# Patient Record
Sex: MALE | Race: WHITE | NOT HISPANIC OR LATINO | Employment: OTHER | URBAN - METROPOLITAN AREA
[De-identification: names, ages, dates, MRNs, and addresses within clinical notes are randomized per-mention and may not be internally consistent; named-entity substitution may affect disease eponyms.]

---

## 2017-02-28 RX ORDER — GEMFIBROZIL 600 MG/1
TABLET, FILM COATED ORAL
Qty: 180 TABLET | Refills: 2 | Status: SHIPPED | OUTPATIENT
Start: 2017-02-28 | End: 2017-11-17

## 2017-05-04 RX ORDER — ATORVASTATIN CALCIUM 80 MG/1
TABLET, FILM COATED ORAL
Qty: 90 TABLET | Refills: 2 | Status: SHIPPED | OUTPATIENT
Start: 2017-05-04 | End: 2018-01-23 | Stop reason: SDUPTHER

## 2017-06-16 ENCOUNTER — TELEPHONE (OUTPATIENT)
Dept: CARDIOLOGY | Facility: CLINIC | Age: 57
End: 2017-06-16

## 2017-06-16 NOTE — TELEPHONE ENCOUNTER
Called to speak with pt about connection on [facility].   Pt wasn't home and left message with his dad.  Gave my name and phone number for pt to call me back.

## 2017-08-16 ENCOUNTER — OFFICE VISIT (OUTPATIENT)
Dept: CARDIOLOGY | Facility: CLINIC | Age: 57
End: 2017-08-16

## 2017-08-16 VITALS
DIASTOLIC BLOOD PRESSURE: 70 MMHG | BODY MASS INDEX: 34.84 KG/M2 | WEIGHT: 216.8 LBS | SYSTOLIC BLOOD PRESSURE: 104 MMHG | HEART RATE: 68 BPM | HEIGHT: 66 IN

## 2017-08-16 DIAGNOSIS — I25.10 CORONARY ARTERY DISEASE INVOLVING NATIVE CORONARY ARTERY OF NATIVE HEART WITHOUT ANGINA PECTORIS: ICD-10-CM

## 2017-08-16 DIAGNOSIS — I10 ESSENTIAL HYPERTENSION: ICD-10-CM

## 2017-08-16 DIAGNOSIS — I25.5 ISCHEMIC CARDIOMYOPATHY: Primary | ICD-10-CM

## 2017-08-16 DIAGNOSIS — I48.0 PAROXYSMAL ATRIAL FIBRILLATION (HCC): ICD-10-CM

## 2017-08-16 DIAGNOSIS — E78.00 PURE HYPERCHOLESTEROLEMIA: ICD-10-CM

## 2017-08-16 PROBLEM — I47.20 V-TACH (HCC): Status: ACTIVE | Noted: 2017-08-16

## 2017-08-16 PROCEDURE — 93284 PRGRMG EVAL IMPLANTABLE DFB: CPT | Performed by: INTERNAL MEDICINE

## 2017-08-16 PROCEDURE — 99213 OFFICE O/P EST LOW 20 MIN: CPT | Performed by: INTERNAL MEDICINE

## 2017-08-16 NOTE — PROGRESS NOTES
Subjective:     Encounter Date:08/16/2017      Patient ID: Howard Owusu is a 57 y.o. male.    Chief Complaint: Cardiomyopathy and Coronary Artery Disease      PROBLEM LIST:  1. Coronary artery disease.  a. History of acute anterior myocardial infarction, 2004. Cardiac catheterization revealed an ejection fraction of 30%. There was 100% proximal LAD which was stented with a 3.0 x 12 and a 2.7 x 16 Taxus stent. Right coronary artery and circumflex were within normal limits.  b. November 2004, chest pain with cardiac catheterization. LAD patent stent noted. There was an ostial first diagonal stenosis of 99% which was unable to be crossed. Circumflex and right coronary artery were still within normal limits. Ejection fraction was 35%.  c. June 2008, cardiac catheterization revealed an ejection fraction 15%, 50% distal RCA stenosis, and left coronary anatomy was unchanged.  d. June 2011: Progressive angina, catheterization shows ejection fraction 20%, 90% distal right coronary artery (3.0 x 12 and 3.0 x 8 mm Cypher), and 90% right posterolateral (2.5 x 8 Cypher), and 85% mid LAD (3.0 x 13 mm Cypher).  2. 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  a. November 2004, [employer] ICD with DFT testing done.  b. July 2008, due to ischemic cardiomyopathy, patient was upgraded to a [employer] Bi-V ICD by Dr. Mtz.  c. June 2010, appropriate ICD shocks.  d. Generator change, [employer] Bi-V ICD (Dr. Atkins), 12/11/2013.  3. Ischemic cardiomyopathy.  4. Atrial fibrillation, single episode. Initial with VF during a PCI, cardioverted to atrial fibrillation. After 24 hours, converted to sinus rhythm.  5. Hypertension.  6. Hyperlipidemia.  7. Hypothyroidism.  8. Gastroesophageal reflux disease.  9. Obstructive sleep apnea.  History of Present Illness  Patient returns today for follow up with a history of Ischemic cardio myopathy.  Since her last visit is doing very well.  He  "continues to exercise regularly walks from 2-6 miles daily.  He has lost an additional 14 pounds in the last 6 months.  Denies any chest pain shortness of breath orthopnea PND palpitations presyncope syncope or claudication..     Allergies   Allergen Reactions   • Morphine And Related Hives   • Penicillins    • Sulfa Antibiotics          Current Outpatient Prescriptions:   •  aspirin 325 MG tablet, Take 325 mg by mouth daily., Disp: , Rfl:   •  atorvastatin (LIPITOR) 80 MG tablet, TAKE 1 TABLET EVERY NIGHT, Disp: 90 tablet, Rfl: 2  •  bisoprolol (ZEBETA) 5 MG tablet, TAKE 1 TABLET ONE TIME DAILY, Disp: 90 tablet, Rfl: 3  •  clopidogrel (PLAVIX) 75 MG tablet, Take 1 tablet by mouth Daily., Disp: 90 tablet, Rfl: 3  •  esomeprazole (NexIUM) 40 MG capsule, Take 40 mg by mouth every morning before breakfast., Disp: , Rfl:   •  gemfibrozil (LOPID) 600 MG tablet, TAKE 1 TABLET TWICE DAILY, Disp: 180 tablet, Rfl: 2  •  levothyroxine (SYNTHROID, LEVOTHROID) 125 MCG tablet, Take 125 mcg by mouth Daily., Disp: , Rfl:   •  lisinopril (PRINIVIL,ZESTRIL) 20 MG tablet, TAKE 1 TABLET EVERY DAY, Disp: 90 tablet, Rfl: 3  •  sertraline (ZOLOFT) 100 MG tablet, Take 1 tablet by mouth daily., Disp: , Rfl:     The following portions of the patient's history were reviewed and updated as appropriate: allergies, current medications, past family history, past medical history, past social history, past surgical history and problem list.    Review of Systems   Constitution: Negative.   Cardiovascular: Negative.    Respiratory: Negative.    Hematologic/Lymphatic: Negative for bleeding problem. Does not bruise/bleed easily.   Skin: Negative for rash.   Musculoskeletal: Negative for muscle weakness and myalgias.   Gastrointestinal: Negative for heartburn, nausea and vomiting.   Neurological: Negative.           Objective:   Blood pressure 104/70, pulse 68, height 66\" (167.6 cm), weight 216 lb 12.8 oz (98.3 kg).      Physical Exam   Constitutional: " He is oriented to person, place, and time. He appears well-developed and well-nourished.   HENT:   Mouth/Throat: Oropharynx is clear and moist.   Neck: No JVD present. Carotid bruit is not present. No thyromegaly present.   Cardiovascular: Regular rhythm, S1 normal, S2 normal, normal heart sounds and intact distal pulses.  Exam reveals no gallop, no S3 and no S4.    No murmur heard.  Pulses:       Carotid pulses are 2+ on the right side, and 2+ on the left side.       Radial pulses are 2+ on the right side, and 2+ on the left side.   Pulmonary/Chest: Breath sounds normal.   Abdominal: Soft. Bowel sounds are normal. He exhibits no mass. There is no tenderness.   Musculoskeletal: He exhibits no edema.   Neurological: He is alert and oriented to person, place, and time.   Skin: Skin is warm and dry. No rash noted.       Lab Review:    Procedures  ICD check.  No therapies delivered.  Normal function.  99% biventricular paced.  77% right atrially paced.  Estimated 2.7 years of battery life      Assessment:   Howard was seen today for cardiomyopathy and coronary artery disease.    Diagnoses and all orders for this visit:    Ischemic cardiomyopathy    Coronary artery disease involving native coronary artery of native heart without angina pectoris    Pure hypercholesterolemia    Essential hypertension    Paroxysmal atrial fibrillation      Impression  1. Coronary artery disease, stable no angina  2. Ischemic cardiomyopathy, functional class I.    3. Normal functioning biVICD  4. Hypertension well controlled  5. Dyslipidemia well-controlled    Plan:  1. No changes medical therapy  2. Check fasting lipids, triglycerides normal consider discontinuing the gemfibrozil   3. Revisit in 6 MO, or sooner as needed.    Jose Jones MD

## 2017-08-31 RX ORDER — LISINOPRIL 20 MG/1
TABLET ORAL
Qty: 90 TABLET | Refills: 3 | Status: SHIPPED | OUTPATIENT
Start: 2017-08-31 | End: 2018-08-04 | Stop reason: SDUPTHER

## 2017-08-31 RX ORDER — BISOPROLOL FUMARATE 5 MG/1
TABLET, FILM COATED ORAL
Qty: 90 TABLET | Refills: 3 | Status: SHIPPED | OUTPATIENT
Start: 2017-08-31 | End: 2018-08-04 | Stop reason: SDUPTHER

## 2017-11-17 ENCOUNTER — TELEPHONE (OUTPATIENT)
Dept: CARDIOLOGY | Facility: CLINIC | Age: 57
End: 2017-11-17

## 2017-11-17 RX ORDER — GEMFIBROZIL 600 MG/1
TABLET, FILM COATED ORAL
Qty: 180 TABLET | Refills: 2 | OUTPATIENT
Start: 2017-11-17

## 2017-11-17 RX ORDER — CLOPIDOGREL BISULFATE 75 MG/1
TABLET ORAL
Qty: 90 TABLET | Refills: 3 | Status: SHIPPED | OUTPATIENT
Start: 2017-11-17 | End: 2018-08-22 | Stop reason: SDUPTHER

## 2017-11-17 NOTE — TELEPHONE ENCOUNTER
----- Message from Jose Jones MD sent at 11/17/2017  2:21 PM EST -----  Regarding: RE: possibly d/c'ing gemfibrozil  Stop gemfibrozil  ----- Message -----     From: Ruby Pérez RN     Sent: 11/17/2017   8:52 AM       To: FRANKIE Sung, Jose Jones MD  Subject: possibly d/c'ing gemfibrozil                     Received a RF request for plavix and gemfibrozil.  According to 8/16/17 office note, if triglycerides were normal would consider discontinuing gemfibrozil.  Called patient and had copy of labs faxed from 10/3/17 with following results:  Total cholesterol: 120  Triglycerides  100  HDL 43  VLDL  20  LDL  57.  Please advise before completing refill.

## 2017-11-17 NOTE — TELEPHONE ENCOUNTER
Called patient at [phone] per his dad, advised patient to stop taking Gemfibrozil.  He verbalized understanding.

## 2017-11-30 ENCOUNTER — CLINICAL SUPPORT NO REQUIREMENTS (OUTPATIENT)
Dept: CARDIOLOGY | Facility: CLINIC | Age: 57
End: 2017-11-30

## 2017-11-30 DIAGNOSIS — I25.5 ISCHEMIC CARDIOMYOPATHY: Primary | ICD-10-CM

## 2017-11-30 PROCEDURE — 93295 DEV INTERROG REMOTE 1/2/MLT: CPT | Performed by: INTERNAL MEDICINE

## 2017-11-30 PROCEDURE — 93296 REM INTERROG EVL PM/IDS: CPT | Performed by: INTERNAL MEDICINE

## 2018-01-24 RX ORDER — ATORVASTATIN CALCIUM 80 MG/1
TABLET, FILM COATED ORAL
Qty: 90 TABLET | Refills: 1 | Status: SHIPPED | OUTPATIENT
Start: 2018-01-24 | End: 2018-08-04 | Stop reason: SDUPTHER

## 2018-02-21 ENCOUNTER — OFFICE VISIT (OUTPATIENT)
Dept: CARDIOLOGY | Facility: CLINIC | Age: 58
End: 2018-02-21

## 2018-02-21 VITALS
HEART RATE: 83 BPM | HEIGHT: 66 IN | WEIGHT: 220 LBS | SYSTOLIC BLOOD PRESSURE: 118 MMHG | DIASTOLIC BLOOD PRESSURE: 80 MMHG | BODY MASS INDEX: 35.36 KG/M2

## 2018-02-21 DIAGNOSIS — I25.10 CORONARY ARTERY DISEASE INVOLVING NATIVE CORONARY ARTERY OF NATIVE HEART WITHOUT ANGINA PECTORIS: Primary | ICD-10-CM

## 2018-02-21 DIAGNOSIS — I25.5 ISCHEMIC CARDIOMYOPATHY: ICD-10-CM

## 2018-02-21 DIAGNOSIS — I10 ESSENTIAL HYPERTENSION: ICD-10-CM

## 2018-02-21 DIAGNOSIS — E78.5 DYSLIPIDEMIA: ICD-10-CM

## 2018-02-21 DIAGNOSIS — Z86.79 HISTORY OF VENTRICULAR TACHYCARDIA: ICD-10-CM

## 2018-02-21 PROCEDURE — 93284 PRGRMG EVAL IMPLANTABLE DFB: CPT | Performed by: NURSE PRACTITIONER

## 2018-02-21 PROCEDURE — 99214 OFFICE O/P EST MOD 30 MIN: CPT | Performed by: NURSE PRACTITIONER

## 2018-02-21 RX ORDER — NITROGLYCERIN 0.4 MG/1
0.4 TABLET SUBLINGUAL
Qty: 25 TABLET | Refills: 6 | Status: SHIPPED | OUTPATIENT
Start: 2018-02-21 | End: 2018-06-20 | Stop reason: SDUPTHER

## 2018-02-21 RX ORDER — NITROGLYCERIN 0.4 MG/1
0.4 TABLET SUBLINGUAL
COMMUNITY
End: 2018-02-21 | Stop reason: SDUPTHER

## 2018-02-21 NOTE — PROGRESS NOTES
Subjective:     Encounter Date:02/21/2018    Primary Care Physician: Aly Wallace III, MD      Patient ID: Howard Owusu is a 58 y.o. male.    Chief Complaint:Cardiomyopathy and Coronary Artery Disease    PROBLEM LIST:  1. Coronary artery disease.  a. History of acute anterior myocardial infarction, 2004. Cardiac catheterization revealed an ejection fraction of 30%. There was 100% proximal LAD which was stented with a 3.0 x 12 and a 2.7 x 16 Taxus stent. Right coronary artery and circumflex were within normal limits.  b. November 2004, chest pain with cardiac catheterization. LAD patent stent noted. There was an ostial first diagonal stenosis of 99% which was unable to be crossed. Circumflex and right coronary artery were still within normal limits. Ejection fraction was 35%.  c. June 2008, cardiac catheterization revealed an ejection fraction 15%, 50% distal RCA stenosis, and left coronary anatomy was unchanged.  d. June 2011: Progressive angina, catheterization shows ejection fraction 20%, 90% distal right coronary artery (3.0 x 12 and 3.0 x 8 mm Cypher), and 90% right posterolateral (2.5 x 8 Cypher), and 85% mid LAD (3.0 x 13 mm Cypher).  2. 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  a. November 2004, [employer] ICD with DFT testing done.  b. July 2008, due to ischemic cardiomyopathy, patient was upgraded to a [employer] Bi-V ICD by Dr. Mtz.  c. June 2010, appropriate ICD shocks.  d. Generator change, [employer] Bi-V ICD (Dr. Atkins), 12/11/2013.  3. Ischemic cardiomyopathy.  4. Atrial fibrillation, single episode. Initial with VF during a PCI, cardioverted to atrial fibrillation. After 24 hours, converted to sinus rhythm.  5. Hypertension.  6. Hyperlipidemia.  7. Hypothyroidism.  8. Gastroesophageal reflux disease.  9. Obstructive sleep apnea.      Allergies   Allergen Reactions   • Morphine And Related Hives   • Penicillins    • Sulfa Antibiotics           Current Outpatient Prescriptions:   •  aspirin 325 MG tablet, Take 325 mg by mouth daily., Disp: , Rfl:   •  atorvastatin (LIPITOR) 80 MG tablet, TAKE 1 TABLET EVERY NIGHT, Disp: 90 tablet, Rfl: 1  •  bisoprolol (ZEBeta) 5 MG tablet, TAKE 1 TABLET ONE TIME DAILY, Disp: 90 tablet, Rfl: 3  •  clopidogrel (PLAVIX) 75 MG tablet, TAKE 1 TABLET BY MOUTH DAILY., Disp: 90 tablet, Rfl: 3  •  esomeprazole (NexIUM) 40 MG capsule, Take 40 mg by mouth every morning before breakfast., Disp: , Rfl:   •  levothyroxine (SYNTHROID, LEVOTHROID) 125 MCG tablet, Take 125 mcg by mouth Daily., Disp: , Rfl:   •  lisinopril (PRINIVIL,ZESTRIL) 20 MG tablet, TAKE 1 TABLET EVERY DAY, Disp: 90 tablet, Rfl: 3  •  Multiple Vitamins-Minerals (ONE DAILY MENS PO), Take  by mouth., Disp: , Rfl:   •  nitroglycerin (NITROSTAT) 0.4 MG SL tablet, Place 0.4 mg under the tongue Every 5 (Five) Minutes As Needed for Chest Pain. Take no more than 3 doses in 15 minutes., Disp: , Rfl:   •  sertraline (ZOLOFT) 100 MG tablet, Take 1 tablet by mouth daily., Disp: , Rfl:         History of Present Illness    Patient returns today for 6 month follow-up of coronary disease, ischemic cardiomyopathy, and history of ventricular tachycardia.  Since last being seen patient notes overall be doing well.  He denies any chest pain, pressure, tightness.  Denies any increasing shortness of breath.  No syncope, near-syncope, or edema.  States that he is compliant with his medications.  Since being seen his gemfibrozil was discontinued as his triglycerides were controlled.  Most recent LDL was 57 in October of last year.  He follows with his primary care for routine blood work.    The following portions of the patient's history were reviewed and updated as appropriate: allergies, current medications, past family history, past medical history, past social history, past surgical history and problem list.      Social History   Substance Use Topics   • Smoking status: Never  "Smoker   • Smokeless tobacco: Never Used   • Alcohol use No         Review of Systems   Constitution: Negative.   Cardiovascular: Negative.    Respiratory: Negative.    Hematologic/Lymphatic: Negative for bleeding problem. Does not bruise/bleed easily.   Skin: Negative for rash.   Musculoskeletal: Positive for arthritis. Negative for muscle weakness and myalgias.   Gastrointestinal: Negative for heartburn, nausea and vomiting.   Neurological: Negative.           Objective:    height is 167.6 cm (66\") and weight is 99.8 kg (220 lb). His blood pressure is 118/80 and his pulse is 83.         Physical Exam   Constitutional: He is oriented to person, place, and time. He appears well-developed and well-nourished. No distress.   Neck: No JVD present. No tracheal deviation present.   Cardiovascular: Normal rate, regular rhythm and normal heart sounds.  Exam reveals no friction rub.    No murmur heard.  Pulmonary/Chest: Effort normal and breath sounds normal. No respiratory distress.   Abdominal: Soft. Bowel sounds are normal. There is no tenderness.   Musculoskeletal: He exhibits no edema or deformity.   Neurological: He is alert and oriented to person, place, and time.   Skin: Skin is warm and dry.       Procedures  Device check:  Normal function.  Right atrially paced 80%.  By ventricularly paced greater than 99%.  Battery voltage of 2.2 years.  Charge time 10 seconds.  Noted atrial lead noise.  No mode switching.  Cor alicia was turned on.        Assessment:   Assessment/Plan      Howard was seen today for cardiomyopathy and coronary artery disease.    Diagnoses and all orders for this visit:    Coronary artery disease involving native coronary artery of native heart without angina pectoris    Ischemic cardiomyopathy, Stable.  Euvolemic.  No signs of heart failure.    Essential hypertension, controlled.    Dyslipidemia, controlled on current statin therapy.    History of ventricular tachycardia, no noted recurrence on device " check today.      Plan:    At this time we'll make no changes to the patient's cardiac medication regimen.  We'll see him back in 6 months time or sooner if needed.       Zeina DAVID     Dictated utilizing Dragon dictation

## 2018-05-29 ENCOUNTER — CLINICAL SUPPORT NO REQUIREMENTS (OUTPATIENT)
Dept: CARDIOLOGY | Facility: CLINIC | Age: 58
End: 2018-05-29

## 2018-05-29 DIAGNOSIS — I25.5 ISCHEMIC CARDIOMYOPATHY: ICD-10-CM

## 2018-05-29 PROCEDURE — 93296 REM INTERROG EVL PM/IDS: CPT | Performed by: INTERNAL MEDICINE

## 2018-05-29 PROCEDURE — 93295 DEV INTERROG REMOTE 1/2/MLT: CPT | Performed by: INTERNAL MEDICINE

## 2018-06-20 RX ORDER — NITROGLYCERIN 0.4 MG/1
0.4 TABLET SUBLINGUAL
Qty: 25 TABLET | Refills: 3 | Status: SHIPPED | OUTPATIENT
Start: 2018-06-20 | End: 2020-04-08 | Stop reason: SDUPTHER

## 2018-08-06 RX ORDER — BISOPROLOL FUMARATE 5 MG/1
TABLET, FILM COATED ORAL
Qty: 90 TABLET | Refills: 3 | Status: SHIPPED | OUTPATIENT
Start: 2018-08-06 | End: 2019-07-22 | Stop reason: SDUPTHER

## 2018-08-06 RX ORDER — ATORVASTATIN CALCIUM 80 MG/1
TABLET, FILM COATED ORAL
Qty: 90 TABLET | Refills: 1 | Status: SHIPPED | OUTPATIENT
Start: 2018-08-06 | End: 2018-08-22 | Stop reason: SDUPTHER

## 2018-08-06 RX ORDER — LISINOPRIL 20 MG/1
TABLET ORAL
Qty: 90 TABLET | Refills: 3 | Status: SHIPPED | OUTPATIENT
Start: 2018-08-06 | End: 2019-07-22 | Stop reason: SDUPTHER

## 2018-08-22 ENCOUNTER — OFFICE VISIT (OUTPATIENT)
Dept: CARDIOLOGY | Facility: CLINIC | Age: 58
End: 2018-08-22

## 2018-08-22 VITALS
WEIGHT: 223 LBS | DIASTOLIC BLOOD PRESSURE: 82 MMHG | SYSTOLIC BLOOD PRESSURE: 118 MMHG | HEART RATE: 74 BPM | HEIGHT: 66 IN | BODY MASS INDEX: 35.84 KG/M2

## 2018-08-22 DIAGNOSIS — I25.5 ISCHEMIC CARDIOMYOPATHY: Primary | ICD-10-CM

## 2018-08-22 DIAGNOSIS — I25.10 CORONARY ARTERY DISEASE INVOLVING NATIVE CORONARY ARTERY OF NATIVE HEART WITHOUT ANGINA PECTORIS: ICD-10-CM

## 2018-08-22 DIAGNOSIS — E78.00 PURE HYPERCHOLESTEROLEMIA: ICD-10-CM

## 2018-08-22 DIAGNOSIS — I10 ESSENTIAL HYPERTENSION: ICD-10-CM

## 2018-08-22 PROCEDURE — 99213 OFFICE O/P EST LOW 20 MIN: CPT | Performed by: INTERNAL MEDICINE

## 2018-08-22 PROCEDURE — 93284 PRGRMG EVAL IMPLANTABLE DFB: CPT | Performed by: INTERNAL MEDICINE

## 2018-08-22 RX ORDER — CLOPIDOGREL BISULFATE 75 MG/1
75 TABLET ORAL DAILY
Qty: 90 TABLET | Refills: 3 | Status: SHIPPED | OUTPATIENT
Start: 2018-08-22 | End: 2019-10-17 | Stop reason: SDUPTHER

## 2018-08-22 RX ORDER — OMEGA-3/DHA/EPA/FISH OIL 300-1000MG
800 CAPSULE,DELAYED RELEASE (ENTERIC COATED) ORAL DAILY
COMMUNITY

## 2018-08-22 RX ORDER — ATORVASTATIN CALCIUM 80 MG/1
80 TABLET, FILM COATED ORAL NIGHTLY
Qty: 90 TABLET | Refills: 3 | Status: SHIPPED | OUTPATIENT
Start: 2018-08-22 | End: 2019-10-17 | Stop reason: SDUPTHER

## 2018-08-22 NOTE — PROGRESS NOTES
Subjective:     Encounter Date:02/21/2018    Primary Care Physician: Aly Wallace III, MD      Patient ID: Howard Owusu is a 58 y.o. male.    Chief Complaint:No chief complaint on file.    PROBLEM LIST:  1. Coronary artery disease.  a. History of acute anterior myocardial infarction, 2004. Cardiac catheterization revealed an ejection fraction of 30%. There was 100% proximal LAD which was stented with a 3.0 x 12 and a 2.7 x 16 Taxus stent. Right coronary artery and circumflex were within normal limits.  b. November 2004, chest pain with cardiac catheterization. LAD patent stent noted. There was an ostial first diagonal stenosis of 99% which was unable to be crossed. Circumflex and right coronary artery were still within normal limits. Ejection fraction was 35%.  c. June 2008, cardiac catheterization revealed an ejection fraction 15%, 50% distal RCA stenosis, and left coronary anatomy was unchanged.  d. June 2011: Progressive angina, catheterization shows ejection fraction 20%, 90% distal right coronary artery (3.0 x 12 and 3.0 x 8 mm Cypher), and 90% right posterolateral (2.5 x 8 Cypher), and 85% mid LAD (3.0 x 13 mm Cypher).  2. 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  a. November 2004, [employer] ICD with DFT testing done.  b. July 2008, due to ischemic cardiomyopathy, patient was upgraded to a [employer] Bi-V ICD by Dr. Mtz.  c. June 2010, appropriate ICD shocks.  d. Generator change, [employer] Bi-V ICD (Dr. Atkins), 12/11/2013.  3. Ischemic cardiomyopathy.  4. Atrial fibrillation, single episode. Initial with VF during a PCI, cardioverted to atrial fibrillation. After 24 hours, converted to sinus rhythm.  5. Hypertension.  6. Hyperlipidemia.  7. Hypothyroidism.  8. Gastroesophageal reflux disease.  9. Obstructive sleep apnea.      Allergies   Allergen Reactions   • Morphine And Related Hives   • Penicillins    • Sulfa Antibiotics           Current Outpatient Prescriptions:   •  aspirin 325 MG tablet, Take 325 mg by mouth daily., Disp: , Rfl:   •  atorvastatin (LIPITOR) 80 MG tablet, TAKE 1 TABLET EVERY NIGHT, Disp: 90 tablet, Rfl: 1  •  bisoprolol (ZEBeta) 5 MG tablet, TAKE 1 TABLET EVERY DAY, Disp: 90 tablet, Rfl: 3  •  clopidogrel (PLAVIX) 75 MG tablet, TAKE 1 TABLET BY MOUTH DAILY., Disp: 90 tablet, Rfl: 3  •  esomeprazole (NexIUM) 40 MG capsule, Take 40 mg by mouth every morning before breakfast., Disp: , Rfl:   •  levothyroxine (SYNTHROID, LEVOTHROID) 125 MCG tablet, Take 125 mcg by mouth Daily., Disp: , Rfl:   •  lisinopril (PRINIVIL,ZESTRIL) 20 MG tablet, TAKE 1 TABLET EVERY DAY, Disp: 90 tablet, Rfl: 3  •  Multiple Vitamins-Minerals (ONE DAILY MENS PO), Take  by mouth., Disp: , Rfl:   •  nitroglycerin (NITROSTAT) 0.4 MG SL tablet, Place 1 tablet under the tongue Every 5 (Five) Minutes As Needed for Chest Pain. Take no more than 3 doses in 15 minutes., Disp: 25 tablet, Rfl: 3  •  Omega-3 Fatty Acids (OMEGA-3 FISH OIL EX ST) 880 MG capsule, Take 800 mg by mouth Daily., Disp: , Rfl:   •  sertraline (ZOLOFT) 100 MG tablet, Take 1 tablet by mouth daily., Disp: , Rfl:         History of Present Illness    Patient returns today for 6 month follow-up of coronary disease, ischemic cardiomyopathy, and history of ventricular tachycardia.  Since last being seen patient notes overall be doing well.  He denies any chest pain, pressure, tightness.  Denies any increasing shortness of breath.  No syncope, near-syncope, or edema.  States that he is compliant with his medications.  Since being seen his gemfibrozil was discontinued as his triglycerides were controlled.  Most recent LDL was 57 in October of last year.  He follows with his primary care for routine blood work.    The following portions of the patient's history were reviewed and updated as appropriate: allergies, current medications, past family history, past medical history, past social  "history, past surgical history and problem list.      Social History   Substance Use Topics   • Smoking status: Never Smoker   • Smokeless tobacco: Never Used   • Alcohol use No         Review of Systems   Constitution: Negative.   Cardiovascular: Negative.    Respiratory: Negative.    Hematologic/Lymphatic: Negative for bleeding problem. Does not bruise/bleed easily.   Skin: Negative for rash.   Musculoskeletal: Positive for arthritis. Negative for muscle weakness and myalgias.   Gastrointestinal: Negative for heartburn, nausea and vomiting.   Neurological: Negative.           Objective:    height is 167.6 cm (66\") and weight is 101 kg (223 lb). His blood pressure is 118/82 and his pulse is 74.         Physical Exam   Constitutional: He is oriented to person, place, and time. He appears well-developed and well-nourished. No distress.   Neck: No JVD present. No tracheal deviation present.   Cardiovascular: Normal rate, regular rhythm and normal heart sounds.  Exam reveals no friction rub.    No murmur heard.  Pulmonary/Chest: Effort normal and breath sounds normal. No respiratory distress.   Abdominal: Soft. Bowel sounds are normal. There is no tenderness.   Musculoskeletal: He exhibits no edema or deformity.   Neurological: He is alert and oriented to person, place, and time.   Skin: Skin is warm and dry.       Procedures  Device check:  Normal biventricular ICD function.  No ventricular arrhythmias.  Greater than 99% right ventricular paced.  70% right atrial paced.  Some mode switching due to noise reversion.  In the atrial lead only.  32% battery voltage left.        Assessment:   Assessment/Plan      Howard was seen today for cardiomyopathy and coronary artery disease.    Diagnoses and all orders for this visit:    Coronary artery disease involving native coronary artery of native heart without angina pectoris    Ischemic cardiomyopathy, Stable.  Euvolemic.  No signs of heart failure.    Essential hypertension, " controlled.    Dyslipidemia, controlled on current statin therapy.    History of ventricular tachycardia, no noted recurrence on device check today.      Plan:    At this time we'll make no changes to the patient's cardiac medication regimen.  We'll see him back in 6 months time or sooner if needed.       Jose Jones MD      Dictated utilizing Dragon dictation

## 2018-09-04 ENCOUNTER — CLINICAL SUPPORT NO REQUIREMENTS (OUTPATIENT)
Dept: CARDIOLOGY | Facility: CLINIC | Age: 58
End: 2018-09-04

## 2018-09-04 DIAGNOSIS — I25.5 ISCHEMIC CARDIOMYOPATHY: Primary | ICD-10-CM

## 2018-10-17 ENCOUNTER — CLINICAL SUPPORT NO REQUIREMENTS (OUTPATIENT)
Dept: CARDIOLOGY | Facility: CLINIC | Age: 58
End: 2018-10-17

## 2018-10-17 DIAGNOSIS — I25.5 ISCHEMIC CARDIOMYOPATHY: ICD-10-CM

## 2018-10-17 PROCEDURE — 93295 DEV INTERROG REMOTE 1/2/MLT: CPT | Performed by: INTERNAL MEDICINE

## 2018-10-17 PROCEDURE — 93296 REM INTERROG EVL PM/IDS: CPT | Performed by: INTERNAL MEDICINE

## 2019-01-16 ENCOUNTER — CLINICAL SUPPORT NO REQUIREMENTS (OUTPATIENT)
Dept: CARDIOLOGY | Facility: CLINIC | Age: 59
End: 2019-01-16

## 2019-01-16 DIAGNOSIS — I25.5 ISCHEMIC CARDIOMYOPATHY: ICD-10-CM

## 2019-01-16 PROCEDURE — 93295 DEV INTERROG REMOTE 1/2/MLT: CPT | Performed by: PHYSICIAN ASSISTANT

## 2019-01-16 PROCEDURE — 93296 REM INTERROG EVL PM/IDS: CPT | Performed by: PHYSICIAN ASSISTANT

## 2019-02-26 NOTE — PROGRESS NOTES
Subjective:     Encounter Date:02/27/2019    Primary Care Physician: Aly Wallace III, MD      Patient ID: Howard Owusu is a 59 y.o. male.    Chief Complaint:Cardiomyopathy and Coronary Artery Disease    PROBLEM LIST:  1. Coronary artery disease.  a. History of acute anterior myocardial infarction, 2004. Cardiac catheterization revealed an ejection fraction of 30%. There was 100% proximal LAD which was stented with a 3.0 x 12 and a 2.7 x 16 Taxus stent. Right coronary artery and circumflex were within normal limits.  b. November 2004, chest pain with cardiac catheterization. LAD patent stent noted. There was an ostial first diagonal stenosis of 99% which was unable to be crossed. Circumflex and right coronary artery were still within normal limits. Ejection fraction was 35%.  c. June 2008, cardiac catheterization revealed an ejection fraction 15%, 50% distal RCA stenosis, and left coronary anatomy was unchanged.  d. June 2011: Progressive angina, catheterization shows ejection fraction 20%, 90% distal right coronary artery (3.0 x 12 and 3.0 x 8 mm Cypher), and 90% right posterolateral (2.5 x 8 Cypher), and 85% mid LAD (3.0 x 13 mm Cypher).  2. 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  a. November 2004, [employer] ICD with DFT testing done.  b. July 2008, due to ischemic cardiomyopathy, patient was upgraded to a [employer] Bi-V ICD by Dr. Mtz.  c. June 2010, appropriate ICD shocks.  d. Generator change, [employer] Bi-V ICD (Dr. Atkins), 12/11/2013.  3. Ischemic cardiomyopathy.  4. Atrial fibrillation, single episode. Initial with VF during a PCI, cardioverted to atrial fibrillation. After 24 hours, converted to sinus rhythm.  5. Hypertension.  6. Hyperlipidemia.  7. Hypothyroidism.  8. Gastroesophageal reflux disease.  9. Obstructive sleep apnea.      Allergies   Allergen Reactions   • Morphine And Related Hives   • Penicillins    • Sulfa Antibiotics   "        Current Outpatient Medications:   •  aspirin 325 MG tablet, Take 325 mg by mouth daily., Disp: , Rfl:   •  atorvastatin (LIPITOR) 80 MG tablet, Take 1 tablet by mouth Every Night., Disp: 90 tablet, Rfl: 3  •  bisoprolol (ZEBeta) 5 MG tablet, TAKE 1 TABLET EVERY DAY, Disp: 90 tablet, Rfl: 3  •  clopidogrel (PLAVIX) 75 MG tablet, Take 1 tablet by mouth Daily., Disp: 90 tablet, Rfl: 3  •  esomeprazole (NexIUM) 40 MG capsule, Take 20 mg by mouth Every Morning Before Breakfast., Disp: , Rfl:   •  Garlic 1000 MG capsule, Take  by mouth Daily., Disp: , Rfl:   •  levothyroxine (SYNTHROID, LEVOTHROID) 150 MCG tablet, Take 150 mcg by mouth Daily., Disp: , Rfl:   •  lisinopril (PRINIVIL,ZESTRIL) 20 MG tablet, TAKE 1 TABLET EVERY DAY, Disp: 90 tablet, Rfl: 3  •  Multiple Vitamins-Minerals (ONE DAILY MENS PO), Take  by mouth., Disp: , Rfl:   •  nitroglycerin (NITROSTAT) 0.4 MG SL tablet, Place 1 tablet under the tongue Every 5 (Five) Minutes As Needed for Chest Pain. Take no more than 3 doses in 15 minutes., Disp: 25 tablet, Rfl: 3  •  Omega-3 Fatty Acids (OMEGA-3 FISH OIL EX ST) 880 MG capsule, Take 800 mg by mouth Daily., Disp: , Rfl:   •  sertraline (ZOLOFT) 100 MG tablet, Take 1 tablet by mouth daily., Disp: , Rfl:         History of Present Illness    Patient is a 59-year-old  male who we are seeing today for follow-up of coronary artery disease, cardiomyopathy and chronic systolic heart failure.  Since last being seen he notes to overall be doing well.  Has had 1 or 2 episodes of chest discomfort since last being seen.  Overall does not think they are \"anything\".  Denies any increasing shortness of breath.  Denies any syncope, near syncope, or edema.  Labs performed with primary care in January showed controlled LDL of 67.  His thyroid medication was recently adjusted secondary to elevated TSH.    The following portions of the patient's history were reviewed and updated as appropriate: allergies, current " "medications, past family history, past medical history, past social history, past surgical history and problem list.      Social History     Tobacco Use   • Smoking status: Never Smoker   • Smokeless tobacco: Never Used   Substance Use Topics   • Alcohol use: No   • Drug use: No         Review of Systems   Constitution: Negative.   Cardiovascular: Negative.    Respiratory: Positive for shortness of breath.    Hematologic/Lymphatic: Negative for bleeding problem. Does not bruise/bleed easily.   Skin: Negative for rash.   Musculoskeletal: Positive for arthritis. Negative for muscle weakness and myalgias.   Gastrointestinal: Negative for heartburn, nausea and vomiting.   Neurological: Negative.           Objective:    height is 167.6 cm (66\") and weight is 103 kg (226 lb). His blood pressure is 126/80 and his pulse is 79.         Physical Exam   Constitutional: He is oriented to person, place, and time. He appears well-developed and well-nourished.   Obese   Neck: No JVD present. No tracheal deviation present.   Cardiovascular: Normal rate, regular rhythm and normal heart sounds. Exam reveals no friction rub.   No murmur heard.  Pulmonary/Chest: Effort normal and breath sounds normal. No respiratory distress.   Abdominal: Soft. Bowel sounds are normal. There is no tenderness.   Musculoskeletal: He exhibits no edema or deformity.   Neurological: He is alert and oriented to person, place, and time.   Skin: Skin is warm and dry.       Procedures  Device check:   Normal functioning.  Greater than 99% by ventricularly paced.  66% right atrially paced.  Atrial events noted but noted to be noise.  No reprogramming.  Battery voltage 1.4 years.        Assessment:   Assessment/Plan      Howard was seen today for cardiomyopathy and coronary artery disease.    Diagnoses and all orders for this visit:    Coronary artery disease involving native coronary artery of native heart without angina pectoris    Ischemic cardiomyopathy " stable.    Essential hypertension, controlled    Dyslipidemia, controlled on statin therapy.      Plan:  1. Continue current medications  2. Follow-up in 6 months time with a device check or sooner if needed.    FRANKIE Sung        Dictated utilizing Dragon dictation

## 2019-02-27 ENCOUNTER — OFFICE VISIT (OUTPATIENT)
Dept: CARDIOLOGY | Facility: CLINIC | Age: 59
End: 2019-02-27

## 2019-02-27 VITALS
DIASTOLIC BLOOD PRESSURE: 80 MMHG | WEIGHT: 226 LBS | HEIGHT: 66 IN | HEART RATE: 79 BPM | SYSTOLIC BLOOD PRESSURE: 126 MMHG | BODY MASS INDEX: 36.32 KG/M2

## 2019-02-27 DIAGNOSIS — I10 ESSENTIAL HYPERTENSION: ICD-10-CM

## 2019-02-27 DIAGNOSIS — I25.5 ISCHEMIC CARDIOMYOPATHY: ICD-10-CM

## 2019-02-27 DIAGNOSIS — I25.10 CORONARY ARTERY DISEASE INVOLVING NATIVE CORONARY ARTERY OF NATIVE HEART WITHOUT ANGINA PECTORIS: Primary | ICD-10-CM

## 2019-02-27 DIAGNOSIS — E78.5 DYSLIPIDEMIA: ICD-10-CM

## 2019-02-27 PROCEDURE — 99213 OFFICE O/P EST LOW 20 MIN: CPT | Performed by: NURSE PRACTITIONER

## 2019-02-27 PROCEDURE — 93284 PRGRMG EVAL IMPLANTABLE DFB: CPT | Performed by: NURSE PRACTITIONER

## 2019-02-27 RX ORDER — LEVOTHYROXINE SODIUM 0.15 MG/1
150 TABLET ORAL DAILY
COMMUNITY

## 2019-04-18 ENCOUNTER — CLINICAL SUPPORT NO REQUIREMENTS (OUTPATIENT)
Dept: CARDIOLOGY | Facility: CLINIC | Age: 59
End: 2019-04-18

## 2019-04-18 DIAGNOSIS — I25.5 ISCHEMIC CARDIOMYOPATHY: Primary | ICD-10-CM

## 2019-04-18 PROCEDURE — 93296 REM INTERROG EVL PM/IDS: CPT | Performed by: INTERNAL MEDICINE

## 2019-04-18 PROCEDURE — 93295 DEV INTERROG REMOTE 1/2/MLT: CPT | Performed by: INTERNAL MEDICINE

## 2019-07-19 ENCOUNTER — CLINICAL SUPPORT NO REQUIREMENTS (OUTPATIENT)
Dept: CARDIOLOGY | Facility: CLINIC | Age: 59
End: 2019-07-19

## 2019-07-19 DIAGNOSIS — I25.5 ISCHEMIC CARDIOMYOPATHY: ICD-10-CM

## 2019-07-19 PROCEDURE — 93295 DEV INTERROG REMOTE 1/2/MLT: CPT | Performed by: INTERNAL MEDICINE

## 2019-07-19 PROCEDURE — 93296 REM INTERROG EVL PM/IDS: CPT | Performed by: INTERNAL MEDICINE

## 2019-07-23 RX ORDER — BISOPROLOL FUMARATE 5 MG/1
TABLET, FILM COATED ORAL
Qty: 90 TABLET | Refills: 3 | Status: SHIPPED | OUTPATIENT
Start: 2019-07-23 | End: 2020-04-08 | Stop reason: SDUPTHER

## 2019-07-23 RX ORDER — LISINOPRIL 20 MG/1
TABLET ORAL
Qty: 90 TABLET | Refills: 3 | Status: SHIPPED | OUTPATIENT
Start: 2019-07-23 | End: 2020-04-08 | Stop reason: SDUPTHER

## 2019-09-25 ENCOUNTER — OFFICE VISIT (OUTPATIENT)
Dept: CARDIOLOGY | Facility: CLINIC | Age: 59
End: 2019-09-25

## 2019-09-25 VITALS
OXYGEN SATURATION: 97 % | HEIGHT: 66 IN | DIASTOLIC BLOOD PRESSURE: 82 MMHG | HEART RATE: 72 BPM | WEIGHT: 222 LBS | BODY MASS INDEX: 35.68 KG/M2 | SYSTOLIC BLOOD PRESSURE: 142 MMHG

## 2019-09-25 DIAGNOSIS — E78.00 PURE HYPERCHOLESTEROLEMIA: ICD-10-CM

## 2019-09-25 DIAGNOSIS — I10 ESSENTIAL HYPERTENSION: ICD-10-CM

## 2019-09-25 DIAGNOSIS — I25.5 ISCHEMIC CARDIOMYOPATHY: Primary | ICD-10-CM

## 2019-09-25 DIAGNOSIS — I25.10 CORONARY ARTERY DISEASE INVOLVING NATIVE CORONARY ARTERY OF NATIVE HEART WITHOUT ANGINA PECTORIS: ICD-10-CM

## 2019-09-25 PROCEDURE — 93284 PRGRMG EVAL IMPLANTABLE DFB: CPT | Performed by: INTERNAL MEDICINE

## 2019-09-25 PROCEDURE — 99214 OFFICE O/P EST MOD 30 MIN: CPT | Performed by: INTERNAL MEDICINE

## 2019-09-25 NOTE — PROGRESS NOTES
Subjective:     Encounter Date:09/25/2019    Primary Care Physician: Aly Wallace III, MD      Patient ID: Howard Owusu is a 59 y.o. male.    Chief Complaint:Follow-up    PROBLEM LIST:  1. Coronary artery disease.  a. History of acute anterior myocardial infarction, 2004. Cardiac catheterization revealed an ejection fraction of 30%. There was 100% proximal LAD which was stented with a 3.0 x 12 and a 2.7 x 16 Taxus stent. Right coronary artery and circumflex were within normal limits.  b. November 2004, chest pain with cardiac catheterization. LAD patent stent noted. There was an ostial first diagonal stenosis of 99% which was unable to be crossed. Circumflex and right coronary artery were still within normal limits. Ejection fraction was 35%.  c. June 2008, cardiac catheterization revealed an ejection fraction 15%, 50% distal RCA stenosis, and left coronary anatomy was unchanged.  d. June 2011: Progressive angina, catheterization shows ejection fraction 20%, 90% distal right coronary artery (3.0 x 12 and 3.0 x 8 mm Cypher), and 90% right posterolateral (2.5 x 8 Cypher), and 85% mid LAD (3.0 x 13 mm Cypher).  2. 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  a. November 2004, [employer] ICD with DFT testing done.  b. July 2008, due to ischemic cardiomyopathy, patient was upgraded to a [employer] Bi-V ICD by Dr. Mtz.  c. June 2010, appropriate ICD shocks.  d. Generator change, [employer] Bi-V ICD (Dr. Atkins), 12/11/2013.  3. Ischemic cardiomyopathy.  4. Atrial fibrillation, single episode. Initial with VF during a PCI, cardioverted to atrial fibrillation. After 24 hours, converted to sinus rhythm.  5. Hypertension.  6. Hyperlipidemia.  7. Hypothyroidism.  8. Gastroesophageal reflux disease.  9. Obstructive sleep apnea.        Allergies   Allergen Reactions   • Morphine And Related Hives   • Penicillins    • Sulfa Antibiotics          Current Outpatient  Medications:   •  aspirin 325 MG tablet, Take 325 mg by mouth daily., Disp: , Rfl:   •  atorvastatin (LIPITOR) 80 MG tablet, Take 1 tablet by mouth Every Night., Disp: 90 tablet, Rfl: 3  •  bisoprolol (ZEBeta) 5 MG tablet, TAKE 1 TABLET EVERY DAY, Disp: 90 tablet, Rfl: 3  •  clopidogrel (PLAVIX) 75 MG tablet, Take 1 tablet by mouth Daily., Disp: 90 tablet, Rfl: 3  •  esomeprazole (NexIUM) 40 MG capsule, Take 20 mg by mouth Every Morning Before Breakfast., Disp: , Rfl:   •  Garlic 1000 MG capsule, Take  by mouth Daily., Disp: , Rfl:   •  levothyroxine (SYNTHROID, LEVOTHROID) 150 MCG tablet, Take 150 mcg by mouth Daily., Disp: , Rfl:   •  lisinopril (PRINIVIL,ZESTRIL) 20 MG tablet, TAKE 1 TABLET EVERY DAY, Disp: 90 tablet, Rfl: 3  •  Multiple Vitamins-Minerals (ONE DAILY MENS PO), Take  by mouth., Disp: , Rfl:   •  nitroglycerin (NITROSTAT) 0.4 MG SL tablet, Place 1 tablet under the tongue Every 5 (Five) Minutes As Needed for Chest Pain. Take no more than 3 doses in 15 minutes., Disp: 25 tablet, Rfl: 3  •  Omega-3 Fatty Acids (OMEGA-3 FISH OIL EX ST) 880 MG capsule, Take 800 mg by mouth Daily., Disp: , Rfl:   •  sertraline (ZOLOFT) 100 MG tablet, Take 1 tablet by mouth daily., Disp: , Rfl:         History of Present Illness    Patient returns today for routine follow-up for biventricular ICD, coronary disease and ischemic cardia myopathy.  Overall since her last visit patient is doing very well.  He denies any cardiovascular complaints.  Is active.  Denies denies presyncope chest pain shortness of breath orthopnea PND claudication or any cardiovascular symptoms.    The following portions of the patient's history were reviewed and updated as appropriate: allergies, current medications, past family history, past medical history, past social history, past surgical history and problem list.      Social History     Tobacco Use   • Smoking status: Never Smoker   • Smokeless tobacco: Never Used   Substance Use Topics   •  "Alcohol use: No   • Drug use: No         Review of Systems   Constitution: Negative.   Cardiovascular: Negative.    Respiratory: Negative.    Hematologic/Lymphatic: Negative for bleeding problem. Does not bruise/bleed easily.   Skin: Negative for rash.   Musculoskeletal: Negative for muscle weakness and myalgias.   Gastrointestinal: Negative for heartburn, nausea and vomiting.   Neurological: Negative.           Objective:    height is 167.6 cm (66\") and weight is 101 kg (222 lb). His blood pressure is 142/82 and his pulse is 72. His oxygen saturation is 97%.         Physical Exam   Constitutional: He is oriented to person, place, and time. He appears well-developed and well-nourished.   HENT:   Mouth/Throat: Oropharynx is clear and moist.   Neck: No JVD present. Carotid bruit is not present. No thyromegaly present.   Cardiovascular: Regular rhythm, S1 normal, S2 normal, normal heart sounds and intact distal pulses. Exam reveals no gallop, no S3 and no S4.   No murmur heard.  Pulses:       Carotid pulses are 2+ on the right side, and 2+ on the left side.       Radial pulses are 2+ on the right side, and 2+ on the left side.   Pulmonary/Chest: Breath sounds normal.   Abdominal: Soft. Bowel sounds are normal. He exhibits no mass. There is no tenderness.   Musculoskeletal: He exhibits no edema.   Neurological: He is alert and oriented to person, place, and time.   Skin: Skin is warm and dry. No rash noted.       Procedures  Device check:  61% atrially paced, 99% biventricular paced.  Normal thresholds and impedances.  Estimated battery life 1.2 years.  Charge time 10.5 seconds.  No atrial or ventricular episodes noted.  No therapy is required.        Assessment:   Assessment/Plan      Howard was seen today for follow-up.    Diagnoses and all orders for this visit:    Ischemic cardiomyopathy    Coronary artery disease involving native coronary artery of native heart without angina pectoris    Pure " hypercholesterolemia    Essential hypertension      1.  Ischemic cardiomyopathy status post by V ICD.  Currently functional class I.  2.  Coronary artery disease, stable without angina.  3.  Hypertension well-controlled  4.  Dyslipidemia well-controlled on high-dose statin  5.  Normal functioning by V ICD without recent atrial or ventricular arrhythmias.    Recommendations continue current medical therapy.  Revisit 6 months with the device check    Jose Jones MD           Dictated utilizing Dragon dictation

## 2019-10-17 ENCOUNTER — CLINICAL SUPPORT NO REQUIREMENTS (OUTPATIENT)
Dept: CARDIOLOGY | Facility: CLINIC | Age: 59
End: 2019-10-17

## 2019-10-17 DIAGNOSIS — I25.5 ISCHEMIC CARDIOMYOPATHY: ICD-10-CM

## 2019-10-17 RX ORDER — ATORVASTATIN CALCIUM 80 MG/1
TABLET, FILM COATED ORAL
Qty: 90 TABLET | Refills: 3 | Status: SHIPPED | OUTPATIENT
Start: 2019-10-17 | End: 2020-04-08 | Stop reason: SDUPTHER

## 2019-10-17 RX ORDER — CLOPIDOGREL BISULFATE 75 MG/1
TABLET ORAL
Qty: 90 TABLET | Refills: 3 | Status: SHIPPED | OUTPATIENT
Start: 2019-10-17 | End: 2020-04-08 | Stop reason: SDUPTHER

## 2020-04-06 ENCOUNTER — CLINICAL SUPPORT NO REQUIREMENTS (OUTPATIENT)
Dept: CARDIOLOGY | Facility: CLINIC | Age: 60
End: 2020-04-06

## 2020-04-06 ENCOUNTER — TELEPHONE (OUTPATIENT)
Dept: CARDIOLOGY | Facility: CLINIC | Age: 60
End: 2020-04-06

## 2020-04-06 DIAGNOSIS — I25.5 ISCHEMIC CARDIOMYOPATHY: ICD-10-CM

## 2020-04-06 PROCEDURE — 93296 REM INTERROG EVL PM/IDS: CPT | Performed by: INTERNAL MEDICINE

## 2020-04-06 PROCEDURE — 93295 DEV INTERROG REMOTE 1/2/MLT: CPT | Performed by: INTERNAL MEDICINE

## 2020-04-06 NOTE — TELEPHONE ENCOUNTER
Called pt to get a Merlin home monitor reading for his telephone visit coming up.  Left message with instructions and left my name and number to call for assistance.

## 2020-04-06 NOTE — PROGRESS NOTES
[facility] Cardiology  Subjective:     Encounter Date:04/07/2020      Patient ID: Howard Owusu is a 60 y.o. male.    Chief Complaint: Atrial Fibrillation and Cardiomyopathy      PROBLEM LIST:  1. Coronary artery disease/ischemic cardiomyopathy:  a. [facility] for acute anterior MI, 2004: EF 30%. 100% proximal LAD now s/p 3.0 x 12 and a 2.7 x 16 Taxus stent. RCA and LCx were within normal limits.  b. [facility], 11/2004: LAD patent stent noted. Ostial D1 stenosis of 99% which was unable to be crossed. LCx and RCA were still within normal limits. EF 35%.  c. [facility], 06/2008: EF 15%. 50% distal RCA stenosis, and left coronary anatomy was unchanged.  d. [facility], 06/2011: EF 20%, 90% distal RCA (3.0 x 12 and 3.0 x 8 mm Cypher), and 90% right posterolateral (2.5 x 8 Cypher), and 85% mid LAD (3.0 x 13 mm Cypher).  2. Ventricular tachycardia:  a. 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  b. November 2004, [employer] ICD with DFT testing done.  c. July 2008, due to ischemic cardiomyopathy, patient was upgraded to a [employer] Bi-V ICD by Dr. Mtz.  d. June 2010, appropriate ICD shocks.  e. Generator change, [employer] Bi-V ICD (Dr. Atkins), 12/11/2013.  3. Ischemic cardiomyopathy.  4. Atrial fibrillation, single episode. Initial with VF during a PCI, cardioverted to atrial fibrillation. After 24 hours, converted to sinus rhythm.  5. Hypertension.  6. Hyperlipidemia.  7. Hypothyroidism.  8. GERD.  9. GILL.    History of Present Illness  Howard Owusu has telephone visit today for 6 month follow up with a history of coronary artery disease, ischemic cardiomyopathy, and cardiac risk factors. Since last visit Mr. Owusu continues to feel well.  He is very active in the yard, outside has no chest pain dyspnea orthopnea PND tachypalpitations claudication presyncope or syncope.  No interval medical problems.  Overall feels he is at his baseline.  Taking  appropriate coronavirus precautions.    Allergies   Allergen Reactions   • Morphine And Related Hives   • Penicillins    • Sulfa Antibiotics          Current Outpatient Medications:   •  aspirin 325 MG tablet, Take 325 mg by mouth daily., Disp: , Rfl:   •  atorvastatin (LIPITOR) 80 MG tablet, TAKE 1 TABLET EVERY NIGHT, Disp: 90 tablet, Rfl: 3  •  bisoprolol (ZEBeta) 5 MG tablet, TAKE 1 TABLET EVERY DAY, Disp: 90 tablet, Rfl: 3  •  clopidogrel (PLAVIX) 75 MG tablet, TAKE 1 TABLET EVERY DAY, Disp: 90 tablet, Rfl: 3  •  esomeprazole (NexIUM) 40 MG capsule, Take 20 mg by mouth Every Morning Before Breakfast., Disp: , Rfl:   •  Garlic 1000 MG capsule, Take 1 tablet by mouth Daily., Disp: , Rfl:   •  levothyroxine (SYNTHROID, LEVOTHROID) 150 MCG tablet, Take 150 mcg by mouth Daily., Disp: , Rfl:   •  lisinopril (PRINIVIL,ZESTRIL) 20 MG tablet, TAKE 1 TABLET EVERY DAY, Disp: 90 tablet, Rfl: 3  •  Multiple Vitamins-Minerals (ONE DAILY MENS PO), Take 1 tablet by mouth Daily., Disp: , Rfl:   •  nitroglycerin (NITROSTAT) 0.4 MG SL tablet, Place 1 tablet under the tongue Every 5 (Five) Minutes As Needed for Chest Pain. Take no more than 3 doses in 15 minutes., Disp: 25 tablet, Rfl: 3  •  Omega-3 Fatty Acids (OMEGA-3 FISH OIL EX ST) 880 MG capsule, Take 800 mg by mouth Daily., Disp: , Rfl:   •  sertraline (ZOLOFT) 100 MG tablet, Take 1 tablet by mouth daily., Disp: , Rfl:     The following portions of the patient's history were reviewed and updated as appropriate: allergies, current medications, past family history, past medical history, past social history, past surgical history and problem list.    Review of Systems   Constitution: Negative.   Cardiovascular: Negative.    Respiratory: Negative.    Hematologic/Lymphatic: Negative for bleeding problem. Does not bruise/bleed easily.   Skin: Negative for rash.   Musculoskeletal: Negative for muscle weakness and myalgias.   Gastrointestinal: Negative for heartburn, nausea and  "vomiting.   Neurological: Negative.           Objective:     Vitals:    04/07/20 1434   Weight: 101 kg (222 lb)   Height: 167.6 cm (66\")         Physical Exam  No physical exam performed secondary to telephone visit.       Lab Review:    Lab date: 03/05/2020  • FLP: , , HDL 47, LDL 75  • CMP: Glu 102, BUN 16, Creat 0.94, eGFR 87, Na 141, K 4.7, Cl 106, CO2 21, Ca 8.8, Alk Phos 81, AST 31, ALT 30  • CBC: WBC 5.7, RBC 4.32, HGB 13.5, HCT 41.5, MCV 96, MCH 31.3,       Procedures  [employer] ICD check: Download was reviewed with the patient.  Normal atrial right ventricular and left ventricular thresholds and impedances.  He has 99% biventricular paced, 64% atrially paced.  No ventricular tachycardia and no atrial arrhythmias detected.  Battery voltage was 2.68 V given an estimated battery longevity of 7 months.      Assessment:   Howard was seen today for atrial fibrillation and cardiomyopathy.    Diagnoses and all orders for this visit:    Ischemic cardiomyopathy    Coronary artery disease involving native coronary artery of native heart without angina pectoris    Pure hypercholesterolemia    V-tach (CMS/[facility])         Plan:  1. Ischemic cardiomyopathy, currently functional class I-2.  No evidence of volume overload.  2. Coronary artery disease active without angina we will continue current medical therapy  3. Normal functioning biventricular ICD.  Nearing EVAN we will continue to follow.  4. Continue current medications.  5. Revisit in 6 MO, or sooner as needed.      This patient has consented to a telehealth visit via telephone. The visit was scheduled as a telephone visit to comply with patient safety concerns in accordance with CDC recommendations.  All vitals recorded within this visit are reported by the patient.  I spent 13 minutes in total including but not limited to the 6 minutes spent in direct conversation with this patient.     Jose Jones MD      Please note that portions of this note " may have been completed with a voice recognition program. Efforts were made to edit the dictations, but occasionally words are mistranscribed.

## 2020-04-07 ENCOUNTER — OFFICE VISIT (OUTPATIENT)
Dept: CARDIOLOGY | Facility: CLINIC | Age: 60
End: 2020-04-07

## 2020-04-07 VITALS — WEIGHT: 222 LBS | BODY MASS INDEX: 35.68 KG/M2 | HEIGHT: 66 IN

## 2020-04-07 DIAGNOSIS — I25.5 ISCHEMIC CARDIOMYOPATHY: Primary | ICD-10-CM

## 2020-04-07 DIAGNOSIS — I47.20 V-TACH (HCC): ICD-10-CM

## 2020-04-07 DIAGNOSIS — E78.00 PURE HYPERCHOLESTEROLEMIA: ICD-10-CM

## 2020-04-07 DIAGNOSIS — I25.10 CORONARY ARTERY DISEASE INVOLVING NATIVE CORONARY ARTERY OF NATIVE HEART WITHOUT ANGINA PECTORIS: ICD-10-CM

## 2020-04-07 PROCEDURE — 99213 OFFICE O/P EST LOW 20 MIN: CPT | Performed by: INTERNAL MEDICINE

## 2020-04-08 RX ORDER — ATORVASTATIN CALCIUM 80 MG/1
80 TABLET, FILM COATED ORAL NIGHTLY
Qty: 90 TABLET | Refills: 3 | Status: SHIPPED | OUTPATIENT
Start: 2020-04-08 | End: 2021-04-15

## 2020-04-08 RX ORDER — CLOPIDOGREL BISULFATE 75 MG/1
75 TABLET ORAL DAILY
Qty: 90 TABLET | Refills: 3 | Status: SHIPPED | OUTPATIENT
Start: 2020-04-08 | End: 2021-04-29

## 2020-04-08 RX ORDER — LISINOPRIL 20 MG/1
20 TABLET ORAL DAILY
Qty: 90 TABLET | Refills: 3 | Status: SHIPPED | OUTPATIENT
Start: 2020-04-08 | End: 2021-04-29

## 2020-04-08 RX ORDER — NITROGLYCERIN 0.4 MG/1
0.4 TABLET SUBLINGUAL
Qty: 25 TABLET | Refills: 3 | Status: SHIPPED | OUTPATIENT
Start: 2020-04-08

## 2020-04-08 RX ORDER — BISOPROLOL FUMARATE 5 MG/1
5 TABLET, FILM COATED ORAL DAILY
Qty: 90 TABLET | Refills: 3 | Status: SHIPPED | OUTPATIENT
Start: 2020-04-08 | End: 2021-04-29

## 2020-10-06 ENCOUNTER — TELEPHONE (OUTPATIENT)
Dept: CARDIOLOGY | Facility: CLINIC | Age: 60
End: 2020-10-06

## 2020-10-06 NOTE — TELEPHONE ENCOUNTER
Mr Umer called with concerns of his device vibrating in his chest.  Had him send in a remote reading and he is EVAN.  He has f/u appt with Dr Jones tomorrow.  He will get checked tomorrow in office and will be set up for gen change.

## 2020-10-07 ENCOUNTER — OFFICE VISIT (OUTPATIENT)
Dept: CARDIOLOGY | Facility: CLINIC | Age: 60
End: 2020-10-07

## 2020-10-07 VITALS
WEIGHT: 227 LBS | OXYGEN SATURATION: 99 % | SYSTOLIC BLOOD PRESSURE: 134 MMHG | BODY MASS INDEX: 36.48 KG/M2 | HEART RATE: 64 BPM | HEIGHT: 66 IN | DIASTOLIC BLOOD PRESSURE: 74 MMHG

## 2020-10-07 DIAGNOSIS — I10 ESSENTIAL HYPERTENSION: ICD-10-CM

## 2020-10-07 DIAGNOSIS — E78.00 PURE HYPERCHOLESTEROLEMIA: ICD-10-CM

## 2020-10-07 DIAGNOSIS — I25.10 CORONARY ARTERY DISEASE INVOLVING NATIVE CORONARY ARTERY OF NATIVE HEART WITHOUT ANGINA PECTORIS: ICD-10-CM

## 2020-10-07 DIAGNOSIS — I25.5 ISCHEMIC CARDIOMYOPATHY: Primary | ICD-10-CM

## 2020-10-07 DIAGNOSIS — I47.20 V-TACH (HCC): ICD-10-CM

## 2020-10-07 PROCEDURE — 93284 PRGRMG EVAL IMPLANTABLE DFB: CPT | Performed by: INTERNAL MEDICINE

## 2020-10-07 PROCEDURE — 99214 OFFICE O/P EST MOD 30 MIN: CPT | Performed by: INTERNAL MEDICINE

## 2020-10-07 NOTE — PROGRESS NOTES
Subjective:     Encounter Date:10/07/2020    Primary Care Physician: Aly Wallace III, MD      Patient ID: Howard Owusu is a 60 y.o. male.    Chief Complaint:Follow-up    PROBLEM LIST:  1. Coronary artery disease/ischemic cardiomyopathy:  a. [facility] for acute anterior MI, 2004: EF 30%. 100% proximal LAD now s/p 3.0 x 12 and a 2.7 x 16 Taxus stent. RCA and LCx were within normal limits.  b. [facility], 11/2004: LAD patent stent noted. Ostial D1 stenosis of 99% which was unable to be crossed. LCx and RCA were still within normal limits. EF 35%.  c. [facility], 06/2008: EF 15%. 50% distal RCA stenosis, and left coronary anatomy was unchanged.  d. [facility], 06/2011: EF 20%, 90% distal RCA (3.0 x 12 and 3.0 x 8 mm Cypher), and 90% right posterolateral (2.5 x 8 Cypher), and 85% mid LAD (3.0 x 13 mm Cypher).  2. Ventricular tachycardia:  a. 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  b. November 2004, [employer] ICD with DFT testing done.  c. July 2008, due to ischemic cardiomyopathy, patient was upgraded to a [employer] Bi-V ICD by Dr. Mtz.  d. June 2010, appropriate ICD shocks.  e. Generator change, [employer] Bi-V ICD (Dr. Atkins), 12/11/2013.  3. Ischemic cardiomyopathy.  4. Atrial fibrillation, single episode. Initial with VF during a PCI, cardioverted to atrial fibrillation. After 24 hours, converted to sinus rhythm.  5. Hypertension.  6. Hyperlipidemia.  7. Hypothyroidism.  8. GERD.  9. GILL.        Allergies   Allergen Reactions   • Morphine And Related Hives   • Penicillins    • Sulfa Antibiotics          Current Outpatient Medications:   •  aspirin 325 MG tablet, Take 325 mg by mouth daily., Disp: , Rfl:   •  atorvastatin (LIPITOR) 80 MG tablet, Take 1 tablet by mouth Every Night., Disp: 90 tablet, Rfl: 3  •  bisoprolol (ZEBeta) 5 MG tablet, Take 1 tablet by mouth Daily., Disp: 90 tablet, Rfl: 3  •  clopidogrel (PLAVIX) 75 MG tablet, Take 1 tablet by mouth Daily., Disp: 90  tablet, Rfl: 3  •  esomeprazole (NexIUM) 40 MG capsule, Take 20 mg by mouth Every Morning Before Breakfast., Disp: , Rfl:   •  Garlic 1000 MG capsule, Take 1 tablet by mouth Daily., Disp: , Rfl:   •  levothyroxine (SYNTHROID, LEVOTHROID) 150 MCG tablet, Take 150 mcg by mouth Daily., Disp: , Rfl:   •  lisinopril (PRINIVIL,ZESTRIL) 20 MG tablet, Take 1 tablet by mouth Daily., Disp: 90 tablet, Rfl: 3  •  Multiple Vitamins-Minerals (ONE DAILY MENS PO), Take 1 tablet by mouth Daily., Disp: , Rfl:   •  nitroglycerin (NITROSTAT) 0.4 MG SL tablet, Place 1 tablet under the tongue Every 5 (Five) Minutes As Needed for Chest Pain. Take no more than 3 doses in 15 minutes., Disp: 25 tablet, Rfl: 3  •  Omega-3 Fatty Acids (OMEGA-3 FISH OIL EX ST) 880 MG capsule, Take 800 mg by mouth Daily., Disp: , Rfl:   •  sertraline (ZOLOFT) 100 MG tablet, Take 1 tablet by mouth daily., Disp: , Rfl:         History of Present Illness    Patient turns today for routine follow-up of defibrillator coronary disease ventricular tachycardia.  Since her last visit he is doing very well.  He is active, doing work outside in the yard without chest pain shortness of breath orthopnea PND tachypalpitations claudication presyncope or syncope.  One episode of chest pain in July, not related to activity and no recurrence    The following portions of the patient's history were reviewed and updated as appropriate: allergies, current medications, past family history, past medical history, past social history, past surgical history and problem list.      Social History     Tobacco Use   • Smoking status: Never Smoker   • Smokeless tobacco: Never Used   Substance Use Topics   • Alcohol use: No   • Drug use: No         Review of Systems   Constitution: Negative.   Cardiovascular: Negative for chest pain, dyspnea on exertion, leg swelling, palpitations and syncope.   Respiratory: Negative.  Negative for shortness of breath.    Hematologic/Lymphatic: Negative for  "bleeding problem. Does not bruise/bleed easily.   Skin: Negative for rash.   Musculoskeletal: Positive for joint pain and joint swelling. Negative for muscle weakness and myalgias.   Gastrointestinal: Negative for heartburn, nausea and vomiting.   Neurological: Negative for dizziness, light-headedness, loss of balance and numbness.          Objective:   /74 (BP Location: Left arm)   Pulse 64   Ht 167.6 cm (66\")   Wt 103 kg (227 lb)   SpO2 99%   BMI 36.64 kg/m²         Vitals signs reviewed.   Constitutional:       Appearance: Well-developed and not in distress.   Neck:      Thyroid: No thyromegaly.      Vascular: No carotid bruit or JVD.   Pulmonary:      Breath sounds: Normal breath sounds.   Cardiovascular:      Regular rhythm.      No gallop. No S3 and S4 gallop.   Abdominal:      General: Bowel sounds are normal.      Palpations: Abdomen is soft. There is no abdominal mass.      Tenderness: There is no abdominal tenderness.   Musculoskeletal:         General: No deformity.      Extremities: No clubbing present.  Skin:     General: Skin is warm and dry.      Findings: No rash.   Neurological:      Mental Status: Alert and oriented to person, place, and time.         Procedures  Device check:  63% atrially paced greater than 99% biventricular pacing.  No therapies delivered.  Atrial lead noise seen, but no mode switching due to this.  No ventricular arrhythmias.  Normal thresholds impedance of all lead.  Patient device at [facility].        Assessment:   Assessment/Plan      Howard was seen today for follow-up.    Diagnoses and all orders for this visit:    Ischemic cardiomyopathy    Coronary artery disease involving native coronary artery of native heart without angina pectoris    Essential hypertension    Pure hypercholesterolemia    V-tach (CMS/HCC)      1.  Ischemic cardiomyopathy, currently euvolemic without heart failure.  2.  Biventricular pacer, now at elective replacement time.  3.  No further " ventricular tachycardia the last 6 months  4.  Hypertension well-controlled  5.  Dyslipidemia on high-dose statin    Recommendations 1.  Patient is stable no change in medical therapy  2.  Generator change at patient's earliest convenience.  Given the atrial lead noise, will reevaluate at that time to see if atrial lead revision is also necessary.  3.  Revisit after the above.       Jose Jones MD      Dictated utilizing Dragon dictation

## 2020-10-22 ENCOUNTER — PREP FOR SURGERY (OUTPATIENT)
Dept: OTHER | Facility: HOSPITAL | Age: 60
End: 2020-10-22

## 2020-10-22 DIAGNOSIS — Z45.02 IMPLANTABLE CARDIOVERTER-DEFIBRILLATOR (ICD) AT END OF BATTERY LIFE: Primary | ICD-10-CM

## 2020-10-22 DIAGNOSIS — I25.5 ISCHEMIC CARDIOMYOPATHY: ICD-10-CM

## 2020-10-22 RX ORDER — SODIUM CHLORIDE 0.9 % (FLUSH) 0.9 %
3 SYRINGE (ML) INJECTION EVERY 12 HOURS SCHEDULED
Status: CANCELLED | OUTPATIENT
Start: 2020-10-22

## 2020-10-22 RX ORDER — SODIUM CHLORIDE 0.9 % (FLUSH) 0.9 %
10 SYRINGE (ML) INJECTION AS NEEDED
Status: CANCELLED | OUTPATIENT
Start: 2020-10-22

## 2020-10-22 RX ORDER — ACETAMINOPHEN 325 MG/1
650 TABLET ORAL EVERY 4 HOURS PRN
Status: CANCELLED | OUTPATIENT
Start: 2020-10-22

## 2020-10-22 RX ORDER — CLINDAMYCIN PHOSPHATE 900 MG/50ML
900 INJECTION INTRAVENOUS
Status: CANCELLED | OUTPATIENT
Start: 2020-10-22

## 2020-10-22 RX ORDER — ONDANSETRON 2 MG/ML
4 INJECTION INTRAMUSCULAR; INTRAVENOUS EVERY 6 HOURS PRN
Status: CANCELLED | OUTPATIENT
Start: 2020-10-22

## 2020-10-22 RX ORDER — NITROGLYCERIN 0.4 MG/1
0.4 TABLET SUBLINGUAL
Status: CANCELLED | OUTPATIENT
Start: 2020-10-22

## 2020-10-28 ENCOUNTER — APPOINTMENT (OUTPATIENT)
Dept: PREADMISSION TESTING | Facility: HOSPITAL | Age: 60
End: 2020-10-28

## 2020-10-28 LAB — SARS-COV-2 RNA RESP QL NAA+PROBE: NOT DETECTED

## 2020-10-28 PROCEDURE — U0004 COV-19 TEST NON-CDC HGH THRU: HCPCS

## 2020-10-28 PROCEDURE — C9803 HOPD COVID-19 SPEC COLLECT: HCPCS

## 2020-11-15 ENCOUNTER — APPOINTMENT (OUTPATIENT)
Dept: PREADMISSION TESTING | Facility: HOSPITAL | Age: 60
End: 2020-11-15

## 2020-11-15 LAB — SARS-COV-2 RNA RESP QL NAA+PROBE: NOT DETECTED

## 2020-11-15 PROCEDURE — C9803 HOPD COVID-19 SPEC COLLECT: HCPCS

## 2020-11-15 PROCEDURE — U0004 COV-19 TEST NON-CDC HGH THRU: HCPCS

## 2020-11-17 ENCOUNTER — HOSPITAL ENCOUNTER (OUTPATIENT)
Facility: HOSPITAL | Age: 60
Discharge: HOME | End: 2020-11-18
Attending: INTERNAL MEDICINE | Admitting: INTERNAL MEDICINE

## 2020-11-17 DIAGNOSIS — I25.5 ISCHEMIC CARDIOMYOPATHY: ICD-10-CM

## 2020-11-17 DIAGNOSIS — Z45.02 IMPLANTABLE CARDIOVERTER-DEFIBRILLATOR (ICD) AT END OF BATTERY LIFE: ICD-10-CM

## 2020-11-17 LAB
ANION GAP SERPL CALCULATED.3IONS-SCNC: 9 MMOL/L (ref 5–15)
BUN SERPL-MCNC: 12 MG/DL (ref 8–23)
BUN/CREAT SERPL: 13.6 (ref 7–25)
CALCIUM SPEC-SCNC: 9.6 MG/DL (ref 8.6–10.5)
CHLORIDE SERPL-SCNC: 107 MMOL/L (ref 98–107)
CO2 SERPL-SCNC: 24 MMOL/L (ref 22–29)
CREAT SERPL-MCNC: 0.88 MG/DL (ref 0.76–1.27)
DEPRECATED RDW RBC AUTO: 46.8 FL (ref 37–54)
ERYTHROCYTE [DISTWIDTH] IN BLOOD BY AUTOMATED COUNT: 13.2 % (ref 12.3–15.4)
GFR SERPL CREATININE-BSD FRML MDRD: 88 ML/MIN/1.73
GLUCOSE SERPL-MCNC: 98 MG/DL (ref 65–99)
HCT VFR BLD AUTO: 41.4 % (ref 37.5–51)
HGB BLD-MCNC: 13.6 G/DL (ref 13–17.7)
MCH RBC QN AUTO: 31.3 PG (ref 26.6–33)
MCHC RBC AUTO-ENTMCNC: 32.9 G/DL (ref 31.5–35.7)
MCV RBC AUTO: 95.4 FL (ref 79–97)
PLATELET # BLD AUTO: 133 10*3/MM3 (ref 140–450)
PMV BLD AUTO: 11.2 FL (ref 6–12)
POTASSIUM SERPL-SCNC: 4.5 MMOL/L (ref 3.5–5.2)
RBC # BLD AUTO: 4.34 10*6/MM3 (ref 4.14–5.8)
SODIUM SERPL-SCNC: 140 MMOL/L (ref 136–145)
WBC # BLD AUTO: 5.52 10*3/MM3 (ref 3.4–10.8)

## 2020-11-17 PROCEDURE — 33249 INSJ/RPLCMT DEFIB W/LEAD(S): CPT | Performed by: INTERNAL MEDICINE

## 2020-11-17 PROCEDURE — 0 IOPAMIDOL PER 1 ML: Performed by: INTERNAL MEDICINE

## 2020-11-17 PROCEDURE — 99153 MOD SED SAME PHYS/QHP EA: CPT | Performed by: INTERNAL MEDICINE

## 2020-11-17 PROCEDURE — C1892 INTRO/SHEATH,FIXED,PEEL-AWAY: HCPCS | Performed by: INTERNAL MEDICINE

## 2020-11-17 PROCEDURE — 63710000001 ATORVASTATIN 40 MG TABLET: Performed by: INTERNAL MEDICINE

## 2020-11-17 PROCEDURE — 33241 REMOVE PULSE GENERATOR: CPT | Performed by: INTERNAL MEDICINE

## 2020-11-17 PROCEDURE — 63710000001 ACETAMINOPHEN 325 MG TABLET: Performed by: INTERNAL MEDICINE

## 2020-11-17 PROCEDURE — 25010000002 ONDANSETRON PER 1 MG: Performed by: INTERNAL MEDICINE

## 2020-11-17 PROCEDURE — 80048 BASIC METABOLIC PNL TOTAL CA: CPT | Performed by: NURSE PRACTITIONER

## 2020-11-17 PROCEDURE — 93461 R&L HRT ART/VENTRICLE ANGIO: CPT | Performed by: INTERNAL MEDICINE

## 2020-11-17 PROCEDURE — 93641 EP EVL 1/2CHMB PAC CVDFB TST: CPT | Performed by: INTERNAL MEDICINE

## 2020-11-17 PROCEDURE — 85027 COMPLETE CBC AUTOMATED: CPT | Performed by: NURSE PRACTITIONER

## 2020-11-17 PROCEDURE — 99152 MOD SED SAME PHYS/QHP 5/>YRS: CPT | Performed by: INTERNAL MEDICINE

## 2020-11-17 PROCEDURE — 75820 VEIN X-RAY ARM/LEG: CPT | Performed by: INTERNAL MEDICINE

## 2020-11-17 PROCEDURE — C1882 AICD, OTHER THAN SING/DUAL: HCPCS | Performed by: INTERNAL MEDICINE

## 2020-11-17 PROCEDURE — 25010000002 FENTANYL CITRATE (PF) 100 MCG/2ML SOLUTION: Performed by: INTERNAL MEDICINE

## 2020-11-17 PROCEDURE — A9270 NON-COVERED ITEM OR SERVICE: HCPCS | Performed by: INTERNAL MEDICINE

## 2020-11-17 PROCEDURE — 25010000003 LIDOCAINE 1 % SOLUTION: Performed by: INTERNAL MEDICINE

## 2020-11-17 PROCEDURE — 25010000002 MIDAZOLAM PER 1 MG: Performed by: INTERNAL MEDICINE

## 2020-11-17 PROCEDURE — C1898 LEAD, PMKR, OTHER THAN TRANS: HCPCS | Performed by: INTERNAL MEDICINE

## 2020-11-17 PROCEDURE — S0260 H&P FOR SURGERY: HCPCS | Performed by: NURSE PRACTITIONER

## 2020-11-17 DEVICE — ICD UNIFY ASSURA CRTD CD335740C: Type: IMPLANTABLE DEVICE | Status: FUNCTIONAL

## 2020-11-17 DEVICE — ENV PM AIGISRX ANTIBAC RESORB 2.9X3.3IN LG: Type: IMPLANTABLE DEVICE | Status: FUNCTIONAL

## 2020-11-17 DEVICE — LD PM TENDRIL STS 6F52CM 2088TC52: Type: IMPLANTABLE DEVICE | Status: FUNCTIONAL

## 2020-11-17 RX ORDER — ACETAMINOPHEN 325 MG/1
650 TABLET ORAL EVERY 4 HOURS PRN
Status: DISCONTINUED | OUTPATIENT
Start: 2020-11-17 | End: 2020-11-17 | Stop reason: HOSPADM

## 2020-11-17 RX ORDER — FENTANYL CITRATE 50 UG/ML
INJECTION, SOLUTION INTRAMUSCULAR; INTRAVENOUS AS NEEDED
Status: DISCONTINUED | OUTPATIENT
Start: 2020-11-17 | End: 2020-11-17 | Stop reason: HOSPADM

## 2020-11-17 RX ORDER — ACETAMINOPHEN 325 MG/1
650 TABLET ORAL EVERY 6 HOURS
Status: DISCONTINUED | OUTPATIENT
Start: 2020-11-17 | End: 2020-11-18 | Stop reason: HOSPADM

## 2020-11-17 RX ORDER — BISOPROLOL FUMARATE 5 MG/1
5 TABLET, FILM COATED ORAL DAILY
Status: DISCONTINUED | OUTPATIENT
Start: 2020-11-18 | End: 2020-11-18 | Stop reason: HOSPADM

## 2020-11-17 RX ORDER — SODIUM CHLORIDE 0.9 % (FLUSH) 0.9 %
3 SYRINGE (ML) INJECTION EVERY 12 HOURS SCHEDULED
Status: DISCONTINUED | OUTPATIENT
Start: 2020-11-17 | End: 2020-11-17 | Stop reason: HOSPADM

## 2020-11-17 RX ORDER — SERTRALINE HYDROCHLORIDE 100 MG/1
100 TABLET, FILM COATED ORAL DAILY
Status: DISCONTINUED | OUTPATIENT
Start: 2020-11-18 | End: 2020-11-18 | Stop reason: HOSPADM

## 2020-11-17 RX ORDER — MULTIPLE VITAMINS W/ MINERALS TAB 9MG-400MCG
1 TAB ORAL DAILY
Status: DISCONTINUED | OUTPATIENT
Start: 2020-11-18 | End: 2020-11-18 | Stop reason: HOSPADM

## 2020-11-17 RX ORDER — SODIUM CHLORIDE 0.9 % (FLUSH) 0.9 %
3 SYRINGE (ML) INJECTION EVERY 12 HOURS SCHEDULED
Status: DISCONTINUED | OUTPATIENT
Start: 2020-11-17 | End: 2020-11-18 | Stop reason: HOSPADM

## 2020-11-17 RX ORDER — LIDOCAINE HYDROCHLORIDE 10 MG/ML
INJECTION, SOLUTION INFILTRATION; PERINEURAL AS NEEDED
Status: DISCONTINUED | OUTPATIENT
Start: 2020-11-17 | End: 2020-11-17 | Stop reason: HOSPADM

## 2020-11-17 RX ORDER — SODIUM CHLORIDE 0.9 % (FLUSH) 0.9 %
10 SYRINGE (ML) INJECTION AS NEEDED
Status: DISCONTINUED | OUTPATIENT
Start: 2020-11-17 | End: 2020-11-17 | Stop reason: HOSPADM

## 2020-11-17 RX ORDER — SODIUM CHLORIDE 9 MG/ML
INJECTION, SOLUTION INTRAVENOUS CONTINUOUS PRN
Status: COMPLETED | OUTPATIENT
Start: 2020-11-17 | End: 2020-11-17

## 2020-11-17 RX ORDER — CLINDAMYCIN PHOSPHATE 900 MG/50ML
900 INJECTION INTRAVENOUS
Status: COMPLETED | OUTPATIENT
Start: 2020-11-17 | End: 2020-11-17

## 2020-11-17 RX ORDER — LISINOPRIL 20 MG/1
20 TABLET ORAL DAILY
Status: DISCONTINUED | OUTPATIENT
Start: 2020-11-18 | End: 2020-11-18 | Stop reason: HOSPADM

## 2020-11-17 RX ORDER — MIDAZOLAM HYDROCHLORIDE 1 MG/ML
INJECTION INTRAMUSCULAR; INTRAVENOUS AS NEEDED
Status: DISCONTINUED | OUTPATIENT
Start: 2020-11-17 | End: 2020-11-17 | Stop reason: HOSPADM

## 2020-11-17 RX ORDER — SODIUM CHLORIDE 0.9 % (FLUSH) 0.9 %
10 SYRINGE (ML) INJECTION AS NEEDED
Status: DISCONTINUED | OUTPATIENT
Start: 2020-11-17 | End: 2020-11-18 | Stop reason: HOSPADM

## 2020-11-17 RX ORDER — ASPIRIN 325 MG
325 TABLET ORAL DAILY
Status: DISCONTINUED | OUTPATIENT
Start: 2020-11-18 | End: 2020-11-18 | Stop reason: HOSPADM

## 2020-11-17 RX ORDER — NITROGLYCERIN 0.4 MG/1
0.4 TABLET SUBLINGUAL
Status: DISCONTINUED | OUTPATIENT
Start: 2020-11-17 | End: 2020-11-17 | Stop reason: HOSPADM

## 2020-11-17 RX ORDER — CLOPIDOGREL BISULFATE 75 MG/1
75 TABLET ORAL DAILY
Status: DISCONTINUED | OUTPATIENT
Start: 2020-11-18 | End: 2020-11-18 | Stop reason: HOSPADM

## 2020-11-17 RX ORDER — ONDANSETRON 2 MG/ML
4 INJECTION INTRAMUSCULAR; INTRAVENOUS EVERY 6 HOURS PRN
Status: DISCONTINUED | OUTPATIENT
Start: 2020-11-17 | End: 2020-11-17 | Stop reason: HOSPADM

## 2020-11-17 RX ORDER — NITROGLYCERIN 0.4 MG/1
0.4 TABLET SUBLINGUAL
Status: DISCONTINUED | OUTPATIENT
Start: 2020-11-17 | End: 2020-11-18 | Stop reason: HOSPADM

## 2020-11-17 RX ORDER — LEVOTHYROXINE SODIUM 0.15 MG/1
150 TABLET ORAL
Status: DISCONTINUED | OUTPATIENT
Start: 2020-11-18 | End: 2020-11-18 | Stop reason: HOSPADM

## 2020-11-17 RX ORDER — IBUPROFEN 600 MG/1
600 TABLET ORAL EVERY 6 HOURS SCHEDULED
Status: DISCONTINUED | OUTPATIENT
Start: 2020-11-18 | End: 2020-11-18 | Stop reason: HOSPADM

## 2020-11-17 RX ORDER — BUPIVACAINE HYDROCHLORIDE 5 MG/ML
INJECTION, SOLUTION EPIDURAL; INTRACAUDAL AS NEEDED
Status: DISCONTINUED | OUTPATIENT
Start: 2020-11-17 | End: 2020-11-17 | Stop reason: HOSPADM

## 2020-11-17 RX ORDER — ATORVASTATIN CALCIUM 40 MG/1
80 TABLET, FILM COATED ORAL NIGHTLY
Status: DISCONTINUED | OUTPATIENT
Start: 2020-11-17 | End: 2020-11-18 | Stop reason: HOSPADM

## 2020-11-17 RX ORDER — ONDANSETRON 2 MG/ML
INJECTION INTRAMUSCULAR; INTRAVENOUS AS NEEDED
Status: DISCONTINUED | OUTPATIENT
Start: 2020-11-17 | End: 2020-11-17 | Stop reason: HOSPADM

## 2020-11-17 RX ADMIN — CLINDAMYCIN PHOSPHATE 900 MG: 18 INJECTION, SOLUTION INTRAVENOUS at 16:44

## 2020-11-17 RX ADMIN — ACETAMINOPHEN 650 MG: 325 TABLET, FILM COATED ORAL at 21:46

## 2020-11-17 RX ADMIN — ATORVASTATIN CALCIUM 80 MG: 40 TABLET, FILM COATED ORAL at 21:46

## 2020-11-17 NOTE — OP NOTE
PROCEDURE:   CRT -D GENERATOR REPLACEMENT and right atrial lead revision    OPERATION PERFORMED:     1. Explantation of [employer] resynchronization ICD model 3357-40C pulse generator with serial number [id number] implanted on December 11, 2013.    2. Testing of retained leads:          B. Right ventricular lead Riata model 1581, dual-chamber ICD 8 French serial number [id number].  Lead was implanted on November 2, 2004        C. Left ventricular lead [employer] model 11 5060, 86 cm, BR [id number] implanted on 7/29/2008.    3. Implantation of [employer] pulse generator with serial number [id number].    4.  Implantation of  New RA lead:     C) Atrial lead [employer] model 2088 TC, 52, cm, CAU [id number].    5.  Abandonment of old RA lead:     D) Atrial lead [employer] model 1688 TC, 52, cm, [id number], this lead was implanted November 2, 2004.    6.  Contrast venogram and venoplasty due to subclavian stenosis    7 . Defibrillation threshold testing.    ATTENDING SURGEON: Maurizio Cruz DO    MODERATE SEDATION FOR PROCEDURE:  Moderate sedation was given during this procedure.    I supervised and directed as needed to administer this sedation.  This staff member also monitored the patient's hemodynamic and respiratory status and response to these medications.  Please see the full detailed procedure report generated by the electrophysiology laboratory staff.  The patient tolerated moderate sedation well.  There were no complications regarding sedation.  The total dose of fentanyl was 25 mcg and the total dose of midazolam was 6 mg.  The total dose of Brevital was 25 mg.  First sedation was administered at 1625 and continued through 1800.    ESTIMATED BLOOD LOSS: less than 20 cc    COMPLICATIONS: None.    TIME OUT: Time out was completed with verification of the correct patient identity, procedure to be performed, procedure site and implanted equipment.  INDICATION: Briefly, Howard Owusu  is a 60 y.o. male with a history of ischemic  cardiomyopathy with a QRS duration of 90 milliseconds, severe systolic dysfunction with an LVEF of 25% and NYHA functional class 3 heart failure, significant symptomatic AV karen disease including periods of complete heart block chronotropic incompetence due to 2-1 AV block and Mobitz 1 AV conduction requiring ventricular pacing more than 40% of the time and subsequent upgrade to resynchronization ICD system in 2008 with significant improvement in symptoms at that time.  He is also had a history of ventricular tachycardia but no ICD shocks.  He was noted to have noise intermittently on his right atrial lead and had reached the elective replacement indicator.  We plan to proceed with elective pulse generator replacement and revision of his right atrial lead.  I then abandoned the old right atrial lead by cutting the iceman connector pulling insulation over the conducting elements and suturing it securely to the prepectoral fascia with the insulation covering the conducting elements completely.    The patient was able to give written informed consent after revisiting the key portions of the risk versus benefit profile of the procedure.  This discussion was framed by our lengthy conversations  (please see our detailed notes).  Patient verbalized strong understanding of this discussion and a strong desire to proceed with the procedure.  Please note that this detailed informed consent process utilized mutual and shared decision making process between all parties involved, principally the physician and patient, but also potentially with input from the patient's selected family and friends.    Please especially note that the patient has been on maximally tolerated doses of guideline directed medical therapy, including but not limited to: HF indicated beta-blocker (carvedilol, metoprolol succinate), ACE Inhibitor or Angiotensin receptor blocker.  Please note that for some of these medications the maximally tolerated dose  may be zero.  The patient has not had a myocardial infarction within 40 days and has not had coronary revascularization within 90 days.    This patient who is a candidate for an ICD has a life expectancy greater than 12 months.    The patient was recently seen in our ICD clinic at which time the patient's device was discovered to be at the elective replacement indicator.   Please see our clinic note for further details.    PROCEDURE AND FINDINGS:  The patient was brought to the electrophysiology suite in a post-absorbtive state.  Informed consent was obtained prior to the procedure and confirmed.  Intravenous prophylactic antibiotics were administered and confirmed to be completely infused prior to start of the procedure.  After the site of implantation was prepped and draped in the usual sterile fashion and after adequate anesthesia was given, the skin was infiltrated with 1% lidocaine and 0.5% bupivicaine 50/50 mixture.  The skin was incised with a #10 scalpel.  Blunt and electrosurgical dissection was carried out to the pulse generator pocket.  The leads were carefully isolated with blunt and electrosurgical dissection.  Careful attention was paid not to damage the leads.     Contrast venography was performed of the left upper extremity this demonstrated a complex stenosis of the left subclavian vein.  There was a minimal amount of passage through this vein and extensive collateralization.  We felt that we could cannulate it with a wire but would be best served by venoplasty to accommodate the new lead.  Seldinger access was obtained.  A J-tip wire was then passed to the inferior vena cava under fluoroscopic guidance excluding arterial puncture.  A 9 mm x 40 mm noncompliant balloon was placed in an over-the-wire fashion past the stenosis to the superior vena cava.  Serial dilations were performed at a maximum of 18 jessica pressure from the SVC in serial fashion all the way back to the right atrial pocket.  This  ameliorated the stenosis satisfactorily.  A short sheath was placed over this wire.  The right atrial lead was then advanced under fluoroscopic guidance to the right atrial appendage.  It was affixed actively.  Active quit pacing sensing impedances were obtained.  Lead was sutured to the prepectoral fascia with nonabsorbable suture.    Once adequate hemostasis was confirmed within the pocket, the leads were detached from the pulse generator.  The leads were tested for adequate sensing, impedances and pacing thresholds.  The lead tips for all leads were cleaned and dried thoroughly.  The leads were attached to the appropriate ports on the new pulse generator.  Tug testing was performed on all connections.  The device and leads were placed within the pocket such that the coiled redundant leads were posterior to the pulse generator.  Once again, the device was tested for adequate sensing, impedances and pacing thresholds.    DEFIBRILLATION THRESHOLD TESTING:  Once adequate level of anesthesia was obtained, defibrillation testing was performed using Shock on T induction of ventricular fibrillation.  The patient was successfully internal defibrillated with 16 Joules with the least sensitivity.  There were no significant dropouts.  The defibrillation tilt was programmed optimally according to [employer] proprietary techniques.  Shock impedance was 58 ohms.  Defibrillation threshold was less than or equal to 16 J.  Patient tolerated testing of defibrillation threshold well.  There were no hemodynamic issues.  He was stable throughout.  The pulse generator was then sutured to the fascia in a medial location within the pocket using non absorbable suture.  The pocket was closed with two layers of suture using 2-0 then 3-0 Vicryl, followed by a layer of surgical adhesive and finally a sterile occlusive dressing.  A [employer] Tyrex antibiotic impregnated bio-absorbable pouch was placed within the pocket that including all of  the redundant leads and pulse generator for the prophylaxis of surgical device infection.      UNDERLYING RHYTHM: Sinus rhythm intermittent 2-1 AV block                   MEASURED DEVICE DATA:  Atrial lead                   sensin.4 mV                   impedance:              440 ohms                   Threshold:               1 volts at 0.5 ms  RV lead                   sensin.8 mV                   pacing impedance:    400 ohms                   Threshold:            0.75 volts at 0.5 ms                     RV Coil impedance:    58 ohms                   SVC Coil impedance: 58 ohms    LV lead                   sensing:                na                   impedance:          610 ohms                   Threshold:           2 volts at 1.5 ms                                                    PROGRAMMED PARAMETERS:    Mode:                                DDDR  Lower Rate:                      60 pulses per minute  Upper Sensor Rate:         130 pulses per minute  Upper Tracking Rate:      130 pulses per minute  Mode Switch Rate:           170 bpm    Tachy Therapy Programming.                  VT 1 zone MONITOR ONLY               Detection:  171 beats per minute                Therapy:    This is a monitor zone    VF zone 30/40 INTERVAL DETECTION seconds detection interval               Detection:  200 beats per minute               Therapy:    ATP during charge followed by maximal output defibrillation.      CONCLUSION:    1) Successful ICD generator change with right atrial lead revision.  Old right atrial lead was abandoned.    2) Successful DFT testing with adequate DFT safety margin.  Please note that the patient's Riata lead was demonstrated to be functionally stable.  The patient has a recalled lead and is aware of it.  That said there is no reason to abandon or revise this lead at this point.      Plan:    Patient is to follow up with our clinic in approximately one week  and then myself in 3 months.    No lovenox or heparin at any dose is to be given.    FOR THE PATIENT    Please do not lift more than 10 pounds or abduct the shoulder joint / or raise the arm above the shoulder for 6 weeks after device was implanted (this does not apply to subcutaneous ICDs).    Avoid activities that involve heavy lifting or rough contact that could result in blows to your implant site and to allow your incision time to heal.    No shower or getting device incision wet for 2 days post-operatively.    Keep wound exposed to air unless otherwise instructed, the surgical glue is the bandage.    No creams, lotions, or powders to incision.    Please avoid allowing bra strap or suspenders to lay over incision until completely healed.    Avoid hot tubs or pools until incision completely healed.    No driving for 24 hours post-operatively after device implant.    Call your doctor if you have any swelling, redness or discharge around your incision, notice anything unusual or unexpected, or you develop a fever that does not go away in two or three days.    Call your doctor if you hear any beeping sounds / vibratory alerts from your device as this indicates your device needs to be checked immediately.    Carry your Medical Device ID Card with you at all times.  Please call our office () with any questions about the device or the wounds.

## 2020-11-17 NOTE — H&P
Cardiac Electrophysiology History and Physical          [facility] at [facility]    History & Physical      PATIENT NAME  Howard Owusu    :  1960 AGE: 60 y.o.    ADMISSION DATE: 2020     Subjective      CHIEF COMPLAINT: BIV ICD generator at [facility] with RA lead malfunction    PROBLEM LIST:    1. Ischemic cardiomyopathy / Chronic Systolic Heart Failure / Ventricular Tachycardia   a. Acute Anterior MI,  with LHC revealing EF 30%. 100% proximal LAD with 3.0 x 12 and a 2.7 x 16 Taxus stent. RCA and LCx were within normal limits.  b. Repeat C 2004 for VT: LAD patent stent noted. Ostial D1 stenosis of 99% which was unable to be crossed. LCx and RCA were still within normal limits. EF 35%.  c. Electrophysiology study 2004 positive for inducible monomorphic ventricular tachycardia with [employer] Eliezer DDD ICD implant   d. [facility], 2008: EF 15%. 50% distal RCA stenosis, and left coronary anatomy was unchanged.  e. Upgrade to a [employer]. Eliezer Bi-V ICD by Dr. Mtz 2008, due to ischemic cardiomyopathy,  f. [facility], 2011: EF 20%, 90% distal RCA (3.0 x 12 and 3.0 x 8 mm Cypher), and 90% right posterolateral (2.5 x 8 Cypher), and 85% mid LAD (3.0 x 13 mm Cypher).  g. .Generator change, St. Eliezer Bi-V ICD (Dr. Atkins), 2013.  2. Atrial Fibrillation  a. Single episode of Atrial Fibrillation post cardioversion for VF during PCI  converted to NSR after 24 hours  b. Subclinical AFib documented on device interrogation 2016 w/o treatment.   3. Essential Hypertension  4. Hyperlipidemia  5. Hypothyroidism  6. GERD  7. GILL    HISTORY OF PRESENT ILLNESS:  Mr. Howard Owusu is a 60 year old white male who presents as a consult and treat today at the request of Dr. Jones for a BIV ICD generator change with recent routine device interrogation revealing his device was at [facility] necessitating replacement.  Patient also noted with RA lead noise that will be evaluated today plus/minus  replacement.  Upon presentation he reports stable NYHA Class II symptomology with dyspnea on moderate exertion.  He admits to compliance to GDMT without noted side effects.  He denies CP, palpitations, dizziness, syncpe, orthopnea, PND, or BLE.  He denies recent infections or signs of fever, chills, or cough.  He denies recent sick contacts.  PT noted with history of GILL with reported CPAP compliance.      PAST MEDICAL HISTORY  Past Medical History:   Diagnosis Date   • Arrhythmia    • Atrial fibrillation (CMS/HCC)     single episode during PCI with conversion to NSR witin 24 hrs   • Coronary artery disease 2004    S/P Taxus RICHARD prox LAD, Cath June 2011 EF 20%, S/P distal RCA 3.0x12 and 3.0x8 Cypher, PLB 2.5x8 Cypher, and mid ALD 3.0x 13 Cypher   • GERD (gastroesophageal reflux disease)    • GERD (gastroesophageal reflux disease)    • Hyperlipidemia    • Hypertension    • Hypothyroidism    • Ischemic cardiomyopathy     Hx of Inducible VT per EP study Nov 2004, S/P St Eliezer ICD implant, upgrade to Bi-V iCD July 2008 Dr. Mtz June 2010 appropriate ICD shocks, Gen change 12/11/13 MGR   • Ischemic cardiomyopathy    • Obstructive sleep apnea    • Old MI (myocardial infarction)    • GILL (obstructive sleep apnea)    • VT (ventricular tachycardia) (CMS/HCC)        SURGICAL HISTORY   has a past surgical history that includes internal cardiac defibrillator insertion; Pacemaker Replacement; and Cardiac catheterization.     SOCIAL HISTORY  Social History     Socioeconomic History   • Marital status:      Spouse name: Not on file   • Number of children: Not on file   • Years of education: Not on file   • Highest education level: Not on file   Tobacco Use   • Smoking status: Never Smoker   • Smokeless tobacco: Never Used   Substance and Sexual Activity   • Alcohol use: No   • Drug use: No   • Sexual activity: Defer       FAMILY HISTORY  family history includes Alzheimer's disease in his mother; Heart attack in his father;  No Known Problems in his brother, brother, and sister; Pulmonary fibrosis in his mother.     MEDICATIONS  Prior to Admission medications    Medication Sig Start Date End Date Taking? Authorizing Provider   aspirin 325 MG tablet Take 325 mg by mouth daily.    Ceferino Barriga MD   atorvastatin (LIPITOR) 80 MG tablet Take 1 tablet by mouth Every Night. 4/8/20   Jose Jones MD   bisoprolol (ZEBeta) 5 MG tablet Take 1 tablet by mouth Daily. 4/8/20   Jose Jones MD   clopidogrel (PLAVIX) 75 MG tablet Take 1 tablet by mouth Daily. 4/8/20   Jose Jones MD   esomeprazole (NexIUM) 40 MG capsule Take 20 mg by mouth Every Morning Before Breakfast.    Ceferino Barriga MD   Garlic 1000 MG capsule Take 1 tablet by mouth Daily.    Ceferino Barriga MD   levothyroxine (SYNTHROID, LEVOTHROID) 150 MCG tablet Take 150 mcg by mouth Daily.    Ceferino Barriga MD   lisinopril (PRINIVIL,ZESTRIL) 20 MG tablet Take 1 tablet by mouth Daily. 4/8/20   Jose Jones MD   Multiple Vitamins-Minerals (ONE DAILY MENS PO) Take 1 tablet by mouth Daily.    Ceferino Barriga MD   nitroglycerin (NITROSTAT) 0.4 MG SL tablet Place 1 tablet under the tongue Every 5 (Five) Minutes As Needed for Chest Pain. Take no more than 3 doses in 15 minutes. 4/8/20   Jose Jones MD   Omega-3 Fatty Acids (OMEGA-3 FISH OIL EX ST) 880 MG capsule Take 800 mg by mouth Daily.    Ceferino Barriga MD   sertraline (ZOLOFT) 100 MG tablet Take 1 tablet by mouth daily. 10/3/14   Ceferino Barriga MD       ALLERGIES  Allergies   Allergen Reactions   • Morphine And Related Hives   • Penicillins    • Sulfa Antibiotics        REVIEW OF SYSTEMS  Constitutional: No fevers or chills, no recent weight gain or weight loss or fatigue  Eyes: No visual loss, blurred vision, double vision, yellow sclerae.  ENT: No headaches, hearing loss, vertigo, congestion or sore throat.   Cardiovascular: Per HPI  Respiratory: No cough or wheezing, no  "sputum production, no hematemesis   Gastrointestinal: No abdominal pain, no nausea, vomiting, constipation, diarrhea, melena.   Genitourinary: No dysuria, hematuria or increased frequency.  Musculoskeletal:  No gait disturbance, weakness or joint pain or stiffness  Integumentary: No rashes, urticaria, ulcers or sores.   Neurological: No headache, dizziness, syncope, paralysis, ataxia, no prior CVA/TIA  Psychiatric: No anxiety, or depression  Endocrine: No diaphoresis, cold or heat intolerance. No polyuria or polydipsia.   Hematologic/Lymphatic: No anemia, abnormal bruising or bleeding. No history of DVT/PE.      Objective      PHYSICAL EXAM    Vital Signs: /78 (BP Location: Right arm, Patient Position: Lying) Comment: 133/77 L ARM  Pulse 60   Temp 96.7 °F (35.9 °C) (Temporal)   Resp 18   Ht 167.6 cm (66\")   Wt 101 kg (222 lb 10.6 oz)   SpO2 95%   BMI 35.94 kg/m²     General appearance: Awake, alert, cooperative  Head: Normocephalic, without obvious abnormality, atraumatic  Eyes: Conjunctivae/corneas clear, EOM's intact  Neck: no adenopathy, no carotid bruit, no JVD and thyroid: not enlarged  Lungs: clear to auscultation bilaterally and no rhonchi or crackles\", ' symmetric  Heart: regular rate and rhythm, S1, S2 normal, no murmur, click, rub or gallop  Abdomen: Soft, non-tender. Bowel sounds normal,  no organomegaly  Extremities: extremities normal, atraumatic, no cyanosis or edema  Skin: Skin color, turgor normal, no rashes or lesions  Neurologic: Grossly normal       CBC:   WBC   Date Value Ref Range Status   11/17/2020 5.52 3.40 - 10.80 10*3/mm3 Final     RBC   Date Value Ref Range Status   11/17/2020 4.34 4.14 - 5.80 10*6/mm3 Final     Hemoglobin   Date Value Ref Range Status   11/17/2020 13.6 13.0 - 17.7 g/dL Final     Hematocrit   Date Value Ref Range Status   11/17/2020 41.4 37.5 - 51.0 % Final     MCV   Date Value Ref Range Status   11/17/2020 95.4 79.0 - 97.0 fL Final     RDW   Date Value Ref " Range Status   11/17/2020 13.2 12.3 - 15.4 % Final     Platelets   Date Value Ref Range Status   11/17/2020 133 (L) 140 - 450 10*3/mm3 Final     Lab Results   Component Value Date    GLUCOSE 98 11/17/2020    CALCIUM 9.6 11/17/2020     11/17/2020    K 4.5 11/17/2020    CO2 24.0 11/17/2020     11/17/2020    BUN 12 11/17/2020    CREATININE 0.88 11/17/2020    EGFRIFNONA 88 11/17/2020    BCR 13.6 11/17/2020    ANIONGAP 9.0 11/17/2020      TELEMETRY: Paced 60 bpm    Assessment & Plan     Mr. Howard Owusu is a 60 year old white male who presents as a consult and treat today at the request of Dr. Jones for a BIV ICD generator change with recent routine device interrogation revealing his device was at [facility] necessitating replacement.  Patient also noted with RA lead noise that will be evaluated today plus/minus replacement.   All risk, benefits, and alternatives to the procedure were outlined and reviewed with patient at bedside today.  He verbalizes complete understanding of the procedure and is willing to proceed as scheduled.  All pre procedure lab evaluation reviewed and acceptable.  Patient has a negative COVID screening documented within the last 72 hours.        Electronically signed by FRANKIE Byrne, 11/17/20, 12:19 PM EST.      This note was completed using a voice transcription system. Every effort was made to ensure accuracy. However, inadvertent computerized transcription errors may be present.

## 2020-11-18 ENCOUNTER — APPOINTMENT (OUTPATIENT)
Dept: GENERAL RADIOLOGY | Facility: HOSPITAL | Age: 60
End: 2020-11-18

## 2020-11-18 VITALS
OXYGEN SATURATION: 93 % | HEART RATE: 60 BPM | RESPIRATION RATE: 18 BRPM | BODY MASS INDEX: 35.79 KG/M2 | SYSTOLIC BLOOD PRESSURE: 110 MMHG | TEMPERATURE: 96 F | DIASTOLIC BLOOD PRESSURE: 67 MMHG | WEIGHT: 222.66 LBS | HEIGHT: 66 IN

## 2020-11-18 PROCEDURE — 63710000001 ASPIRIN 325 MG TABLET: Performed by: INTERNAL MEDICINE

## 2020-11-18 PROCEDURE — 63710000001 BISOPROLOL 5 MG TABLET: Performed by: INTERNAL MEDICINE

## 2020-11-18 PROCEDURE — A9270 NON-COVERED ITEM OR SERVICE: HCPCS | Performed by: INTERNAL MEDICINE

## 2020-11-18 PROCEDURE — 63710000001 CLOPIDOGREL 75 MG TABLET: Performed by: INTERNAL MEDICINE

## 2020-11-18 PROCEDURE — 63710000001 SERTRALINE 100 MG TABLET: Performed by: INTERNAL MEDICINE

## 2020-11-18 PROCEDURE — 63710000001 IBUPROFEN 600 MG TABLET: Performed by: INTERNAL MEDICINE

## 2020-11-18 PROCEDURE — 71046 X-RAY EXAM CHEST 2 VIEWS: CPT

## 2020-11-18 PROCEDURE — 63710000001 LEVOTHYROXINE 150 MCG TABLET: Performed by: INTERNAL MEDICINE

## 2020-11-18 PROCEDURE — 63710000001 ACETAMINOPHEN 325 MG TABLET: Performed by: INTERNAL MEDICINE

## 2020-11-18 PROCEDURE — 99024 POSTOP FOLLOW-UP VISIT: CPT | Performed by: NURSE PRACTITIONER

## 2020-11-18 PROCEDURE — 63710000001 LISINOPRIL 20 MG TABLET: Performed by: INTERNAL MEDICINE

## 2020-11-18 PROCEDURE — 63710000001 MULTIVITAMIN WITH MINERALS TABLET: Performed by: INTERNAL MEDICINE

## 2020-11-18 RX ORDER — ACETAMINOPHEN 325 MG/1
650 TABLET ORAL EVERY 6 HOURS
Qty: 36 TABLET | Refills: 0 | Status: SHIPPED | OUTPATIENT
Start: 2020-11-18 | End: 2020-11-23

## 2020-11-18 RX ORDER — IBUPROFEN 600 MG/1
600 TABLET ORAL EVERY 6 HOURS SCHEDULED
Qty: 18 TABLET | Refills: 0 | Status: SHIPPED | OUTPATIENT
Start: 2020-11-18 | End: 2020-11-23

## 2020-11-18 RX ADMIN — ACETAMINOPHEN 650 MG: 325 TABLET, FILM COATED ORAL at 02:30

## 2020-11-18 RX ADMIN — MULTIPLE VITAMINS W/ MINERALS TAB 1 TABLET: TAB at 08:41

## 2020-11-18 RX ADMIN — LISINOPRIL 20 MG: 20 TABLET ORAL at 08:41

## 2020-11-18 RX ADMIN — LEVOTHYROXINE SODIUM 150 MCG: 150 TABLET ORAL at 05:46

## 2020-11-18 RX ADMIN — ASPIRIN 325 MG ORAL TABLET 325 MG: 325 PILL ORAL at 08:41

## 2020-11-18 RX ADMIN — ACETAMINOPHEN 650 MG: 325 TABLET, FILM COATED ORAL at 08:40

## 2020-11-18 RX ADMIN — IBUPROFEN 600 MG: 600 TABLET, FILM COATED ORAL at 00:38

## 2020-11-18 RX ADMIN — BISOPROLOL FUMARATE 5 MG: 5 TABLET, FILM COATED ORAL at 08:40

## 2020-11-18 RX ADMIN — CLOPIDOGREL BISULFATE 75 MG: 75 TABLET ORAL at 08:41

## 2020-11-18 RX ADMIN — SODIUM CHLORIDE, PRESERVATIVE FREE 3 ML: 5 INJECTION INTRAVENOUS at 08:41

## 2020-11-18 RX ADMIN — SERTRALINE HYDROCHLORIDE 100 MG: 100 TABLET ORAL at 08:41

## 2020-11-18 RX ADMIN — IBUPROFEN 600 MG: 600 TABLET, FILM COATED ORAL at 05:46

## 2020-11-18 NOTE — PLAN OF CARE
Goal Outcome Evaluation:  Plan of Care Reviewed With: patient     Outcome Summary: alert, roomair, no c/o this morning, being discharged home, educated on discharge teaching

## 2020-11-18 NOTE — PLAN OF CARE
Goal Outcome Evaluation:  Plan of Care Reviewed With: patient     Outcome Summary: VSS on RA. No complaints of pain. 100% AV paced. Voiding and ambulating. Dressing CDI and are sling in place. Will continue to monitor.

## 2020-11-18 NOTE — PROGRESS NOTES
Discharge Planning Assessment  [facility]     Patient Name: Howard Owusu  MRN: [id number]  Today's Date: 11/18/2020    Admit Date: 11/17/2020    Discharge Needs Assessment     Row Name 11/18/20 1041       Living Environment    Lives With  parent(s) pt resides in [facility]    Name(s) of Who Lives With Patient  father- Robin and brother Nirmala    Current Living Arrangements  home/apartment/condo    Primary Care Provided by  self    Provides Primary Care For  parent(s)    Family Caregiver if Needed  sibling(s)    Family Caregiver Names  brothnubia Silvestre    Able to Return to Prior Arrangements  yes       Resource/Environmental Concerns    Resource/Environmental Concerns  none       Transition Planning    Patient/Family Anticipates Transition to  home with family    Patient/Family Anticipated Services at Transition  none    Transportation Anticipated  family or friend will provide       Discharge Needs Assessment    Readmission Within the Last 30 Days  no previous admission in last 30 days    Equipment Currently Used at Home  none    Concerns to be Addressed  denies needs/concerns at this time;discharge planning    Equipment Needed After Discharge  none    Provided Post Acute Provider List?  N/A    Provided Post Acute Provider Quality & Resource List?  N/A        Discharge Plan     Row Name 11/18/20 1042       Plan    Plan  home    Patient/Family in Agreement with Plan  yes    Plan Comments  CM spoke with pt at bedside. Pt resides in [place] with his brother and father. Pt assists with care for his father as he has ALS. Pt reports he is independent of adls and denies use of DME. Pt is not current with home health or outpatient medical services. Pt plans to return home and denies needs at this time. Pt will have private transportation home.    Final Discharge Disposition Code  01 - home or self-care        Continued Care and Services - Admitted Since 11/17/2020    Coordination has not been started for this  encounter.       Expected Discharge Date and Time     Expected Discharge Date Expected Discharge Time    Nov 18, 2020         Demographic Summary     Row Name 11/18/20 1038       General Information    Referral Source  admission list    Reason for Consult  discharge planning    Preferred Language  English     Used During This Interaction  no    General Information Comments  PCP- Aly Wallace       Contact Information    Permission Granted to Share Info With          Functional Status     Row Name 11/18/20 1038       Functional Status    Usual Activity Tolerance  good    Current Activity Tolerance  moderate       Functional Status, IADL    Medications  independent    Meal Preparation  independent    Housekeeping  independent    Laundry  independent    Shopping  independent       Employment/    Employment/ Comments  pt confirms he has [employer] Medicare, denies concerns or disruption in coverage. Pt has prescription drug coverage and denies issues obtaining or affording current medications.        Psychosocial    No documentation.       Abuse/Neglect    No documentation.       Legal    No documentation.       Substance Abuse    No documentation.       Patient Forms    No documentation.           Janene Sykes RN

## 2020-11-18 NOTE — DISCHARGE INSTR - ACTIVITY
Please do not lift more than 10 pounds or abduct the shoulder joint / or raise the arm above the shoulder for 6 weeks after device was implanted (this does not apply to subcutaneous ICDs).     Avoid activities that involve heavy lifting or rough contact that could result in blows to your implant site and to allow your incision time to heal.     No shower or getting device incision wet for 2 days post-operatively.     Keep wound exposed to air unless otherwise instructed, the surgical glue is the bandage.     No creams, lotions, or powders to incision.     Please avoid allowing bra strap or suspenders to lay over incision until completely healed.     Avoid hot tubs or pools until incision completely healed.     No driving for 24 hours post-operatively after device implant.     Call your doctor if you have any swelling, redness or discharge around your incision, notice anything unusual or unexpected, or you develop a fever that does not go away in two or three days.     Call your doctor if you hear any beeping sounds / vibratory alerts from your device as this indicates your device needs to be checked immediately.     Carry your Medical Device ID Card with you at all times.  Please call our office () with any questions about the device or the wounds.

## 2020-11-18 NOTE — PROGRESS NOTES
"      Cardiac Electrophysiology Inpatient Follow Up Note         [facility] at [facility]    Progress Note      CC: Defibrillator generator at [facility], RA lead malfunction    Subjective       Mr. Howard Owusu is a 60-year-old white male seen in EP follow-up today status post biventricular ICD generator change with new RA lead placement and abandoned old RA lead. Upon evaluation patient is resting comfortably in bed with minimal complaints of device incision tenderness. Patient's dressing removed with incision clean, dry, and approximated with mild ecchymosis and occlusive Dermabond covering without hematoma or drainage noted. Patient has been up walking in room and voiding without difficulty. Patient states he is ready for discharge home today as scheduled.      REVIEW OF SYSTEMS  ROS no change from admission H&P except for: above    Objective   VITAL SIGNS  /64 (BP Location: Right arm, Patient Position: Lying)   Pulse 60   Temp 98.6 °F (37 °C) (Oral)   Resp 18   Ht 167.6 cm (66\")   Wt 101 kg (222 lb 10.6 oz)   SpO2 93%   BMI 35.94 kg/m²     Pulse Ox: SpO2  Av.7 %  Min: 90 %  Max: 97 %  Supplemental O2:       Admit Weight  Weight: 101 kg (222 lb 10.6 oz)  Last 3 Weights    Body mass index is 35.94 kg/m².    INTAKE/OUTPUT  No intake/output data recorded.  No intake or output data in the 24 hours ending 20 0816    PHYSICAL EXAM  General appearance: awake, alert, oriented, moves all extremities  Lungs: no rhonchi, no wheezes, no rales  Heart: S1-S2, RRR  Abdomen: positive bowel sounds, no bruits, no masses  Extremities: warm and dry, no cyanosis, no clubbing    LABS  Results from last 7 days   Lab Units 20  1307   WBC 10*3/mm3 5.52   HEMOGLOBIN g/dL 13.6   HEMATOCRIT % 41.4   MCV fL 95.4   PLATELETS 10*3/mm3 133*         Results from last 7 days   Lab Units 20  1307   POTASSIUM mmol/L 4.5   CHLORIDE mmol/L 107   CO2 mmol/L 24.0   BUN mg/dL 12   CREATININE " mg/dL 0.88   GLUCOSE mg/dL 98   CALCIUM mg/dL 9.6       CURRENT MEDICATIONS  acetaminophen, 650 mg, Oral, Q6H  aspirin, 325 mg, Oral, Daily  atorvastatin, 80 mg, Oral, Nightly  bisoprolol, 5 mg, Oral, Daily  clopidogrel, 75 mg, Oral, Daily  ibuprofen, 600 mg, Oral, Q6H  levothyroxine, 150 mcg, Oral, Q AM  lisinopril, 20 mg, Oral, Daily  multivitamin with minerals, 1 tablet, Oral, Daily  sertraline, 100 mg, Oral, Daily  sodium chloride, 3 mL, Intravenous, Q12H      CXR: Device and leads in acceptable position without pneumothorax noted    TELEMETRY: AV paced 60 bpm    Assessment & Plan     Mr. Howard Owusu is a 60-year-old white male seen in EP follow-up today status post biventricular ICD generator change with new RA lead placement and abandoned old RA lead. Device check per representative today with noted normal function. Patient is stable and ready for discharge home today with follow-up in 3 months with Dr. Cruz in office with [employer] device check. Patient is instructed on all activity restrictions and wound care instructions. All medications are reviewed and outlined on discharge paperwork. Patient verbalizes complete understanding of above instruction and education and is ready for discharge home today.    Electronically signed by FRANKIE Byrne, 11/18/20, 8:16 AM EST.

## 2020-11-30 ENCOUNTER — OFFICE VISIT (OUTPATIENT)
Dept: CARDIOLOGY | Facility: CLINIC | Age: 60
End: 2020-11-30

## 2020-11-30 DIAGNOSIS — I25.5 ISCHEMIC CARDIOMYOPATHY: Primary | ICD-10-CM

## 2020-11-30 PROCEDURE — 99024 POSTOP FOLLOW-UP VISIT: CPT | Performed by: INTERNAL MEDICINE

## 2020-11-30 NOTE — PROGRESS NOTES
11/30/2020    Name: Howard Owusu  YOB: 1960    WOUND CHECK    Patient has fever: [] YES   [x] NO     Temperature if indicated:     Wound Location:  Left Shoulder     Surgical Glue: intact     Wound Appearance: clear/intact     Plan: normal wound check      Did patient receive home monitoring box? [x] YES   [] NO  (If no, contact device clinic.)    Does patient have questions about remote monitoring? [] YES   [x] NO (If yes notify device clinic)      Notes:       Appointment for follow-up scheduled for 3 months post procedure []    Future Appointments   Date Time Provider Department Center   11/30/2020 10:00 AM WOUND CHECK [facility] None   2/22[facility]2021 10:30 [facility]   4/21/2021 11:45 AM Jose Jones MD [facility] None          Tabitha Quire, MA, 11/30/20

## 2021-02-22 ENCOUNTER — OFFICE VISIT (OUTPATIENT)
Dept: CARDIOLOGY | Facility: CLINIC | Age: 61
End: 2021-02-22

## 2021-02-22 VITALS
HEART RATE: 60 BPM | HEIGHT: 66 IN | OXYGEN SATURATION: 98 % | SYSTOLIC BLOOD PRESSURE: 110 MMHG | WEIGHT: 230 LBS | DIASTOLIC BLOOD PRESSURE: 64 MMHG | BODY MASS INDEX: 36.96 KG/M2

## 2021-02-22 DIAGNOSIS — I47.20 V-TACH (HCC): ICD-10-CM

## 2021-02-22 DIAGNOSIS — I10 ESSENTIAL HYPERTENSION: ICD-10-CM

## 2021-02-22 DIAGNOSIS — I25.10 CORONARY ARTERY DISEASE INVOLVING NATIVE CORONARY ARTERY OF NATIVE HEART WITHOUT ANGINA PECTORIS: ICD-10-CM

## 2021-02-22 DIAGNOSIS — I48.0 PAROXYSMAL ATRIAL FIBRILLATION (HCC): ICD-10-CM

## 2021-02-22 DIAGNOSIS — I25.5 ISCHEMIC CARDIOMYOPATHY: Primary | ICD-10-CM

## 2021-02-22 DIAGNOSIS — G47.33 OBSTRUCTIVE SLEEP APNEA: ICD-10-CM

## 2021-02-22 DIAGNOSIS — Z95.810 ICD (IMPLANTABLE CARDIOVERTER-DEFIBRILLATOR), BIVENTRICULAR, IN SITU: ICD-10-CM

## 2021-02-22 PROCEDURE — 99214 OFFICE O/P EST MOD 30 MIN: CPT | Performed by: INTERNAL MEDICINE

## 2021-02-22 PROCEDURE — 93284 PRGRMG EVAL IMPLANTABLE DFB: CPT | Performed by: INTERNAL MEDICINE

## 2021-02-22 NOTE — PROGRESS NOTES
Cardiac Electrophysiology Outpatient Follow Up Note            [facility] at [facility]    Follow Up Office Visit      Howard Owusu  [id number]  02/22/2021  [unfilled]  [unfilled]    Primary Care Physician: Aly Wallace III, MD    Referred By: No ref. provider found    Subjective     Chief Complaint:   Diagnoses and all orders for this visit:    1. Ischemic cardiomyopathy (Primary)    2. Coronary artery disease involving native coronary artery of native heart without angina pectoris    3. Essential hypertension    4. Paroxysmal atrial fibrillation (CMS/HCC)    5. V-tach (CMS/HCC)    6. Obstructive sleep apnea    7. ICD (implantable cardioverter-defibrillator), biventricular, in situ      Chief Complaint   Patient presents with   • Cardiomyopathy       History of Present Illness:   Hoawrd Owusu is a 61 y.o. male who presents to my electrophysiology clinic for follow up of above complaints.  He is doing quite well.  He is healed nicely since his resynchronization ICD pulse generator replacement recently.  No discomfort here.    He was walking taking garbage out on the ice he slipped he landed on his chest.  It was sore for several days.    His heart failure symptoms are stable.  He does not have shortness of breath lying flat does not wake up at night short of breath.  Of course if eats a lot of salt and this all changes and he becomes markedly worse.  That said he is quite effectively is staying away from salt..      Review of Systems:   Constitutional: No fevers or chills, no recent weight gain or weight loss or fatigue  Eyes: No visual loss, blurred vision, double vision, yellow sclerae.  ENT: No headaches, hearing loss, vertigo, congestion or sore throat.   Cardiovascular: Per HPI  Respiratory: No cough or wheezing, no sputum production, no hematemesis   Gastrointestinal: No abdominal pain, no nausea, vomiting, constipation, diarrhea, melena.    Genitourinary: No dysuria, hematuria or increased frequency.  Musculoskeletal:  No gait disturbance, weakness or joint pain or stiffness  Integumentary: No rashes, urticaria, ulcers or sores.   Neurological: No headache, dizziness, syncope, paralysis, ataxia, no prior CVA/TIA  Psychiatric: No anxiety, or depression  Endocrine: No diaphoresis, cold or heat intolerance. No polyuria or polydipsia.   Hematologic/Lymphatic: No anemia, abnormal bruising or bleeding. No history of DVT/PE.      Past Medical History:   Past Medical History:   Diagnosis Date   • Arrhythmia    • Atrial fibrillation (CMS/HCC)     single episode during PCI with conversion to NSR witin 24 hrs   • Coronary artery disease 2004    S/P Taxus RICHARD prox LAD, Cath June 2011 EF 20%, S/P distal RCA 3.0x12 and 3.0x8 Cypher, PLB 2.5x8 Cypher, and mid ALD 3.0x 13 Cypher   • GERD (gastroesophageal reflux disease)    • GERD (gastroesophageal reflux disease)    • Hyperlipidemia    • Hypertension    • Hypothyroidism    • Ischemic cardiomyopathy     Hx of Inducible VT per EP study Nov 2004, S/P [employer] ICD implant, upgrade to Bi-V iCD July 2008 Dr. Mtz June 2010 appropriate ICD shocks, Gen change 12/11/13 MGR   • Ischemic cardiomyopathy    • Obstructive sleep apnea    • Old MI (myocardial infarction)    • GILL (obstructive sleep apnea)    • VT (ventricular tachycardia) (CMS/HCC)        Past Surgical History:   Past Surgical History:   Procedure Laterality Date   • CARDIAC CATHETERIZATION     • CARDIAC ELECTROPHYSIOLOGY PROCEDURE N/A 11/17/2020    Procedure: ICD DC generator change Generator change at patient's earliest convenience.  Given the atrial lead noise, will reevaluate at that time to see if atrial lead revision is also necessary. ;  Surgeon: Maurizio Cruz DO;  Location: [facility] EP INVASIVE LOCATION;  Service: Cardiology;  Laterality: N/A;   • INTERNAL CARDIAC DEFIBRILLATOR INSERTION     • PACEMAKER REPLACEMENT      Generator change, [employer] Bi-V ICD  (Dr. Atkins), 12/11/2013.       Family History:   Family History   Problem Relation Age of Onset   • Pulmonary fibrosis Mother    • Alzheimer's disease Mother    • Heart attack Father    • No Known Problems Sister    • No Known Problems Brother    • No Known Problems Brother        Social History:   Social History     Socioeconomic History   • Marital status:      Spouse name: Not on file   • Number of children: Not on file   • Years of education: Not on file   • Highest education level: Not on file   Tobacco Use   • Smoking status: Never Smoker   • Smokeless tobacco: Never Used   Substance and Sexual Activity   • Alcohol use: No   • Drug use: No   • Sexual activity: Defer       Medications:     Current Outpatient Medications:   •  aspirin 325 MG tablet, Take 325 mg by mouth daily., Disp: , Rfl:   •  atorvastatin (LIPITOR) 80 MG tablet, Take 1 tablet by mouth Every Night., Disp: 90 tablet, Rfl: 3  •  bisoprolol (ZEBeta) 5 MG tablet, Take 1 tablet by mouth Daily., Disp: 90 tablet, Rfl: 3  •  clopidogrel (PLAVIX) 75 MG tablet, Take 1 tablet by mouth Daily., Disp: 90 tablet, Rfl: 3  •  esomeprazole (NexIUM) 40 MG capsule, Take 20 mg by mouth Every Morning Before Breakfast., Disp: , Rfl:   •  Garlic 1000 MG capsule, Take 1 tablet by mouth Daily., Disp: , Rfl:   •  levothyroxine (SYNTHROID, LEVOTHROID) 150 MCG tablet, Take 150 mcg by mouth Daily., Disp: , Rfl:   •  lisinopril (PRINIVIL,ZESTRIL) 20 MG tablet, Take 1 tablet by mouth Daily., Disp: 90 tablet, Rfl: 3  •  Multiple Vitamins-Minerals (ONE DAILY MENS PO), Take 1 tablet by mouth Daily., Disp: , Rfl:   •  nitroglycerin (NITROSTAT) 0.4 MG SL tablet, Place 1 tablet under the tongue Every 5 (Five) Minutes As Needed for Chest Pain. Take no more than 3 doses in 15 minutes., Disp: 25 tablet, Rfl: 3  •  Omega-3 Fatty Acids (OMEGA-3 FISH OIL EX ST) 880 MG capsule, Take 800 mg by mouth Daily., Disp: , Rfl:   •  sertraline (ZOLOFT) 100 MG tablet, Take 1 tablet by  "mouth daily., Disp: , Rfl:     Allergies:   Allergies   Allergen Reactions   • Morphine And Related Hives   • Penicillins    • Sulfa Antibiotics        Objective   Vital Signs:   Vitals:    02/22/21 1033   BP: 110/64   BP Location: Right arm   Patient Position: Sitting   Pulse: 60   SpO2: 98%   Weight: 104 kg (230 lb)   Height: 167.6 cm (66\")       PHYSICAL EXAM  General appearance: Awake, alert, cooperative  Head: Normocephalic, without obvious abnormality, atraumatic  Eyes: Conjunctivae/corneas clear, EOMs intact  Neck: no adenopathy, no carotid bruit, no JVD and thyroid: not enlarged  Lungs: clear to auscultation bilaterally and no rhonchi or crackles\", ' symmetric  Heart: regular rate and rhythm, S1, S2 normal, no murmur, click, rub or gallop  Abdomen: Soft, non-tender, bowel sounds normal,  no organomegaly  Extremities: extremities normal, atraumatic, no cyanosis or edema  Skin: Skin color, turgor normal, no rashes or lesions  Neurologic: Grossly normal     Lab Results   Component Value Date    GLUCOSE 98 11/17/2020    CALCIUM 9.6 11/17/2020     11/17/2020    K 4.5 11/17/2020    CO2 24.0 11/17/2020     11/17/2020    BUN 12 11/17/2020    CREATININE 0.88 11/17/2020    EGFRIFNONA 88 11/17/2020    BCR 13.6 11/17/2020    ANIONGAP 9.0 11/17/2020     Lab Results   Component Value Date    WBC 5.52 11/17/2020    HGB 13.6 11/17/2020    HCT 41.4 11/17/2020    MCV 95.4 11/17/2020     (L) 11/17/2020     No results found for: INR, PROTIME  No results found for: TSH, N2GWVCD, F2WSDTQ, THYROIDAB    Cardiac Testing:     I personally viewed and interpreted the patient's EKG/Telemetry/lab data    Procedures    Tobacco Cessation: N/A  Obstructive Sleep Apnea Screening: Completed    Assessment & Plan    Diagnoses and all orders for this visit:    1. Ischemic cardiomyopathy (Primary)    2. Coronary artery disease involving native coronary artery of native heart without angina pectoris    3. Essential " hypertension    4. Paroxysmal atrial fibrillation (CMS/HCC)    5. V-tach (CMS/[facility])    6. Obstructive sleep apnea    7. ICD (implantable cardioverter-defibrillator), biventricular, in situ         Diagnosis Plan   1. Ischemic cardiomyopathy   table.  Heart failure symptoms are absent.  New York Heart Association functional class II at this point today.  No lower extremity edema.  No exacerbation.  On good medical therapy including heart failure beta-blocker as well as ACE inhibitor.   2. Coronary artery disease involving native coronary artery of native heart without angina pectoris   aspirin.  Statin.  Beta-blocker.    No symptoms of angina.  Doing nicely.   3. Essential hypertension   blood pressure well controlled on bisoprolol.  Managed well.   4. Paroxysmal atrial fibrillation (CMS/HCC)   nothing to suggest atrial fibrillation on his device interrogation.  Brief episodes of atrial tachycardia less than 10 seconds duration.  No indication for anticoagulation at this point.  Continue to monitor A. fib burden on his device.  At this point I would not diagnose him as having atrial fibrillation.   5. V-tach (CMS/[facility])   no further episodes.  ICD monitor zone programmed accordingly.  We will continue to monitor this.   6. Obstructive sleep apnea   CPAP therapy.  Well treated.  Controlled.   7. ICD (implantable cardioverter-defibrillator), biventricular, in situ   [employer] ICD system interrogated.  New right atrial lead functioning well today.  VT monitor zone detection interval extended to 60 intervals by device reprogramming today.      This patient's Cardiac Implanted Electronic Device was manually interrogated and reprogrammed during the patient encounter today.  Iterative programming changes were manually made to determine the sensing threshold, pacing threshold, lead impedance as well as underlying cardiac rhythm.  These programming changes were not limited to but included some or all of the following when  appropriate: pacing mode, programmed AV delays, blanking periods, and refractory periods.  Data obtained as a result of these manual programing changes informed the patient's CIED permanent programming.         Body mass index is 37.12 kg/m².  Weight loss encouraged.    I spent 26 minutes in consultation with this patient which included more than 65% of this time in direct face-to-face counseling, physical examination and discussion of my assessment and findings and shared decision making with the patient.  The remainder of the time not spent face to face was performing one, some or all of the following actions:  preparing to see this patient ( eg. Review of tests),  ordering medications, tests or procedures ), care coordination, discussion of the plan with other healthcare providers, documenting clinical information in Epic well as independently interpreting results and communicating results to patient, family and or caregiver.  All time noted occurred on the date of service.    Follow Up: 1 year      Thank you for allowing me to participate in the care of your patient. Please to not hesitate to contact me with additional questions or concerns.      Maurizio Cruz DO, FACC, RS  Cardiac Electrophysiologist  [facility] / [facility]

## 2021-04-08 ENCOUNTER — HOSPITAL ENCOUNTER (OUTPATIENT)
Facility: HOSPITAL | Age: 61
Discharge: HOME | End: 2021-04-10
Attending: EMERGENCY MEDICINE | Admitting: INTERNAL MEDICINE

## 2021-04-08 ENCOUNTER — APPOINTMENT (OUTPATIENT)
Dept: GENERAL RADIOLOGY | Facility: HOSPITAL | Age: 61
End: 2021-04-08

## 2021-04-08 ENCOUNTER — TELEPHONE (OUTPATIENT)
Dept: CARDIOLOGY | Facility: CLINIC | Age: 61
End: 2021-04-08

## 2021-04-08 DIAGNOSIS — T82.111A MALFUNCTION OF CARDIAC PACEMAKER, INITIAL ENCOUNTER: Primary | ICD-10-CM

## 2021-04-08 LAB
ALBUMIN SERPL-MCNC: 4.2 G/DL (ref 3.5–5.2)
ALBUMIN/GLOB SERPL: 1.6 G/DL
ALP SERPL-CCNC: 82 U/L (ref 39–117)
ALT SERPL W P-5'-P-CCNC: 26 U/L (ref 1–41)
ANION GAP SERPL CALCULATED.3IONS-SCNC: 7 MMOL/L (ref 5–15)
AST SERPL-CCNC: 32 U/L (ref 1–40)
BASOPHILS # BLD AUTO: 0.03 10*3/MM3 (ref 0–0.2)
BASOPHILS NFR BLD AUTO: 0.5 % (ref 0–1.5)
BILIRUB SERPL-MCNC: 0.4 MG/DL (ref 0–1.2)
BUN SERPL-MCNC: 13 MG/DL (ref 8–23)
BUN/CREAT SERPL: 14.9 (ref 7–25)
CALCIUM SPEC-SCNC: 9 MG/DL (ref 8.6–10.5)
CHLORIDE SERPL-SCNC: 107 MMOL/L (ref 98–107)
CO2 SERPL-SCNC: 25 MMOL/L (ref 22–29)
CREAT SERPL-MCNC: 0.87 MG/DL (ref 0.76–1.27)
DEPRECATED RDW RBC AUTO: 47 FL (ref 37–54)
EOSINOPHIL # BLD AUTO: 0.38 10*3/MM3 (ref 0–0.4)
EOSINOPHIL NFR BLD AUTO: 6.7 % (ref 0.3–6.2)
ERYTHROCYTE [DISTWIDTH] IN BLOOD BY AUTOMATED COUNT: 13.3 % (ref 12.3–15.4)
GFR SERPL CREATININE-BSD FRML MDRD: 89 ML/MIN/1.73
GLOBULIN UR ELPH-MCNC: 2.7 GM/DL
GLUCOSE SERPL-MCNC: 99 MG/DL (ref 65–99)
HCT VFR BLD AUTO: 38.2 % (ref 37.5–51)
HGB BLD-MCNC: 12.4 G/DL (ref 13–17.7)
HOLD SPECIMEN: NORMAL
IMM GRANULOCYTES # BLD AUTO: 0.01 10*3/MM3 (ref 0–0.05)
IMM GRANULOCYTES NFR BLD AUTO: 0.2 % (ref 0–0.5)
LYMPHOCYTES # BLD AUTO: 1.78 10*3/MM3 (ref 0.7–3.1)
LYMPHOCYTES NFR BLD AUTO: 31.3 % (ref 19.6–45.3)
MCH RBC QN AUTO: 31 PG (ref 26.6–33)
MCHC RBC AUTO-ENTMCNC: 32.5 G/DL (ref 31.5–35.7)
MCV RBC AUTO: 95.5 FL (ref 79–97)
MONOCYTES # BLD AUTO: 0.5 10*3/MM3 (ref 0.1–0.9)
MONOCYTES NFR BLD AUTO: 8.8 % (ref 5–12)
NEUTROPHILS NFR BLD AUTO: 2.98 10*3/MM3 (ref 1.7–7)
NEUTROPHILS NFR BLD AUTO: 52.5 % (ref 42.7–76)
NRBC BLD AUTO-RTO: 0 /100 WBC (ref 0–0.2)
NT-PROBNP SERPL-MCNC: 1201 PG/ML (ref 0–900)
PLATELET # BLD AUTO: 143 10*3/MM3 (ref 140–450)
PMV BLD AUTO: 11.5 FL (ref 6–12)
POTASSIUM SERPL-SCNC: 4.2 MMOL/L (ref 3.5–5.2)
PROT SERPL-MCNC: 6.9 G/DL (ref 6–8.5)
RBC # BLD AUTO: 4 10*6/MM3 (ref 4.14–5.8)
SODIUM SERPL-SCNC: 139 MMOL/L (ref 136–145)
TROPONIN T SERPL-MCNC: <0.01 NG/ML (ref 0–0.03)
WBC # BLD AUTO: 5.68 10*3/MM3 (ref 3.4–10.8)
WHOLE BLOOD HOLD SPECIMEN: NORMAL
WHOLE BLOOD HOLD SPECIMEN: NORMAL

## 2021-04-08 PROCEDURE — G0378 HOSPITAL OBSERVATION PER HR: HCPCS

## 2021-04-08 PROCEDURE — 80053 COMPREHEN METABOLIC PANEL: CPT | Performed by: EMERGENCY MEDICINE

## 2021-04-08 PROCEDURE — 71046 X-RAY EXAM CHEST 2 VIEWS: CPT

## 2021-04-08 PROCEDURE — C9803 HOPD COVID-19 SPEC COLLECT: HCPCS

## 2021-04-08 PROCEDURE — 93005 ELECTROCARDIOGRAM TRACING: CPT | Performed by: EMERGENCY MEDICINE

## 2021-04-08 PROCEDURE — 84484 ASSAY OF TROPONIN QUANT: CPT | Performed by: EMERGENCY MEDICINE

## 2021-04-08 PROCEDURE — U0004 COV-19 TEST NON-CDC HGH THRU: HCPCS | Performed by: INTERNAL MEDICINE

## 2021-04-08 PROCEDURE — 83880 ASSAY OF NATRIURETIC PEPTIDE: CPT | Performed by: EMERGENCY MEDICINE

## 2021-04-08 PROCEDURE — 93295 DEV INTERROG REMOTE 1/2/MLT: CPT | Performed by: INTERNAL MEDICINE

## 2021-04-08 PROCEDURE — 99284 EMERGENCY DEPT VISIT MOD MDM: CPT

## 2021-04-08 PROCEDURE — 99220 PR INITIAL OBSERVATION CARE/DAY 70 MINUTES: CPT | Performed by: INTERNAL MEDICINE

## 2021-04-08 PROCEDURE — 93296 REM INTERROG EVL PM/IDS: CPT | Performed by: INTERNAL MEDICINE

## 2021-04-08 PROCEDURE — 85025 COMPLETE CBC W/AUTO DIFF WBC: CPT | Performed by: EMERGENCY MEDICINE

## 2021-04-08 RX ORDER — SODIUM CHLORIDE 0.9 % (FLUSH) 0.9 %
10 SYRINGE (ML) INJECTION EVERY 12 HOURS SCHEDULED
Status: DISCONTINUED | OUTPATIENT
Start: 2021-04-08 | End: 2021-04-10 | Stop reason: HOSPADM

## 2021-04-08 RX ORDER — ASPIRIN 325 MG
325 TABLET ORAL DAILY
Status: DISCONTINUED | OUTPATIENT
Start: 2021-04-08 | End: 2021-04-10 | Stop reason: HOSPADM

## 2021-04-08 RX ORDER — SODIUM CHLORIDE 0.9 % (FLUSH) 0.9 %
10 SYRINGE (ML) INJECTION AS NEEDED
Status: DISCONTINUED | OUTPATIENT
Start: 2021-04-08 | End: 2021-04-10 | Stop reason: HOSPADM

## 2021-04-08 RX ORDER — ACETAMINOPHEN 650 MG/1
650 SUPPOSITORY RECTAL EVERY 4 HOURS PRN
Status: DISCONTINUED | OUTPATIENT
Start: 2021-04-08 | End: 2021-04-10 | Stop reason: HOSPADM

## 2021-04-08 RX ORDER — PANTOPRAZOLE SODIUM 40 MG/1
40 TABLET, DELAYED RELEASE ORAL EVERY MORNING
Status: DISCONTINUED | OUTPATIENT
Start: 2021-04-09 | End: 2021-04-10 | Stop reason: HOSPADM

## 2021-04-08 RX ORDER — BISOPROLOL FUMARATE 5 MG/1
5 TABLET, FILM COATED ORAL DAILY
Status: DISCONTINUED | OUTPATIENT
Start: 2021-04-09 | End: 2021-04-10 | Stop reason: HOSPADM

## 2021-04-08 RX ORDER — SERTRALINE HYDROCHLORIDE 100 MG/1
100 TABLET, FILM COATED ORAL DAILY
Status: DISCONTINUED | OUTPATIENT
Start: 2021-04-09 | End: 2021-04-10 | Stop reason: HOSPADM

## 2021-04-08 RX ORDER — LISINOPRIL 20 MG/1
20 TABLET ORAL DAILY
Status: DISCONTINUED | OUTPATIENT
Start: 2021-04-09 | End: 2021-04-10 | Stop reason: HOSPADM

## 2021-04-08 RX ORDER — ACETAMINOPHEN 325 MG/1
650 TABLET ORAL EVERY 4 HOURS PRN
Status: DISCONTINUED | OUTPATIENT
Start: 2021-04-08 | End: 2021-04-10 | Stop reason: HOSPADM

## 2021-04-08 RX ORDER — ATORVASTATIN CALCIUM 40 MG/1
80 TABLET, FILM COATED ORAL NIGHTLY
Status: DISCONTINUED | OUTPATIENT
Start: 2021-04-08 | End: 2021-04-10 | Stop reason: HOSPADM

## 2021-04-08 RX ORDER — ACETAMINOPHEN 160 MG/5ML
650 SOLUTION ORAL EVERY 4 HOURS PRN
Status: DISCONTINUED | OUTPATIENT
Start: 2021-04-08 | End: 2021-04-10 | Stop reason: HOSPADM

## 2021-04-08 RX ORDER — LEVOTHYROXINE SODIUM 0.15 MG/1
150 TABLET ORAL DAILY
Status: DISCONTINUED | OUTPATIENT
Start: 2021-04-09 | End: 2021-04-10 | Stop reason: HOSPADM

## 2021-04-08 RX ADMIN — SODIUM CHLORIDE, PRESERVATIVE FREE 10 ML: 5 INJECTION INTRAVENOUS at 23:38

## 2021-04-08 RX ADMIN — ATORVASTATIN CALCIUM 80 MG: 40 TABLET, FILM COATED ORAL at 23:38

## 2021-04-08 NOTE — ED PROVIDER NOTES
EMERGENCY DEPARTMENT ENCOUNTER    Pt Name: Howard Owusu  MRN: [id number]  Pt :   1960  Room Number:    Date of encounter:  2021  PCP: Aly Wallace III, MD  ED Provider: Flo Grullon MD    Historian: Patient and patient's cardiologist       HPI:  Chief Complaint: Tiredness, pacemaker malfunction        Context: Howard Owusu is a 61 y.o. male who presents to the ED c/o generalized tiredness ongoing for the last month.  He denies any unilateral weakness.  He has had no recent change in medications.  The patient performed a pacemaker check today and was told that the pacemaker was malfunctioning as far as the wires are concerned.  He was told to come to the hospital and he would require admission and surgical procedure to repair this problem.        PAST MEDICAL HISTORY  Active Ambulatory Problems     Diagnosis Date Noted   • Ischemic cardiomyopathy    • Coronary artery disease 2004   • Hyperlipidemia    • Hypertension    • Atrial fibrillation (CMS/[facility]) 2016   • GERD (gastroesophageal reflux disease)    • Hypothyroidism    • Obstructive sleep apnea    • V-tach (CMS/[facility]) 2017   • ICD (implantable cardioverter-defibrillator), biventricular, in situ 2021     Resolved Ambulatory Problems     Diagnosis Date Noted   • No Resolved Ambulatory Problems     Past Medical History:   Diagnosis Date   • Arrhythmia    • Old MI (myocardial infarction)    • GILL (obstructive sleep apnea)    • VT (ventricular tachycardia) (CMS/[facility])          PAST SURGICAL HISTORY  Past Surgical History:   Procedure Laterality Date   • CARDIAC CATHETERIZATION     • CARDIAC ELECTROPHYSIOLOGY PROCEDURE N/A 2020    Procedure: ICD DC generator change Generator change at patient's earliest convenience.  Given the atrial lead noise, will reevaluate at that time to see if atrial lead revision is also necessary. ;  Surgeon: Maurizio Cruz DO;  Location: [facility] INVASIVE LOCATION;  Service:  Cardiology;  Laterality: N/A;   • INTERNAL CARDIAC DEFIBRILLATOR INSERTION     • PACEMAKER REPLACEMENT      Generator change, [employer] Bi-V ICD (Dr. Atkins), 12/11/2013.         FAMILY HISTORY  Family History   Problem Relation Age of Onset   • Pulmonary fibrosis Mother    • Alzheimer's disease Mother    • Heart attack Father    • No Known Problems Sister    • No Known Problems Brother    • No Known Problems Brother          SOCIAL HISTORY  Social History     Socioeconomic History   • Marital status:      Spouse name: Not on file   • Number of children: Not on file   • Years of education: Not on file   • Highest education level: Not on file   Tobacco Use   • Smoking status: Never Smoker   • Smokeless tobacco: Never Used   Substance and Sexual Activity   • Alcohol use: No   • Drug use: No   • Sexual activity: Defer         ALLERGIES  Morphine and related, Penicillins, and Sulfa antibiotics        REVIEW OF SYSTEMS  Review of Systems     All systems reviewed and negative except for those discussed in HPI.       PHYSICAL EXAM    I have reviewed the triage vital signs and nursing notes.    ED Triage Vitals [04/08/21 1718]   Temp Heart Rate Resp BP SpO2   98.1 °F (36.7 °C) 61 22 167/88 94 %      Temp src Heart Rate Source Patient Position BP Location FiO2 (%)   Oral Monitor Sitting Left arm --       Physical Exam  GENERAL:   Appears pleasant and in no acute distress.  HENT: Nares patent.  EYES: No scleral icterus.  CV: Regular rhythm, regular rate.  No murmurs gallops rubs clear to auscultation  RESPIRATORY: Normal effort.  No audible wheezes, rales or rhonchi.  ABDOMEN: Soft, nontender.  Protuberant with adipose  MUSCULOSKELETAL: No deformities.   NEURO: Alert, moves all extremities, follows commands.  SKIN: Warm, dry, no rash visualized.        LAB RESULTS  No results found for this or any previous visit (from the past 24 hour(s)).    If labs were ordered, I independently reviewed the  results.        RADIOLOGY  No Radiology Exams Resulted Within Past 24 Hours    I ordered and reviewed the above noted radiographic studies.    I viewed images of chest x-ray which showed no obvious pacemaker lead wire cracks/disruption per my independent interpretation.  See radiologist's dictation for official interpretation.        PROCEDURES    Procedures    ECG 12 Lead   Final Result   Test Reason : SOA Protocol   Blood Pressure : **/** mmHG   Vent. Rate : 060 BPM     Atrial Rate : 060 BPM      P-R Int : 166 ms          QRS Dur : 180 ms       QT Int : 486 ms       P-R-T Axes : 111 -63 066 degrees      QTc Int : 486 ms      AV dual-paced rhythm   Biventricular pacemaker detected   Abnormal ECG   When compared with ECG of 03-JUN-2011 14:47,   Vent. rate has decreased BY  14 BPM   Confirmed by ZENAIDA ROSALES MD ([id number]) on 4/9/2021 1:25:07 AM      Referred By:  EDMD           Confirmed By:ZENAIDA ROSALES MD          MEDICATIONS GIVEN IN ER    Medications   sodium chloride 0.9 % flush 10 mL (has no administration in time range)         PROGRESS, DATA ANALYSIS, CONSULTS, AND MEDICAL DECISION MAKING    All labs have been independently reviewed by me.  All radiology studies have been reviewed by me and the radiologist dictating the report.   EKG's have been independently viewed and interpreted by me.            ED Course as of Apr 08 1905   Thu Apr 08, 2021   1820 I spoke with , cardiology.  He will call in [employer] pacer rep who is on their way.  He requests NPO after midnight.    I have paged the hospitalist for admission.    [MS]   1828 I spoke with Dr. Gusman who will admit.    [MS]      ED Course User Index  [MS] Zenaida Rosales MD             AS OF 17:47 EDT VITALS:    BP - 167/88  HR - 61  TEMP - 98.1 °F (36.7 °C) (Oral)  O2 SATS - 94%        DIAGNOSIS  Final diagnoses:   Malfunction of cardiac pacemaker, initial encounter         DISPOSITION  Admission           Zenaida Rosales MD  04/09/21 3564

## 2021-04-08 NOTE — TELEPHONE ENCOUNTER
Received reading from home monitor of RV lead noise with aborted shocks x 4.  Spoke with Dr Cruz and he wants him to come to ER to get worked up for a new RV lead and for detections to be turned off.  Called Mr Owusu and he is aware and is coming to [facility] ER.  Notified Mata in billing tool and on call Laureen DAVID is aware.

## 2021-04-08 NOTE — H&P
[facility] Medicine Services  HISTORY AND PHYSICAL    Patient Name: Howard Owusu  : 1960  MRN: [id number]  Primary Care Physician: Aly Wallace III, MD  Date of admission: 2021      Subjective   Subjective     Chief Complaint:  Pacemaker complication    HPI:  Howard Owusu is a 61 y.o. male with a PMH of atrial fibrillation s/p PPM, HTN, GERD, obesity, and hypothyroidism who presented to the ED after cardiology nurse called him and told him his pacemaker was not working appropriately. He denies any chest pain, palpitations, lightheadedness, or dizziness. He has been having progressively worsening dyspnea on exertion for the last month associated with PND and orthopnea, denies and lower extremity swelling.      COVID Details: [x] No Symptoms OR  Date of Symptom Onset:  __ Date of first positive COVID test:  __       Symptoms: [] Fever []  Cough [] Shortness of breath [] Change in taste or smell       Risks: [] Direct Exposure [] High risk facility [] None known       COVID VACCINE status elects not to vaccinate       Review of Systems   Constitutional: Negative for chills and fever.   HENT: Negative for sore throat and trouble swallowing.    Eyes: Negative for photophobia and visual disturbance.   Gastrointestinal: Negative for abdominal pain, constipation and diarrhea.   Genitourinary: Negative for difficulty urinating and dysuria.   Musculoskeletal: Negative for gait problem and joint swelling.   Skin: Negative for rash and wound.   Neurological: Negative for dizziness and headaches.   Hematological: Negative for adenopathy. Does not bruise/bleed easily.   Psychiatric/Behavioral: Negative for confusion and decreased concentration.     All other systems reviewed and are negative.     Personal History     Past Medical History:   Diagnosis Date   • Arrhythmia    • Atrial fibrillation (CMS/HCC)     single episode during PCI with conversion to NSR witin 24 hrs   •  Coronary artery disease 2004    S/P Taxus RICHARD prox LAD, Cath June 2011 EF 20%, S/P distal RCA 3.0x12 and 3.0x8 Cypher, PLB 2.5x8 Cypher, and mid ALD 3.0x 13 Cypher   • GERD (gastroesophageal reflux disease)    • GERD (gastroesophageal reflux disease)    • Hyperlipidemia    • Hypertension    • Hypothyroidism    • Ischemic cardiomyopathy     Hx of Inducible VT per EP study Nov 2004, S/P [employer] ICD implant, upgrade to Bi-V iCD July 2008 Dr. Mtz June 2010 appropriate ICD shocks, Gen change 12/11/13 MGR   • Ischemic cardiomyopathy    • Obstructive sleep apnea    • Old MI (myocardial infarction)    • GILL (obstructive sleep apnea)    • VT (ventricular tachycardia) (CMS/HCC)        Past Surgical History:   Procedure Laterality Date   • CARDIAC CATHETERIZATION     • CARDIAC ELECTROPHYSIOLOGY PROCEDURE N/A 11/17/2020    Procedure: ICD DC generator change Generator change at patient's earliest convenience.  Given the atrial lead noise, will reevaluate at that time to see if atrial lead revision is also necessary. ;  Surgeon: Maurizio Cruz DO;  Location: [facility] EP INVASIVE LOCATION;  Service: Cardiology;  Laterality: N/A;   • INTERNAL CARDIAC DEFIBRILLATOR INSERTION     • PACEMAKER REPLACEMENT      Generator change, [employer] Bi-V ICD (Dr. Atkins), 12/11/2013.       Family History: family history includes Alzheimer's disease in his mother; Heart attack in his father; No Known Problems in his brother, brother, and sister; Pulmonary fibrosis in his mother. Otherwise pertinent FHx was reviewed and unremarkable.     Social History:  reports that he has never smoked. He has never used smokeless tobacco. He reports that he does not drink alcohol and does not use drugs.  Social History     Social History Narrative   • Not on file       Medications:  Available home medication information reviewed.  (Not in a hospital admission)      Allergies   Allergen Reactions   • Morphine And Related Hives   • Penicillins    • Sulfa  Antibiotics        Objective   Objective     Vital Signs:   Temp:  [98.1 °F (36.7 °C)] 98.1 °F (36.7 °C)  Heart Rate:  [60-61] 60  Resp:  [22] 22  BP: (107-167)/(66-95) 122/78       Physical Exam   Constitutional: Awake, alert  Eyes: PERRLA, sclerae anicteric, no conjunctival injection  HENT: NCAT, mucous membranes moist  Neck: Supple, no thyromegaly, no lymphadenopathy, trachea midline  Respiratory: Clear to auscultation bilaterally, nonlabored respirations on room air  Cardiovascular: RRR, no murmurs, no lower extremity edema   Gastrointestinal: Positive bowel sounds, soft, nontender, nondistended  Psychiatric: Appropriate affect, cooperative  Neurologic: Oriented x 3, moving all extremities equally, Cranial Nerves grossly intact to confrontation, speech clear  Skin: No rashes on exposed skin    Result Review:  I have personally reviewed the results from the time of this admission to 04/08/21 7:31 PM EDT and agree with these findings:  [x]  Laboratory  []  Microbiology  []  Radiology  [x]  EKG/Telemetry   []  Cardiology/Vascular   []  Pathology  []  Old records  []  Other:  Most notable findings include:      LAB RESULTS:      Lab 04/08/21  1802   WBC 5.68   HEMOGLOBIN 12.4*   HEMATOCRIT 38.2   PLATELETS 143   NEUTROS ABS 2.98   IMMATURE GRANS (ABS) 0.01   LYMPHS ABS 1.78   MONOS ABS 0.50   EOS ABS 0.38   MCV 95.5         Lab 04/08/21  1802   SODIUM 139   POTASSIUM 4.2   CHLORIDE 107   CO2 25.0   ANION GAP 7.0   BUN 13   CREATININE 0.87   GLUCOSE 99   CALCIUM 9.0         Lab 04/08/21  1802   TOTAL PROTEIN 6.9   ALBUMIN 4.20   GLOBULIN 2.7   ALT (SGPT) 26   AST (SGOT) 32   BILIRUBIN 0.4   ALK PHOS 82         Lab 04/08/21  1802   PROBNP 1,201.0*   TROPONIN T <0.010                 Brief Urine Lab Results     None        Microbiology Results (last 10 days)     ** No results found for the last 240 hours. **          XR Chest 2 View    Result Date: 4/8/2021   EXAMINATION: XR CHEST 2 VIEWS - 04/08/2021  INDICATION:  Pacemaker wire problem.  COMPARISON: Chest x-ray 11/18/2020.  FINDINGS: Left chest wall pacing/ICD device with leads grossly intact. No pneumothorax. No overt edema.       Impression: Left chest wall pacing device stable from 11/18/2020 comparison in overall positioning of leads with leads intact. No overt edema or acute parenchymal findings.  DICTATED:   04/08/2021 EDITED/ls :   04/08/2021  This report was finalized on 4/8/2021 7:00 PM by Dr. Abdriahman Rankin.            Assessment/Plan   Assessment & Plan     Active Hospital Problems    Diagnosis  POA   • Complications, mechanical, pacemaker, cardiac [T82.111A]  Yes   • Atrial fibrillation (CMS/HCC) [I48.91]  Yes     single episode.  Initial with VF during a PCI, cardioverted to atrial fibrillation.  After 24 hours, converted to sinus rhythm.     • Hyperlipidemia [E78.5]  Yes   • Hypertension [I10]  Yes   • Hypothyroidism [E03.9]  Yes   • Obstructive sleep apnea [G47.33]  Yes   • Ischemic cardiomyopathy [I25.5]  Yes       Howard Owusu is a 61 y.o. male with a PMH of ICM, CAD, atrial fibrillation s/p CV in 2004, HTN, GERD, obesity, and hypothyroidism who presented to the ED after cardiology nurse called him and told him his pacemaker was not working appropriately.    Pacemaker complication  Atrial fibrillation s/p cardioversion in 2004  ICM   CAD  -patient is asymptomatic except for progressive dyspnea x1 month   -EKG showing paced rhythm, chest x-ray shows pacing device with leads intact  -Cardiology nurse called patient and said the lead was out of place  -ED doc spoke with  I suppose had to keep patient n.p.o. at midnight for procedure for tomorrow  -continue aspirin, statin, bisoprolol, holding plavix for procedure     Dyspnea   -for 1 month, associated with orthopnea and PND, proBNP 1201  -check echocardiogram   -recommend sleep study - he says he has never been tested for sleep apnea    HTN  -continue lisinopril     Hypothyroidism   -continue levothyroxine  150 mcg qd     DVT prophylaxis:  SCD      CODE STATUS:    Code Status and Medical Interventions:   Ordered at: 04/08/21 1934     Code Status:    CPR     Medical Interventions (Level of Support Prior to Arrest):    Full       Admission Status:  I believe this patient meets OBSERVATION status, however if further evaluation or treatment plans warrant, status may change.  Based upon current information, I predict patient's care encounter to be less than or equal to 2 midnights.      Rosalinda Duffy MD  04/08/21

## 2021-04-09 ENCOUNTER — APPOINTMENT (OUTPATIENT)
Dept: CARDIOLOGY | Facility: HOSPITAL | Age: 61
End: 2021-04-09

## 2021-04-09 ENCOUNTER — APPOINTMENT (OUTPATIENT)
Dept: GENERAL RADIOLOGY | Facility: HOSPITAL | Age: 61
End: 2021-04-09

## 2021-04-09 LAB
BH CV ECHO MEAS - AO MAX PG (FULL): 2.3 MMHG
BH CV ECHO MEAS - AO MAX PG: 6.2 MMHG
BH CV ECHO MEAS - AO MEAN PG (FULL): 1 MMHG
BH CV ECHO MEAS - AO MEAN PG: 3 MMHG
BH CV ECHO MEAS - AO ROOT AREA (BSA CORRECTED): 1.7
BH CV ECHO MEAS - AO ROOT AREA: 10.2 CM^2
BH CV ECHO MEAS - AO ROOT DIAM: 3.6 CM
BH CV ECHO MEAS - AO V2 MAX: 124 CM/SEC
BH CV ECHO MEAS - AO V2 MEAN: 85.3 CM/SEC
BH CV ECHO MEAS - AO V2 VTI: 33.3 CM
BH CV ECHO MEAS - ASC AORTA: 3.2 CM
BH CV ECHO MEAS - AVA(I,A): 2.6 CM^2
BH CV ECHO MEAS - AVA(I,D): 2.6 CM^2
BH CV ECHO MEAS - AVA(V,A): 3 CM^2
BH CV ECHO MEAS - AVA(V,D): 3 CM^2
BH CV ECHO MEAS - BSA(HAYCOCK): 2.2 M^2
BH CV ECHO MEAS - BSA: 2.1 M^2
BH CV ECHO MEAS - BZI_BMI: 36.6 KILOGRAMS/M^2
BH CV ECHO MEAS - BZI_METRIC_HEIGHT: 167.6 CM
BH CV ECHO MEAS - BZI_METRIC_WEIGHT: 103 KG
BH CV ECHO MEAS - EDV(CUBED): 248.9 ML
BH CV ECHO MEAS - EDV(MOD-SP2): 177 ML
BH CV ECHO MEAS - EDV(MOD-SP4): 190 ML
BH CV ECHO MEAS - EDV(TEICH): 200.5 ML
BH CV ECHO MEAS - EF(CUBED): 48.9 %
BH CV ECHO MEAS - EF(MOD-SP2): 35.6 %
BH CV ECHO MEAS - EF(MOD-SP4): 38.9 %
BH CV ECHO MEAS - EF(TEICH): 40.2 %
BH CV ECHO MEAS - ESV(CUBED): 127.3 ML
BH CV ECHO MEAS - ESV(MOD-SP2): 114 ML
BH CV ECHO MEAS - ESV(MOD-SP4): 116 ML
BH CV ECHO MEAS - ESV(TEICH): 119.9 ML
BH CV ECHO MEAS - FS: 20 %
BH CV ECHO MEAS - IVS/LVPW: 1.1
BH CV ECHO MEAS - IVSD: 1.1 CM
BH CV ECHO MEAS - LA DIMENSION: 4.7 CM
BH CV ECHO MEAS - LA/AO: 1.3
BH CV ECHO MEAS - LAD MAJOR: 6.1 CM
BH CV ECHO MEAS - LAT PEAK E' VEL: 4.9 CM/SEC
BH CV ECHO MEAS - LATERAL E/E' RATIO: 19.2
BH CV ECHO MEAS - LV DIASTOLIC VOL/BSA (35-75): 90 ML/M^2
BH CV ECHO MEAS - LV MASS(C)D: 271.9 GRAMS
BH CV ECHO MEAS - LV MASS(C)DI: 128.8 GRAMS/M^2
BH CV ECHO MEAS - LV MAX PG: 3.9 MMHG
BH CV ECHO MEAS - LV MEAN PG: 2 MMHG
BH CV ECHO MEAS - LV SYSTOLIC VOL/BSA (12-30): 55 ML/M^2
BH CV ECHO MEAS - LV V1 MAX: 98.5 CM/SEC
BH CV ECHO MEAS - LV V1 MEAN: 67.2 CM/SEC
BH CV ECHO MEAS - LV V1 VTI: 23.2 CM
BH CV ECHO MEAS - LVIDD: 6.3 CM
BH CV ECHO MEAS - LVIDS: 5 CM
BH CV ECHO MEAS - LVLD AP2: 9.7 CM
BH CV ECHO MEAS - LVLD AP4: 9.2 CM
BH CV ECHO MEAS - LVLS AP2: 8.7 CM
BH CV ECHO MEAS - LVLS AP4: 8.3 CM
BH CV ECHO MEAS - LVOT AREA (M): 3.8 CM^2
BH CV ECHO MEAS - LVOT AREA: 3.8 CM^2
BH CV ECHO MEAS - LVOT DIAM: 2.2 CM
BH CV ECHO MEAS - LVPWD: 0.96 CM
BH CV ECHO MEAS - MED PEAK E' VEL: 5.7 CM/SEC
BH CV ECHO MEAS - MEDIAL E/E' RATIO: 16.5
BH CV ECHO MEAS - MR ALIAS VEL: 38.5 CM/SEC
BH CV ECHO MEAS - MR ERO: 1.4 CM^2
BH CV ECHO MEAS - MR FLOW RATE: 740.8 CM^3/SEC
BH CV ECHO MEAS - MR MAX PG: 114 MMHG
BH CV ECHO MEAS - MR MAX VEL: 534 CM/SEC
BH CV ECHO MEAS - MR MEAN PG: 70 MMHG
BH CV ECHO MEAS - MR MEAN VEL: 389 CM/SEC
BH CV ECHO MEAS - MR PISA RADIUS: 1.8 CM
BH CV ECHO MEAS - MR PISA: 19.2 CM^2
BH CV ECHO MEAS - MR VOLUME: 299.7 ML
BH CV ECHO MEAS - MR VTI: 216 CM
BH CV ECHO MEAS - MV A MAX VEL: 79.5 CM/SEC
BH CV ECHO MEAS - MV AREA (1 DIAM): 9.1 CM^2
BH CV ECHO MEAS - MV DEC TIME: 0.25 SEC
BH CV ECHO MEAS - MV DIAM: 3.4 CM
BH CV ECHO MEAS - MV E MAX VEL: 93.8 CM/SEC
BH CV ECHO MEAS - MV E/A: 1.2
BH CV ECHO MEAS - MV FLOW AREA(1DIAM): 9.1 CM^2
BH CV ECHO MEAS - PA ACC SLOPE: 371 CM/SEC^2
BH CV ECHO MEAS - PA ACC TIME: 0.21 SEC
BH CV ECHO MEAS - PA MAX PG: 4.2 MMHG
BH CV ECHO MEAS - PA PR(ACCEL): -16.9 MMHG
BH CV ECHO MEAS - PA V2 MAX: 103 CM/SEC
BH CV ECHO MEAS - RAP SYSTOLE: 8 MMHG
BH CV ECHO MEAS - RVDD: 1.8 CM
BH CV ECHO MEAS - RVSP: 30 MMHG
BH CV ECHO MEAS - SI(AO): 160.6 ML/M^2
BH CV ECHO MEAS - SI(CUBED): 57.6 ML/M^2
BH CV ECHO MEAS - SI(LVOT): 41.8 ML/M^2
BH CV ECHO MEAS - SI(MOD-SP2): 29.8 ML/M^2
BH CV ECHO MEAS - SI(MOD-SP4): 35.1 ML/M^2
BH CV ECHO MEAS - SI(TEICH): 38.2 ML/M^2
BH CV ECHO MEAS - SV(AO): 339 ML
BH CV ECHO MEAS - SV(CUBED): 121.6 ML
BH CV ECHO MEAS - SV(LVOT): 88.2 ML
BH CV ECHO MEAS - SV(MOD-SP2): 63 ML
BH CV ECHO MEAS - SV(MOD-SP4): 74 ML
BH CV ECHO MEAS - SV(TEICH): 80.6 ML
BH CV ECHO MEAS - TAPSE (>1.6): 2.9 CM
BH CV ECHO MEAS - TR MAX PG: 22 MMHG
BH CV ECHO MEAS - TR MAX VEL: 232.5 CM/SEC
BH CV ECHO MEAS - TV MAX PG: 1.6 MMHG
BH CV ECHO MEAS - TV V2 MAX: 62.7 CM/SEC
BH CV ECHO MEASUREMENTS AVERAGE E/E' RATIO: 17.7
BH CV VAS BP RIGHT ARM: NORMAL MMHG
BH CV XLRA - RV BASE: 3.1 CM
BH CV XLRA - RV LENGTH: 7 CM
BH CV XLRA - RV MID: 2.1 CM
BH CV XLRA - TDI S': 11 CM/SEC
LEFT ATRIUM VOLUME INDEX: 31.7 ML/M^2
LEFT ATRIUM VOLUME: 67 ML
LV EF 2D ECHO EST: 25 %
MAXIMAL PREDICTED HEART RATE: 157 BPM
QT INTERVAL: 486 MS
QTC INTERVAL: 486 MS
SARS-COV-2 RNA PNL SPEC NAA+PROBE: NOT DETECTED
STRESS TARGET HR: 133 BPM

## 2021-04-09 PROCEDURE — G0378 HOSPITAL OBSERVATION PER HR: HCPCS

## 2021-04-09 PROCEDURE — C1769 GUIDE WIRE: HCPCS | Performed by: INTERNAL MEDICINE

## 2021-04-09 PROCEDURE — 93306 TTE W/DOPPLER COMPLETE: CPT | Performed by: INTERNAL MEDICINE

## 2021-04-09 PROCEDURE — 63710000001 IBUPROFEN 600 MG TABLET: Performed by: INTERNAL MEDICINE

## 2021-04-09 PROCEDURE — 71045 X-RAY EXAM CHEST 1 VIEW: CPT

## 2021-04-09 PROCEDURE — 99152 MOD SED SAME PHYS/QHP 5/>YRS: CPT | Performed by: INTERNAL MEDICINE

## 2021-04-09 PROCEDURE — 63710000001 ATORVASTATIN 40 MG TABLET: Performed by: INTERNAL MEDICINE

## 2021-04-09 PROCEDURE — 33216 INSERT 1 ELECTRODE PM-DEFIB: CPT | Performed by: INTERNAL MEDICINE

## 2021-04-09 PROCEDURE — 0 IOPAMIDOL PER 1 ML: Performed by: INTERNAL MEDICINE

## 2021-04-09 PROCEDURE — 93306 TTE W/DOPPLER COMPLETE: CPT

## 2021-04-09 PROCEDURE — 25010000002 MIDAZOLAM PER 1 MG: Performed by: INTERNAL MEDICINE

## 2021-04-09 PROCEDURE — 25010000002 SULFUR HEXAFLUORIDE MICROSPH 60.7-25 MG RECONSTITUTED SUSPENSION: Performed by: INTERNAL MEDICINE

## 2021-04-09 PROCEDURE — 99153 MOD SED SAME PHYS/QHP EA: CPT | Performed by: INTERNAL MEDICINE

## 2021-04-09 PROCEDURE — 99225 PR SBSQ OBSERVATION CARE/DAY 25 MINUTES: CPT | Performed by: INTERNAL MEDICINE

## 2021-04-09 PROCEDURE — 25010000003 CEFAZOLIN IN DEXTROSE 2-4 GM/100ML-% SOLUTION: Performed by: NURSE PRACTITIONER

## 2021-04-09 PROCEDURE — A9270 NON-COVERED ITEM OR SERVICE: HCPCS | Performed by: INTERNAL MEDICINE

## 2021-04-09 PROCEDURE — 25010000002 FENTANYL CITRATE (PF) 100 MCG/2ML SOLUTION: Performed by: INTERNAL MEDICINE

## 2021-04-09 PROCEDURE — 99214 OFFICE O/P EST MOD 30 MIN: CPT | Performed by: INTERNAL MEDICINE

## 2021-04-09 PROCEDURE — 75820 VEIN X-RAY ARM/LEG: CPT | Performed by: INTERNAL MEDICINE

## 2021-04-09 RX ORDER — IBUPROFEN 600 MG/1
600 TABLET ORAL EVERY 6 HOURS SCHEDULED
Status: DISCONTINUED | OUTPATIENT
Start: 2021-04-09 | End: 2021-04-10 | Stop reason: HOSPADM

## 2021-04-09 RX ORDER — LIDOCAINE HYDROCHLORIDE 5 MG/ML
INJECTION, SOLUTION INFILTRATION; PERINEURAL AS NEEDED
Status: DISCONTINUED | OUTPATIENT
Start: 2021-04-09 | End: 2021-04-09 | Stop reason: HOSPADM

## 2021-04-09 RX ORDER — SODIUM CHLORIDE 0.9 % (FLUSH) 0.9 %
10 SYRINGE (ML) INJECTION AS NEEDED
Status: DISCONTINUED | OUTPATIENT
Start: 2021-04-09 | End: 2021-04-10 | Stop reason: HOSPADM

## 2021-04-09 RX ORDER — ACETAMINOPHEN 325 MG/1
650 TABLET ORAL EVERY 6 HOURS
Status: DISCONTINUED | OUTPATIENT
Start: 2021-04-09 | End: 2021-04-10 | Stop reason: HOSPADM

## 2021-04-09 RX ORDER — MIDAZOLAM HYDROCHLORIDE 1 MG/ML
INJECTION INTRAMUSCULAR; INTRAVENOUS AS NEEDED
Status: DISCONTINUED | OUTPATIENT
Start: 2021-04-09 | End: 2021-04-09 | Stop reason: HOSPADM

## 2021-04-09 RX ORDER — SODIUM CHLORIDE 0.9 % (FLUSH) 0.9 %
3 SYRINGE (ML) INJECTION EVERY 12 HOURS SCHEDULED
Status: DISCONTINUED | OUTPATIENT
Start: 2021-04-09 | End: 2021-04-10 | Stop reason: HOSPADM

## 2021-04-09 RX ORDER — CEFAZOLIN SODIUM 2 G/100ML
2 INJECTION, SOLUTION INTRAVENOUS
Status: COMPLETED | OUTPATIENT
Start: 2021-04-09 | End: 2021-04-09

## 2021-04-09 RX ORDER — FENTANYL CITRATE 50 UG/ML
INJECTION, SOLUTION INTRAMUSCULAR; INTRAVENOUS AS NEEDED
Status: DISCONTINUED | OUTPATIENT
Start: 2021-04-09 | End: 2021-04-09 | Stop reason: HOSPADM

## 2021-04-09 RX ORDER — CEFAZOLIN SODIUM 2 G/100ML
2 INJECTION, SOLUTION INTRAVENOUS ONCE
Status: DISCONTINUED | OUTPATIENT
Start: 2021-04-09 | End: 2021-04-09

## 2021-04-09 RX ADMIN — SERTRALINE HYDROCHLORIDE 100 MG: 100 TABLET ORAL at 08:35

## 2021-04-09 RX ADMIN — SODIUM CHLORIDE, PRESERVATIVE FREE 10 ML: 5 INJECTION INTRAVENOUS at 08:35

## 2021-04-09 RX ADMIN — CEFAZOLIN SODIUM 2 G: 2 INJECTION, SOLUTION INTRAVENOUS at 16:50

## 2021-04-09 RX ADMIN — ATORVASTATIN CALCIUM 80 MG: 40 TABLET, FILM COATED ORAL at 20:42

## 2021-04-09 RX ADMIN — IBUPROFEN 600 MG: 600 TABLET, FILM COATED ORAL at 20:42

## 2021-04-09 RX ADMIN — LEVOTHYROXINE SODIUM 150 MCG: 150 TABLET ORAL at 05:08

## 2021-04-09 RX ADMIN — ASPIRIN 325 MG ORAL TABLET 325 MG: 325 PILL ORAL at 08:34

## 2021-04-09 RX ADMIN — BISOPROLOL FUMARATE 5 MG: 5 TABLET, FILM COATED ORAL at 08:34

## 2021-04-09 RX ADMIN — LISINOPRIL 20 MG: 20 TABLET ORAL at 08:35

## 2021-04-09 RX ADMIN — SULFUR HEXAFLUORIDE 2 ML: KIT at 11:00

## 2021-04-09 RX ADMIN — PANTOPRAZOLE SODIUM 40 MG: 40 TABLET, DELAYED RELEASE ORAL at 05:08

## 2021-04-09 RX ADMIN — SODIUM CHLORIDE, PRESERVATIVE FREE 10 ML: 5 INJECTION INTRAVENOUS at 20:42

## 2021-04-09 NOTE — PROGRESS NOTES
Discharge Planning Assessment  [facility]     Patient Name: Howard Owusu  MRN: [id number]  Today's Date: 4/9/2021    Admit Date: 4/8/2021    Discharge Needs Assessment     Row Name 04/09/21 1408       Living Environment    Lives With  child(jason), adult;parent(s)    Name(s) of Who Lives With Patient  Phoenix Kincaid, brother, and Robin Owusu, father    Current Living Arrangements  home/apartment/condo    Primary Care Provided by  self    Provides Primary Care For  no one    Family Caregiver if Needed  sibling(s);other relative(s)    Able to Return to Prior Arrangements  yes       Resource/Environmental Concerns    Resource/Environmental Concerns  none       Transition Planning    Patient/Family Anticipates Transition to  home with family    Patient/Family Anticipated Services at Transition      Transportation Anticipated  family or friend will provide       Discharge Needs Assessment    Readmission Within the Last 30 Days  no previous admission in last 30 days    Equipment Currently Used at Home  none    Concerns to be Addressed  no discharge needs identified;denies needs/concerns at this time    Anticipated Changes Related to Illness  none    Equipment Needed After Discharge  none        Discharge Plan     Row Name 04/09/21 7100       Plan    Plan  Home    Patient/Family in Agreement with Plan  yes    Plan Comments  Met & spoke with Mr Owusu at the bedside. He lives with his dad and younger brother in [facility]. At baseline, is ind with ADL's/mobility. Denies DME/HH/Rehab/Oxygen. 4/9 placement of new RV lead for ICD. O2 RA. Plan is home with dad or brother to transport. No DC needs voiced. CM is following.    Final Discharge Disposition Code  01 - home or self-care        Continued Care and Services - Admitted Since 4/8/2021    Coordination has not been started for this encounter.         Demographic Summary     Row Name 04/09/21 4138       General Information    Admission Type  observation     General Information Comments  Met & spoke with Mr Owusu at the bedside. Verified PCP is Dr Wallace. Primary insurance is [employer]. Has drug coverage.        Functional Status     Row Name 04/09/21 1407       Functional Status, IADL    Medications  independent    Meal Preparation  independent    Housekeeping  independent    Laundry  independent    Shopping  independent        Psychosocial    No documentation.       Abuse/Neglect    No documentation.       Legal    No documentation.       Substance Abuse    No documentation.       Patient Forms    No documentation.           Enid Fleming RN

## 2021-04-09 NOTE — OP NOTE
Procedure performed:    1.  Moderate sedation    2.  Left upper extremity venogram    _________    This very pleasant 61-year-old gentleman has a history of appropriate ICD therapy ischemic heart disease severe systolic dysfunction left bundle branch block [employer] [place] Heart Association functional class III heart failure ejection fraction of 30 to 35% and was admitted electively to the hospital after home telemetry demonstrated failure of his [employer] ICD lead.  His ICD was deactivated last night and the emergency room.    He was brought to the EP laboratory today for elective procedure.  The goal of the procedure was to provide him with a new RV ICD lead.    The patient was sedated moderately.    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    I felt that it would be prudent to see if we could pass a wire through this complex stenosis to the inferior vena cava without opening the device pocket and thus we prepared and draped the patient's chest in a sterile fashion but did not open the device pocket.  Lidocaine bupivacaine were administered to the skin overlying the area and a small wheal was formed in the subcutaneous tissue with this.  Seldinger access was performed at the subclavian vein under fluoroscopic guidance.  After numerous attempts I was not able to pass a J-tip wire through the stenotic area.  This was a lengthy attempt that we made.    The risk-benefit profile at this point favor discontinuing this procedure.    This patient will benefit from revision of his RV lead likely requiring laser lead extraction with or without combination snare extraction from the groin at a later date electively as an outpatient.    I did discuss this with the patient but his sedation likely will mean that he will benefit from some reinforcement tomorrow after he has recovered from the anesthesia.    Loss of 0.    Total Versed given was 4 mg.   Fentanyl dose was 150 mcg.  Brevital dose was 40 mg.    Patient tolerated this procedure well and there were no complications.  ______________    Plan:    1.  Overnight observation.  2.  Chest x-ray to rule out pneumothorax.  3.  Telemetry continuously overnight.  Patient's ICD is nonfunctional.  4.  ZOLL LifeVest to be arranged tonight.  He can be discharged home with this tomorrow.  5.  Our outpatient schedulers will contact him to arrange for elective laser lead extraction procedure in the near future.

## 2021-04-09 NOTE — CONSULTS
Cardiology Consult    Howard Owusu  1960  [phone]  There is no work phone number on file.    04/09/21    DATE OF ADMISSION: 4/8/2021  [address]    Aly Wallace III, MD  [address]  / [facility] [id number]  Referring Provider: No ref. provider found     Chief Complaint   Patient presents with   • Pacemaker Problem     CC: RV lead noise     PROBLEM LIST:     1. Ischemic cardiomyopathy / Chronic Systolic Heart Failure / Ventricular Tachycardia   a. Acute Anterior MI, 2004 with [facility] revealing EF 30%. 100% proximal LAD with 3.0 x 12 and a 2.7 x 16 Taxus stent. RCA and LCx were within normal limits.  b. Repeat [facility] 11/2004 for VT: LAD patent stent noted. Ostial D1 stenosis of 99% which was unable to be crossed. LCx and RCA were still within normal limits. EF 35%.  c. Electrophysiology study 11/2004 positive for inducible monomorphic ventricular tachycardia with [employer] DDD ICD implant   d. [facility], 06/2008: EF 15%. 50% distal RCA stenosis, and left coronary anatomy was unchanged.  e. Upgrade to a [employer] Bi-V ICD by Dr. Mtz July 2008, due to ischemic cardiomyopathy,  f. [facility], 06/2011: EF 20%, 90% distal RCA (3.0 x 12 and 3.0 x 8 mm Cypher), and 90% right posterolateral (2.5 x 8 Cypher), and 85% mid LAD (3.0 x 13 mm Cypher).  g. .Generator change, [employer] Bi-V ICD (Dr. Atkins), 12/11/2013.  2. Atrial Fibrillation  a. Single episode of Atrial Fibrillation post cardioversion for VF during PCI 2004 converted to NSR after 24 hours  b. Subclinical AFib documented on device interrogation 2016 w/o treatment.   3. Essential Hypertension  4. Hyperlipidemia  5. Hypothyroidism  6. GERD  7. GILL    History of Present Illness:     Mr. Howard Owusu is a plesant 61 y.o. male with a PMH of atrial fibrillation s/p PPM, HTN, GERD, obesity, and hypothyroidism , known patient of Dr. Cruz. Patient received a call from Cadriology nurse Merary Zelaya yesterday 4/8/21 around 1600 who alerted him that his  home monitoring for his [employer] BiV ICD was having RV lead noise with aborted shocks x 4. Dr. Cruz wanted patient to come to the [facility] ED for manual device interrogation with plan for new RV lead placement and for detections to be turned off to prevent inappropriate shocks. Patient was admitted last night as advised with plan to undergo placement of new RV lead today with Dr. Cruz. He denies any chest pain, palpitations, lightheadedness, or dizziness. He has been having progressively worsening dyspnea on exertion for the last month associated with PND and orthopnea, denies and lower extremity swelling. He does feel moderately fatigued. BP stable at home. He has remained NPO since MN.     Allergies   Allergen Reactions   • Morphine And Related Hives   • Penicillins    • Sulfa Antibiotics        Prior to Admission Medications     Prescriptions Last Dose Informant Patient Reported? Taking?    aspirin 325 MG tablet 4/8/2021 Self Yes Yes    Take 325 mg by mouth daily.    atorvastatin (LIPITOR) 80 MG tablet 4/7/2021 Self No Yes    Take 1 tablet by mouth Every Night.    bisoprolol (ZEBeta) 5 MG tablet 4/8/2021 Self No Yes    Take 1 tablet by mouth Daily.    clopidogrel (PLAVIX) 75 MG tablet 4/8/2021 Self No Yes    Take 1 tablet by mouth Daily.    esomeprazole (NexIUM) 20 MG packet 4/8/2021 Self Yes Yes    Take 20 mg by mouth Every Morning Before Breakfast.    Garlic 1000 MG capsule 4/8/2021 Self Yes Yes    Take 1 tablet by mouth Daily.    levothyroxine (SYNTHROID, LEVOTHROID) 150 MCG tablet 4/8/2021 Self Yes Yes    Take 150 mcg by mouth Daily.    lisinopril (PRINIVIL,ZESTRIL) 20 MG tablet 4/8/2021 Self No Yes    Take 1 tablet by mouth Daily.    Multiple Vitamins-Minerals (ONE DAILY MENS PO) 4/8/2021 Self Yes Yes    Take 1 tablet by mouth Daily.    Omega-3 Fatty Acids (OMEGA-3 FISH OIL EX ST) 880 MG capsule 4/8/2021 Self Yes Yes    Take 800 mg by mouth Daily.    sertraline (ZOLOFT) 100 MG tablet 4/8/2021 Self Yes Yes     Take 1 tablet by mouth daily.    nitroglycerin (NITROSTAT) 0.4 MG SL tablet More than a month Self No No    Place 1 tablet under the tongue Every 5 (Five) Minutes As Needed for Chest Pain. Take no more than 3 doses in 15 minutes.            Current Facility-Administered Medications:   •  acetaminophen (TYLENOL) tablet 650 mg, 650 mg, Oral, Q4H PRN **OR** acetaminophen (TYLENOL) 160 MG/5ML solution 650 mg, 650 mg, Oral, Q4H PRN **OR** acetaminophen (TYLENOL) suppository 650 mg, 650 mg, Rectal, Q4H PRN, Rosalinda Duffy MD  •  aspirin tablet 325 mg, 325 mg, Oral, Daily, Rosalinda Duffy MD, 325 mg at 04/09/21 0834  •  atorvastatin (LIPITOR) tablet 80 mg, 80 mg, Oral, Nightly, Rosalinda Duffy MD, 80 mg at 04/08/21 2338  •  bisoprolol (ZEBeta) tablet 5 mg, 5 mg, Oral, Daily, Rosalinda Duffy MD, 5 mg at 04/09/21 0834  •  levothyroxine (SYNTHROID, LEVOTHROID) tablet 150 mcg, 150 mcg, Oral, Daily, Rosalinda Duffy MD, 150 mcg at 04/09/21 0508  •  lisinopril (PRINIVIL,ZESTRIL) tablet 20 mg, 20 mg, Oral, Daily, Rosalinda Duffy MD, 20 mg at 04/09/21 0835  •  pantoprazole (PROTONIX) EC tablet 40 mg, 40 mg, Oral, QAM, Rosalinda Duffy MD, 40 mg at 04/09/21 0508  •  sertraline (ZOLOFT) tablet 100 mg, 100 mg, Oral, Daily, Rosalinda Duffy MD, 100 mg at 04/09/21 0835  •  sodium chloride 0.9 % flush 10 mL, 10 mL, Intravenous, PRN, Flo Grullon MD  •  sodium chloride 0.9 % flush 10 mL, 10 mL, Intravenous, Q12H, Rosalinda Duffy MD, 10 mL at 04/09/21 0835  •  sodium chloride 0.9 % flush 10 mL, 10 mL, Intravenous, PRN, Rosalinda Duffy MD    Social History     Socioeconomic History   • Marital status:      Spouse name: Not on file   • Number of children: Not on file   • Years of education: Not on file   • Highest education level: Not on file   Tobacco Use   • Smoking status: Never Smoker   • Smokeless tobacco: Never Used   Substance and Sexual Activity   • Alcohol use: No   • Drug use: No   • Sexual activity: Defer       Family  History   Problem Relation Age of Onset   • Pulmonary fibrosis Mother    • Alzheimer's disease Mother    • Heart attack Father    • No Known Problems Sister    • No Known Problems Brother    • No Known Problems Brother        REVIEW OF SYSTEMS:   CONST:  No weight loss, fever, chills, +weakness + fatigue.   HEENT:  No visual loss, blurred vision, double vision, yellow sclerae.                   No hearing loss, congestion, sore throat.   SKIN:      No rashes, urticaria, ulcers, sores.     RESP:     + shortness of breath, - hemoptysis, cough, sputum.   GI:           No anorexia, nausea, vomiting, diarrhea. No abdominal pain, melena.   :         No burning on urination, hematuria or increased frequency.  ENDO:    No diaphoresis, cold or heat intolerance. No polyuria or polydipsia.   NEURO:  No headache, dizziness, syncope, paralysis, ataxia, or parasthesias.                  No change in bowel or bladder control. No history of CVA/TIA  MUSC:    No muscle, back pain, joint pain or stiffness.   HEME:    No anemia, bleeding, bruising. No history of DVT/PE.  PSYCH:  No history of depression, anxiety    Vitals:    04/09/21 0710 04/09/21 0754 04/09/21 0834 04/09/21 0835   BP:  152/88 152/88    BP Location:  Left arm Left arm    Patient Position:  Lying Lying    Pulse: 60 60 67 69   Resp:  20 18    Temp:  97.4 °F (36.3 °C) 97.4 °F (36.3 °C)    TempSrc:  Oral Oral    SpO2: 91% 90% (!) 88% 90%   Weight:       Height:             Vital Sign Min/Max for last 24 hours  Temp  Min: 97.4 °F (36.3 °C)  Max: 98.1 °F (36.7 °C)   BP  Min: 107/66  Max: 167/88   Pulse  Min: 53  Max: 69   Resp  Min: 18  Max: 22   SpO2  Min: 88 %  Max: 96 %   Flow (L/min)  Min: 2  Max: 2      Intake/Output Summary (Last 24 hours) at 4/9/2021 1006  Last data filed at 4/9/2021 0316  Gross per 24 hour   Intake --   Output 200 ml   Net -200 ml             Physical Exam:  GEN: Well nourished, Well- developed  No acute distress  HEENT: Normocephalic,  Atraumatic, PERRLA, moist mucous membranes  NECK: supple, NO JVD, no thyromegaly, no lymphadenopathy  CARD: S1S2  RRR no murmur, gallop, rub  LUNGS: Clear to auscultataion, normal respiratory effort  ABDOMEN: Soft, nontender, normal bowel sounds  EXTREMITIES:No gross deformities,  No clubbing, cyanosis, or edema  SKIN: Warm, dry  NEURO: No focal deficits  PSYCHIATRIC: Normal affect and mood      I personally viewed and interpreted the patient's EKG/Telemetry/lab data    Data:   Results from last 7 days   Lab Units 04/08/21  1802   WBC 10*3/mm3 5.68   HEMOGLOBIN g/dL 12.4*   HEMATOCRIT % 38.2   PLATELETS 10*3/mm3 143     Results from last 7 days   Lab Units 04/08/21  1802   SODIUM mmol/L 139   POTASSIUM mmol/L 4.2   CHLORIDE mmol/L 107   CO2 mmol/L 25.0   BUN mg/dL 13   CREATININE mg/dL 0.87   GLUCOSE mg/dL 99              Results from last 7 days   Lab Units 04/08/21  1802   PROBNP pg/mL 1,201.0*         Results from last 7 days   Lab Units 04/08/21  1802   TROPONIN T ng/mL <0.010         Results from last 7 days   Lab Units 04/08/21  1802   PROBNP pg/mL 1,201.0*       Intake/Output Summary (Last 24 hours) at 4/9/2021 1006  Last data filed at 4/9/2021 0316  Gross per 24 hour   Intake --   Output 200 ml   Net -200 ml       Chest X-Ray:  Imaging Results (Last 24 Hours)     Procedure Component Value Units Date/Time    XR Chest 2 View [[id number]] Collected: 04/08/21 1758     Updated: 04/08/21 1903    Narrative:         EXAMINATION: XR CHEST 2 VIEWS - 04/08/2021     INDICATION: Pacemaker wire problem.     COMPARISON: Chest x-ray 11/18/2020.     FINDINGS: Left chest wall pacing/ICD device with leads grossly intact.  No pneumothorax. No overt edema.         Impression:      Left chest wall pacing device stable from 11/18/2020  comparison in overall positioning of leads with leads intact. No overt  edema or acute parenchymal findings.     DICTATED:   04/08/2021  EDITED/ls :   04/08/2021     This report was finalized on  2021 7:00 PM by Dr. Abdirahman Rankin.             Telemetry: AV paced 53-69 bpm  EK21 @ 1734, AV dual paced 60 bpm       Assessment and Plan:     1. Ischemic cardiomyopathy / Chronic Systolic Heart Failure / hx of VT  -NYHA class II, on GDMT, continue   -s/p [employer] BiV ICD with noted RV lead noise on remote monitoring yesterday with aborted shocks x 4. Patient has been moderately fatigued with increased SANTIZO over the last month. [employer] representative saw patient last night and performed manual device interrogation that confirmed RV lead noise, detections were turned off to prevent inappropriate shocks. Patient admitted with plan to undergo placement of new RV lead today with Dr. Cruz today. The risks, benefits, and alternatives of the procedure have been reviewed and the patient wishes to proceed. Patient has been NPO since MN.   -Covid test pending   -informed consent, EP case request, Saline lock, and pre procedural antibiotics ordered.   -Continue to hold plavix for todays procedure    2. AF   -CHADsVasc= 2, on ASA   -Single episode of Atrial Fibrillation post cardioversion for VF during PCI  converted to NSR after 24 hours  -Subclinical AFib documented on device interrogation  w/o treatment  -No reoccurrence    3. HTN   -well controlled, Continue Lisinopril       Electronically signed by FRANKIE Meadows, 21, 10:06 AM EDT.  ____________________          Cardiac Electrophysiology Procedure Note      [facility]          CARDIAC ELECTROPHYSIOLOGIST ADDENDUM    I have personally performed a face to face diagnostic evaluation on this patient.  I have reviewed the note authored by the advanced practice provider and agree with the history of present illness, past medical history, surgical history, social history, family history and review of systems.. I have reviewed current medications, and lab values.  My findings are below:  .    History of  "Present Illness:  61 y.o. male who had the pleasure of seeing again in follow-up visit today.  He was found on remote monitoring to have physiologic noise on his RV rate since lead of his recalled Riata lead.  There were several events of aborted ICD therapy due to ventricular oversensing due to noise.  He came to the ER last night.  His ICD was turned off with respect to tachycardia detection and therapy and needs with me to the hospital.    He is feeling reasonably well.  No chest pain nausea vomit fevers or chills.  He is little bit more short of breath recently in the last couple of months but nothing acute.      Review of Systems:   · Constitutional: No Fevers/Chills, No recent weight gain weight loss, No fatigue.  · Cardiovascular: Per HPI  · Respiratory: No cough or wheezing, no sputum production. No hematemesis.    · Gastrointestinal: No Nausea, Vomiting, Diarrhea, or Constipation. No bloody or dark stools.  · All others negative except what is noted above and in my UJAN's note.     Physical Exam   Vital Signs: /88 (BP Location: Left arm, Patient Position: Lying)   Pulse 60   Temp 97.4 °F (36.3 °C) (Oral)   Resp 18   Ht 167.6 cm (66\")   Wt 103 kg (227 lb 11.8 oz)   SpO2 97%   BMI 36.76 kg/m²        Admission Weight: 99.8 kg (220 lb)     General appearance: Alert oriented and cooperative.  In no acute distress  Skin:  Warm and Dry to touch  Head: Normocephalic, without obvious abnormality, atraumatic.   Eyes: Conjunctivae unremarkable, EOM's intact.Sclera non icteric.  Neck: No JVD, No carotid bruit. Neck supple, trachea midline  Lungs: Clear to auscultation bilaterally, no use of accessory muscles.    Heart:: RRR with Normal S1 and S2 . No murmurs and no gallops.  Abdomen: Soft, non-tender. Bowel sounds normal.  Extremities: No edema.  Neurologic: Oriented to time, person and place, affect appropriate.  No focal/major motor or sensory defects noted.    Psychiatric:  Appropriate mood, memory and " judgment.  Good use of semantics and logic.    PLAN:    Very pleasant gentleman 61 years of age ischemic cardiomyopathy chronic systolic heart failure left bundle branch block severe systolic dysfunction ejection fraction of 35% New [place] Heart Association functional class III heart failure.  He has had appropriate ICD therapy previously.    He has a recalled Riata lead.  He underwent elective pulse generator replacement in October 2020 performed by myself.  At that time his Riata lead was functioning within normal parameters.  His Riata lead has now failed.    I discussed with him the option of revising his ICD lead.  We will need to assess for patency of the subclavian and axillary venous system on the left side.  He already has 4 leads in situ.  Placing another lead would leave him with 5 leads traversing the venous structures the superior vena cava and lying within the heart.  Alternatively lead extraction is a potential option however in my opinion the risk-benefit profile of lead extraction is less favorable than proceeding with a lead revision at this point.    He agrees and wishes to proceed in this fashion.  We will arrange for elective inpatient lead revision today.    The patient and I made a mutually shared decision ( shared decision making model ) that the patient would be best served by proceeding with the above invasive heart procedure.  This is a high risk invasive cardiac procedure which comes with significant risk of morbidity and mortality.  This patient has significant underlying  heart disease.  Howard Owusu understands these risks and   has made an informed decision to proceed.    This patient who is a candidate for an ICD has a life expectancy greater than 12 months.    Please especially note that the patient has been on maximally tolerated doses of guideline directed medical therapy, including but not limited to: HF indicated beta-blocker (carvedilol, metoprolol succinate), ACE Inhibitor or  Angiotensin receptor blocker.  Please note that for some of these medications the maximally tolerated dose may be zero.  The patient has not had a myocardial infarction within 40 days and has not had coronary revascularization within 90 days.         Maurizio Cruz, DO

## 2021-04-09 NOTE — PROGRESS NOTES
[facility]  PROGRESS NOTE    Patient Name: Howard Owusu  : 1960  MRN: [id number]    Date of Admission: 2021  Primary Care Physician: Aly Wallace III, MD    Subjective   Subjective     CC:  F/U pacemaker problem    HPI:  He feels well today.  No chest pain or shortness of breath.    ROS:  Gen- No fevers, chills  CV- No chest pain, palpitations  Resp- No cough, dyspnea  GI- No N/V/D, abd pain     Objective   Objective     Vital Signs:   Temp:  [97.4 °F (36.3 °C)-98.1 °F (36.7 °C)] 97.4 °F (36.3 °C)  Heart Rate:  [53-67] 67  Resp:  [20-22] 20  BP: (107-167)/() 152/88        Physical Exam:  Constitutional: No acute distress, awake, alert, sitting up in bed  HENT: NCAT, mucous membranes moist  Respiratory: Clear to auscultation bilaterally, respiratory effort normal   Cardiovascular: RRR, no murmurs, rubs, or gallops  Gastrointestinal: Positive bowel sounds, soft, nontender, nondistended  Musculoskeletal: No bilateral ankle edema  Psychiatric: Appropriate affect, cooperative  Neurologic: Cranial Nerves grossly intact to confrontation, speech clear  Skin: No rashes    Results Reviewed:  Results from last 7 days   Lab Units 21  1802   WBC 10*3/mm3 5.68   HEMOGLOBIN g/dL 12.4*   HEMATOCRIT % 38.2   PLATELETS 10*3/mm3 143     Results from last 7 days   Lab Units 21  1802   SODIUM mmol/L 139   POTASSIUM mmol/L 4.2   CHLORIDE mmol/L 107   CO2 mmol/L 25.0   BUN mg/dL 13   CREATININE mg/dL 0.87   GLUCOSE mg/dL 99   CALCIUM mg/dL 9.0   ALT (SGPT) U/L 26   AST (SGOT) U/L 32   TROPONIN T ng/mL <0.010   PROBNP pg/mL 1,201.0*     Estimated Creatinine Clearance: 97.7 mL/min (by C-G formula based on SCr of 0.87 mg/dL).    Microbiology Results Abnormal     None          Imaging Results (Last 24 Hours)     Procedure Component Value Units Date/Time    XR Chest 2 View [[id number]] Collected: 21     Updated: 21 1903    Narrative:          EXAMINATION: XR CHEST 2 VIEWS - 04/08/2021     INDICATION: Pacemaker wire problem.     COMPARISON: Chest x-ray 11/18/2020.     FINDINGS: Left chest wall pacing/ICD device with leads grossly intact.  No pneumothorax. No overt edema.         Impression:      Left chest wall pacing device stable from 11/18/2020  comparison in overall positioning of leads with leads intact. No overt  edema or acute parenchymal findings.     DICTATED:   04/08/2021  EDITED/ls :   04/08/2021     This report was finalized on 4/8/2021 7:00 PM by Dr. Abdirahman Rankin.                 I have reviewed the medications:  Scheduled Meds:aspirin, 325 mg, Oral, Daily  atorvastatin, 80 mg, Oral, Nightly  bisoprolol, 5 mg, Oral, Daily  levothyroxine, 150 mcg, Oral, Daily  lisinopril, 20 mg, Oral, Daily  pantoprazole, 40 mg, Oral, QAM  sertraline, 100 mg, Oral, Daily  sodium chloride, 10 mL, Intravenous, Q12H      Continuous Infusions:   PRN Meds:.•  acetaminophen **OR** acetaminophen **OR** acetaminophen  •  sodium chloride  •  sodium chloride    Assessment/Plan   Assessment & Plan     Active Hospital Problems    Diagnosis  POA   • **Complications, mechanical, pacemaker, cardiac [T82.111A]  Yes   • Atrial fibrillation (CMS/HCC) [I48.91]  Yes   • Hyperlipidemia [E78.5]  Yes   • Hypertension [I10]  Yes   • Hypothyroidism [E03.9]  Yes   • Obstructive sleep apnea [G47.33]  Yes   • Ischemic cardiomyopathy [I25.5]  Yes      Resolved Hospital Problems   No resolved problems to display.        Brief Hospital Course to date:  Howard Owusu is a 61 y.o. male with a PMH of ICM, CAD, atrial fibrillation s/p CV in 2004, HTN, GERD, obesity, and hypothyroidism who presented to the ED after cardiology nurse called him and told him his pacemaker was not working appropriately.     Pacemaker complication  Atrial fibrillation s/p cardioversion in 2004  ICM   CAD  -Cardiology to see today for pacemaker lead out of place  -Continue aspirin, statin, bisoprolol, holding  plavix for procedure   -Echocardiogram shows LVEF 25%.  Not sure what this was previously     Dyspnea   -for 1 month, associated with orthopnea and PND, proBNP 1201  --Echo with LVEF 25%  -Needs outpatient sleep study     HTN  -continue lisinopril      Hypothyroidism   -continue levothyroxine 150 mcg qd     DVT Prophylaxis:  SCDs      Disposition: I expect the patient to be discharged 1-2 days.    CODE STATUS:   Code Status and Medical Interventions:   Ordered at: 04/08/21 1934     Code Status:    CPR     Medical Interventions (Level of Support Prior to Arrest):    Full       Jackie Liang MD  04/09/21

## 2021-04-10 VITALS
WEIGHT: 227.74 LBS | RESPIRATION RATE: 16 BRPM | SYSTOLIC BLOOD PRESSURE: 141 MMHG | OXYGEN SATURATION: 90 % | TEMPERATURE: 96.7 F | HEART RATE: 60 BPM | DIASTOLIC BLOOD PRESSURE: 89 MMHG | HEIGHT: 66 IN | BODY MASS INDEX: 36.6 KG/M2

## 2021-04-10 LAB
ANION GAP SERPL CALCULATED.3IONS-SCNC: 9 MMOL/L (ref 5–15)
BUN SERPL-MCNC: 15 MG/DL (ref 8–23)
BUN/CREAT SERPL: 17 (ref 7–25)
CALCIUM SPEC-SCNC: 8.4 MG/DL (ref 8.6–10.5)
CHLORIDE SERPL-SCNC: 106 MMOL/L (ref 98–107)
CO2 SERPL-SCNC: 25 MMOL/L (ref 22–29)
CREAT SERPL-MCNC: 0.88 MG/DL (ref 0.76–1.27)
DEPRECATED RDW RBC AUTO: 48.7 FL (ref 37–54)
ERYTHROCYTE [DISTWIDTH] IN BLOOD BY AUTOMATED COUNT: 13.3 % (ref 12.3–15.4)
GFR SERPL CREATININE-BSD FRML MDRD: 87 ML/MIN/1.73
GLUCOSE SERPL-MCNC: 103 MG/DL (ref 65–99)
HCT VFR BLD AUTO: 38.9 % (ref 37.5–51)
HGB BLD-MCNC: 12.2 G/DL (ref 13–17.7)
MCH RBC QN AUTO: 31 PG (ref 26.6–33)
MCHC RBC AUTO-ENTMCNC: 31.4 G/DL (ref 31.5–35.7)
MCV RBC AUTO: 99 FL (ref 79–97)
PLATELET # BLD AUTO: 118 10*3/MM3 (ref 140–450)
PMV BLD AUTO: 11.4 FL (ref 6–12)
POTASSIUM SERPL-SCNC: 4.1 MMOL/L (ref 3.5–5.2)
RBC # BLD AUTO: 3.93 10*6/MM3 (ref 4.14–5.8)
SODIUM SERPL-SCNC: 140 MMOL/L (ref 136–145)
WBC # BLD AUTO: 5.46 10*3/MM3 (ref 3.4–10.8)

## 2021-04-10 PROCEDURE — 63710000001 LISINOPRIL 20 MG TABLET: Performed by: INTERNAL MEDICINE

## 2021-04-10 PROCEDURE — A9270 NON-COVERED ITEM OR SERVICE: HCPCS | Performed by: INTERNAL MEDICINE

## 2021-04-10 PROCEDURE — 63710000001 SERTRALINE 100 MG TABLET: Performed by: INTERNAL MEDICINE

## 2021-04-10 PROCEDURE — 63710000001 IBUPROFEN 600 MG TABLET: Performed by: INTERNAL MEDICINE

## 2021-04-10 PROCEDURE — G0378 HOSPITAL OBSERVATION PER HR: HCPCS

## 2021-04-10 PROCEDURE — 85027 COMPLETE CBC AUTOMATED: CPT | Performed by: INTERNAL MEDICINE

## 2021-04-10 PROCEDURE — 63710000001 BISOPROLOL 5 MG TABLET: Performed by: INTERNAL MEDICINE

## 2021-04-10 PROCEDURE — 63710000001 LEVOTHYROXINE 150 MCG TABLET: Performed by: INTERNAL MEDICINE

## 2021-04-10 PROCEDURE — 63710000001 ASPIRIN 325 MG TABLET: Performed by: INTERNAL MEDICINE

## 2021-04-10 PROCEDURE — 80048 BASIC METABOLIC PNL TOTAL CA: CPT | Performed by: INTERNAL MEDICINE

## 2021-04-10 PROCEDURE — 99217 PR OBSERVATION CARE DISCHARGE MANAGEMENT: CPT | Performed by: NURSE PRACTITIONER

## 2021-04-10 PROCEDURE — 63710000001 PANTOPRAZOLE 40 MG TABLET DELAYED-RELEASE: Performed by: INTERNAL MEDICINE

## 2021-04-10 RX ADMIN — IBUPROFEN 600 MG: 600 TABLET, FILM COATED ORAL at 02:01

## 2021-04-10 RX ADMIN — LISINOPRIL 20 MG: 20 TABLET ORAL at 08:44

## 2021-04-10 RX ADMIN — BISOPROLOL FUMARATE 5 MG: 5 TABLET, FILM COATED ORAL at 08:44

## 2021-04-10 RX ADMIN — ASPIRIN 325 MG ORAL TABLET 325 MG: 325 PILL ORAL at 08:44

## 2021-04-10 RX ADMIN — LEVOTHYROXINE SODIUM 150 MCG: 150 TABLET ORAL at 05:48

## 2021-04-10 RX ADMIN — SERTRALINE HYDROCHLORIDE 100 MG: 100 TABLET ORAL at 08:44

## 2021-04-10 RX ADMIN — SODIUM CHLORIDE, PRESERVATIVE FREE 10 ML: 5 INJECTION INTRAVENOUS at 08:45

## 2021-04-10 RX ADMIN — PANTOPRAZOLE SODIUM 40 MG: 40 TABLET, DELAYED RELEASE ORAL at 05:48

## 2021-04-10 NOTE — DISCHARGE SUMMARY
[facility]  DISCHARGE SUMMARY    Patient Name: Howard Owusu  : 1960  MRN: [id number]    Date of Admission: 2021  5:40 PM  Date of Discharge: 04/10/21  Primary Care Physician: Aly Wallace III, MD    Consults     No orders found from 3/10/2021 to 2021.          Hospital Course     Presenting Problem:   Malfunction of cardiac pacemaker, initial encounter [T82.111A]    Active Hospital Problems    Diagnosis  POA   • **Complications, mechanical, pacemaker, cardiac [T82.111A]  Yes   • Atrial fibrillation (CMS/HCC) [I48.91]  Yes   • Hyperlipidemia [E78.5]  Yes   • Hypertension [I10]  Yes   • Hypothyroidism [E03.9]  Yes   • Obstructive sleep apnea [G47.33]  Yes   • Ischemic cardiomyopathy [I25.5]  Yes      Resolved Hospital Problems   No resolved problems to display.          Hospital Course:  Howard Owusu is a 61 y.o. male with a PMH of ICM, CAD, atrial fibrillation s/p CV in , HTN, GERD, obesity, and hypothyroidism who presented to the ED after cardiology nurse called him and told him his pacemaker was not working appropriately. Plan was for him to get new RV ICD lead.  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  After numerous attempts, was not able to pass a J-tip wire through the stenotic area and procedure was discontinued.  It was felt this patient will benefit from revision of his RV lead likely requiring laser lead extraction with or without combination snare extraction     Pacemaker complication  Atrial fibrillation s/p cardioversion in   ICM   CAD  -Cardiology following, plans for laser lead extraction in near future.  ICD is nonfunctional  --awaiting LifeVest to be placed prior to dc  -Continue aspirin, statin, bisoprolol, restart plavix    -Echocardiogram shows LVEF 25%.  Not sure what this was previously     Dyspnea   -for 1 month, associated with orthopnea and PND, proBNP  1201  --Echo with LVEF 25%  -Needs outpatient sleep study     HTN  -continue lisinopril      Hypothyroidism   -continue levothyroxine 150 mcg qd          Discharge Follow Up Recommendations for outpatient labs/diagnostics:  PCP 1 week  Dr. Cruz per his recs, his schedulers will call with appointment      Day of Discharge     HPI:   No issues overnight  No complaints    Review of Systems  Gen- No fevers, chills  CV- No chest pain, palpitations  Resp- No cough, dyspnea  GI- No N/V/D, abd pain      Vital Signs:   Temp:  [96.7 °F (35.9 °C)-97.6 °F (36.4 °C)] 96.7 °F (35.9 °C)  Heart Rate:  [36-83] 60  Resp:  [14-18] 16  BP: (108-168)/(79-95) 141/89     Physical Exam:  Constitutional: No acute distress, awake, alert  HENT: NCAT, mucous membranes moist  Respiratory: Clear to auscultation bilaterally, respiratory effort normal   Cardiovascular: RRR, no murmurs, rubs, or gallops  Gastrointestinal: Positive bowel sounds, soft, nontender, nondistended, obese  Musculoskeletal: No bilateral ankle edema  Psychiatric: Appropriate affect, cooperative  Neurologic: Oriented x 3, PERERA, speech clear  Skin: No rashes noted      Pertinent  and/or Most Recent Results     LAB RESULTS:      Lab 04/10/21  0430 04/08/21  1802   WBC 5.46 5.68   HEMOGLOBIN 12.2* 12.4*   HEMATOCRIT 38.9 38.2   PLATELETS 118* 143   NEUTROS ABS  --  2.98   IMMATURE GRANS (ABS)  --  0.01   LYMPHS ABS  --  1.78   MONOS ABS  --  0.50   EOS ABS  --  0.38   MCV 99.0* 95.5         Lab 04/10/21  0430 04/08/21  1802   SODIUM 140 139   POTASSIUM 4.1 4.2   CHLORIDE 106 107   CO2 25.0 25.0   ANION GAP 9.0 7.0   BUN 15 13   CREATININE 0.88 0.87   GLUCOSE 103* 99   CALCIUM 8.4* 9.0         Lab 04/08/21  1802   TOTAL PROTEIN 6.9   ALBUMIN 4.20   GLOBULIN 2.7   ALT (SGPT) 26   AST (SGOT) 32   BILIRUBIN 0.4   ALK PHOS 82         Lab 04/08/21  1802   PROBNP 1,201.0*   TROPONIN T <0.010         Brief Urine Lab Results     None        Microbiology Results (last 10 days)      Procedure Component Value - Date/Time    COVID-19, M,T,W, APTIMA PANTHER [facility] IN-HOUSE NP/OP SWAB IN UTM/VTM/SALINE TRANSPORT MEDIA 24HR TAT - Swab, Nasopharynx [[id number]]  (Normal) Collected: 04/08/21 1930    Lab Status: Final result Specimen: Swab from Nasopharynx Updated: 04/09/21 1146     COVID19 Not Detected    Narrative:      Fact sheet for providers: https://www.fda.gov/media/907160/download     Fact sheet for patients: https://www.fda.gov/media/702170/download    Test performed by RT PCR.          Adult Transthoracic Echo Complete w/ Color, Spectral and Contrast if necessary per protocol    Result Date: 4/9/2021  · Estimated left ventricular EF = 25% · Mild mitral valve regurgitation is present. · Mild tricuspid valve regurgitation is present.      XR Chest 2 View    Result Date: 4/8/2021   EXAMINATION: XR CHEST 2 VIEWS - 04/08/2021  INDICATION: Pacemaker wire problem.  COMPARISON: Chest x-ray 11/18/2020.  FINDINGS: Left chest wall pacing/ICD device with leads grossly intact. No pneumothorax. No overt edema.       Left chest wall pacing device stable from 11/18/2020 comparison in overall positioning of leads with leads intact. No overt edema or acute parenchymal findings.  DICTATED:   04/08/2021 EDITED/ls :   04/08/2021  This report was finalized on 4/8/2021 7:00 PM by Dr. Abdirahman Rankin.      XR Chest 1 View    Result Date: 4/9/2021  EXAMINATION: XR CHEST 1 VW-  INDICATION: Post ICD / Pacer Implant; T82.111A-Breakdown (mechanical) of cardiac pulse generator (battery), initial encounter  COMPARISON: One day prior  FINDINGS: Left chest wall ICD projects unchanged. No new focal airspace opacity. No effusion or pneumothorax. Unchanged heart and mediastinal contours.      Left chest wall ICD projects unchanged. No new focal airspace opacity. No effusion or pneumothorax. Unchanged heart and mediastinal contours.  This report was finalized on 4/9/2021 6:32 PM by Yaakov Hinson.      EP/CRM Study    Result Date:  4/9/2021  Procedure performed: 1.  Moderate sedation 2.  Left upper extremity venogram _________ This very pleasant 61-year-old gentleman has a history of appropriate ICD therapy ischemic heart disease severe systolic dysfunction left bundle branch block [employer] [place] Heart Association functional class III heart failure ejection fraction of 30 to 35% and was admitted electively to the hospital after home telemetry demonstrated failure of his [employer] [place] ICD lead.  His ICD was deactivated last night and the emergency room. He was brought to the EP laboratory today for elective procedure.  The goal of the procedure was to provide him with a new RV ICD lead. The patient was sedated moderately. 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 I felt that it would be prudent to see if we could pass a wire through this complex stenosis to the inferior vena cava without opening the device pocket and thus we prepared and draped the patient's chest in a sterile fashion but did not open the device pocket.  Lidocaine bupivacaine were administered to the skin overlying the area and a small wheal was formed in the subcutaneous tissue with this.  Seldinger access was performed at the subclavian vein under fluoroscopic guidance.  After numerous attempts I was not able to pass a J-tip wire through the stenotic area.  This was a lengthy attempt that we made. The risk-benefit profile at this point favor discontinuing this procedure. This patient will benefit from revision of his RV lead likely requiring laser lead extraction with or without combination snare extraction from the groin at a later date electively as an outpatient. I did discuss this with the patient but his sedation likely will mean that he will benefit from some reinforcement tomorrow after he has recovered from the anesthesia. Loss of 0. Total Versed given was 4 mg.  Fentanyl dose was 150 mcg.   Brevital dose was 40 mg. Patient tolerated this procedure well and there were no complications. ______________ Plan: 1.  Overnight observation. 2.  Chest x-ray to rule out pneumothorax. 3.  Telemetry continuously overnight.  Patient's ICD is nonfunctional. 4.  ZOLL LifeVest to be arranged tonight.  He can be discharged home with this tomorrow. 5.  Our outpatient schedulers will contact him to arrange for elective laser lead extraction procedure in the near future.       Results for orders placed during the hospital encounter of 04/08/21    Adult Transthoracic Echo Complete w/ Color, Spectral and Contrast if necessary per protocol    Interpretation Summary  · Estimated left ventricular EF = 25%  · Mild mitral valve regurgitation is present.  · Mild tricuspid valve regurgitation is present.      Discharge Details        Discharge Medications      Continue These Medications      Instructions Start Date   aspirin 325 MG tablet   325 mg, Oral, Daily      atorvastatin 80 MG tablet  Commonly known as: LIPITOR   80 mg, Oral, Nightly      bisoprolol 5 MG tablet  Commonly known as: ZEBeta   5 mg, Oral, Daily      clopidogrel 75 MG tablet  Commonly known as: PLAVIX   75 mg, Oral, Daily      esomeprazole 20 MG packet  Commonly known as: NexIUM   20 mg, Oral, Every Morning Before Breakfast      Garlic 1000 MG capsule   1 tablet, Oral, Daily      levothyroxine 150 MCG tablet  Commonly known as: SYNTHROID, LEVOTHROID   150 mcg, Oral, Daily      lisinopril 20 MG tablet  Commonly known as: PRINIVIL,ZESTRIL   20 mg, Oral, Daily      multivitamin with minerals tablet tablet   1 tablet, Oral, Daily      nitroglycerin 0.4 MG SL tablet  Commonly known as: NITROSTAT   0.4 mg, Sublingual, Every 5 Minutes PRN, Take no more than 3 doses in 15 minutes.       [place]-3 Fish Oil Ex St 880 MG capsule   800 mg, Oral, Daily      sertraline 100 MG tablet  Commonly known as: ZOLOFT   1 tablet, Oral, Daily             Allergies   Allergen Reactions   •  Morphine And Related Hives   • Penicillins Unknown - Low Severity     Patient reports tolerating amoxicillin.   • Sulfa Antibiotics          Discharge Disposition:  Home or Self Care    Diet:  Hospital:  Diet Order   Procedures   • Diet Regular       Activity:  Activity Instructions     Activity as Tolerated               CODE STATUS:    Code Status and Medical Interventions:   Ordered at: 04/08/21 1934     Code Status:    CPR     Medical Interventions (Level of Support Prior to Arrest):    Full       Future Appointments   Date Time Provider Department Center   4/21/2021 11:45 AM Jose Jones MD [facility] None   2/28/2022 10:15 AM Maurizio Cruz DO [facility] None       Additional Instructions for the Follow-ups that You Need to Schedule     Discharge Follow-up with PCP   As directed       Currently Documented PCP:    Aly Wallace III, MD    PCP Phone Number:    [phone]     Follow Up Details: 1 week, needs outpatient sleep study         Discharge Follow-up with Specified Provider: Anthony per his recs   As directed      To: Anthony per his recs    Follow Up Details: his schedulers will call pt with appointment         Discharge Follow-up with Specified Provider: Dr. Jones as scheduled   As directed      To: Dr. Jones as scheduled               Delmi Mejia, FRANKIE  04/10/21      Time Spent on Discharge:  I spent 40 minutes on this discharge activity which included: face-to-face encounter with the patient, reviewing the data in the system, coordination of the care with the nursing staff as well as consultants, documentation, and entering orders.

## 2021-04-10 NOTE — PROGRESS NOTES
[facility]       LOS: 0 days   Patient Care Team:  Aly Wallace III, MD as PCP - General        Subjective       Patient Denies:  Cp, sob, palpitations.      Vital Signs  Temp:  [97.4 °F (36.3 °C)-97.6 °F (36.4 °C)] 97.6 °F (36.4 °C)  Heart Rate:  [36-72] 60  Resp:  [14-18] 14  BP: (108-168)/(79-95) 147/89    Intake/Output Summary (Last 24 hours) at 4/10/2021 0831  Last data filed at 4/10/2021 0100  Gross per 24 hour   Intake 240 ml   Output 300 ml   Net -60 ml     No intake/output data recorded.    Physical Exam:     General Appearance:    Alert, cooperative, in no acute distress       Neck:   No adenopathy, supple, trachea midline, no thyromegaly, no JVD   Lungs:     Clear to auscultation,respirations regular, even and                unlabored    Heart:    Regular rhythm and normal rate, normal S1 and S2, no         murmur, no gallop, no rub, no click   Chest Wall:    No abnormalities observed.  ICD wound vac in place   Abdomen:     Normal bowel sounds, no masses, no organomegaly, soft     nontender, nondistended   Extremities:   Moves all extremities well, no edema, no cyanosis, no           redness   Pulses:   Pulses palpable and equal bilaterally     Results Review:     I reviewed the patient's new clinical results.      WBC WBC   Date/Time Value Ref Range Status   04/10/2021 0430 5.46 3.40 - 10.80 10*3/mm3 Final   04/08/2021 1802 5.68 3.40 - 10.80 10*3/mm3 Final            HGB Hemoglobin   Date/Time Value Ref Range Status   04/10/2021 0430 12.2 (L) 13.0 - 17.7 g/dL Final   04/08/2021 1802 12.4 (L) 13.0 - 17.7 g/dL Final           HCT Hematocrit   Date/Time Value Ref Range Status   04/10/2021 0430 38.9 37.5 - 51.0 % Final   04/08/2021 1802 38.2 37.5 - 51.0 % Final            Platelets Platelets   Date/Time Value Ref Range Status   04/10/2021 0430 118 (L) 140 - 450 10*3/mm3 Final   04/08/2021 1802 529 140 - 450 10*3/mm3 Final      Sodium  Sodium   Date/Time Value Ref Range Status   04/10/2021 0430 140 136 - 145 mmol/L Final   04/08/2021 1802 139 136 - 145 mmol/L Final     Potassium  Potassium   Date/Time Value Ref Range Status   04/10/2021 0430 4.1 3.5 - 5.2 mmol/L Final   04/08/2021 1802 4.2 3.5 - 5.2 mmol/L Final     Chloride  Chloride   Date/Time Value Ref Range Status   04/10/2021 0430 106 98 - 107 mmol/L Final   04/08/2021 1802 107 98 - 107 mmol/L Final     BicarbonateNo results found for: PLASMABICARB    BUN BUN   Date/Time Value Ref Range Status   04/10/2021 0430 15 8 - 23 mg/dL Final   04/08/2021 1802 13 8 - 23 mg/dL Final      Creatinine Creatinine   Date/Time Value Ref Range Status   04/10/2021 0430 0.88 0.76 - 1.27 mg/dL Final   04/08/2021 1802 0.87 0.76 - 1.27 mg/dL Final      Calcium Calcium   Date/Time Value Ref Range Status   04/10/2021 0430 8.4 (L) 8.6 - 10.5 mg/dL Final   04/08/2021 1802 9.0 8.6 - 10.5 mg/dL Final      Mag @RESULFAST(MG:3)@        PT/INR:     No results found for: PROTIME, INR   Troponin I:  No results found for: TROPONINI   Lab Results   Component Value Date    TROPONINT <0.010 04/08/2021       acetaminophen, 650 mg, Oral, Q6H  aspirin, 325 mg, Oral, Daily  atorvastatin, 80 mg, Oral, Nightly  bisoprolol, 5 mg, Oral, Daily  ibuprofen, 600 mg, Oral, Q6H  levothyroxine, 150 mcg, Oral, Daily  lisinopril, 20 mg, Oral, Daily  pantoprazole, 40 mg, Oral, QAM  sertraline, 100 mg, Oral, Daily  sodium chloride, 10 mL, Intravenous, Q12H  sodium chloride, 3 mL, Intravenous, Q12H           Assessment/Plan     Patient Active Problem List   Diagnosis Code   • Ischemic cardiomyopathy I25.5   • Coronary artery disease I25.10   • Hyperlipidemia E78.5   • Hypertension I10   • Atrial fibrillation (CMS/HCC) I48.91   • GERD (gastroesophageal reflux disease) K21.9   • Hypothyroidism E03.9   • Obstructive sleep apnea G47.33   • V-tach (CMS/HCC) I47.2   • ICD (implantable cardioverter-defibrillator), biventricular, in situ  Z95.810   • Complications, mechanical, pacemaker, cardiac T82.111A     CV stable  Home with Lifevest.  ICD is nonfunctional.  Follow-up with DANIEL Dodd  04/10/21  08:31 EDT

## 2021-04-13 ENCOUNTER — TELEPHONE (OUTPATIENT)
Dept: CARDIOLOGY | Facility: CLINIC | Age: 61
End: 2021-04-13

## 2021-04-13 NOTE — TELEPHONE ENCOUNTER
Pt wishes to come to the office to discuss laser lead extraction prior to having the procedure done. Called [facility] in scheduling and left a voicemail.

## 2021-04-14 DIAGNOSIS — I87.1 SUBCLAVIAN VEIN STENOSIS: ICD-10-CM

## 2021-04-14 DIAGNOSIS — T82.110A AICD LEAD MALFUNCTION: Primary | ICD-10-CM

## 2021-04-15 RX ORDER — ATORVASTATIN CALCIUM 80 MG/1
TABLET, FILM COATED ORAL
Qty: 90 TABLET | Refills: 1 | Status: SHIPPED | OUTPATIENT
Start: 2021-04-15 | End: 2021-09-02

## 2021-04-19 ENCOUNTER — OFFICE VISIT (OUTPATIENT)
Dept: CARDIOLOGY | Facility: CLINIC | Age: 61
End: 2021-04-19

## 2021-04-19 VITALS
HEART RATE: 66 BPM | SYSTOLIC BLOOD PRESSURE: 130 MMHG | WEIGHT: 231.8 LBS | TEMPERATURE: 96 F | BODY MASS INDEX: 37.25 KG/M2 | DIASTOLIC BLOOD PRESSURE: 66 MMHG | HEIGHT: 66 IN

## 2021-04-19 DIAGNOSIS — I25.5 ISCHEMIC CARDIOMYOPATHY: ICD-10-CM

## 2021-04-19 DIAGNOSIS — T82.110D ICD (IMPLANTABLE CARDIOVERTER-DEFIBRILLATOR) LEAD FAILURE, SUBSEQUENT ENCOUNTER: Primary | ICD-10-CM

## 2021-04-19 DIAGNOSIS — Z95.810 ICD (IMPLANTABLE CARDIOVERTER-DEFIBRILLATOR), BIVENTRICULAR, IN SITU: ICD-10-CM

## 2021-04-19 PROBLEM — T82.111A COMPLICATIONS, MECHANICAL, PACEMAKER, CARDIAC: Status: RESOLVED | Noted: 2021-04-08 | Resolved: 2021-04-19

## 2021-04-19 PROCEDURE — 99024 POSTOP FOLLOW-UP VISIT: CPT | Performed by: INTERNAL MEDICINE

## 2021-04-19 NOTE — PROGRESS NOTES
Cardiac Electrophysiology Outpatient Follow Up Note            [facility]    Follow Up Office Visit      Howard Owusu  [id number]  04/19/2021  [unfilled]  [unfilled]    Primary Care Physician: Bia Cam    Referred By: No ref. provider found    Subjective     Chief Complaint:   Diagnoses and all orders for this visit:    1. ICD (implantable cardioverter-defibrillator) lead failure, subsequent encounter (Primary)    2. ICD (implantable cardioverter-defibrillator), biventricular, in situ    3. Ischemic cardiomyopathy      Chief Complaint   Patient presents with   • Coronary Artery Disease       History of Present Illness:   Howard Owusu is a 61 y.o. male who presents to my electrophysiology clinic for follow up of recent EP procedure documented complex stenosis of the left subclavian.  The patient has a failed Riata lead.    Simon doing well.  He does not like wearing a LifeVest but he is doing so faithfully reliably.  He has had no syncope presyncope ICD shocks palpitations nausea vomiting fevers or chills.  He is waiting to hear from our schedulers in terms of when the lead extraction will be performed.      Review of Systems:   Constitutional: No fevers or chills, no recent weight gain or weight loss or fatigue  Eyes: No visual loss, blurred vision, double vision, yellow sclerae.  ENT: No headaches, hearing loss, vertigo, congestion or sore throat.   Cardiovascular: Per HPI  Respiratory: No cough or wheezing, no sputum production, no hematemesis   Gastrointestinal: No abdominal pain, no nausea, vomiting, constipation, diarrhea, melena.   Genitourinary: No dysuria, hematuria or increased frequency.  Musculoskeletal:  No gait disturbance, weakness or joint pain or stiffness  Integumentary: No rashes, urticaria, ulcers or sores.   Neurological: No headache, dizziness, syncope, paralysis, ataxia, no prior CVA/TIA  Psychiatric: No anxiety, or  depression  Endocrine: No diaphoresis, cold or heat intolerance. No polyuria or polydipsia.   Hematologic/Lymphatic: No anemia, abnormal bruising or bleeding. No history of DVT/PE.      Past Medical History:   Past Medical History:   Diagnosis Date   • Arrhythmia    • Atrial fibrillation (CMS/HCC)     single episode during PCI with conversion to NSR witin 24 hrs   • Coronary artery disease 2004    S/P Taxus RICHARD prox LAD, Cath June 2011 EF 20%, S/P distal RCA 3.0x12 and 3.0x8 Cypher, PLB 2.5x8 Cypher, and mid ALD 3.0x 13 Cypher   • GERD (gastroesophageal reflux disease)    • GERD (gastroesophageal reflux disease)    • Hyperlipidemia    • Hypertension    • Hypothyroidism    • Ischemic cardiomyopathy     Hx of Inducible VT per EP study Nov 2004, S/P [employer] Eliezer ICD implant, upgrade to Bi-V iCD July 2008 Dr. Mtz June 2010 appropriate ICD shocks, Gen change 12/11/13 MGR   • Ischemic cardiomyopathy    • Obstructive sleep apnea    • Old MI (myocardial infarction)    • GILL (obstructive sleep apnea)    • VT (ventricular tachycardia) (CMS/HCC)        Past Surgical History:   Past Surgical History:   Procedure Laterality Date   • CARDIAC CATHETERIZATION     • CARDIAC ELECTROPHYSIOLOGY PROCEDURE N/A 11/17/2020    Procedure: ICD DC generator change Generator change at patient's earliest convenience.  Given the atrial lead noise, will reevaluate at that time to see if atrial lead revision is also necessary. ;  Surgeon: Maurizio Cruz DO;  [facility];  Service: Cardiology;  Laterality: N/A;   • CARDIAC ELECTROPHYSIOLOGY PROCEDURE N/A 4/9/2021    Procedure: [employer] Eliezer BiV ICD RV lead, new;  Surgeon: Maurizio Cruz DO;  Location: [facility];  Service: Cardiology;  Laterality: N/A;   • INTERNAL CARDIAC DEFIBRILLATOR INSERTION     • PACEMAKER REPLACEMENT      Generator change, St. Eliezer Bi-V ICD (Dr. Atkins), 12/11/2013.       Family History:   Family History   Problem Relation Age of Onset    • Pulmonary fibrosis Mother    • Alzheimer's disease Mother    • Heart attack Father    • No Known Problems Sister    • No Known Problems Brother    • No Known Problems Brother        Social History:   Social History     Socioeconomic History   • Marital status:      Spouse name: Not on file   • Number of children: Not on file   • Years of education: Not on file   • Highest education level: Not on file   Tobacco Use   • Smoking status: Never Smoker   • Smokeless tobacco: Never Used   Substance and Sexual Activity   • Alcohol use: No   • Drug use: No   • Sexual activity: Defer       Medications:     Current Outpatient Medications:   •  aspirin 325 MG tablet, Take 325 mg by mouth daily., Disp: , Rfl:   •  atorvastatin (LIPITOR) 80 MG tablet, TAKE 1 TABLET EVERY NIGHT, Disp: 90 tablet, Rfl: 1  •  bisoprolol (ZEBeta) 5 MG tablet, Take 1 tablet by mouth Daily., Disp: 90 tablet, Rfl: 3  •  clopidogrel (PLAVIX) 75 MG tablet, Take 1 tablet by mouth Daily., Disp: 90 tablet, Rfl: 3  •  esomeprazole (NexIUM) 20 MG packet, Take 20 mg by mouth Every Morning Before Breakfast., Disp: , Rfl:   •  Garlic 1000 MG capsule, Take 1 tablet by mouth Daily., Disp: , Rfl:   •  levothyroxine (SYNTHROID, LEVOTHROID) 150 MCG tablet, Take 150 mcg by mouth Daily., Disp: , Rfl:   •  lisinopril (PRINIVIL,ZESTRIL) 20 MG tablet, Take 1 tablet by mouth Daily., Disp: 90 tablet, Rfl: 3  •  Multiple Vitamins-Minerals (ONE DAILY MENS PO), Take 1 tablet by mouth Daily., Disp: , Rfl:   •  nitroglycerin (NITROSTAT) 0.4 MG SL tablet, Place 1 tablet under the tongue Every 5 (Five) Minutes As Needed for Chest Pain. Take no more than 3 doses in 15 minutes., Disp: 25 tablet, Rfl: 3  •  Omega-3 Fatty Acids (OMEGA-3 FISH OIL EX ST) 880 MG capsule, Take 800 mg by mouth Daily., Disp: , Rfl:   •  sertraline (ZOLOFT) 100 MG tablet, Take 1 tablet by mouth daily., Disp: , Rfl:     Allergies:   Allergies   Allergen Reactions   • Morphine And Related Hives   •  "Penicillins Unknown - Low Severity     Patient reports tolerating amoxicillin.   • Sulfa Antibiotics        Objective   Vital Signs:   Vitals:    04/19/21 1025   BP: 130/66   BP Location: Right arm   Patient Position: Sitting   Pulse: 66   Temp: 96 °F (35.6 °C)   Weight: 105 kg (231 lb 12.8 oz)   Height: 167.6 cm (66\")       PHYSICAL EXAM  General appearance: Awake, alert, cooperative  Head: Normocephalic, without obvious abnormality, atraumatic  Eyes: Conjunctivae/corneas clear, EOMs intact  Neck: no adenopathy, no carotid bruit, no JVD and thyroid: not enlarged  Lungs: clear to auscultation bilaterally and no rhonchi or crackles\", ' symmetric  Heart: regular rate and rhythm, S1, S2 normal, no murmur, click, rub or gallop  Abdomen: Soft, non-tender, bowel sounds normal,  no organomegaly  Extremities: extremities normal, atraumatic, no cyanosis or edema  Skin: Skin color, turgor normal, no rashes or lesions  Neurologic: Grossly normal     Lab Results   Component Value Date    GLUCOSE 103 (H) 04/10/2021    CALCIUM 8.4 (L) 04/10/2021     04/10/2021    K 4.1 04/10/2021    CO2 25.0 04/10/2021     04/10/2021    BUN 15 04/10/2021    CREATININE 0.88 04/10/2021    EGFRIFNONA 87 04/10/2021    BCR 17.0 04/10/2021    ANIONGAP 9.0 04/10/2021     Lab Results   Component Value Date    WBC 5.46 04/10/2021    HGB 12.2 (L) 04/10/2021    HCT 38.9 04/10/2021    MCV 99.0 (H) 04/10/2021     (L) 04/10/2021     No results found for: INR, PROTIME  No results found for: TSH, I4USFJU, O6PMTTA, THYROIDAB    Cardiac Testing:     I personally viewed and interpreted the patient's EKG/Telemetry/lab data    Procedures    Tobacco Cessation: N/A  Obstructive Sleep Apnea Screening: N/A    Assessment & Plan    Diagnoses and all orders for this visit:    1. ICD (implantable cardioverter-defibrillator) lead failure, subsequent encounter (Primary)    2. ICD (implantable cardioverter-defibrillator), biventricular, in situ    3. Ischemic " cardiomyopathy         Diagnosis Plan   1. ICD (implantable cardioverter-defibrillator) lead failure, subsequent encounter   [employer] Eliezer resynchronization ICD system.  Failed Riata lead.  Left bundle branch block New York Heart Association functional class III heart failure severe systolic dysfunction.    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    Plan is for complex laser lead extraction combined snare procedure.  This will be immediately followed during the same operative occasion with reimplantation of a new RV lead.  We may need to revise his LV lead and atrial leads at the same time as well if there is collateral damage to these leads.    Scheduling is underway to arrange for Dr. Greco, Dr. Bartlett in my schedules to coincide.  Laser lead extraction will be high risk performed in the hybrid OR.   2. ICD (implantable cardioverter-defibrillator), biventricular, in situ   ICD deactivated.  ZOLL LifeVest in place.  Patient understands the purpose of this device.  He is wearing it reliably.  It is difficult for him to wear and is unpleasant but he is compliant with it.   3. Ischemic cardiomyopathy   stable.  No further heart failure symptoms.  I asked him medical therapy.  Continue bisoprolol.  Continue aspirin.  No changes here.     Body mass index is 37.41 kg/m².    I spent 18 minutes in consultation with this patient which included more than 65% of this time in direct face-to-face counseling, physical examination and discussion of my assessment and findings and shared decision making with the patient.  The remainder of the time not spent face to face was performing one, some or all of the following actions:  preparing to see this patient ( eg. Review of tests),  ordering medications, tests or procedures ), care coordination, discussion of the plan with other healthcare providers, documenting clinical information in Epic well as independently  interpreting results and communicating results to patient, family and or caregiver.  All time noted occurred on the date of service.    Follow Up:       Thank you for allowing me to participate in the care of your patient. Please to not hesitate to contact me with additional questions or concerns.      Maurizio Cruz DO, FACC, RS  Cardiac Electrophysiologist  [facility] / [facility]

## 2021-04-21 ENCOUNTER — OFFICE VISIT (OUTPATIENT)
Dept: CARDIOLOGY | Facility: CLINIC | Age: 61
End: 2021-04-21

## 2021-04-21 VITALS
HEART RATE: 67 BPM | WEIGHT: 232 LBS | RESPIRATION RATE: 18 BRPM | OXYGEN SATURATION: 94 % | HEIGHT: 66 IN | BODY MASS INDEX: 37.28 KG/M2

## 2021-04-21 DIAGNOSIS — I25.10 CORONARY ARTERY DISEASE INVOLVING NATIVE CORONARY ARTERY OF NATIVE HEART, ANGINA PRESENCE UNSPECIFIED: ICD-10-CM

## 2021-04-21 DIAGNOSIS — I10 ESSENTIAL HYPERTENSION: ICD-10-CM

## 2021-04-21 DIAGNOSIS — E78.00 PURE HYPERCHOLESTEROLEMIA: ICD-10-CM

## 2021-04-21 DIAGNOSIS — I47.20 V-TACH (HCC): ICD-10-CM

## 2021-04-21 DIAGNOSIS — I25.5 ISCHEMIC CARDIOMYOPATHY: Primary | ICD-10-CM

## 2021-04-21 DIAGNOSIS — I25.10 CORONARY ARTERY DISEASE INVOLVING NATIVE CORONARY ARTERY OF NATIVE HEART WITHOUT ANGINA PECTORIS: ICD-10-CM

## 2021-04-21 PROBLEM — I87.1 SUBCLAVIAN VEIN STENOSIS: Status: ACTIVE | Noted: 2021-04-21

## 2021-04-21 PROBLEM — T82.110A AICD LEAD MALFUNCTION: Status: ACTIVE | Noted: 2021-04-21

## 2021-04-21 PROCEDURE — 99214 OFFICE O/P EST MOD 30 MIN: CPT | Performed by: INTERNAL MEDICINE

## 2021-04-21 NOTE — PROGRESS NOTES
Subjective:     Encounter Date:04/21/2021      Patient ID: Howard Owusu is a 61 y.o. male.    Chief Complaint: Follow-up    PROBLEM LIST:  1. Coronary artery disease/ischemic cardiomyopathy:  a. [facility] for acute anterior MI, 2004: EF 30%. 100% proximal LAD now s/p 3.0 x 12 and a 2.7 x 16 Taxus stent. RCA and LCx were within normal limits.  b. [facility], 11/2004: LAD patent stent noted. Ostial D1 stenosis of 99% which was unable to be crossed. LCx and RCA were still within normal limits. EF 35%.  c. [facility], 06/2008: EF 15%. 50% distal RCA stenosis, and left coronary anatomy was unchanged.  d. [facility], 06/2011: EF 20%, 90% distal RCA (3.0 x 12 and 3.0 x 8 mm Cypher), and 90% right posterolateral (2.5 x 8 Cypher), and 85% mid LAD (3.0 x 13 mm Cypher).  2. Ventricular tachycardia:  a. 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  b. November 2004, St. Eliezer ICD with DFT testing done.  c. July 2008, due to ischemic cardiomyopathy, patient was upgraded to a [employer]. Eliezer Bi-V ICD by Dr. Mtz.  d. June 2010, appropriate ICD shocks.  e. Generator change, [employer]. Eliezer Bi-V ICD (Dr. Atkins), 12/11/2013.  f. Generator change, [employer]zer BiV ICD 11/20  g. Nonfunctioning ICD lead 3/21.  occluded left subclavian vein.  3. Ischemic cardiomyopathy.  4. Atrial fibrillation, single episode. Initial with VF during a PCI, cardioverted to atrial fibrillation. After 24 hours, converted to sinus rhythm.  5. Hypertension.  6. Hyperlipidemia.  7. Hypothyroidism.  8. GERD.  9. GILL.    History of Present Illness  Patient returns today for follow up with a history of patient returns to follow-up of ischemic cardiomyopathy ventricular arrhythmias. Since her last visit, he was noted to have a nonfunctioning ICD lead on routine transtelephonic checks. Attempts at replacing we were unsuccessful due to occluded left subclavian vein. He is separately scheduled for upcoming laser guided extraction. He is wearing a  LifeVest currently. Denies any chest pain orthopnea PND or tachypalpitations. Main complaint is of decreasing energy and easy sleeping..     Allergies   Allergen Reactions   • Morphine And Related Hives   • Penicillins Unknown - Low Severity     Patient reports tolerating amoxicillin.   • Sulfa Antibiotics          Current Outpatient Medications:   •  aspirin 325 MG tablet, Take 325 mg by mouth daily., Disp: , Rfl:   •  atorvastatin (LIPITOR) 80 MG tablet, TAKE 1 TABLET EVERY NIGHT, Disp: 90 tablet, Rfl: 1  •  bisoprolol (ZEBeta) 5 MG tablet, Take 1 tablet by mouth Daily., Disp: 90 tablet, Rfl: 3  •  clopidogrel (PLAVIX) 75 MG tablet, Take 1 tablet by mouth Daily., Disp: 90 tablet, Rfl: 3  •  esomeprazole (NexIUM) 20 MG packet, Take 20 mg by mouth Every Morning Before Breakfast., Disp: , Rfl:   •  Garlic 1000 MG capsule, Take 1 tablet by mouth Daily., Disp: , Rfl:   •  levothyroxine (SYNTHROID, LEVOTHROID) 150 MCG tablet, Take 150 mcg by mouth Daily., Disp: , Rfl:   •  lisinopril (PRINIVIL,ZESTRIL) 20 MG tablet, Take 1 tablet by mouth Daily., Disp: 90 tablet, Rfl: 3  •  Multiple Vitamins-Minerals (ONE DAILY MENS PO), Take 1 tablet by mouth Daily., Disp: , Rfl:   •  nitroglycerin (NITROSTAT) 0.4 MG SL tablet, Place 1 tablet under the tongue Every 5 (Five) Minutes As Needed for Chest Pain. Take no more than 3 doses in 15 minutes., Disp: 25 tablet, Rfl: 3  •  Omega-3 Fatty Acids (OMEGA-3 FISH OIL EX ST) 880 MG capsule, Take 800 mg by mouth Daily., Disp: , Rfl:   •  sertraline (ZOLOFT) 100 MG tablet, Take 1 tablet by mouth daily., Disp: , Rfl:     The following portions of the patient's history were reviewed and updated as appropriate: allergies, current medications, past family history, past medical history, past social history, past surgical history and problem list.    Review of Systems   Constitutional: Positive for malaise/fatigue.   Respiratory: Positive for sleep disturbances due to breathing.          "  Objective:   Pulse 67, resp. rate 18, height 167.6 cm (66\"), weight 105 kg (232 lb), SpO2 94 %.      Vitals reviewed.   Constitutional:       Appearance: Well-developed and not in distress.   Neck:      Thyroid: No thyromegaly.      Vascular: No carotid bruit or JVD.   Pulmonary:      Breath sounds: Normal breath sounds.   Cardiovascular:      Regular rhythm.      No gallop. No S3 and S4 gallop.   Edema:     Peripheral edema absent.   Abdominal:      General: Bowel sounds are normal.      Palpations: Abdomen is soft. There is no abdominal mass.      Tenderness: There is no abdominal tenderness.   Musculoskeletal:         General: No deformity.      Extremities: No clubbing present.Skin:     General: Skin is warm and dry.      Findings: No rash.   Neurological:      Mental Status: Alert and oriented to person, place, and time.         Lab Review:    Procedures        Assessment:   Diagnoses and all orders for this visit:    1. Ischemic cardiomyopathy (Primary)    2. Coronary artery disease involving native coronary artery of native heart without angina pectoris    3. Pure hypercholesterolemia    4. Essential hypertension    5. V-tach (CMS/[facility])        Impression  1. Coronary artery disease, no current angina. No ischemic evaluation in quite some time.  2. Ventricular tachycardia status post BiV ICD upgrade. Nonfunctioning ICD lead currently wearing LifeVest.  3. Hypertension well-controlled  4. Dyslipidemia last LDL less than 70  5. Ischemic cardiomyopathy currently euvolemic and no evidence of heart failure    Plan:  1. Await laser guided lead extraction and reimplant.  2. Patient is due for ischemic evaluation, will do after the above and prior to her next visit  3. Revisit in 6 MO, or sooner as needed.    Jose Jones MD    "

## 2021-04-22 ENCOUNTER — PREP FOR SURGERY (OUTPATIENT)
Dept: OTHER | Facility: HOSPITAL | Age: 61
End: 2021-04-22

## 2021-04-22 DIAGNOSIS — T82.198A MALFUNCTION OF IMPLANTABLE DEFIBRILLATOR VENTRICULAR (ICD) LEAD: Primary | ICD-10-CM

## 2021-04-22 DIAGNOSIS — I25.5 ISCHEMIC CARDIOMYOPATHY: ICD-10-CM

## 2021-04-22 RX ORDER — CEFAZOLIN SODIUM 2 G/100ML
2 INJECTION, SOLUTION INTRAVENOUS ONCE
Status: CANCELLED | OUTPATIENT
Start: 2021-04-22 | End: 2021-04-22

## 2021-04-22 RX ORDER — SODIUM CHLORIDE 0.9 % (FLUSH) 0.9 %
3 SYRINGE (ML) INJECTION EVERY 12 HOURS SCHEDULED
Status: CANCELLED | OUTPATIENT
Start: 2021-04-22

## 2021-04-22 RX ORDER — SODIUM CHLORIDE 0.9 % (FLUSH) 0.9 %
1-10 SYRINGE (ML) INJECTION AS NEEDED
Status: CANCELLED | OUTPATIENT
Start: 2021-04-22

## 2021-04-22 RX ORDER — NITROGLYCERIN 0.4 MG/1
0.4 TABLET SUBLINGUAL
Status: CANCELLED | OUTPATIENT
Start: 2021-04-22

## 2021-04-22 RX ORDER — ACETAMINOPHEN 325 MG/1
650 TABLET ORAL EVERY 4 HOURS PRN
Status: CANCELLED | OUTPATIENT
Start: 2021-04-22

## 2021-04-29 RX ORDER — CLOPIDOGREL BISULFATE 75 MG/1
TABLET ORAL
Qty: 90 TABLET | Refills: 3 | Status: SHIPPED | OUTPATIENT
Start: 2021-04-29 | End: 2022-01-28 | Stop reason: ALTCHOICE

## 2021-04-29 RX ORDER — LISINOPRIL 20 MG/1
TABLET ORAL
Qty: 90 TABLET | Refills: 3 | Status: SHIPPED | OUTPATIENT
Start: 2021-04-29 | End: 2021-06-29

## 2021-04-29 RX ORDER — BISOPROLOL FUMARATE 5 MG/1
TABLET, FILM COATED ORAL
Qty: 90 TABLET | Refills: 3 | Status: SHIPPED | OUTPATIENT
Start: 2021-04-29

## 2021-05-24 ENCOUNTER — PRE-ADMISSION TESTING (OUTPATIENT)
Dept: PREADMISSION TESTING | Facility: HOSPITAL | Age: 61
End: 2021-05-24

## 2021-05-24 ENCOUNTER — ANESTHESIA EVENT (OUTPATIENT)
Dept: CARDIOLOGY | Facility: HOSPITAL | Age: 61
End: 2021-05-24

## 2021-05-24 DIAGNOSIS — I25.5 ISCHEMIC CARDIOMYOPATHY: ICD-10-CM

## 2021-05-24 DIAGNOSIS — T82.198A MALFUNCTION OF IMPLANTABLE DEFIBRILLATOR VENTRICULAR (ICD) LEAD: ICD-10-CM

## 2021-05-24 DIAGNOSIS — Z01.89 LABORATORY TEST: Primary | ICD-10-CM

## 2021-05-24 LAB
ABO GROUP BLD: NORMAL
ANION GAP SERPL CALCULATED.3IONS-SCNC: 11 MMOL/L (ref 5–15)
BUN SERPL-MCNC: 16 MG/DL (ref 8–23)
BUN/CREAT SERPL: 17.4 (ref 7–25)
CALCIUM SPEC-SCNC: 9.4 MG/DL (ref 8.6–10.5)
CHLORIDE SERPL-SCNC: 107 MMOL/L (ref 98–107)
CO2 SERPL-SCNC: 20 MMOL/L (ref 22–29)
CREAT SERPL-MCNC: 0.92 MG/DL (ref 0.76–1.27)
DEPRECATED RDW RBC AUTO: 48.8 FL (ref 37–54)
ERYTHROCYTE [DISTWIDTH] IN BLOOD BY AUTOMATED COUNT: 13.2 % (ref 12.3–15.4)
FLUAV SUBTYP SPEC NAA+PROBE: NOT DETECTED
FLUBV RNA ISLT QL NAA+PROBE: NOT DETECTED
GFR SERPL CREATININE-BSD FRML MDRD: 83 ML/MIN/1.73
GLUCOSE SERPL-MCNC: 109 MG/DL (ref 65–99)
HCT VFR BLD AUTO: 44.5 % (ref 37.5–51)
HGB BLD-MCNC: 13.8 G/DL (ref 13–17.7)
MCH RBC QN AUTO: 31 PG (ref 26.6–33)
MCHC RBC AUTO-ENTMCNC: 31 G/DL (ref 31.5–35.7)
MCV RBC AUTO: 100 FL (ref 79–97)
PLATELET # BLD AUTO: 123 10*3/MM3 (ref 140–450)
PMV BLD AUTO: 11.2 FL (ref 6–12)
POTASSIUM SERPL-SCNC: 4 MMOL/L (ref 3.5–5.2)
RBC # BLD AUTO: 4.45 10*6/MM3 (ref 4.14–5.8)
RH BLD: POSITIVE
SARS-COV-2 RNA PNL SPEC NAA+PROBE: NOT DETECTED
SODIUM SERPL-SCNC: 138 MMOL/L (ref 136–145)
WBC # BLD AUTO: 5.93 10*3/MM3 (ref 3.4–10.8)

## 2021-05-24 PROCEDURE — 86901 BLOOD TYPING SEROLOGIC RH(D): CPT

## 2021-05-24 PROCEDURE — 86900 BLOOD TYPING SEROLOGIC ABO: CPT

## 2021-05-24 PROCEDURE — 85027 COMPLETE CBC AUTOMATED: CPT

## 2021-05-24 PROCEDURE — 36415 COLL VENOUS BLD VENIPUNCTURE: CPT

## 2021-05-24 PROCEDURE — C9803 HOPD COVID-19 SPEC COLLECT: HCPCS

## 2021-05-24 PROCEDURE — 80048 BASIC METABOLIC PNL TOTAL CA: CPT

## 2021-05-24 PROCEDURE — 87636 SARSCOV2 & INF A&B AMP PRB: CPT

## 2021-05-24 NOTE — PAT
"Dear Patient,    Drink plenty of fluids the day before to ensure you are well hydrated, unless otherwise directed by your physician.    Do NOT eat after midnight the night before your procedure.   You may have clear liquids only up to three hours before your scheduled arrival time (Water is best, but clear liquids can also include coffee without cream or milk, fruit juice without pulp, clear broth, and clear gelatin).  During the three hour pre-procedure timeframe, NOTHING BY MOUTH (NPO).    We encourage you to drink 8 ounces of water three to four hours before your scheduled arrival time.    Take your medications as instructed by your doctor.    Following your procedure, be sure to drink plenty of fluids to continue flushing the kidneys if dye was utilized during your procedure (cardiac catheterization)    Benefits of hydrating before and after your procedure include:    -improved hydration helps prevent potential harm to the kidneys    by flushing the contrast/dye used during your procedure (if    applicable)    -Lower post-procedure complications    -Improved patient comfort     Do NOT smoke after midnight the night before your procedure.    Glasses and jewelry may be worn, but dentures must be removed prior to your procedure.    Leave any items you consider valuable at home.      MORNING of your Procedure, please bring the following:     -Photo ID and insurance card(s)    -ALL medications in their ORIGINAL CONTAINERS    -Co-pay and/or deductible required by your insurance   -Copy of living will or power of  document (if not brought to    Pre-Admission Testing department)   -CPAP mask and tubing, not your machine (if applicable)    -Relaxation aids (music, books, magazines)    Family members may wait in [facility] waiting area during procedure.    Need to make arrangements for transportation prior to discharge.    A handout regarding \"Heart Healthy Eating\" was provided today to encourage healthy eating " habits.    Booklet published by [employer] was given in Pre-Admission testing.  This booklet is for informational purposes only.  If you have any questions about your procedure, please speak with your physician.    Blood bank bracelet applied to patient during Pre Admission Testing visit.  Patient instructed not to remove from arm until after procedure and they are discharged from the hospital.  Explained to patient that they may shower and get the bracelet wet, but not to immerse under water for longer periods (bathing, swimming, hand dishwashing, etc).  Patient verbalized understanding.    Covid test completed here.

## 2021-05-25 ENCOUNTER — APPOINTMENT (OUTPATIENT)
Dept: GENERAL RADIOLOGY | Facility: HOSPITAL | Age: 61
End: 2021-05-25

## 2021-05-25 ENCOUNTER — HOSPITAL ENCOUNTER (OUTPATIENT)
Facility: HOSPITAL | Age: 61
Discharge: HOME | End: 2021-05-26
Attending: INTERNAL MEDICINE | Admitting: INTERNAL MEDICINE

## 2021-05-25 ENCOUNTER — ANESTHESIA (OUTPATIENT)
Dept: CARDIOLOGY | Facility: HOSPITAL | Age: 61
End: 2021-05-25

## 2021-05-25 DIAGNOSIS — T82.198A MALFUNCTION OF IMPLANTABLE DEFIBRILLATOR VENTRICULAR (ICD) LEAD: ICD-10-CM

## 2021-05-25 DIAGNOSIS — T82.110A AICD LEAD MALFUNCTION: ICD-10-CM

## 2021-05-25 DIAGNOSIS — I25.5 ISCHEMIC CARDIOMYOPATHY: ICD-10-CM

## 2021-05-25 DIAGNOSIS — I87.1 SUBCLAVIAN VEIN STENOSIS: ICD-10-CM

## 2021-05-25 LAB
ABO GROUP BLD: NORMAL
BLD GP AB SCN SERPL QL: NEGATIVE
RH BLD: POSITIVE
T&S EXPIRATION DATE: NORMAL

## 2021-05-25 PROCEDURE — 63710000001 HYDROCODONE-ACETAMINOPHEN 5-325 MG TABLET: Performed by: NURSE PRACTITIONER

## 2021-05-25 PROCEDURE — C1773 RET DEV, INSERTABLE: HCPCS | Performed by: INTERNAL MEDICINE

## 2021-05-25 PROCEDURE — 33234 REMOVAL OF PACEMAKER SYSTEM: CPT | Performed by: INTERNAL MEDICINE

## 2021-05-25 PROCEDURE — C1892 INTRO/SHEATH,FIXED,PEEL-AWAY: HCPCS | Performed by: INTERNAL MEDICINE

## 2021-05-25 PROCEDURE — 63710000001 ACETAMINOPHEN 325 MG TABLET: Performed by: INTERNAL MEDICINE

## 2021-05-25 PROCEDURE — 25010000002 NEOSTIGMINE 10 MG/10ML SOLUTION: Performed by: NURSE ANESTHETIST, CERTIFIED REGISTERED

## 2021-05-25 PROCEDURE — C1894 INTRO/SHEATH, NON-LASER: HCPCS | Performed by: INTERNAL MEDICINE

## 2021-05-25 PROCEDURE — 25010000002 ONDANSETRON PER 1 MG: Performed by: NURSE ANESTHETIST, CERTIFIED REGISTERED

## 2021-05-25 PROCEDURE — S0260 H&P FOR SURGERY: HCPCS | Performed by: INTERNAL MEDICINE

## 2021-05-25 PROCEDURE — 25010000003 CEFAZOLIN IN DEXTROSE 2-4 GM/100ML-% SOLUTION: Performed by: NURSE ANESTHETIST, CERTIFIED REGISTERED

## 2021-05-25 PROCEDURE — 33249 INSJ/RPLCMT DEFIB W/LEAD(S): CPT | Performed by: INTERNAL MEDICINE

## 2021-05-25 PROCEDURE — 25010000002 DEXAMETHASONE SODIUM PHOSPHATE 10 MG/ML SOLUTION: Performed by: NURSE ANESTHETIST, CERTIFIED REGISTERED

## 2021-05-25 PROCEDURE — C1898 LEAD, PMKR, OTHER THAN TRANS: HCPCS | Performed by: INTERNAL MEDICINE

## 2021-05-25 PROCEDURE — 25010000002 KETOROLAC TROMETHAMINE PER 15 MG: Performed by: INTERNAL MEDICINE

## 2021-05-25 PROCEDURE — 25010000003 LIDOCAINE 1 % SOLUTION: Performed by: INTERNAL MEDICINE

## 2021-05-25 PROCEDURE — 33241 REMOVE PULSE GENERATOR: CPT

## 2021-05-25 PROCEDURE — C1769 GUIDE WIRE: HCPCS | Performed by: INTERNAL MEDICINE

## 2021-05-25 PROCEDURE — 25010000002 PROPOFOL 10 MG/ML EMULSION: Performed by: NURSE ANESTHETIST, CERTIFIED REGISTERED

## 2021-05-25 PROCEDURE — 0 IOPAMIDOL PER 1 ML: Performed by: INTERNAL MEDICINE

## 2021-05-25 PROCEDURE — 63710000001 ATORVASTATIN 40 MG TABLET: Performed by: INTERNAL MEDICINE

## 2021-05-25 PROCEDURE — C2628 CATHETER, OCCLUSION: HCPCS | Performed by: INTERNAL MEDICINE

## 2021-05-25 PROCEDURE — 93641 EP EVL 1/2CHMB PAC CVDFB TST: CPT | Performed by: INTERNAL MEDICINE

## 2021-05-25 PROCEDURE — C2629 INTRO/SHEATH, LASER: HCPCS | Performed by: INTERNAL MEDICINE

## 2021-05-25 PROCEDURE — 33241 REMOVE PULSE GENERATOR: CPT | Performed by: INTERNAL MEDICINE

## 2021-05-25 PROCEDURE — 33244 REMOVE ELCTRD TRANSVENOUSLY: CPT | Performed by: INTERNAL MEDICINE

## 2021-05-25 PROCEDURE — C1766 INTRO/SHEATH,STRBLE,NON-PEEL: HCPCS | Performed by: INTERNAL MEDICINE

## 2021-05-25 PROCEDURE — C1882 AICD, OTHER THAN SING/DUAL: HCPCS | Performed by: INTERNAL MEDICINE

## 2021-05-25 PROCEDURE — A9270 NON-COVERED ITEM OR SERVICE: HCPCS | Performed by: INTERNAL MEDICINE

## 2021-05-25 PROCEDURE — 71046 X-RAY EXAM CHEST 2 VIEWS: CPT

## 2021-05-25 PROCEDURE — 86900 BLOOD TYPING SEROLOGIC ABO: CPT | Performed by: INTERNAL MEDICINE

## 2021-05-25 PROCEDURE — A9270 NON-COVERED ITEM OR SERVICE: HCPCS | Performed by: NURSE PRACTITIONER

## 2021-05-25 PROCEDURE — C1759 CATH, INTRA ECHOCARDIOGRAPHY: HCPCS | Performed by: INTERNAL MEDICINE

## 2021-05-25 PROCEDURE — C1895 LEAD, AICD, ENDO DUAL COIL: HCPCS | Performed by: INTERNAL MEDICINE

## 2021-05-25 PROCEDURE — 86923 COMPATIBILITY TEST ELECTRIC: CPT

## 2021-05-25 PROCEDURE — 86901 BLOOD TYPING SEROLOGIC RH(D): CPT | Performed by: INTERNAL MEDICINE

## 2021-05-25 PROCEDURE — C1889 IMPLANT/INSERT DEVICE, NOC: HCPCS | Performed by: INTERNAL MEDICINE

## 2021-05-25 PROCEDURE — C1893 INTRO/SHEATH, FIXED,NON-PEEL: HCPCS | Performed by: INTERNAL MEDICINE

## 2021-05-25 PROCEDURE — 86850 RBC ANTIBODY SCREEN: CPT | Performed by: INTERNAL MEDICINE

## 2021-05-25 PROCEDURE — C1760 CLOSURE DEV, VASC: HCPCS | Performed by: INTERNAL MEDICINE

## 2021-05-25 DEVICE — LD DEFIB OPTISURE DF4 1COIL 8F 65CM: Type: IMPLANTABLE DEVICE | Status: FUNCTIONAL

## 2021-05-25 DEVICE — LD PM TENDRIL STS 6F52CM 2088TC52: Type: IMPLANTABLE DEVICE | Status: FUNCTIONAL

## 2021-05-25 DEVICE — ICD UNIFY ASSURA NXGN CRTD DF4 CD335740Q: Type: IMPLANTABLE DEVICE | Status: FUNCTIONAL

## 2021-05-25 DEVICE — ENV PM AIGISRX ANTIBAC RESORB 2.9X3.3IN LG: Type: IMPLANTABLE DEVICE | Status: FUNCTIONAL

## 2021-05-25 RX ORDER — LIDOCAINE HYDROCHLORIDE 10 MG/ML
INJECTION, SOLUTION INFILTRATION; PERINEURAL AS NEEDED
Status: DISCONTINUED | OUTPATIENT
Start: 2021-05-25 | End: 2021-05-25 | Stop reason: HOSPADM

## 2021-05-25 RX ORDER — ESMOLOL HYDROCHLORIDE 10 MG/ML
INJECTION INTRAVENOUS AS NEEDED
Status: DISCONTINUED | OUTPATIENT
Start: 2021-05-25 | End: 2021-05-25 | Stop reason: SURG

## 2021-05-25 RX ORDER — FAMOTIDINE 20 MG/1
20 TABLET, FILM COATED ORAL ONCE
Status: DISCONTINUED | OUTPATIENT
Start: 2021-05-25 | End: 2021-05-25 | Stop reason: HOSPADM

## 2021-05-25 RX ORDER — ASPIRIN 325 MG
325 TABLET ORAL DAILY
Status: DISCONTINUED | OUTPATIENT
Start: 2021-05-26 | End: 2021-05-26 | Stop reason: HOSPADM

## 2021-05-25 RX ORDER — IBUPROFEN 600 MG/1
600 TABLET ORAL EVERY 6 HOURS SCHEDULED
Status: DISCONTINUED | OUTPATIENT
Start: 2021-05-26 | End: 2021-05-26 | Stop reason: HOSPADM

## 2021-05-25 RX ORDER — ONDANSETRON 2 MG/ML
INJECTION INTRAMUSCULAR; INTRAVENOUS AS NEEDED
Status: DISCONTINUED | OUTPATIENT
Start: 2021-05-25 | End: 2021-05-25 | Stop reason: SURG

## 2021-05-25 RX ORDER — CEFAZOLIN SODIUM 2 G/100ML
2 INJECTION, SOLUTION INTRAVENOUS ONCE
Status: DISCONTINUED | OUTPATIENT
Start: 2021-05-25 | End: 2021-05-26 | Stop reason: HOSPADM

## 2021-05-25 RX ORDER — ATORVASTATIN CALCIUM 40 MG/1
80 TABLET, FILM COATED ORAL NIGHTLY
Status: DISCONTINUED | OUTPATIENT
Start: 2021-05-25 | End: 2021-05-26 | Stop reason: HOSPADM

## 2021-05-25 RX ORDER — ONDANSETRON 2 MG/ML
4 INJECTION INTRAMUSCULAR; INTRAVENOUS ONCE AS NEEDED
Status: DISCONTINUED | OUTPATIENT
Start: 2021-05-25 | End: 2021-05-25 | Stop reason: HOSPADM

## 2021-05-25 RX ORDER — LEVOTHYROXINE SODIUM 0.15 MG/1
150 TABLET ORAL
Status: DISCONTINUED | OUTPATIENT
Start: 2021-05-26 | End: 2021-05-26 | Stop reason: HOSPADM

## 2021-05-25 RX ORDER — MIDAZOLAM HYDROCHLORIDE 1 MG/ML
1 INJECTION INTRAMUSCULAR; INTRAVENOUS
Status: DISCONTINUED | OUTPATIENT
Start: 2021-05-25 | End: 2021-05-25 | Stop reason: HOSPADM

## 2021-05-25 RX ORDER — CLOPIDOGREL BISULFATE 75 MG/1
75 TABLET ORAL DAILY
Status: DISCONTINUED | OUTPATIENT
Start: 2021-05-26 | End: 2021-05-26 | Stop reason: HOSPADM

## 2021-05-25 RX ORDER — NITROGLYCERIN 0.4 MG/1
0.4 TABLET SUBLINGUAL
Status: DISCONTINUED | OUTPATIENT
Start: 2021-05-25 | End: 2021-05-26 | Stop reason: HOSPADM

## 2021-05-25 RX ORDER — SODIUM CHLORIDE 0.9 % (FLUSH) 0.9 %
10 SYRINGE (ML) INJECTION EVERY 12 HOURS SCHEDULED
Status: DISCONTINUED | OUTPATIENT
Start: 2021-05-25 | End: 2021-05-25 | Stop reason: HOSPADM

## 2021-05-25 RX ORDER — LIDOCAINE HYDROCHLORIDE 10 MG/ML
INJECTION, SOLUTION EPIDURAL; INFILTRATION; INTRACAUDAL; PERINEURAL AS NEEDED
Status: DISCONTINUED | OUTPATIENT
Start: 2021-05-25 | End: 2021-05-25 | Stop reason: SURG

## 2021-05-25 RX ORDER — HYDROCODONE BITARTRATE AND ACETAMINOPHEN 5; 325 MG/1; MG/1
1 TABLET ORAL ONCE
Status: COMPLETED | OUTPATIENT
Start: 2021-05-25 | End: 2021-05-25

## 2021-05-25 RX ORDER — FAMOTIDINE 10 MG/ML
20 INJECTION, SOLUTION INTRAVENOUS ONCE
Status: COMPLETED | OUTPATIENT
Start: 2021-05-25 | End: 2021-05-25

## 2021-05-25 RX ORDER — SODIUM CHLORIDE 0.9 % (FLUSH) 0.9 %
1-10 SYRINGE (ML) INJECTION AS NEEDED
Status: DISCONTINUED | OUTPATIENT
Start: 2021-05-25 | End: 2021-05-26 | Stop reason: HOSPADM

## 2021-05-25 RX ORDER — CEFAZOLIN SODIUM 2 G/100ML
INJECTION, SOLUTION INTRAVENOUS AS NEEDED
Status: DISCONTINUED | OUTPATIENT
Start: 2021-05-25 | End: 2021-05-25 | Stop reason: SURG

## 2021-05-25 RX ORDER — ETOMIDATE 2 MG/ML
INJECTION INTRAVENOUS AS NEEDED
Status: DISCONTINUED | OUTPATIENT
Start: 2021-05-25 | End: 2021-05-25 | Stop reason: SURG

## 2021-05-25 RX ORDER — LIDOCAINE HYDROCHLORIDE 10 MG/ML
0.5 INJECTION, SOLUTION EPIDURAL; INFILTRATION; INTRACAUDAL; PERINEURAL ONCE AS NEEDED
Status: DISCONTINUED | OUTPATIENT
Start: 2021-05-25 | End: 2021-05-25 | Stop reason: HOSPADM

## 2021-05-25 RX ORDER — SODIUM CHLORIDE 0.9 % (FLUSH) 0.9 %
3 SYRINGE (ML) INJECTION EVERY 12 HOURS SCHEDULED
Status: DISCONTINUED | OUTPATIENT
Start: 2021-05-25 | End: 2021-05-26 | Stop reason: HOSPADM

## 2021-05-25 RX ORDER — LISINOPRIL 20 MG/1
20 TABLET ORAL DAILY
Status: DISCONTINUED | OUTPATIENT
Start: 2021-05-26 | End: 2021-05-26 | Stop reason: HOSPADM

## 2021-05-25 RX ORDER — SODIUM CHLORIDE 9 MG/ML
INJECTION, SOLUTION INTRAVENOUS CONTINUOUS PRN
Status: DISCONTINUED | OUTPATIENT
Start: 2021-05-25 | End: 2021-05-25 | Stop reason: SURG

## 2021-05-25 RX ORDER — SODIUM CHLORIDE 0.9 % (FLUSH) 0.9 %
10 SYRINGE (ML) INJECTION AS NEEDED
Status: DISCONTINUED | OUTPATIENT
Start: 2021-05-25 | End: 2021-05-25 | Stop reason: HOSPADM

## 2021-05-25 RX ORDER — EPHEDRINE SULFATE 50 MG/ML
INJECTION, SOLUTION INTRAVENOUS AS NEEDED
Status: DISCONTINUED | OUTPATIENT
Start: 2021-05-25 | End: 2021-05-25 | Stop reason: SURG

## 2021-05-25 RX ORDER — NEOSTIGMINE METHYLSULFATE 1 MG/ML
INJECTION, SOLUTION INTRAVENOUS AS NEEDED
Status: DISCONTINUED | OUTPATIENT
Start: 2021-05-25 | End: 2021-05-25 | Stop reason: SURG

## 2021-05-25 RX ORDER — SODIUM CHLORIDE, SODIUM LACTATE, POTASSIUM CHLORIDE, CALCIUM CHLORIDE 600; 310; 30; 20 MG/100ML; MG/100ML; MG/100ML; MG/100ML
9 INJECTION, SOLUTION INTRAVENOUS CONTINUOUS
Status: DISCONTINUED | OUTPATIENT
Start: 2021-05-25 | End: 2021-05-26 | Stop reason: HOSPADM

## 2021-05-25 RX ORDER — PROPOFOL 10 MG/ML
VIAL (ML) INTRAVENOUS AS NEEDED
Status: DISCONTINUED | OUTPATIENT
Start: 2021-05-25 | End: 2021-05-25 | Stop reason: SURG

## 2021-05-25 RX ORDER — ACETAMINOPHEN 325 MG/1
650 TABLET ORAL EVERY 4 HOURS PRN
Status: DISCONTINUED | OUTPATIENT
Start: 2021-05-25 | End: 2021-05-26 | Stop reason: HOSPADM

## 2021-05-25 RX ORDER — DEXAMETHASONE SODIUM PHOSPHATE 10 MG/ML
INJECTION, SOLUTION INTRAMUSCULAR; INTRAVENOUS AS NEEDED
Status: DISCONTINUED | OUTPATIENT
Start: 2021-05-25 | End: 2021-05-25 | Stop reason: SURG

## 2021-05-25 RX ORDER — BISOPROLOL FUMARATE 5 MG/1
5 TABLET, FILM COATED ORAL DAILY
Status: DISCONTINUED | OUTPATIENT
Start: 2021-05-26 | End: 2021-05-26 | Stop reason: HOSPADM

## 2021-05-25 RX ORDER — KETOROLAC TROMETHAMINE 15 MG/ML
15 INJECTION, SOLUTION INTRAMUSCULAR; INTRAVENOUS EVERY 8 HOURS SCHEDULED
Status: DISCONTINUED | OUTPATIENT
Start: 2021-05-25 | End: 2021-05-26 | Stop reason: HOSPADM

## 2021-05-25 RX ORDER — SERTRALINE HYDROCHLORIDE 100 MG/1
100 TABLET, FILM COATED ORAL DAILY
Status: DISCONTINUED | OUTPATIENT
Start: 2021-05-26 | End: 2021-05-26 | Stop reason: HOSPADM

## 2021-05-25 RX ORDER — ROCURONIUM BROMIDE 10 MG/ML
INJECTION, SOLUTION INTRAVENOUS AS NEEDED
Status: DISCONTINUED | OUTPATIENT
Start: 2021-05-25 | End: 2021-05-25 | Stop reason: SURG

## 2021-05-25 RX ORDER — FENTANYL CITRATE 50 UG/ML
50 INJECTION, SOLUTION INTRAMUSCULAR; INTRAVENOUS
Status: DISCONTINUED | OUTPATIENT
Start: 2021-05-25 | End: 2021-05-25 | Stop reason: HOSPADM

## 2021-05-25 RX ORDER — GLYCOPYRROLATE 0.2 MG/ML
INJECTION INTRAMUSCULAR; INTRAVENOUS AS NEEDED
Status: DISCONTINUED | OUTPATIENT
Start: 2021-05-25 | End: 2021-05-25 | Stop reason: SURG

## 2021-05-25 RX ORDER — ACETAMINOPHEN 325 MG/1
650 TABLET ORAL EVERY 6 HOURS
Status: DISCONTINUED | OUTPATIENT
Start: 2021-05-25 | End: 2021-05-26 | Stop reason: HOSPADM

## 2021-05-25 RX ADMIN — PROPOFOL 50 MG: 10 INJECTION, EMULSION INTRAVENOUS at 08:29

## 2021-05-25 RX ADMIN — EPHEDRINE SULFATE 10 MG: 50 INJECTION, SOLUTION INTRAVENOUS at 10:20

## 2021-05-25 RX ADMIN — SODIUM CHLORIDE: 9 INJECTION, SOLUTION INTRAVENOUS at 08:29

## 2021-05-25 RX ADMIN — SODIUM CHLORIDE: 9 INJECTION, SOLUTION INTRAVENOUS at 12:04

## 2021-05-25 RX ADMIN — ROCURONIUM BROMIDE 10 MG: 10 INJECTION INTRAVENOUS at 11:23

## 2021-05-25 RX ADMIN — GLYCOPYRROLATE 0.4 MG: 0.4 INJECTION INTRAMUSCULAR; INTRAVENOUS at 12:04

## 2021-05-25 RX ADMIN — ETOMIDATE 10 MG: 2 INJECTION, SOLUTION INTRAVENOUS at 08:29

## 2021-05-25 RX ADMIN — ROCURONIUM BROMIDE 20 MG: 10 INJECTION INTRAVENOUS at 09:30

## 2021-05-25 RX ADMIN — ATORVASTATIN CALCIUM 80 MG: 40 TABLET, FILM COATED ORAL at 20:12

## 2021-05-25 RX ADMIN — KETOROLAC TROMETHAMINE 15 MG: 15 INJECTION, SOLUTION INTRAMUSCULAR; INTRAVENOUS at 15:01

## 2021-05-25 RX ADMIN — ESMOLOL HYDROCHLORIDE 20 MG: 10 INJECTION, SOLUTION INTRAVENOUS at 08:29

## 2021-05-25 RX ADMIN — ROCURONIUM BROMIDE 20 MG: 10 INJECTION INTRAVENOUS at 10:41

## 2021-05-25 RX ADMIN — EPHEDRINE SULFATE 10 MG: 50 INJECTION, SOLUTION INTRAVENOUS at 11:24

## 2021-05-25 RX ADMIN — KETOROLAC TROMETHAMINE 15 MG: 15 INJECTION, SOLUTION INTRAMUSCULAR; INTRAVENOUS at 22:27

## 2021-05-25 RX ADMIN — EPHEDRINE SULFATE 5 MG: 50 INJECTION, SOLUTION INTRAVENOUS at 11:19

## 2021-05-25 RX ADMIN — EPHEDRINE SULFATE 5 MG: 50 INJECTION, SOLUTION INTRAVENOUS at 10:53

## 2021-05-25 RX ADMIN — ROCURONIUM BROMIDE 50 MG: 10 INJECTION INTRAVENOUS at 08:31

## 2021-05-25 RX ADMIN — FAMOTIDINE 20 MG: 10 INJECTION, SOLUTION INTRAVENOUS at 07:06

## 2021-05-25 RX ADMIN — ACETAMINOPHEN 650 MG: 325 TABLET ORAL at 17:51

## 2021-05-25 RX ADMIN — ROCURONIUM BROMIDE 10 MG: 10 INJECTION INTRAVENOUS at 08:57

## 2021-05-25 RX ADMIN — EPHEDRINE SULFATE 10 MG: 50 INJECTION, SOLUTION INTRAVENOUS at 11:08

## 2021-05-25 RX ADMIN — EPHEDRINE SULFATE 5 MG: 50 INJECTION, SOLUTION INTRAVENOUS at 09:32

## 2021-05-25 RX ADMIN — ROCURONIUM BROMIDE 20 MG: 10 INJECTION INTRAVENOUS at 10:09

## 2021-05-25 RX ADMIN — NEOSTIGMINE METHYLSULFATE 4 MG: 1 INJECTION, SOLUTION INTRAVENOUS at 12:04

## 2021-05-25 RX ADMIN — HYDROCODONE BITARTRATE AND ACETAMINOPHEN 1 TABLET: 5; 325 TABLET ORAL at 20:12

## 2021-05-25 RX ADMIN — LIDOCAINE HYDROCHLORIDE 50 MG: 10 INJECTION, SOLUTION EPIDURAL; INFILTRATION; INTRACAUDAL; PERINEURAL at 08:29

## 2021-05-25 RX ADMIN — PROPOFOL 50 MG: 10 INJECTION, EMULSION INTRAVENOUS at 08:31

## 2021-05-25 RX ADMIN — CEFAZOLIN SODIUM 2 G: 2 INJECTION, SOLUTION INTRAVENOUS at 08:34

## 2021-05-25 RX ADMIN — ONDANSETRON 4 MG: 2 INJECTION INTRAMUSCULAR; INTRAVENOUS at 08:29

## 2021-05-25 RX ADMIN — EPHEDRINE SULFATE 5 MG: 50 INJECTION, SOLUTION INTRAVENOUS at 10:18

## 2021-05-25 RX ADMIN — EPHEDRINE SULFATE 5 MG: 50 INJECTION, SOLUTION INTRAVENOUS at 10:46

## 2021-05-25 RX ADMIN — SUGAMMADEX 200 MG: 100 INJECTION, SOLUTION INTRAVENOUS at 12:11

## 2021-05-25 RX ADMIN — EPHEDRINE SULFATE 10 MG: 50 INJECTION, SOLUTION INTRAVENOUS at 10:47

## 2021-05-25 RX ADMIN — DEXAMETHASONE SODIUM PHOSPHATE 4 MG: 10 INJECTION, SOLUTION INTRAMUSCULAR; INTRAVENOUS at 08:29

## 2021-05-25 RX ADMIN — ACETAMINOPHEN 650 MG: 325 TABLET ORAL at 22:27

## 2021-05-25 RX ADMIN — EPHEDRINE SULFATE 5 MG: 50 INJECTION, SOLUTION INTRAVENOUS at 11:22

## 2021-05-25 NOTE — ANESTHESIA PROCEDURE NOTES
Airway  Urgency: elective    Date/Time: 5/25/2021 8:34 AM  Airway not difficult    General Information and Staff    Patient location during procedure: OR  CRNA: Cody Rodríguez CRNA    Indications and Patient Condition  Indications for airway management: airway protection    Preoxygenated: yes  MILS not maintained throughout  Mask difficulty assessment: 2 - vent by mask + OA or adjuvant +/- NMBA    Final Airway Details  Final airway type: endotracheal airway      Successful airway: ETT  Cuffed: yes   Successful intubation technique: direct laryngoscopy  Endotracheal tube insertion site: oral  Blade: Stephan  Blade size: 3  ETT size (mm): 7.5  Cormack-Lehane Classification: grade I - full view of glottis  Placement verified by: chest auscultation and capnometry   Measured from: lips  ETT/EBT  to lips (cm): 20  Number of attempts at approach: 1  Assessment: lips, teeth, and gum same as pre-op and atraumatic intubation    Additional Comments  Negative epigastric sounds, Breath sound equal bilaterally with symmetric chest rise and fall

## 2021-05-25 NOTE — ANESTHESIA POSTPROCEDURE EVALUATION
Patient: Howard Owusu    Procedure Summary     Date: 05/25/21 Room / Location: [facility] CATH/EP LAB [facility] / [facility] EP INVASIVE LOCATION    Anesthesia Start: 0823 Anesthesia Stop: 1219    Procedure: RV ICD lead extraction (SJM) new RV lead insertion, no meds to hold, Hybrid OR, CT/OR back up (N/A ) Diagnosis:       AICD lead malfunction      Subclavian vein stenosis      (lead malfuction, vein occulusion)    Providers: Maurizio Cruz DO Provider: Caden Ventura MD    Anesthesia Type: general ASA Status: 4          Anesthesia Type: general    Vitals  Vitals Value Taken Time   /69 05/25/21 1218   Temp     Pulse 87 05/25/21 1218   Resp 20 05/25/21 1218   SpO2 96 % 05/25/21 1218           Post Anesthesia Care and Evaluation    Patient location during evaluation: PACU  Patient participation: complete - patient participated  Level of consciousness: awake and alert  Pain management: adequate  Airway patency: patent  Anesthetic complications: No anesthetic complications  PONV Status: none  Cardiovascular status: hemodynamically stable and acceptable  Respiratory status: nonlabored ventilation, acceptable and nasal cannula  Hydration status: acceptable

## 2021-05-25 NOTE — ANESTHESIA PROCEDURE NOTES
Arterial Line      Patient reassessed immediately prior to procedure    Patient location during procedure: ICU  Start time: 5/25/2021 7:40 AM  Stop Time:5/25/2021 7:50 AM       Line placed for hemodynamic monitoring.  Performed By   CRNA: Cody Rodríguez CRNA  Preanesthetic Checklist  Completed: patient identified, IV checked, site marked, risks and benefits discussed, surgical consent, monitors and equipment checked, pre-op evaluation and timeout performed  Arterial Line Prep   Sterile Tech: cap, gloves, sterile barriers and mask  Prep: ChloraPrep  Patient monitoring: blood pressure monitoring, continuous pulse oximetry and EKG  Arterial Line Procedure   Laterality:right  Location:  radial artery  Catheter size: 20 G   Guidance: palpation technique  Number of attempts: 1  Successful placement: yes  Post Assessment   Dressing Type: occlusive dressing applied, secured with tape, wrist guard applied and biopatch applied.   Complications no  Circ/Move/Sens Assessment: normal and unchanged.   Patient Tolerance: patient tolerated the procedure well with no apparent complications

## 2021-05-25 NOTE — ANESTHESIA PREPROCEDURE EVALUATION
Anesthesia Evaluation     Patient summary reviewed and Nursing notes reviewed   NPO Solid Status: > 8 hours  NPO Liquid Status: > 8 hours           Airway   Mallampati: II  TM distance: >3 FB  Neck ROM: full  No difficulty expected  Dental      Pulmonary    (+) sleep apnea,   (-) asthma, shortness of breath, recent URI, not a smoker  Cardiovascular     ECG reviewed  Patient on routine beta blocker    (+) pacemaker ICD, hypertension well controlled, past MI  >12 months, CAD, cardiac stents more than 12 months ago dysrhythmias (VT ) Tachycardia, CHF (isch CM ) , hyperlipidemia,     ROS comment: ECG AV dual-paced rhythm  Biventricular pacemaker detected    ECHO 2021 EF = 25%  Mild MVR TVR   ·     Neuro/Psych  (-) seizures, CVA  GI/Hepatic/Renal/Endo    (+)  GERD,  renal disease stones, thyroid problem hypothyroidism  (-) liver disease, diabetes    Musculoskeletal     Abdominal    Substance History      OB/GYN          Other   arthritis,      ROS/Med Hx Other: Plavix                Anesthesia Plan    ASA 4     general   (A  Line)  intravenous induction     Anesthetic plan, all risks, benefits, and alternatives have been provided, discussed and informed consent has been obtained with: patient.    Plan discussed with CRNA.

## 2021-05-26 VITALS
HEIGHT: 66 IN | OXYGEN SATURATION: 92 % | BODY MASS INDEX: 35.79 KG/M2 | HEART RATE: 57 BPM | WEIGHT: 222.66 LBS | RESPIRATION RATE: 18 BRPM | SYSTOLIC BLOOD PRESSURE: 105 MMHG | TEMPERATURE: 97.8 F | DIASTOLIC BLOOD PRESSURE: 63 MMHG

## 2021-05-26 LAB
BH BB BLOOD EXPIRATION DATE: NORMAL
BH BB BLOOD TYPE BARCODE: 5100
BH BB DISPENSE STATUS: NORMAL
BH BB PRODUCT CODE: NORMAL
BH BB UNIT NUMBER: NORMAL
CROSSMATCH INTERPRETATION: NORMAL
UNIT  ABO: NORMAL
UNIT  RH: NORMAL

## 2021-05-26 PROCEDURE — 25010000002 KETOROLAC TROMETHAMINE PER 15 MG: Performed by: INTERNAL MEDICINE

## 2021-05-26 PROCEDURE — 93010 ELECTROCARDIOGRAM REPORT: CPT | Performed by: INTERNAL MEDICINE

## 2021-05-26 PROCEDURE — 63710000001 LEVOTHYROXINE 150 MCG TABLET: Performed by: INTERNAL MEDICINE

## 2021-05-26 PROCEDURE — 63710000001 LISINOPRIL 20 MG TABLET: Performed by: INTERNAL MEDICINE

## 2021-05-26 PROCEDURE — A9270 NON-COVERED ITEM OR SERVICE: HCPCS | Performed by: INTERNAL MEDICINE

## 2021-05-26 PROCEDURE — 93005 ELECTROCARDIOGRAM TRACING: CPT | Performed by: INTERNAL MEDICINE

## 2021-05-26 PROCEDURE — 99024 POSTOP FOLLOW-UP VISIT: CPT | Performed by: NURSE PRACTITIONER

## 2021-05-26 PROCEDURE — 63710000001 SERTRALINE 100 MG TABLET: Performed by: INTERNAL MEDICINE

## 2021-05-26 PROCEDURE — 63710000001 BISOPROLOL 5 MG TABLET: Performed by: INTERNAL MEDICINE

## 2021-05-26 PROCEDURE — 63710000001 CLOPIDOGREL 75 MG TABLET: Performed by: INTERNAL MEDICINE

## 2021-05-26 PROCEDURE — 63710000001 ACETAMINOPHEN 325 MG TABLET: Performed by: INTERNAL MEDICINE

## 2021-05-26 PROCEDURE — 63710000001 ASPIRIN 325 MG TABLET: Performed by: INTERNAL MEDICINE

## 2021-05-26 RX ORDER — IBUPROFEN 600 MG/1
600 TABLET ORAL EVERY 6 HOURS SCHEDULED
Qty: 20 TABLET | Refills: 0 | Status: SHIPPED | OUTPATIENT
Start: 2021-05-26 | End: 2021-05-31

## 2021-05-26 RX ORDER — ACETAMINOPHEN 325 MG/1
650 TABLET ORAL EVERY 6 HOURS
Qty: 34 TABLET | Refills: 0 | Status: SHIPPED | OUTPATIENT
Start: 2021-05-26 | End: 2021-05-31

## 2021-05-26 RX ADMIN — KETOROLAC TROMETHAMINE 15 MG: 15 INJECTION, SOLUTION INTRAMUSCULAR; INTRAVENOUS at 05:33

## 2021-05-26 RX ADMIN — CLOPIDOGREL BISULFATE 75 MG: 75 TABLET ORAL at 08:46

## 2021-05-26 RX ADMIN — BISOPROLOL FUMARATE 5 MG: 5 TABLET, FILM COATED ORAL at 08:46

## 2021-05-26 RX ADMIN — LEVOTHYROXINE SODIUM 150 MCG: 150 TABLET ORAL at 05:33

## 2021-05-26 RX ADMIN — ACETAMINOPHEN 650 MG: 325 TABLET ORAL at 05:33

## 2021-05-26 RX ADMIN — LISINOPRIL 20 MG: 20 TABLET ORAL at 08:46

## 2021-05-26 RX ADMIN — SERTRALINE 100 MG: 100 TABLET, FILM COATED ORAL at 08:46

## 2021-05-26 RX ADMIN — SODIUM CHLORIDE, PRESERVATIVE FREE 3 ML: 5 INJECTION INTRAVENOUS at 08:46

## 2021-05-26 RX ADMIN — ASPIRIN 325 MG ORAL TABLET 325 MG: 325 PILL ORAL at 08:46

## 2021-06-03 ENCOUNTER — OFFICE VISIT (OUTPATIENT)
Dept: CARDIOLOGY | Facility: CLINIC | Age: 61
End: 2021-06-03

## 2021-06-03 DIAGNOSIS — T82.110A AICD LEAD MALFUNCTION: Primary | ICD-10-CM

## 2021-06-03 PROCEDURE — 99024 POSTOP FOLLOW-UP VISIT: CPT | Performed by: INTERNAL MEDICINE

## 2021-06-03 NOTE — PROGRESS NOTES
06/03/2021    Name: Howard Owusu  YOB: 1960    WOUND CHECK    Patient has fever: [] YES   [] NO     Temperature if indicated:     Wound Location:  Left Shoulder     Surgical Glue: intact     Wound Appearance: clear/intact     Plan: normal wound check      Did patient receive home monitoring box? [x] YES   [] NO  (If no, contact device clinic.)    Does patient have questions about remote monitoring? [] YES   [x] NO (If yes notify device clinic)      Notes:       Appointment for follow-up scheduled for 3 months post procedure []    Future Appointments   Date Time Provider Department Center   6/25/2021 12:45 PM Suly Tavarez APRN E [facility]   8/30/2021 11:30 AM Maurizio Cruz DO E [facility]   11/2/2021  2:30 PM Jose Jones MD E [facility]          Jed Hendricks MA, 06/03/21

## 2021-06-09 LAB
QT INTERVAL: 448 MS
QTC INTERVAL: 448 MS

## 2021-06-24 NOTE — PROGRESS NOTES
"Chief Complaint  Establish Care and Congestive Heart Failure    Subjective    History of Present Illness {CC  Problem List  Visit  Diagnosis   Encounters  Notes  Medications  Labs  Result Review Imaging  Media :23}     Howard Owusu presents to [facility] CARDIOLOGY for   History of Present Illness   61-year-old male with ischemic cardiomyopathy/chronic systolic heart failure, paroxysmal atrial fibrillation, hypertension, hyperlipidemia, hypothyroidism, GERD, GILL who presents today as a hospital referral for chronic systolic heart failure.  Patient was admitted 5/25 for elective BiV ICD system revision with removal of old malfunctioning RV defibrillator lead and removal of 2 RA pacing leads due to failure of old RA lead and insulation failure of recently placed RA lead.  He reports since revision that he has had increasing weakness and dyspnea.  He reports that he has to lay down multiple times throughout the day due to weakness.  He reports that weakness is generalized.  He reports that he used to build to climb stairs but now he climbs a few stairs he has to stop because he is so short of breath.  He feels his resting heart rates are faster than what they usually are.  He denies any palpitations or racing heartbeats.  He denies any weight gain or lower extremity edema.  He does note occasional orthopnea.  He says he has to take rest after just doing routine activities of daily living.  He denies any chest pain.      Objective     Vital Signs:   Vitals:    06/25/21 1226 06/25/21 1227 06/25/21 1228   BP: 133/78 129/83 128/68   BP Location: Right arm Left arm Left arm   Patient Position: Sitting Sitting Standing   Cuff Size: Adult Adult Adult   Pulse: 64 63 71   Resp:   18   Temp:   96.8 °F (36 °C)   TempSrc:   Temporal   SpO2: 93% 94% 92%   Weight:   100 kg (220 lb 8 oz)   Height:   167.6 cm (66\")     Body mass index is 35.59 kg/m².  Physical Exam  Vitals reviewed.   Constitutional:     "   Appearance: Normal appearance.   HENT:      Head: Normocephalic.   Eyes:      Extraocular Movements: Extraocular movements intact.      Pupils: Pupils are equal, round, and reactive to light.   Neck:      Vascular: No carotid bruit.   Cardiovascular:      Rate and Rhythm: Normal rate and regular rhythm.      Pulses: Normal pulses.      Heart sounds: Normal heart sounds, S1 normal and S2 normal. No murmur heard.     Pulmonary:      Effort: Pulmonary effort is normal. No respiratory distress.      Breath sounds: Normal breath sounds.   Chest:      Chest wall: No tenderness.   Abdominal:      General: Abdomen is flat. Bowel sounds are normal.      Palpations: Abdomen is soft.   Musculoskeletal:      Cervical back: Neck supple.      Right lower leg: No edema.      Left lower leg: No edema.   Skin:     General: Skin is warm and dry.      Comments: Left supraclavicular incision is healed appropriately   Neurological:      General: No focal deficit present.      Mental Status: He is alert and oriented to person, place, and time. Mental status is at baseline.   Psychiatric:         Mood and Affect: Mood normal.         Behavior: Behavior normal.         Thought Content: Thought content normal.              Result Review  Data Reviewed:{ Labs  Result Review  Imaging  Med Tab  Media :23}   EP/CRM Study (05/25/2021 12:09)  Adult Transthoracic Echo Complete w/ Color, Spectral and Contrast if necessary per protocol (04/09/2021 11:52)  SCANNED - CARDIOLOGY (05/25/2021)  ECG 12 Lead (05/26/2021 05:38)  EP/CRM Study (05/25/2021 12:09)  Prepare RBC, 6 Units (05/26/2021 07:51)  Basic metabolic panel (05/24/2021 12:02)  CBC (No diff) (05/24/2021 12:02)  Basic Metabolic Panel (04/10/2021 04:30)  BNP (04/08/2021 18:02)  XR Chest PA & Lateral (05/25/2021 20:05)    Consultant notes EP/cardiology   Device interrogation shows normal functioning device.  Atrial paced 20%, biventricular paced 13%.    Due to narrow QRS LV pacing was  turned off during RA/RV lead implantation    EKG today shows normal sinus rhythm with first-degree AV block, heart rate 63.     Assessment and Plan {CC Problem List  Visit Diagnosis  ROS  Review (Popup)  Health Maintenance  Quality  BestPractice  Medications  SmartSets  SnapShot Encounters  Media :23}   1. Shortness of breath  He does not appear fluid overloaded, he is no longer BV pacing, which may be contributing to his symptoms  Will discuss with Dr. Cruz  - ECG 12 Lead; Future    2. Weakness  Normal functioning device, but intentionally no longer bi-v pacing due to narrow QRS. Will see if Dr. Cruz feels this contributes to his symptoms    3. Ischemic cardiomyopathy  EF ~25-30% for years and has remained compensated  Continue lisinopril, bisoprolol.       4. AICD lead malfunction  Status post extraction of RA/ RV lead, implantation of new RA lead and new RV lead    5. Essential hypertension  Well controlled  Continue to monitor    6. Coronary artery disease involving native coronary artery of native heart without angina pectoris  EKG stable  Continue statin, plavix, ASA      Follow Up {Instructions Charge Capture  Follow-up Communications :23}   Return if symptoms worsen or fail to improve.    Patient was given instructions and counseling regarding his condition or for health maintenance advice. Please see specific information pulled into the AVS if appropriate.  Patient was instructed to call the [facility] with any questions, concerns, or worsening symptoms.    *Please note that portions of this note were completed with a voice recognition program. Efforts were made to edit the dictations, but occasionally words are mistranscribed.

## 2021-06-25 ENCOUNTER — HOSPITAL ENCOUNTER (OUTPATIENT)
Dept: CARDIOLOGY | Facility: HOSPITAL | Age: 61
Discharge: HOME | End: 2021-06-25

## 2021-06-25 ENCOUNTER — OFFICE VISIT (OUTPATIENT)
Dept: CARDIOLOGY | Facility: HOSPITAL | Age: 61
End: 2021-06-25

## 2021-06-25 VITALS
BODY MASS INDEX: 35.44 KG/M2 | OXYGEN SATURATION: 92 % | WEIGHT: 220.5 LBS | TEMPERATURE: 96.8 F | RESPIRATION RATE: 18 BRPM | DIASTOLIC BLOOD PRESSURE: 68 MMHG | SYSTOLIC BLOOD PRESSURE: 128 MMHG | HEIGHT: 66 IN | HEART RATE: 71 BPM

## 2021-06-25 DIAGNOSIS — I10 ESSENTIAL HYPERTENSION: ICD-10-CM

## 2021-06-25 DIAGNOSIS — I25.10 CORONARY ARTERY DISEASE INVOLVING NATIVE CORONARY ARTERY OF NATIVE HEART WITHOUT ANGINA PECTORIS: ICD-10-CM

## 2021-06-25 DIAGNOSIS — R06.02 SHORTNESS OF BREATH: Primary | ICD-10-CM

## 2021-06-25 DIAGNOSIS — I25.5 ISCHEMIC CARDIOMYOPATHY: ICD-10-CM

## 2021-06-25 DIAGNOSIS — R53.1 WEAKNESS: ICD-10-CM

## 2021-06-25 DIAGNOSIS — R06.02 SHORTNESS OF BREATH: ICD-10-CM

## 2021-06-25 DIAGNOSIS — T82.110A AICD LEAD MALFUNCTION: ICD-10-CM

## 2021-06-25 LAB
QT INTERVAL: 384 MS
QTC INTERVAL: 392 MS

## 2021-06-25 PROCEDURE — 93005 ELECTROCARDIOGRAM TRACING: CPT | Performed by: NURSE PRACTITIONER

## 2021-06-25 PROCEDURE — 99214 OFFICE O/P EST MOD 30 MIN: CPT | Performed by: NURSE PRACTITIONER

## 2021-06-25 PROCEDURE — 93010 ELECTROCARDIOGRAM REPORT: CPT | Performed by: INTERNAL MEDICINE

## 2021-06-29 ENCOUNTER — TELEPHONE (OUTPATIENT)
Dept: CARDIOLOGY | Facility: HOSPITAL | Age: 61
End: 2021-06-29

## 2021-06-29 RX ORDER — SACUBITRIL AND VALSARTAN 24; 26 MG/1; MG/1
1 TABLET, FILM COATED ORAL 2 TIMES DAILY
Qty: 60 TABLET | Refills: 3 | Status: SHIPPED | OUTPATIENT
Start: 2021-06-29 | End: 2021-10-29 | Stop reason: SDUPTHER

## 2021-06-29 NOTE — TELEPHONE ENCOUNTER
Discussed care with Dr. Cruz. Attempted to call patient to discuss recommendations. Left message to return call

## 2021-06-29 NOTE — TELEPHONE ENCOUNTER
Spoke with patient.  Discussed care with  who feels that turning of LV pacing is unlikely causing his symptoms considering his narrow QRS.  Recommended that we treat him for his systolic heart failure.  Patient tells me he is actually feeling much better.  He is not sure what is made him feel better.  We have discussed medical management.  At this time he does not appear to have symptoms of fluid overload.  We will try changing his lisinopril to Entresto to see if this helps improves his symptoms.  Discussed possible side effects.  Patient to follow-up next week for repeat labs and further evaluation.

## 2021-07-07 NOTE — PROGRESS NOTES
"Chief Complaint  Congestive Heart Failure and Follow-up    Subjective    History of Present Illness {CC  Problem List  Visit  Diagnosis   Encounters  Notes  Medications  Labs  Result Review Imaging  Media :23}     Howard Owusu presents to [facility] CARDIOLOGY for   History of Present Illness   61-year-old male with ischemic cardiomyopathy/chronic systolic heart failure, paroxysmal atrial fibrillation, hypertension, hyperlipidemia, hypothyroidism, GERD, GILL who presents today to follow up on  chronic systolic heart failure.  Patient was admitted 5/25 for elective BiV ICD system revision with removal of old malfunctioning RV defibrillator lead and removal of 2 RA pacing leads due to failure of old RA lead and insulation failure of recently placed RA lead.  He has some initial fatigue and dyspnea after procedure, which seemed to improve.  His lisinopril was changed to Entresto.  He feels back to his baseline and reports that he is actually seeing some improvement in his exertional dyspnea.  He reports his energy levels have improved.  He denies any weight gain, lower extreme edema, lightheadedness, dizziness, low blood pressures or palpitations.  He reports his systolic blood pressures are typically in the 110s to 130s.    Objective     Vital Signs:   Vitals:    07/09/21 1231   BP: 142/83   BP Location: Left arm   Patient Position: Sitting   Cuff Size: Adult   Pulse: 71   Resp: 20   Temp: 98 °F (36.7 °C)   TempSrc: Temporal   SpO2: 96%   Weight: 101 kg (222 lb 8 oz)   Height: 167.6 cm (66\")     Body mass index is 35.91 kg/m².  Physical Exam  Vitals reviewed.   Constitutional:       Appearance: Normal appearance.   HENT:      Head: Normocephalic.   Eyes:      Extraocular Movements: Extraocular movements intact.      Pupils: Pupils are equal, round, and reactive to light.   Neck:      Vascular: No carotid bruit.   Cardiovascular:      Rate and Rhythm: Normal rate and regular rhythm.      " Pulses: Normal pulses.      Heart sounds: Normal heart sounds, S1 normal and S2 normal. No murmur heard.     Pulmonary:      Effort: Pulmonary effort is normal. No respiratory distress.      Breath sounds: Normal breath sounds.   Chest:      Chest wall: No tenderness.   Abdominal:      General: Abdomen is flat. Bowel sounds are normal.      Palpations: Abdomen is soft.   Musculoskeletal:      Cervical back: Neck supple.      Right lower leg: No edema.      Left lower leg: No edema.   Skin:     General: Skin is warm and dry.      Comments: Left supraclavicular incision is healed appropriately   Neurological:      General: No focal deficit present.      Mental Status: He is alert and oriented to person, place, and time. Mental status is at baseline.   Psychiatric:         Mood and Affect: Mood normal.         Behavior: Behavior normal.         Thought Content: Thought content normal.              Result Review  Data Reviewed:{ Labs  Result Review  Imaging  Med Tab  Media :23}   EP/CRM Study (05/25/2021 12:09)  Adult Transthoracic Echo Complete w/ Color, Spectral and Contrast if necessary per protocol (04/09/2021 11:52)  Basic metabolic panel (05/24/2021 12:02)  BNP (04/08/2021 18:02)       Assessment and Plan {CC Problem List  Visit Diagnosis  ROS  Review (Popup)  Health Maintenance  Quality  BestPractice  Medications  SmartSets  SnapShot Encounters  Media :23}   1. Ischemic cardiomyopathy/chronic systolic heart failure  Appears euvolemic  Symptoms improving on Entresto.  Continue bisoprolol and Entresto.  Will check BMP today and if labs are stable consider increasing Entresto to 49/51mg BID  BMP, proBNP    2. AICD lead malfunction  Status post extraction of RA/ RV lead, implantation of new RA lead and new RV lead    3. Essential hypertension  Well controlled  Continue to monitor  Advised to monitor for symptoms of low blood pressure          Follow Up {Instructions Charge Capture  Follow-up  Communications :23}   No follow-ups on file.    Patient was given instructions and counseling regarding his condition or for health maintenance advice. Please see specific information pulled into the AVS if appropriate.  Patient was instructed to call the [facility] with any questions, concerns, or worsening symptoms.    *Please note that portions of this note were completed with a voice recognition program. Efforts were made to edit the dictations, but occasionally words are mistranscribed.

## 2021-07-08 PROCEDURE — 93296 REM INTERROG EVL PM/IDS: CPT | Performed by: INTERNAL MEDICINE

## 2021-07-08 PROCEDURE — 93295 DEV INTERROG REMOTE 1/2/MLT: CPT | Performed by: INTERNAL MEDICINE

## 2021-07-09 ENCOUNTER — OFFICE VISIT (OUTPATIENT)
Dept: CARDIOLOGY | Facility: HOSPITAL | Age: 61
End: 2021-07-09

## 2021-07-09 ENCOUNTER — LAB (OUTPATIENT)
Dept: LAB | Facility: HOSPITAL | Age: 61
End: 2021-07-09

## 2021-07-09 VITALS
HEIGHT: 66 IN | SYSTOLIC BLOOD PRESSURE: 142 MMHG | RESPIRATION RATE: 20 BRPM | WEIGHT: 222.5 LBS | TEMPERATURE: 98 F | HEART RATE: 71 BPM | OXYGEN SATURATION: 96 % | DIASTOLIC BLOOD PRESSURE: 83 MMHG | BODY MASS INDEX: 35.76 KG/M2

## 2021-07-09 DIAGNOSIS — I25.5 ISCHEMIC CARDIOMYOPATHY: ICD-10-CM

## 2021-07-09 DIAGNOSIS — I25.5 ISCHEMIC CARDIOMYOPATHY: Primary | ICD-10-CM

## 2021-07-09 DIAGNOSIS — I10 ESSENTIAL HYPERTENSION: ICD-10-CM

## 2021-07-09 DIAGNOSIS — T82.110A AICD LEAD MALFUNCTION: ICD-10-CM

## 2021-07-09 DIAGNOSIS — I50.22 CHRONIC SYSTOLIC CONGESTIVE HEART FAILURE (HCC): ICD-10-CM

## 2021-07-09 LAB
ANION GAP SERPL CALCULATED.3IONS-SCNC: 8.2 MMOL/L (ref 5–15)
BUN SERPL-MCNC: 11 MG/DL (ref 8–23)
BUN/CREAT SERPL: 13.9 (ref 7–25)
CALCIUM SPEC-SCNC: 9.1 MG/DL (ref 8.6–10.5)
CHLORIDE SERPL-SCNC: 108 MMOL/L (ref 98–107)
CO2 SERPL-SCNC: 23.8 MMOL/L (ref 22–29)
CREAT SERPL-MCNC: 0.79 MG/DL (ref 0.76–1.27)
GFR SERPL CREATININE-BSD FRML MDRD: 99 ML/MIN/1.73
GLUCOSE SERPL-MCNC: 102 MG/DL (ref 65–99)
NT-PROBNP SERPL-MCNC: 554.8 PG/ML (ref 0–900)
POTASSIUM SERPL-SCNC: 4.9 MMOL/L (ref 3.5–5.2)
SODIUM SERPL-SCNC: 140 MMOL/L (ref 136–145)

## 2021-07-09 PROCEDURE — 99214 OFFICE O/P EST MOD 30 MIN: CPT | Performed by: NURSE PRACTITIONER

## 2021-07-09 PROCEDURE — 36415 COLL VENOUS BLD VENIPUNCTURE: CPT

## 2021-07-09 PROCEDURE — 83880 ASSAY OF NATRIURETIC PEPTIDE: CPT

## 2021-07-09 PROCEDURE — 80048 BASIC METABOLIC PNL TOTAL CA: CPT

## 2021-07-12 ENCOUNTER — TELEPHONE (OUTPATIENT)
Dept: CARDIOLOGY | Facility: HOSPITAL | Age: 61
End: 2021-07-12

## 2021-07-12 NOTE — TELEPHONE ENCOUNTER
Attempted to call patient again. Left message that labs are stable and to continue current medications. Will also send letter

## 2021-07-12 NOTE — TELEPHONE ENCOUNTER
Attempted to call patient with lab results. Unable to reach. Left message to return call.    Statement Selected

## 2021-08-30 ENCOUNTER — OFFICE VISIT (OUTPATIENT)
Dept: CARDIOLOGY | Facility: CLINIC | Age: 61
End: 2021-08-30

## 2021-08-30 VITALS
WEIGHT: 219.8 LBS | HEIGHT: 66 IN | SYSTOLIC BLOOD PRESSURE: 110 MMHG | OXYGEN SATURATION: 98 % | BODY MASS INDEX: 35.32 KG/M2 | DIASTOLIC BLOOD PRESSURE: 62 MMHG | HEART RATE: 72 BPM

## 2021-08-30 DIAGNOSIS — T82.110D ICD (IMPLANTABLE CARDIOVERTER-DEFIBRILLATOR) LEAD FAILURE, SUBSEQUENT ENCOUNTER: ICD-10-CM

## 2021-08-30 DIAGNOSIS — Z95.810 ICD (IMPLANTABLE CARDIOVERTER-DEFIBRILLATOR), BIVENTRICULAR, IN SITU: ICD-10-CM

## 2021-08-30 DIAGNOSIS — I48.0 PAROXYSMAL ATRIAL FIBRILLATION (HCC): Primary | ICD-10-CM

## 2021-08-30 DIAGNOSIS — I25.5 ISCHEMIC CARDIOMYOPATHY: ICD-10-CM

## 2021-08-30 PROCEDURE — 99213 OFFICE O/P EST LOW 20 MIN: CPT | Performed by: PHYSICIAN ASSISTANT

## 2021-08-30 PROCEDURE — 93284 PRGRMG EVAL IMPLANTABLE DFB: CPT | Performed by: PHYSICIAN ASSISTANT

## 2021-08-30 NOTE — PROGRESS NOTES
Cardiac Electrophysiology Outpatient Follow Up Note            [facility]    Follow Up Office Visit      Howard Owusu  [id number]  04/19/2021  Primary Care Physician: Bia Cam    Referred By: No ref. provider found    Subjective       Chief Complaint   Patient presents with   • Atrial Fibrillation       History of Present Illness:   Howard Owusu is a 61 y.o. male who presents to my electrophysiology clinic for follow up of recent EP procedure documented complex stenosis of the left subclavian.  The patient has a failed Riata lead. He underwent a lead revision with extraction of two pacing leads and insertion of leads with a generator change. He states he had low energy but recently he has started Entresto with Daniela Daysi APRN and his symptoms are now improving. He has no chest pain, worsening sob, or CHF symptoms. No ICD shocks. Overall he feels good.         Problem List:   1. Coronary artery disease/ischemic cardiomyopathy:  a. [facility] for acute anterior MI, 2004: EF 30%. 100% proximal LAD now s/p 3.0 x 12 and a 2.7 x 16 Taxus stent. RCA and LCx were within normal limits.  b. [facility], 11/2004: LAD patent stent noted. Ostial D1 stenosis of 99% which was unable to be crossed. LCx and RCA were still within normal limits. EF 35%.  c. [facility], 06/2008: EF 15%. 50% distal RCA stenosis, and left coronary anatomy was unchanged.  d. [facility], 06/2011: EF 20%, 90% distal RCA (3.0 x 12 and 3.0 x 8 mm Cypher), and 90% right posterolateral (2.5 x 8 Cypher), and 85% mid LAD (3.0 x 13 mm Cypher).  2. Ventricular tachycardia:  a. 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  b. November 2004, [employer] Eliezer ICD with DFT testing done.  c. July 2008, due to ischemic cardiomyopathy, patient was upgraded to a [employer] Eliezer Bi-V ICD by Dr. Mtz.  d. June 2010, appropriate ICD shocks.  e. Generator change, St. Eliezer Bi-V ICD (Dr. Atkins),  12/11/2013.  f. Recent Lead extraction of failed Jena lead and revision with new RA/RV lead and generator change with Dr. Cruz 5/25/2021  3. Ischemic cardiomyopathy.  4. Atrial fibrillation, single episode. Initial with VF during a PCI, cardioverted to atrial fibrillation. After 24 hours, converted to sinus rhythm.  5. Hypertension.  6. Hyperlipidemia.  7. Hypothyroidism.  8. GERD.  9. GILL.    Review of Systems:   Constitutional: No fevers or chills, no recent weight gain or weight loss or fatigue  Eyes: No visual loss, blurred vision, double vision, yellow sclerae.  ENT: No headaches, hearing loss, vertigo, congestion or sore throat.   Cardiovascular: Per HPI  Respiratory: No cough or wheezing, no sputum production, no hematemesis   Gastrointestinal: No abdominal pain, no nausea, vomiting, constipation, diarrhea, melena.   Genitourinary: No dysuria, hematuria or increased frequency.  Musculoskeletal:  No gait disturbance, weakness or joint pain or stiffness  Integumentary: No rashes, urticaria, ulcers or sores.   Neurological: No headache, dizziness, syncope, paralysis, ataxia, no prior CVA/TIA  Psychiatric: No anxiety, or depression  Endocrine: No diaphoresis, cold or heat intolerance. No polyuria or polydipsia.   Hematologic/Lymphatic: No anemia, abnormal bruising or bleeding. No history of DVT/PE.      Past Medical History:   Past Medical History:   Diagnosis Date   • Arrhythmia    • Arthritis    • Atrial fibrillation (CMS/HCC)     single episode during PCI with conversion to NSR witin 24 hrs   • Coronary artery disease 2004    S/P Taxus RICHARD prox LAD, Cath June 2011 EF 20%, S/P distal RCA 3.0x12 and 3.0x8 Cypher, PLB 2.5x8 Cypher, and mid ALD 3.0x 13 Cypher   • GERD (gastroesophageal reflux disease)    • GERD (gastroesophageal reflux disease)    • History of kidney stones     2000   • Hyperlipidemia    • Hypertension    • Hypothyroidism    • Ischemic cardiomyopathy     Hx of Inducible VT per EP study Nov 2004,  S/P [employer] Eliezer ICD implant, upgrade to Bi-V iCD July 2008 Dr. Mtz June 2010 appropriate ICD shocks, Gen change 12/11/13 MGR   • Ischemic cardiomyopathy    • Obstructive sleep apnea    • Old MI (myocardial infarction)    • GILL (obstructive sleep apnea)    • VT (ventricular tachycardia) (CMS/HCC)        Past Surgical History:   Past Surgical History:   Procedure Laterality Date   • CARDIAC CATHETERIZATION     • CARDIAC ELECTROPHYSIOLOGY PROCEDURE N/A 11/17/2020    Procedure: ICD DC generator change Generator change at patient's earliest convenience.  Given the atrial lead noise, will reevaluate at that time to see if atrial lead revision is also necessary. ;  Surgeon: Maurizio Cruz DO;  Location:  [facility] EP INVASIVE LOCATION;  Service: Cardiology;  Laterality: N/A;   • CARDIAC ELECTROPHYSIOLOGY PROCEDURE N/A 4/9/2021    Procedure: [employer] Eliezer BiV ICD RV lead, new;  Surgeon: Maurizio Cruz DO;  Location: [facility];  Service: Cardiology;  Laterality: N/A;   • CARDIAC ELECTROPHYSIOLOGY PROCEDURE N/A 5/25/2021    Procedure: RV ICD lead extraction (SJM) new RV lead insertion, no meds to hold, Hybrid OR, CT/OR back up;  Surgeon: Maurizio Cruz DO;  Location: [facility] EP [facility]VASIVE LOCATION;  Service: Cardiovascular;  Laterality: N/A;   • CARDIAC ELECTROPHYSIOLOGY PROCEDURE N/A 5/25/2021    Procedure: EP/CRM Study;  Surgeon: Marlon Bartlett MD;  Location:  [facility] EP INVASIVE LOCATION;  Service: Cardiovascular;  Laterality: N/A;   • INSERT / REPLACE / REMOVE PACEMAKER     • INTERNAL CARDIAC DEFIBRILLATOR INSERTION     • KIDNEY STONE SURGERY     • PACEMAKER REPLACEMENT      Generator change, [employer]. Eliezer Bi-V ICD (Dr. Atkins), 12/11/2013.       Family History:   Family History   Problem Relation Age of Onset   • Pulmonary fibrosis Mother    • Alzheimer's disease Mother    • Heart attack Father    • No Known Problems Sister    • No Known Problems Brother    • No Known Problems Brother        Social History:   Social  History     Socioeconomic History   • Marital status:      Spouse name: Not on file   • Number of children: Not on file   • Years of education: Not on file   • Highest education level: Not on file   Tobacco Use   • Smoking status: Never Smoker   • Smokeless tobacco: Never Used   Vaping Use   • Vaping Use: Never used   Substance and Sexual Activity   • Alcohol use: No   • Drug use: No   • Sexual activity: Defer       Medications:     Current Outpatient Medications:   •  aspirin EC (aspirin) 325 MG tablet, Take 325 mg by mouth Daily., Disp: , Rfl:   •  atorvastatin (LIPITOR) 80 MG tablet, TAKE 1 TABLET EVERY NIGHT, Disp: 90 tablet, Rfl: 1  •  bisoprolol (ZEBeta) 5 MG tablet, TAKE 1 TABLET EVERY DAY, Disp: 90 tablet, Rfl: 3  •  clopidogrel (PLAVIX) 75 MG tablet, TAKE 1 TABLET EVERY DAY, Disp: 90 tablet, Rfl: 3  •  esomeprazole (NexIUM) 20 MG packet, Take 20 mg by mouth Every Morning Before Breakfast., Disp: , Rfl:   •  Garlic 1000 MG capsule, Take 1 tablet by mouth Daily., Disp: , Rfl:   •  levothyroxine (SYNTHROID, LEVOTHROID) 150 MCG tablet, Take 150 mcg by mouth Daily., Disp: , Rfl:   •  Multiple Vitamins-Minerals (ONE DAILY MENS PO), Take 1 tablet by mouth Daily., Disp: , Rfl:   •  nitroglycerin (NITROSTAT) 0.4 MG SL tablet, Place 1 tablet under the tongue Every 5 (Five) Minutes As Needed for Chest Pain. Take no more than 3 doses in 15 minutes., Disp: 25 tablet, Rfl: 3  •  Omega-3 Fatty Acids (OMEGA-3 FISH OIL EX ST) 880 MG capsule, Take 800 mg by mouth Daily., Disp: , Rfl:   •  sacubitril-valsartan (Entresto) 24-26 MG tablet, Take 1 tablet by mouth 2 (Two) Times a Day., Disp: 60 tablet, Rfl: 3  •  sertraline (ZOLOFT) 100 MG tablet, Take 1 tablet by mouth daily., Disp: , Rfl:     Allergies:   Allergies   Allergen Reactions   • Morphine And Related Hives   • Penicillins Unknown - Low Severity     Patient reports tolerating amoxicillin.   • Sulfa Antibiotics Unknown - Low Severity     Was told by MD that he  "is allergic but does not know the reaction       Objective   Vital Signs:   Vitals:    08/30/21 1116   BP: 110/62   BP Location: Left arm   Patient Position: Sitting   Pulse: 72   SpO2: 98%   Weight: 99.7 kg (219 lb 12.8 oz)   Height: 167.6 cm (66\")       PHYSICAL EXAM  General appearance: Awake, alert, cooperative  Head: Normocephalic, without obvious abnormality, atraumatic  Eyes: Conjunctivae/corneas clear, EOMs intact  Neck: no adenopathy, no carotid bruit, no JVD and thyroid: not enlarged  Lungs: clear to auscultation bilaterally and no rhonchi or crackles\", ' symmetric  Heart: regular rate and rhythm, S1, S2 normal, no murmur, click, rub or gallop  Abdomen: Soft, non-tender, bowel sounds normal,  no organomegaly  Extremities: extremities normal, atraumatic, no cyanosis or edema  Skin: Skin color, turgor normal, no rashes or lesions  Neurologic: Grossly normal     Lab Results   Component Value Date    GLUCOSE 102 (H) 07/09/2021    CALCIUM 9.1 07/09/2021     07/09/2021    K 4.9 07/09/2021    CO2 23.8 07/09/2021     (H) 07/09/2021    BUN 11 07/09/2021    CREATININE 0.79 07/09/2021    EGFRIFNONA 99 07/09/2021    BCR 13.9 07/09/2021    ANIONGAP 8.2 07/09/2021     Lab Results   Component Value Date    WBC 5.93 05/24/2021    HGB 13.8 05/24/2021    HCT 44.5 05/24/2021    .0 (H) 05/24/2021     (L) 05/24/2021     No results found for: INR, PROTIME  No results found for: TSH, E9CWITY, O4RUPBE, THYROIDAB    Cardiac Testing:  [employer]zer interrogation [employer]zer DDDR ICD.  A paced 40% RV paced 26%.  Acceptable P wave thresholds impedances.  Battery voltage is a  6 years remaining charge time 8.3 seconds.  Less than 1% mode switch (brief A. tach 4 minutes)        Procedures    Tobacco Cessation: N/A  Obstructive Sleep Apnea Screening: N/A    Assessment & Plan    Diagnoses and all orders for this visit:    1. Paroxysmal atrial fibrillation (CMS/HCC) (Primary)    2. ICD (implantable " cardioverter-defibrillator) lead failure, subsequent encounter    3. ICD (implantable cardioverter-defibrillator), biventricular, in situ    4. Ischemic cardiomyopathy         Diagnosis Plan   1. ICD (implantable cardioverter-defibrillator) lead failure, subsequent encounter   [employer] El[employer]zer resynchronization ICD system.  Failed Riata lead.  S/P lead revesion with extraction of failed leads, insertion of new RA and RV lead with new generator. Normal interrogation today.        2. ICD (implantable cardioverter-defibrillator), biventricular, in situ  Normal Interrogation.    3. Ischemic cardiomyopathy   stable.  Continue GDMT.      Body mass index is 35.48 kg/m².    Continue medical therapy  Follow up 6 months   Electronically signed by DANIEL Moreira, 08/30/21, 11:49 AM EDT.

## 2021-09-02 RX ORDER — ATORVASTATIN CALCIUM 80 MG/1
TABLET, FILM COATED ORAL
Qty: 90 TABLET | Refills: 1 | Status: SHIPPED | OUTPATIENT
Start: 2021-09-02 | End: 2022-07-18

## 2021-10-07 PROCEDURE — 93296 REM INTERROG EVL PM/IDS: CPT | Performed by: INTERNAL MEDICINE

## 2021-10-07 PROCEDURE — 93295 DEV INTERROG REMOTE 1/2/MLT: CPT | Performed by: INTERNAL MEDICINE

## 2021-10-08 RX ORDER — IBUPROFEN 600 MG/1
TABLET ORAL
Qty: 20 TABLET | OUTPATIENT
Start: 2021-10-08

## 2021-10-28 ENCOUNTER — TELEPHONE (OUTPATIENT)
Dept: CARDIOLOGY | Facility: HOSPITAL | Age: 61
End: 2021-10-28

## 2021-10-29 RX ORDER — SACUBITRIL AND VALSARTAN 24; 26 MG/1; MG/1
1 TABLET, FILM COATED ORAL 2 TIMES DAILY
Qty: 60 TABLET | Refills: 3 | Status: SHIPPED | OUTPATIENT
Start: 2021-10-29 | End: 2021-11-02 | Stop reason: DRUGHIGH

## 2021-10-29 NOTE — TELEPHONE ENCOUNTER
To which pharmacy? If you speak with him, let him know that I will get him another refill, but after this Dr. Jones's office can refill since I no longer see him in clinic.

## 2021-11-02 ENCOUNTER — OFFICE VISIT (OUTPATIENT)
Dept: CARDIOLOGY | Facility: CLINIC | Age: 61
End: 2021-11-02

## 2021-11-02 VITALS
DIASTOLIC BLOOD PRESSURE: 74 MMHG | HEIGHT: 66 IN | SYSTOLIC BLOOD PRESSURE: 118 MMHG | BODY MASS INDEX: 36.48 KG/M2 | HEART RATE: 60 BPM | WEIGHT: 227 LBS | OXYGEN SATURATION: 97 %

## 2021-11-02 DIAGNOSIS — I48.0 PAROXYSMAL ATRIAL FIBRILLATION (HCC): Primary | ICD-10-CM

## 2021-11-02 DIAGNOSIS — I25.10 CORONARY ARTERY DISEASE INVOLVING NATIVE CORONARY ARTERY OF NATIVE HEART WITHOUT ANGINA PECTORIS: ICD-10-CM

## 2021-11-02 DIAGNOSIS — I25.5 ISCHEMIC CARDIOMYOPATHY: ICD-10-CM

## 2021-11-02 DIAGNOSIS — I48.0 PAROXYSMAL ATRIAL FIBRILLATION (HCC): ICD-10-CM

## 2021-11-02 DIAGNOSIS — Z95.810 ICD (IMPLANTABLE CARDIOVERTER-DEFIBRILLATOR), BIVENTRICULAR, IN SITU: ICD-10-CM

## 2021-11-02 DIAGNOSIS — I25.5 ISCHEMIC CARDIOMYOPATHY: Primary | ICD-10-CM

## 2021-11-02 DIAGNOSIS — I47.20 V-TACH (HCC): ICD-10-CM

## 2021-11-02 DIAGNOSIS — T82.110A AICD LEAD MALFUNCTION: ICD-10-CM

## 2021-11-02 DIAGNOSIS — E78.00 PURE HYPERCHOLESTEROLEMIA: ICD-10-CM

## 2021-11-02 DIAGNOSIS — I25.10 CORONARY ARTERY DISEASE INVOLVING NATIVE CORONARY ARTERY OF NATIVE HEART, UNSPECIFIED WHETHER ANGINA PRESENT: ICD-10-CM

## 2021-11-02 DIAGNOSIS — I10 PRIMARY HYPERTENSION: ICD-10-CM

## 2021-11-02 PROCEDURE — 99214 OFFICE O/P EST MOD 30 MIN: CPT | Performed by: INTERNAL MEDICINE

## 2021-11-02 NOTE — PROGRESS NOTES
Subjective:     Encounter Date:11/02/2021    Primary Care Physician: Bia Cam      Patient ID: Howard Owusu is a 61 y.o. male.    Chief Complaint:Follow-up    Problem List:   1. Coronary artery disease/ischemic cardiomyopathy:  a. [facility] for acute anterior MI, 2004: EF 30%. 100% proximal LAD now s/p 3.0 x 12 and a 2.7 x 16 Taxus stent. RCA and LCx were within normal limits.  b. [facility], 11/2004: LAD patent stent noted. Ostial D1 stenosis of 99% which was unable to be crossed. LCx and RCA were still within normal limits. EF 35%.  c. [facility], 06/2008: EF 15%. 50% distal RCA stenosis, and left coronary anatomy was unchanged.  d. [facility], 06/2011: EF 20%, 90% distal RCA (3.0 x 12 and 3.0 x 8 mm Cypher), and 90% right posterolateral (2.5 x 8 Cypher), and 85% mid LAD (3.0 x 13 mm Cypher).  2. Ventricular tachycardia:  a. 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  b. November 2004, [employer]. Eliezer ICD with DFT testing done.  c. July 2008, due to ischemic cardiomyopathy, patient was upgraded to a [employer]. Eliezer Bi-V ICD by Dr. Mtz.  d. June 2010, appropriate ICD shocks.  e. Generator change, St. Eliezer Bi-V ICD (Dr. Atkins), 12/11/2013.  f. Recent Lead extraction of failed Jena lead and revision with new RA/RV lead and generator change with Dr. Cruz 5/25/2021  3. Ischemic cardiomyopathy.  4. Atrial fibrillation, single episode. Initial with VF during a PCI, cardioverted to atrial fibrillation. After 24 hours, converted to sinus rhythm.  5. Hypertension.  6. Hyperlipidemia.  7. Hypothyroidism.  8. GERD.  9. GILL.      Allergies   Allergen Reactions   • Morphine And Related Hives   • Penicillins Unknown - Low Severity     Patient reports tolerating amoxicillin.   • Sulfa Antibiotics Unknown - Low Severity     Was told by MD that he is allergic but does not know the reaction         Current Outpatient Medications:   •  aspirin EC (aspirin) 325 MG tablet, Take 325 mg by mouth Daily.,  Disp: , Rfl:   •  atorvastatin (LIPITOR) 80 MG tablet, TAKE 1 TABLET EVERY NIGHT, Disp: 90 tablet, Rfl: 1  •  bisoprolol (ZEBeta) 5 MG tablet, TAKE 1 TABLET EVERY DAY, Disp: 90 tablet, Rfl: 3  •  clopidogrel (PLAVIX) 75 MG tablet, TAKE 1 TABLET EVERY DAY, Disp: 90 tablet, Rfl: 3  •  esomeprazole (NexIUM) 20 MG packet, Take 20 mg by mouth Every Morning Before Breakfast., Disp: , Rfl:   •  Garlic 1000 MG capsule, Take 1 tablet by mouth Daily., Disp: , Rfl:   •  levothyroxine (SYNTHROID, LEVOTHROID) 150 MCG tablet, Take 150 mcg by mouth Daily., Disp: , Rfl:   •  Multiple Vitamins-Minerals (ONE DAILY MENS PO), Take 1 tablet by mouth Daily., Disp: , Rfl:   •  nitroglycerin (NITROSTAT) 0.4 MG SL tablet, Place 1 tablet under the tongue Every 5 (Five) Minutes As Needed for Chest Pain. Take no more than 3 doses in 15 minutes., Disp: 25 tablet, Rfl: 3  •  Omega-3 Fatty Acids (OMEGA-3 FISH OIL EX ST) 880 MG capsule, Take 800 mg by mouth Daily., Disp: , Rfl:   •  sacubitril-valsartan (Entresto) 24-26 MG tablet, Take 1 tablet by mouth 2 (Two) Times a Day., Disp: 60 tablet, Rfl: 3  •  sertraline (ZOLOFT) 100 MG tablet, Take 1 tablet by mouth daily., Disp: , Rfl:         History of Present Illness    Patient returns today for routine follow-up of ischemic cardiomyopathy and coronary disease.  Since her last visit, he and went BiV ICD generator replacement and lead revision due to malfunctioning lead.  He notes since that time he has not felt as well as he had previously.  He has more dyspnea on exertion and malaise fatigue.  He is more short of breath.  When switched to Entresto, he notes it improved slightly but not significantly.  Denies orthopnea PND or tachypalpitations.    The following portions of the patient's history were reviewed and updated as appropriate: allergies, current medications, past family history, past medical history, past social history, past surgical history and problem list.      Social History     Tobacco  "Use   • Smoking status: Never Smoker   • Smokeless tobacco: Never Used   Vaping Use   • Vaping Use: Never used   Substance Use Topics   • Alcohol use: No   • Drug use: No         ROS       Objective:   /74   Pulse 60   Ht 167.6 cm (66\")   Wt 103 kg (227 lb)   SpO2 97%   BMI 36.64 kg/m²         Vitals reviewed.   Constitutional:       Appearance: Well-developed and not in distress.   Neck:      Thyroid: No thyromegaly.      Vascular: No carotid bruit or JVD.   Pulmonary:      Breath sounds: Normal breath sounds.   Cardiovascular:      Regular rhythm.      No gallop. No S3 and S4 gallop.   Abdominal:      General: Bowel sounds are normal.      Palpations: Abdomen is soft. There is no abdominal mass.      Tenderness: There is no abdominal tenderness.   Musculoskeletal:         General: No deformity.      Extremities: No clubbing present.Skin:     General: Skin is warm and dry.      Findings: No rash.   Neurological:      Mental Status: Alert and oriented to person, place, and time.         Procedures          Assessment:   Assessment/Plan      Diagnoses and all orders for this visit:    1. Paroxysmal atrial fibrillation (HCC) (Primary)    2. Primary hypertension    3. Coronary artery disease involving native coronary artery of native heart without angina pectoris    4. Ischemic cardiomyopathy    5. Pure hypercholesterolemia      1.  Coronary artery disease, no current angina no recent ischemic evaluation  2.  Ischemic cardiomyopathy less LV 25%.  Status post BiV ICD upgrade.  Functional class II-3.  No evidence of volume overload  3.  Hypertension well-controlled on Entresto and beta-blocker  4.  Dyslipidemia on high intensity statin 5.  Rare brief atrial fibrillation none recent.    Recommendations:  1.  Patient with new onset dyspnea on exertion.  We will check a perfusion study to rule out ischemia  2.  Note of recent device downloads and recent interrogation showed that he is CRT pacing only 30% of the " time.  I will need to reevaluate this information and see if we can reprogram around this.  This is may be the part of why he is more symptomatic  3.  Does not appear to be volume overloaded would not add diuretics at this time  4.  Increase Entresto to 49/51 twice daily.  Samples given         Jose Jones MD      Dictated utilizing Dragon dictation

## 2021-11-16 ENCOUNTER — TELEPHONE (OUTPATIENT)
Dept: CARDIOLOGY | Facility: CLINIC | Age: 61
End: 2021-11-16

## 2021-11-16 NOTE — TELEPHONE ENCOUNTER
"Spoke with patient, advised of below.  He reports he is feeling some better and cancelled the stress test.  Doesn't think he needs it at this time.   Advised if changes his mind or symptoms return or anything new, to contact office.  Understanding and agreement verbalized.     ----- Message from Jose Jones MD sent at 11/15/2021  5:01 PM EST -----  Please notify patient, that the reason his device was reprogrammed is because of his \"narrow QRS\".  And recent studies showing this is not necessary to have the LV lead turned on.  If he is still feeling \"worse than before\".  We can always place him on a GXT, to see if he has an exercise-induced bundle branch block.  If he does, then we can turn back on his BiV portion of his fibrillator  ----- Message -----  From: Jose Jones MD  Sent: 11/2/2021   4:26 PM EST  To: Jose Jones MD    Please check with Osmo reprogramming his device given his CRT pacing only 30%        "

## 2021-12-07 ENCOUNTER — TELEPHONE (OUTPATIENT)
Dept: CARDIOLOGY | Facility: CLINIC | Age: 61
End: 2021-12-07

## 2021-12-07 NOTE — TELEPHONE ENCOUNTER
Left VM in response to patient message saying Entresto cost has increased.  Per pharmacy, 60 day supply is $153.14 which possibly means patient in coverage gap.  Left VM advising can leave samples at desk and will mail Entresto info for patient assistance.

## 2022-01-06 PROCEDURE — 93296 REM INTERROG EVL PM/IDS: CPT | Performed by: INTERNAL MEDICINE

## 2022-01-06 PROCEDURE — 93295 DEV INTERROG REMOTE 1/2/MLT: CPT | Performed by: INTERNAL MEDICINE

## 2022-01-25 ENCOUNTER — TELEPHONE (OUTPATIENT)
Dept: CARDIOLOGY | Facility: CLINIC | Age: 62
End: 2022-01-25

## 2022-01-25 NOTE — TELEPHONE ENCOUNTER
"Spoke with Mr Owusu regarding being in atrial fibrillation since 1/19/2022. He states he has no edema in feet or ankles or belly. He does have fatigue and a \"little\" sob. States he is not able to sleep flat but only uses 1 pillow. Will call us if he experiences any other symptoms.Report in murj.  "

## 2022-01-27 NOTE — TELEPHONE ENCOUNTER
Mr Owusu remote monitor reading is showing ongoing afib since 1/19/22.  He is only on Plavix and ASA.

## 2022-01-28 NOTE — TELEPHONE ENCOUNTER
Called and spoke to Mr Owusu to instruct him to start Eliquis 5mg bid and to stop Plavix.  He verbalized understanding.

## 2022-02-07 ENCOUNTER — TELEPHONE (OUTPATIENT)
Dept: CARDIOLOGY | Facility: CLINIC | Age: 62
End: 2022-02-07

## 2022-02-07 DIAGNOSIS — I48.0 PAROXYSMAL ATRIAL FIBRILLATION: Primary | ICD-10-CM

## 2022-02-07 NOTE — TELEPHONE ENCOUNTER
Per Allison/TARUN remote transmission received today: AF burden 11%, onigoing in presenting EGM & since 1/19/2022, V-rates controlled. Report in MURJ.     Attempted to call patient to assess if he is symptomatic; left a voice mail message instructing pt call the device clinic.

## 2022-02-07 NOTE — TELEPHONE ENCOUNTER
Pt called back, reports weakness, shortness of breath with activity & at rest; denies lower extremity or abd edema, denies chest pain. Pt denies recent COVID-19 illness. Pt states he's compliant with all cardiac meds including Eliquis.

## 2022-02-09 NOTE — TELEPHONE ENCOUNTER
Day 21 of Eliquis therapy will be on 2/18/2022. I will place order for ECV for 2/21/2022 or afterwards.     I spoke with patient & strongly reiterated the importance of not skipping Eliquis doses & being diligent about taking the doses as scheduled. I also advised patient that the cardioversion would be schedule for 2/21/2022 or later that week.  Pt verbalized understanding and compliance.

## 2022-02-09 NOTE — TELEPHONE ENCOUNTER
Attempted to call patient, left him a voice mail message to call back to discuss a cardioversion.

## 2022-02-10 ENCOUNTER — PREP FOR SURGERY (OUTPATIENT)
Dept: OTHER | Facility: HOSPITAL | Age: 62
End: 2022-02-10

## 2022-02-10 DIAGNOSIS — I48.19 PERSISTENT ATRIAL FIBRILLATION: Primary | ICD-10-CM

## 2022-02-10 RX ORDER — SODIUM CHLORIDE 0.9 % (FLUSH) 0.9 %
10 SYRINGE (ML) INJECTION AS NEEDED
Status: CANCELLED | OUTPATIENT
Start: 2022-02-10

## 2022-02-10 RX ORDER — ONDANSETRON 2 MG/ML
4 INJECTION INTRAMUSCULAR; INTRAVENOUS EVERY 6 HOURS PRN
Status: CANCELLED | OUTPATIENT
Start: 2022-02-10

## 2022-02-10 RX ORDER — SODIUM CHLORIDE 0.9 % (FLUSH) 0.9 %
3 SYRINGE (ML) INJECTION EVERY 12 HOURS SCHEDULED
Status: CANCELLED | OUTPATIENT
Start: 2022-02-10

## 2022-02-10 RX ORDER — NITROGLYCERIN 0.4 MG/1
0.4 TABLET SUBLINGUAL
Status: CANCELLED | OUTPATIENT
Start: 2022-02-10

## 2022-02-23 ENCOUNTER — HOSPITAL ENCOUNTER (OUTPATIENT)
Dept: CARDIOLOGY | Facility: HOSPITAL | Age: 62
Setting detail: HOSPITAL OUTPATIENT SURGERY
Discharge: HOME | End: 2022-02-23
Attending: INTERNAL MEDICINE | Admitting: INTERNAL MEDICINE

## 2022-02-23 VITALS
HEIGHT: 66 IN | HEART RATE: 70 BPM | TEMPERATURE: 96.6 F | RESPIRATION RATE: 16 BRPM | WEIGHT: 229.72 LBS | SYSTOLIC BLOOD PRESSURE: 152 MMHG | OXYGEN SATURATION: 95 % | BODY MASS INDEX: 36.92 KG/M2 | DIASTOLIC BLOOD PRESSURE: 97 MMHG

## 2022-02-23 DIAGNOSIS — I48.0 PAROXYSMAL ATRIAL FIBRILLATION: ICD-10-CM

## 2022-02-23 DIAGNOSIS — I48.19 PERSISTENT ATRIAL FIBRILLATION: ICD-10-CM

## 2022-02-23 PROCEDURE — 92960 CARDIOVERSION ELECTRIC EXT: CPT

## 2022-02-23 PROCEDURE — 92960 CARDIOVERSION ELECTRIC EXT: CPT | Performed by: INTERNAL MEDICINE

## 2022-02-23 PROCEDURE — 36415 COLL VENOUS BLD VENIPUNCTURE: CPT

## 2022-02-23 RX ORDER — SODIUM CHLORIDE 0.9 % (FLUSH) 0.9 %
10 SYRINGE (ML) INJECTION AS NEEDED
Status: DISCONTINUED | OUTPATIENT
Start: 2022-02-23 | End: 2022-02-23 | Stop reason: HOSPADM

## 2022-02-23 RX ORDER — ONDANSETRON 2 MG/ML
4 INJECTION INTRAMUSCULAR; INTRAVENOUS EVERY 6 HOURS PRN
Status: DISCONTINUED | OUTPATIENT
Start: 2022-02-23 | End: 2022-02-23 | Stop reason: HOSPADM

## 2022-02-23 RX ORDER — SODIUM CHLORIDE 0.9 % (FLUSH) 0.9 %
3 SYRINGE (ML) INJECTION EVERY 12 HOURS SCHEDULED
Status: DISCONTINUED | OUTPATIENT
Start: 2022-02-23 | End: 2022-02-23 | Stop reason: HOSPADM

## 2022-02-23 RX ORDER — NITROGLYCERIN 0.4 MG/1
0.4 TABLET SUBLINGUAL
Status: DISCONTINUED | OUTPATIENT
Start: 2022-02-23 | End: 2022-02-23 | Stop reason: HOSPADM

## 2022-02-23 NOTE — PROGRESS NOTES
Mr. Owusu showed up today in the cardiovascular outpatient unit by himself.  He has no family members or friends who can drive him home due to a hospice situation.  He is unaware of his rate and rhythm and states compliance with his blood thinners.  We will cancel his cardioversion for today.  I have asked him to call our office when his social situation has resolved and we can reschedule him for cardioversion at that time.  He understands the plan and is in agreement.    Electronically signed by DANIEL Sandra, 02/23/22, 10:33 AM EST.

## 2022-02-24 DIAGNOSIS — I48.0 PAROXYSMAL ATRIAL FIBRILLATION: Primary | ICD-10-CM

## 2022-02-28 LAB
MAXIMAL PREDICTED HEART RATE: 156 BPM
STRESS TARGET HR: 133 BPM

## 2022-03-01 ENCOUNTER — PREP FOR SURGERY (OUTPATIENT)
Dept: OTHER | Facility: HOSPITAL | Age: 62
End: 2022-03-01

## 2022-03-01 DIAGNOSIS — I48.19 PERSISTENT ATRIAL FIBRILLATION: Primary | ICD-10-CM

## 2022-03-01 RX ORDER — ONDANSETRON 2 MG/ML
4 INJECTION INTRAMUSCULAR; INTRAVENOUS EVERY 6 HOURS PRN
Status: CANCELLED | OUTPATIENT
Start: 2022-03-01

## 2022-03-01 RX ORDER — NITROGLYCERIN 0.4 MG/1
0.4 TABLET SUBLINGUAL
Status: CANCELLED | OUTPATIENT
Start: 2022-03-01

## 2022-03-01 RX ORDER — SODIUM CHLORIDE 0.9 % (FLUSH) 0.9 %
3 SYRINGE (ML) INJECTION EVERY 12 HOURS SCHEDULED
Status: CANCELLED | OUTPATIENT
Start: 2022-03-01

## 2022-03-01 RX ORDER — SODIUM CHLORIDE 0.9 % (FLUSH) 0.9 %
10 SYRINGE (ML) INJECTION AS NEEDED
Status: CANCELLED | OUTPATIENT
Start: 2022-03-01

## 2022-03-02 ENCOUNTER — HOSPITAL ENCOUNTER (OUTPATIENT)
Dept: CARDIOLOGY | Facility: HOSPITAL | Age: 62
Discharge: HOME | End: 2022-03-02
Attending: INTERNAL MEDICINE | Admitting: INTERNAL MEDICINE

## 2022-03-02 VITALS
OXYGEN SATURATION: 98 % | BODY MASS INDEX: 37.03 KG/M2 | DIASTOLIC BLOOD PRESSURE: 94 MMHG | HEART RATE: 60 BPM | RESPIRATION RATE: 16 BRPM | WEIGHT: 230.38 LBS | HEIGHT: 66 IN | SYSTOLIC BLOOD PRESSURE: 142 MMHG | TEMPERATURE: 97.4 F

## 2022-03-02 DIAGNOSIS — I48.0 PAROXYSMAL ATRIAL FIBRILLATION: ICD-10-CM

## 2022-03-02 DIAGNOSIS — I48.19 PERSISTENT ATRIAL FIBRILLATION: ICD-10-CM

## 2022-03-02 LAB
ANION GAP SERPL CALCULATED.3IONS-SCNC: 9 MMOL/L (ref 5–15)
BUN SERPL-MCNC: 17 MG/DL (ref 8–23)
BUN/CREAT SERPL: 17.9 (ref 7–25)
CALCIUM SPEC-SCNC: 9.1 MG/DL (ref 8.6–10.5)
CHLORIDE SERPL-SCNC: 106 MMOL/L (ref 98–107)
CO2 SERPL-SCNC: 23 MMOL/L (ref 22–29)
CREAT SERPL-MCNC: 0.95 MG/DL (ref 0.76–1.27)
DEPRECATED RDW RBC AUTO: 46.3 FL (ref 37–54)
EGFRCR SERPLBLD CKD-EPI 2021: 89.4 ML/MIN/1.73
ERYTHROCYTE [DISTWIDTH] IN BLOOD BY AUTOMATED COUNT: 13.9 % (ref 12.3–15.4)
GLUCOSE SERPL-MCNC: 101 MG/DL (ref 65–99)
HCT VFR BLD AUTO: 38.2 % (ref 37.5–51)
HGB BLD-MCNC: 12.5 G/DL (ref 13–17.7)
MAGNESIUM SERPL-MCNC: 2 MG/DL (ref 1.6–2.4)
MCH RBC QN AUTO: 30 PG (ref 26.6–33)
MCHC RBC AUTO-ENTMCNC: 32.7 G/DL (ref 31.5–35.7)
MCV RBC AUTO: 91.6 FL (ref 79–97)
PLATELET # BLD AUTO: 161 10*3/MM3 (ref 140–450)
PMV BLD AUTO: 11.2 FL (ref 6–12)
POTASSIUM SERPL-SCNC: 4.3 MMOL/L (ref 3.5–5.2)
RBC # BLD AUTO: 4.17 10*6/MM3 (ref 4.14–5.8)
SODIUM SERPL-SCNC: 138 MMOL/L (ref 136–145)
WBC NRBC COR # BLD: 5.51 10*3/MM3 (ref 3.4–10.8)

## 2022-03-02 PROCEDURE — 99152 MOD SED SAME PHYS/QHP 5/>YRS: CPT | Performed by: INTERNAL MEDICINE

## 2022-03-02 PROCEDURE — 92960 CARDIOVERSION ELECTRIC EXT: CPT

## 2022-03-02 PROCEDURE — 36415 COLL VENOUS BLD VENIPUNCTURE: CPT

## 2022-03-02 PROCEDURE — 80048 BASIC METABOLIC PNL TOTAL CA: CPT | Performed by: INTERNAL MEDICINE

## 2022-03-02 PROCEDURE — 93005 ELECTROCARDIOGRAM TRACING: CPT | Performed by: INTERNAL MEDICINE

## 2022-03-02 PROCEDURE — 83735 ASSAY OF MAGNESIUM: CPT | Performed by: INTERNAL MEDICINE

## 2022-03-02 PROCEDURE — 85027 COMPLETE CBC AUTOMATED: CPT | Performed by: INTERNAL MEDICINE

## 2022-03-02 PROCEDURE — S0260 H&P FOR SURGERY: HCPCS | Performed by: INTERNAL MEDICINE

## 2022-03-02 PROCEDURE — 92960 CARDIOVERSION ELECTRIC EXT: CPT | Performed by: INTERNAL MEDICINE

## 2022-03-02 RX ORDER — ONDANSETRON 2 MG/ML
4 INJECTION INTRAMUSCULAR; INTRAVENOUS EVERY 6 HOURS PRN
Status: DISCONTINUED | OUTPATIENT
Start: 2022-03-02 | End: 2022-03-02 | Stop reason: HOSPADM

## 2022-03-02 RX ORDER — SODIUM CHLORIDE 0.9 % (FLUSH) 0.9 %
3 SYRINGE (ML) INJECTION EVERY 12 HOURS SCHEDULED
Status: DISCONTINUED | OUTPATIENT
Start: 2022-03-02 | End: 2022-03-02 | Stop reason: HOSPADM

## 2022-03-02 RX ORDER — NITROGLYCERIN 0.4 MG/1
0.4 TABLET SUBLINGUAL
Status: DISCONTINUED | OUTPATIENT
Start: 2022-03-02 | End: 2022-03-02 | Stop reason: HOSPADM

## 2022-03-02 RX ORDER — SODIUM CHLORIDE 0.9 % (FLUSH) 0.9 %
10 SYRINGE (ML) INJECTION AS NEEDED
Status: DISCONTINUED | OUTPATIENT
Start: 2022-03-02 | End: 2022-03-02 | Stop reason: HOSPADM

## 2022-03-02 RX ADMIN — METHOHEXITAL SODIUM 40 MG: 500 INJECTION, POWDER, LYOPHILIZED, FOR SOLUTION INTRAMUSCULAR; INTRAVENOUS; RECTAL at 11:34

## 2022-03-02 NOTE — H&P
[facility] Cardiology History and Physical    03/02/2022       Subjective:      Howard Owusu  2503/1  1960    Primary Cardiologist: Jose Jones MD    Chief Complaint: Atrial Fibrillation      Problem List:    1. Atrial fibrillation                A. single episode. Initial with VF during a PCI,                 cardioverted to atrial  fibrillation. After 24 hours,                             converted to sinus rhythm.                      B. Recurrent atrial fibrillation noted on remote device                          check 01/19/2022.                      C.  Eliquis 5 mg po bid initiated 01/25/2022                      D. CHADS-VASc=3  2.  Coronary artery disease/ischemic cardiomyopathy:  a. [facility] for acute anterior MI, 2004: EF 30%. 100% proximal LAD now s/p 3.0 x 12 and a 2.7 x 16 Taxus stent. RCA and LCx were within normal limits.  b. [facility], 11/2004: LAD patent stent noted. Ostial D1 stenosis of 99% which was unable to be crossed. LCx and RCA were still within normal limits. EF 35%.  c. [facility], 06/2008: EF 15%. 50% distal RCA stenosis, and left coronary anatomy was unchanged.  d. [facility], 06/2011: EF 20%, 90% distal RCA (3.0 x 12 and 3.0 x 8 mm Cypher), and 90% right posterolateral (2.5 x 8 Cypher), and 85% mid LAD (3.0 x 13 mm Cypher)    3. Ventricular tachycardia:  a. 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  b. November 2004, [employer]. Eliezer ICD with DFT testing done.  c. July 2008, due to ischemic cardiomyopathy, patient was upgraded to a [employer]. Eliezer Bi-V ICD by Dr. Mtz.  d. June 2010, appropriate ICD shocks.  e. Generator change, St. Eliezer Bi-V ICD (Dr. Atkins), 12/11/2013.  f. Recent Lead extraction of failed Jena lead and revision with new RA/RV lead and generator change with Dr. Cruz 5/25/2021  4. Ischemic cardiomyopathy.  5. Hypertension.  6. Hyperlipidemia.  7. Hypothyroidism.  8. GERD.  9. GILL.      is allergic to morphine and related,  penicillins, and sulfa antibiotics.     Home Medications:   1.  Eliquis 5 mg twice daily  2.  Aspirin 325mg daily  3.  Lipitor 80 mg daily  4.  Zebeta 5 mg daily  5.  Nexium 20 mg daily  6.  Garlic 1000 mg daily  7.  Levothyroxine 150 mcg daily  8.  Entresto 49-51 mg twice daily  9.  Zoloft 100 mg daily  10.  Multivitamin daily  11.  Omega-3 fish oil 800 mg daily  12.  Nitroglycerin 0.4 mg sublingual as needed for chest pain    HPI: Mr. Owusu is a 62-year-old white male with the above-noted medical history who presents to the cardiovascular outpatient unit today to undergo external cardioversion.  Mr. wOusu has a history of a single episode of atrial fibrillation dating back to 2011 at the time that he was having a stent placed.  In January he was noted to have atrial fibrillation per his remote device check.  Dr. Cruz was consulted and recommended that Mr. Owusu initiate Eliquis 5 mg p.o. twice daily and cardioversion will be undertaken once the patient had completed 3 weeks of Eliquis therapy.  Mr. Owusu states that he is not aware of tachypalpitations but does note increased symptoms of fatigue as well as shortness of breath with exertional activities.  Mr. Owusu does have a history of sleep apnea but is adamant that he will not wear CPAP.       Cardiac risk factors: advanced age (older than 55 for men, 65 for women), dyslipidemia, family history of premature cardiovascular disease, hypertension, male gender, obesity (BMI >= 30 kg/m2) and sedentary lifestyle.     History  Family History   Problem Relation Age of Onset   • Pulmonary fibrosis Mother    • Alzheimer's disease Mother    • Heart attack Father    • No Known Problems Sister    • No Known Problems Brother    • No Known Problems Brother      Past Surgical History:   Procedure Laterality Date   • CARDIAC CATHETERIZATION     • CARDIAC ELECTROPHYSIOLOGY PROCEDURE N/A 11/17/2020    Procedure: ICD DC generator change Generator change at patient's  earliest convenience.  Given the atrial lead noise, will reevaluate at that time to see if atrial lead revision is also necessary. ;  Surgeon: Maurizio Cruz DO;  Location:  [facility];  Service: Cardiology;  Laterality: N/A;   • CARDIAC ELECTROPHYSIOLOGY PROCEDURE N/A 4/9/2021    Procedure: [employer] Eliezer BiV ICD RV lead, new;  Surgeon: Maurizio Cruz DO;  Location:  [facility];  Service: Cardiology;  Laterality: N/A;   • CARDIAC ELECTROPHYSIOLOGY PROCEDURE N/A 5/25/2021    Procedure: RV ICD lead extraction (SJM) new RV lead insertion, no meds to hold, Hybrid OR, CT/OR back up;  Surgeon: Maurizio Cruz DO;  Location:  [facility];  Service: Cardiovascular;  Laterality: N/A;   • CARDIAC ELECTROPHYSIOLOGY PROCEDURE N/A 5/25/2021    Procedure: EP/CRM Study;  Surgeon: Marlon Bartlett MD;  Location:  [facility] LOC[facility];  Service: Cardiovascular;  Laterality: N/A;   • INSERT / REPLACE / REMOVE PACEMAKER     • INTERNAL CARDIAC DEFIBRILLATOR INSERTION     • KIDNEY STONE SURGERY     • PACEMAKER REPLACEMENT      Generator change, St. Eliezer Bi-V ICD (Dr. Atkins), 12/11/2013.      Past Medical History:   Diagnosis Date   • Arrhythmia    • Arthritis    • Atrial fibrillation (HCC)     single episode during PCI with conversion to NSR witin 24 hrs   • Coronary artery disease 2004    S/P Taxus RICHARD prox LAD, Cath June 2011 EF 20%, S/P distal RCA 3.0x12 and 3.0x8 Cypher, PLB 2.5x8 Cypher, and mid ALD 3.0x 13 Cypher   • GERD (gastroesophageal reflux disease)    • GERD (gastroesophageal reflux disease)    • History of kidney stones     2000   • Hyperlipidemia    • Hypertension    • Hypothyroidism    • Ischemic cardiomyopathy     Hx of Inducible VT per EP study Nov 2004, S/P St Eliezer ICD implant, upgrade to Bi-V iCD July 2008 Dr. Mtz June 2010 appropriate ICD shocks, Gen change 12/11/13 MGR   • Ischemic cardiomyopathy    • Obstructive sleep apnea    • Old MI (myocardial infarction)     • GILL (obstructive sleep apnea)    • VT (ventricular tachycardia) ([facility])      Social History     Tobacco Use   Smoking Status Never Smoker   Smokeless Tobacco Never Used     Social History     Substance and Sexual Activity   Alcohol Use No     Past Surgical History:   Procedure Laterality Date   • CARDIAC CATHETERIZATION     • CARDIAC ELECTROPHYSIOLOGY PROCEDURE N/A 11/17/2020    Procedure: ICD DC generator change Generator change at patient's earliest convenience.  Given the atrial lead noise, will reevaluate at that time to see if atrial lead revision is also necessary. ;  Surgeon: Maurizio Cruz DO;  Location:  [facility];  Service: Cardiology;  Laterality: N/A;   • CARDIAC ELECTROPHYSIOLOGY PROCEDURE N/A 4/9/2021    Procedure: [employer] Eliezer BiV ICD RV lead, new;  Surgeon: Maurizio Cruz DO;  Location:  [facility];  Service: Cardiology;  Laterality: N/A;   • CARDIAC ELECTROPHYSIOLOGY PROCEDURE N/A 5/25/2021    Procedure: RV ICD lead extraction (SJM) new RV lead insertion, no meds to hold, Hybrid OR, CT/OR back up;  Surgeon: Maurizio Cruz DO;  Location: [facility] EP INVASIVE LOCATION;  Service: Cardiovascular;  Laterality: N/A;   • CARDIAC ELECTROPHYSIOLOGY PROCEDURE N/A 5/25/2021    Procedure: EP/CRM Study;  Surgeon: Marlon Bartlett MD;  Location: [facility] EP INVASIVE LOCATION;  Service: Cardiovascular;  Laterality: N/A;   • INSERT / REPLACE / REMOVE PACEMAKER     • INTERNAL CARDIAC DEFIBRILLATOR INSERTION     • KIDNEY STONE SURGERY     • PACEMAKER REPLACEMENT      Generator change, St. Eliezer Bi-V ICD (Dr. Atkins), 12/11/2013.       Review of Systems  Review of Systems   Constitutional: Positive for malaise/fatigue.   HENT: Negative.    Eyes: Negative.    Cardiovascular: Positive for dyspnea on exertion and irregular heartbeat.   Respiratory: Positive for shortness of breath.    Endocrine: Negative.    Hematologic/Lymphatic: Negative.    Skin: Negative.    Musculoskeletal: Negative.     Gastrointestinal: Negative.    Genitourinary: Negative.    Neurological: Negative.    Psychiatric/Behavioral: Negative.        Objective:   B/P 152/97, HR- 70, RR 16, O2 sat 97% on Room Air     Physical Exam  Constitutional:       Appearance: He is obese.   HENT:      Head: Normocephalic and atraumatic.      Nose: Nose normal.      Mouth/Throat:      Mouth: Mucous membranes are dry.   Cardiovascular:      Rate and Rhythm: Normal rate. Rhythm irregular.      Pulses: Normal pulses.   Pulmonary:      Effort: Pulmonary effort is normal.      Breath sounds: Normal breath sounds.   Abdominal:      General: Bowel sounds are normal.      Palpations: Abdomen is soft.   Musculoskeletal:         General: Normal range of motion.      Cervical back: Normal range of motion and neck supple.   Skin:     General: Skin is warm and dry.   Neurological:      General: No focal deficit present.      Mental Status: He is alert and oriented to person, place, and time.   Psychiatric:         Mood and Affect: Mood normal.         Behavior: Behavior normal.       Cardiographics  ECG: not performed- Device interrogation reveals atrial fibrillation .  Echocardiogram: not done    Imaging  Chest x-ray: not done     Lab Review   Lab Results   Component Value Date    GLUCOSE 101 (H) 03/02/2022    BUN 17 03/02/2022    CREATININE 0.95 03/02/2022    EGFRIFNONA 99 07/09/2021    BCR 17.9 03/02/2022    CO2 23.0 03/02/2022    CALCIUM 9.1 03/02/2022    ALBUMIN 4.20 04/08/2021    AST 32 04/08/2021    ALT 26 04/08/2021     Lab Results   Component Value Date    WBC 5.51 03/02/2022    HGB 12.5 (L) 03/02/2022    HCT 38.2 03/02/2022    MCV 91.6 03/02/2022     03/02/2022        Assessment:    62-year-old white male with a history of ischemic cardiomyopathy, coronary artery disease, hypertension, hyperlipidemia, obstructive sleep apnea and atrial fibrillation who was noted to have an 11% atrial fib burden on his remote device check.  Patient was  initiated on Eliquis 5 mg twice daily on 1/25/2022 and presents today to undergo external cardioversion.  The patient's device was interrogated today and he remains in atrial fibrillation.        Plan:   1.  External cardioversion today under the direction of Dr. Maurizio Cruz.  2.  Further recommendations following the above.      Scribed for Maurizio Cruz DO by Verito Benz RN. 3/2/2022  11:56 EST   Maurizio Cruz DO, [facility], [facility]  Cardiac Electrophysiologist  [facility] / [facility]

## 2022-03-02 NOTE — OP NOTE
Cardiac Electrophysiology Procedure Note          [facility] at [facility]      Date of Admission:  3/2/2022            Date of Procedure:  3/2/2022    PROCEDURE: Transthoracic Direct Current Cardioversion    DIAGNOSIS AND PRESENTING RHYTHM: Atrial fibrillation    POSTPROCEDURE DIAGNOSIS: Same    The risks, benefits, approach and potential complications of the procedure were discussed with the patient and informed consent was obtained prior to the administration of anesthesia.    The patient was able to give written informed consent after revisiting the key portions of the risk versus benefit profile of the procedure.  This discussion was framed by our lengthy conversations  (please see our detailed notes).  Patient verbalized strong understanding of this discussion and a strong desire to proceed with the procedure.  Please note that this detailed informed consent process utilized mutual and shared decision making process between all parties involved, principally the physician and patient, but also potentially with input from the patient's selected family and friends.'      The patient is presently anticoagulated with DOAC    Attending Electrophysiologist: Maurizio Cruz DO.    MODERATE SEDATION FOR PROCEDURE:    Moderate sedation was given during this procedure.    I supervised and directed RN to administer this sedation.  This staff member also monitored the patient's hemodynamic and respiratory status and response to these medications.  Please see the full detailed procedure report generated by the electrophysiology laboratory staff.  The patient tolerated moderate sedation well.  There were no complications regarding sedation.  The total dose of fentanyl was 0 mcg and the total dose of midazolam was 0 mg.  The total dose of Brevital was 40 mg.  First sedation was administered at 1100 and continued through 1124.    A time-out was completed verifying correct patient, procedure,  "positioning, and special equipment prior to beginning this procedure.    PT/INR:  No results found for: LABPROT, INR  PTT:  No results found for: APTT    CBC:   WBC   Date Value Ref Range Status   03/02/2022 5.51 3.40 - 10.80 10*3/mm3 Final     RBC   Date Value Ref Range Status   03/02/2022 4.17 4.14 - 5.80 10*6/mm3 Final     Hemoglobin   Date Value Ref Range Status   03/02/2022 12.5 (L) 13.0 - 17.7 g/dL Final     Hematocrit   Date Value Ref Range Status   03/02/2022 38.2 37.5 - 51.0 % Final     MCV   Date Value Ref Range Status   03/02/2022 91.6 79.0 - 97.0 fL Final     RDW   Date Value Ref Range Status   03/02/2022 13.9 12.3 - 15.4 % Final     Platelets   Date Value Ref Range Status   03/02/2022 161 140 - 450 10*3/mm3 Final     BMP:     Sodium   Date Value Ref Range Status   03/02/2022 138 136 - 145 mmol/L Final     Potassium   Date Value Ref Range Status   03/02/2022 4.3 3.5 - 5.2 mmol/L Final     Comment:     Slight hemolysis detected by analyzer. Results may be affected.     Chloride   Date Value Ref Range Status   03/02/2022 106 98 - 107 mmol/L Final     CO2   Date Value Ref Range Status   03/02/2022 23.0 22.0 - 29.0 mmol/L Final     BUN   Date Value Ref Range Status   03/02/2022 17 8 - 23 mg/dL Final     Creatinine   Date Value Ref Range Status   03/02/2022 0.95 0.76 - 1.27 mg/dL Final     eGFR   Date Value Ref Range Status   03/02/2022 89.4 >60.0 mL/min/1.73 Final     Comment:     National Kidney Foundation and American Society of Nephrology (ASN) Task Force recommended calculation based on the Chronic Kidney Disease Epidemiology Collaboration (CKD-EPI) equation refit without adjustment for race.       Vital Signs: /94   Pulse 60   Temp 97.4 °F (36.3 °C) (Temporal)   Resp 16   Ht 167.6 cm (66\")   Wt 104 kg (230 lb 6.1 oz)   SpO2 98%   BMI 37.18 kg/m²      Admit Weight:  104 kg (230 lb 6.1 oz)  BMI: Body mass index is 37.18 kg/m².    Procedural Narrative:  The patient was brought to the " electrophysiology procedure area in the fasting state and placed on a cardiac monitor.  External defibrillation pads were applied in the anterior-posterior configuration. Surface electrocardiography confirmed the above indicated atrial arrhythmia.  Once adequate level of anesthesia was obtained, a synchronized biphasic shock of 200 Joules energy was delivered.  The resultant rhythm was sinus.  Neurologic examination following recovery from anesthesia demonstrated no focal deficits. The patient tolerated the procedure well and there were no complications.     [employer] ICD was confirmed to be programmed tachycardia detection and therapy on prior to him leaving the area.    CONCLUSION:  Succesful DC cardioversion.    RECOMMENDATION:     1.  Follow up with referring physician or clinical service.  2.  This patient will require 4 weeks of uninterrupted anticoagulation.     Maurizio Cruz DO

## 2022-03-19 LAB
QT INTERVAL: 464 MS
QTC INTERVAL: 464 MS

## 2022-04-07 PROCEDURE — 93296 REM INTERROG EVL PM/IDS: CPT | Performed by: INTERNAL MEDICINE

## 2022-04-07 PROCEDURE — 93295 DEV INTERROG REMOTE 1/2/MLT: CPT | Performed by: INTERNAL MEDICINE

## 2022-06-01 ENCOUNTER — OFFICE VISIT (OUTPATIENT)
Dept: CARDIOLOGY | Facility: CLINIC | Age: 62
End: 2022-06-01

## 2022-06-01 VITALS
HEART RATE: 60 BPM | BODY MASS INDEX: 35.52 KG/M2 | DIASTOLIC BLOOD PRESSURE: 76 MMHG | OXYGEN SATURATION: 96 % | SYSTOLIC BLOOD PRESSURE: 114 MMHG | WEIGHT: 221 LBS | HEIGHT: 66 IN

## 2022-06-01 DIAGNOSIS — I10 PRIMARY HYPERTENSION: ICD-10-CM

## 2022-06-01 DIAGNOSIS — I48.0 PAROXYSMAL ATRIAL FIBRILLATION: ICD-10-CM

## 2022-06-01 DIAGNOSIS — I25.10 CORONARY ARTERY DISEASE INVOLVING NATIVE CORONARY ARTERY OF NATIVE HEART WITHOUT ANGINA PECTORIS: Primary | ICD-10-CM

## 2022-06-01 DIAGNOSIS — I25.5 ISCHEMIC CARDIOMYOPATHY: ICD-10-CM

## 2022-06-01 PROCEDURE — 99214 OFFICE O/P EST MOD 30 MIN: CPT | Performed by: INTERNAL MEDICINE

## 2022-06-01 NOTE — PROGRESS NOTES
Subjective:     Encounter Date:06/01/2022    Primary Care Physician: Bia Cam MD      Patient ID: Howard Owusu is a 62 y.o. male.    Chief Complaint:Follow-up      Problem List:   1. Coronary artery disease/ischemic cardiomyopathy:  a. [facility] for acute anterior MI, 2004: EF 30%. 100% proximal LAD now s/p 3.0 x 12 and a 2.7 x 16 Taxus stent. RCA and LCx were within normal limits.  b. [facility], 11/2004: LAD patent stent noted. Ostial D1 stenosis of 99% which was unable to be crossed. LCx and RCA were still within normal limits. EF 35%.  c. [facility], 06/2008: EF 15%. 50% distal RCA stenosis, and left coronary anatomy was unchanged.  d. [facility], 06/2011: EF 20%, 90% distal RCA (3.0 x 12 and 3.0 x 8 mm Cypher), and 90% right posterolateral (2.5 x 8 Cypher), and 85% mid LAD (3.0 x 13 mm Cypher).  2. Ventricular tachycardia:  a. 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  b. November 2004, [employer] ICD with DFT testing done.  c. July 2008, due to ischemic cardiomyopathy, patient was upgraded to a [employer]zer Bi-V ICD by Dr. Mtz.  d. June 2010, appropriate ICD shocks.  e. Generator change, [employer]zer Bi-V ICD (Dr. Atkins), 12/11/2013.  f. Recent Lead extraction of failed [place] lead and revision with new RA/RV lead and generator change with Dr. Cruz 5/25/2021  3. Ischemic cardiomyopathy.  4. Atrial fibrillation  1.  Initial with VF during a PCI, cardioverted to atrial fibrillation. After 24 hours, converted to sinus rhythm.  2. 3/2/2022 ECV to sinus rhythm, Dr. Cruz.  5. Hypertension.  6. Hyperlipidemia.  7. Hypothyroidism.  8. GERD.  9. GILL.  10. Surgeries:  1. Surgery for nephrolithiasis      Allergies   Allergen Reactions   • Morphine And Related Hives   • Penicillins Unknown - Low Severity     Patient reports tolerating amoxicillin.   • Sulfa Antibiotics Unknown - Low Severity     Was told by MD that he is allergic but does not know the reaction         Current Outpatient  Medications:   •  apixaban (ELIQUIS) 5 MG tablet tablet, Take 1 tablet by mouth 2 (Two) Times a Day., Disp: 60 tablet, Rfl: 6  •  aspirin  MG tablet, Take 325 mg by mouth Daily., Disp: , Rfl:   •  atorvastatin (LIPITOR) 80 MG tablet, TAKE 1 TABLET EVERY NIGHT (Patient taking differently: Take 80 mg by mouth Every Night.), Disp: 90 tablet, Rfl: 1  •  bisoprolol (ZEBeta) 5 MG tablet, TAKE 1 TABLET EVERY DAY (Patient taking differently: Take 5 mg by mouth Daily.), Disp: 90 tablet, Rfl: 3  •  esomeprazole (NexIUM) 20 MG packet, Take 20 mg by mouth Every Morning Before Breakfast., Disp: , Rfl:   •  Garlic 1000 MG capsule, Take 1 tablet by mouth Daily., Disp: , Rfl:   •  levothyroxine (SYNTHROID, LEVOTHROID) 150 MCG tablet, Take 150 mcg by mouth Daily., Disp: , Rfl:   •  Multiple Vitamins-Minerals (ONE DAILY MENS PO), Take 1 tablet by mouth Daily., Disp: , Rfl:   •  nitroglycerin (NITROSTAT) 0.4 MG SL tablet, Place 1 tablet under the tongue Every 5 (Five) Minutes As Needed for Chest Pain. Take no more than 3 doses in 15 minutes., Disp: 25 tablet, Rfl: 3  •  Omega-3 Fatty Acids (OMEGA-3 FISH OIL EX ST) 880 MG capsule, Take 800 mg by mouth Daily., Disp: , Rfl:   •  sacubitril-valsartan (ENTRESTO) 49-51 MG tablet, Take 1 tablet by mouth 2 (Two) Times a Day., Disp: 60 tablet, Rfl: 5  •  sertraline (ZOLOFT) 100 MG tablet, Take 1 tablet by mouth daily., Disp: , Rfl:         History of Present Illness    Patient is a 62-year-old  male who is being seen today for 6-month follow-up of coronary artery disease.  Since last being seen patient has undergone external cardioversion for atrial fibrillation.  Does note that he feels improved after this.  He is maintained currently on Eliquis and aspirin.  He denies any chest pain, pressure, tightness.  Notes that since his lead revision and generator change back in the fall he has had some recurrent left-sided back pain.  Fairly pinpoint nature and worse with deep  "inspiration.  Has some occasional shortness of breath but overall feels that this is improving.  Has been intentionally losing weight.  No reported syncope, near syncope.  Is due to follow-up with electrophysiology on June 13.    The following portions of the patient's history were reviewed and updated as appropriate: allergies, current medications, past family history, past medical history, past social history, past surgical history and problem list.      Social History     Tobacco Use   • Smoking status: Never Smoker   • Smokeless tobacco: Never Used   Vaping Use   • Vaping Use: Never used   Substance Use Topics   • Alcohol use: No   • Drug use: No         Review of Systems   Constitutional: Positive for malaise/fatigue.   Cardiovascular: Negative for chest pain, dyspnea on exertion, leg swelling, palpitations and syncope.   Respiratory: Negative.  Negative for shortness of breath.    Hematologic/Lymphatic: Negative for bleeding problem. Does not bruise/bleed easily.   Skin: Negative for rash.   Musculoskeletal: Positive for back pain. Negative for muscle weakness and myalgias.   Gastrointestinal: Negative for heartburn, nausea and vomiting.   Neurological: Negative for dizziness, light-headedness, loss of balance and numbness.          Objective:   /76   Pulse 60   Ht 167.6 cm (66\")   Wt 100 kg (221 lb)   SpO2 96%   BMI 35.67 kg/m²         Vitals reviewed.   Constitutional:       Appearance: Well-developed and not in distress.   Neck:      Vascular: No JVD.      Trachea: No tracheal deviation.   Pulmonary:      Effort: Pulmonary effort is normal.      Breath sounds: Normal breath sounds.   Cardiovascular:      Normal rate. Regular rhythm.   Edema:     Peripheral edema absent.   Abdominal:      General: Bowel sounds are normal.      Palpations: Abdomen is soft.      Tenderness: There is no abdominal tenderness.   Musculoskeletal:         General: No deformity. Skin:     General: Skin is warm and dry. "   Neurological:      Mental Status: Alert and oriented to person, place, and time.         Procedures          Assessment:   Assessment & Plan      Diagnoses and all orders for this visit:    1. Coronary artery disease involving native coronary artery of native heart without angina pectoris (Primary).  Stable.  No active angina.  On aspirin.    2. Ischemic cardiomyopathy, stable.  No active heart failure.  On Entresto.    3. Primary hypertension, stable.  On beta-blocker.    4. Paroxysmal atrial fibrillation (HCC), stable.  Status post recent cardioversion.  Currently on Eliquis.  Has follow-up with electrophysiology coming up.      Plan:  1. Continue current cardiac medications.  2. Keep current scheduled follow-up with electrophysiology.  3. Discussed with patient if appointment with electrophysiology is stable we could condense his appointments to here in [place] as it is easier for him to travel here for device checks and follow-up.  4. Follow-up in 6 months time with a device check or sooner if needed.         FRANKIE Sung   Dictated utilizing Dragon dictation

## 2022-06-13 ENCOUNTER — OFFICE VISIT (OUTPATIENT)
Dept: CARDIOLOGY | Facility: CLINIC | Age: 62
End: 2022-06-13

## 2022-06-13 VITALS
DIASTOLIC BLOOD PRESSURE: 66 MMHG | BODY MASS INDEX: 35.2 KG/M2 | HEART RATE: 61 BPM | HEIGHT: 66 IN | SYSTOLIC BLOOD PRESSURE: 120 MMHG | WEIGHT: 219 LBS | OXYGEN SATURATION: 94 %

## 2022-06-13 DIAGNOSIS — I47.20 V-TACH: ICD-10-CM

## 2022-06-13 DIAGNOSIS — Z95.810 ICD (IMPLANTABLE CARDIOVERTER-DEFIBRILLATOR), BIVENTRICULAR, IN SITU: ICD-10-CM

## 2022-06-13 DIAGNOSIS — I48.0 PAROXYSMAL ATRIAL FIBRILLATION: Primary | ICD-10-CM

## 2022-06-13 DIAGNOSIS — I25.5 ISCHEMIC CARDIOMYOPATHY: ICD-10-CM

## 2022-06-13 DIAGNOSIS — G47.33 OBSTRUCTIVE SLEEP APNEA: ICD-10-CM

## 2022-06-13 PROBLEM — T82.110D ICD (IMPLANTABLE CARDIOVERTER-DEFIBRILLATOR) LEAD FAILURE, SUBSEQUENT ENCOUNTER: Status: RESOLVED | Noted: 2021-04-19 | Resolved: 2022-06-13

## 2022-06-13 PROBLEM — T82.110A AICD LEAD MALFUNCTION: Status: RESOLVED | Noted: 2021-04-21 | Resolved: 2022-06-13

## 2022-06-13 PROCEDURE — 99213 OFFICE O/P EST LOW 20 MIN: CPT | Performed by: PHYSICIAN ASSISTANT

## 2022-06-13 PROCEDURE — 93284 PRGRMG EVAL IMPLANTABLE DFB: CPT | Performed by: PHYSICIAN ASSISTANT

## 2022-06-13 NOTE — PROGRESS NOTES
Encounter Date:06/13/2022      Patient ID: Howard Owusu is a 62 y.o. male.    Bia Cam MD    Chief Complaint: Cardiomyopathy      PROBLEM LIST:  Patient Active Problem List    Diagnosis Date Noted   • ICD (implantable cardioverter-defibrillator), biventricular, in situ 02/22/2021     Priority: High     Note Last Updated: 6/13/2022     a. November 2004, [employer] ICD with DFT testing done.  b. July 2008, due to ischemic cardiomyopathy, patient was upgraded to a [employer] Bi-V ICD by Dr. Mtz.  c. June 2010, appropriate ICD shocks.  d. Generator change, [employer]zer Bi-V ICD (Dr. Atkins), 12/11/2013.  ·   · Revision of right atrial and RV leads to new ICD pulse generator and retention of previously implanted LV endocardial lead, 5/25/2021     • V-tach ([facility]) 08/16/2017       2. 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     • Atrial fibrillation ([facility]) 11/14/2016     Priority: High     Note Last Updated: 6/13/2022     · single episode.  Initial with VF during a PCI, cardioverted to atrial fibrillation.  After 24 hours, converted to sinus rhythm.  · DC cardioversion to sinus rhythm, 3/2/2022     • Ischemic cardiomyopathy      Priority: High   • Coronary artery disease 01/01/2004     Priority: Medium     Note Last Updated: 6/13/2022     a. History of acute anterior myocardial infarction, 2004.  Cardiac catheterization revealed an ejection fraction of 30%.  There was 100% proximal LAD which was stented with a 3.0 x 12 and a 2.7 x 16 Taxus stent.  Right coronary artery and circumflex were within normal limits.  b. November 2004, chest pain with cardiac catheterization.  LAD patent stent noted.  There was an ostial first diagonal stenosis of 99% which was unable to be crossed.  Circumflex and right coronary artery were still within normal limits.  Ejection fraction was 35%.  c. June 2008, cardiac catheterization revealed an ejection fraction 15%, 50%  distal RCA stenosis, and left coronary anatomy was unchanged.  d. June 2011:  Progressive angina, catheterization shows ejection fraction 20%, 90% distal right coronary artery (3.0 x 12 and 3.0 x 8 mm Cypher), and 90% right posterolateral (2.5 x 8 Cypher), and 85% mid LAD (3.0 x 13 mm Cypher).     • Hyperlipidemia      Priority: Low   • Hypertension      Priority: Low   • Obstructive sleep apnea      Priority: Low   • Subclavian vein stenosis 04/21/2021   • GERD (gastroesophageal reflux disease)    • Hypothyroidism                History of Present Illness  Patient presents today for follow-up with a history of ischemic cardiomyopathy, VT, paroxysmal atrial fibrillation.  He presents today for follow-up after recent external cardioversion to sinus rhythm.  Since that day he has had no awareness of palpitations.  He has no current complaint of exertional chest pain or dyspnea, denies orthopnea, PND, claudication, lower extremity edema.  He states his blood pressures at home typically run about 110 to 120 mmHg systolic.  He states compliance with his current medical regimen reports no significant adverse side effects.  He is not currently using his CPAP for obstructive sleep apnea.    Allergies   Allergen Reactions   • Morphine And Related Hives   • Penicillins Unknown - Low Severity     Patient reports tolerating amoxicillin.   • Sulfa Antibiotics Unknown - Low Severity     Was told by MD that he is allergic but does not know the reaction       Current Outpatient Medications   Medication Instructions   • apixaban (ELIQUIS) 5 mg, Oral, 2 Times Daily   • aspirin  mg, Oral, Daily   • atorvastatin (LIPITOR) 80 MG tablet TAKE 1 TABLET EVERY NIGHT   • bisoprolol (ZEBeta) 5 MG tablet TAKE 1 TABLET EVERY DAY   • Cholecalciferol (Vitamin D3) 25 MCG (1000 UT) capsule Oral, Daily   • esomeprazole (NEXIUM) 20 mg, Oral, Every Morning Before Breakfast   • Garlic 1000 MG capsule 1 tablet, Oral, Daily   • levothyroxine  "(SYNTHROID, LEVOTHROID) 150 mcg, Oral, Daily   • Multiple Vitamins-Minerals (ONE DAILY MENS PO) 1 tablet, Oral, Daily   • nitroglycerin (NITROSTAT) 0.4 mg, Sublingual, Every 5 Minutes PRN, Take no more than 3 doses in 15 minutes.    • Omega-3 Fish Oil Ex St 800 mg, Oral, Daily   • sacubitril-valsartan (ENTRESTO) 49-51 MG tablet 1 tablet, Oral, 2 Times Daily   • sertraline (ZOLOFT) 100 MG tablet 1 tablet, Oral, Daily       .    Objective:     /66 (BP Location: Left arm, Patient Position: Sitting)   Pulse 61   Ht 167.6 cm (66\")   Wt 99.3 kg (219 lb)   SpO2 94%   BMI 35.35 kg/m²    Body mass index is 35.35 kg/m².     Vitals reviewed.   Constitutional:       Appearance: Well-developed.   Pulmonary:      Effort: Pulmonary effort is normal. No respiratory distress.      Breath sounds: Normal breath sounds. No wheezing. No rales.      Comments: Bases clear  Chest:      Chest wall: Not tender to palpatation.   Cardiovascular:      Normal rate. Regular rhythm.      Murmurs: There is no murmur.      No gallop. No click. No rub.   Pulses:     Intact distal pulses.   Edema:     Peripheral edema absent.   Musculoskeletal: Normal range of motion.       Lab Review:                           Procedures               Assessment:      Diagnosis Plan   1. Paroxysmal atrial fibrillation (HCC)   no atrial fibrillation/mode switching noted on device interrogation since cardioversion   2. ICD (implantable cardioverter-defibrillator), biventricular, in situ    This patient's Cardiac Implanted Electronic Device was manually interrogated and reprogrammed during the patient encounter today.  Iterative programming changes were manually made to determine the sensing threshold, pacing threshold, lead impedance as well as underlying cardiac rhythm.  These programming changes were not limited to but included some or all of the following when appropriate: pacing mode, programmed AV delays, blanking periods, and refractory periods.  Data " obtained as a result of these manual programing changes informed the patient's CIED permanent programming.    5.5 years battery life remaining, 54% RV and LV pacing normal lead thresholds.  Single NSVT.       3. Ischemic cardiomyopathy   normal functioning ICD and on appropriate, standard of care heart failure medications.  Continue current medical regimen   4. V-tach (HCC)   no VT noted on device interrogation   5. Obstructive sleep apnea   no longer using CPAP     Plan:     Stable cardiac status.  Continue current medications.   in 6 months, sooner as needed.  Thank you for allowing us to participate in the care of your patient.     Electronically signed by DANIEL Sandra, 06/13/22, 3:35 PM EDT.

## 2022-07-18 RX ORDER — ATORVASTATIN CALCIUM 80 MG/1
80 TABLET, FILM COATED ORAL NIGHTLY
Qty: 90 TABLET | Refills: 3 | Status: SHIPPED | OUTPATIENT
Start: 2022-07-18

## 2022-09-28 ENCOUNTER — TELEPHONE (OUTPATIENT)
Dept: CARDIOLOGY | Facility: CLINIC | Age: 62
End: 2022-09-28

## 2022-09-28 NOTE — TELEPHONE ENCOUNTER
Called Mr Owusu due to [facility] home monitor didn't send scheduled reading.  No answer and no voicemail  Monitor is connecting.

## 2022-10-06 PROCEDURE — 93295 DEV INTERROG REMOTE 1/2/MLT: CPT | Performed by: INTERNAL MEDICINE

## 2022-10-06 PROCEDURE — 93296 REM INTERROG EVL PM/IDS: CPT | Performed by: INTERNAL MEDICINE

## 2022-12-07 ENCOUNTER — OFFICE VISIT (OUTPATIENT)
Dept: CARDIOLOGY | Facility: CLINIC | Age: 62
End: 2022-12-07

## 2022-12-07 VITALS
HEART RATE: 99 BPM | SYSTOLIC BLOOD PRESSURE: 110 MMHG | DIASTOLIC BLOOD PRESSURE: 60 MMHG | BODY MASS INDEX: 35.84 KG/M2 | OXYGEN SATURATION: 97 % | WEIGHT: 223 LBS | HEIGHT: 66 IN

## 2022-12-07 DIAGNOSIS — I10 PRIMARY HYPERTENSION: ICD-10-CM

## 2022-12-07 DIAGNOSIS — I25.10 CORONARY ARTERY DISEASE INVOLVING NATIVE CORONARY ARTERY OF NATIVE HEART WITHOUT ANGINA PECTORIS: Primary | ICD-10-CM

## 2022-12-07 DIAGNOSIS — I48.0 PAROXYSMAL ATRIAL FIBRILLATION: ICD-10-CM

## 2022-12-07 DIAGNOSIS — E78.00 PURE HYPERCHOLESTEROLEMIA: ICD-10-CM

## 2022-12-07 DIAGNOSIS — I50.22 CHRONIC SYSTOLIC HEART FAILURE: ICD-10-CM

## 2022-12-07 PROCEDURE — 99214 OFFICE O/P EST MOD 30 MIN: CPT | Performed by: INTERNAL MEDICINE

## 2022-12-07 NOTE — PROGRESS NOTES
Subjective:     Encounter Date:12/07/2022    Primary Care Physician: Bia Cam MD      Patient ID: Howard Owusu is a 62 y.o. male.    Chief Complaint:Follow-up (6mth exam)    Problem List:   1. Coronary artery disease/ischemic cardiomyopathy:  a. [facility] for acute anterior MI, 2004: EF 30%. 100% proximal LAD now s/p 3.0 x 12 and a 2.7 x 16 Taxus stent. RCA and LCx were within normal limits.  b. [facility], 11/2004: LAD patent stent noted. Ostial D1 stenosis of 99% which was unable to be crossed. LCx and RCA were still within normal limits. EF 35%.  c. [facility], 06/2008: EF 15%. 50% distal RCA stenosis, and left coronary anatomy was unchanged.  d. [facility], 06/2011: EF 20%, 90% distal RCA (3.0 x 12 and 3.0 x 8 mm Cypher), and 90% right posterolateral (2.5 x 8 Cypher), and 85% mid LAD (3.0 x 13 mm Cypher).  2. Ventricular tachycardia:  a. 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  b. November 2004, [employer] El[employer]zer ICD with DFT testing done.  c. July 2008, due to ischemic cardiomyopathy, patient was upgraded to a [employer] Eliezer Bi-V ICD by Dr. Mtz.  d. June 2010, appropriate ICD shocks.  e. Generator change, [employer] Eliezer Bi-V ICD (Dr. Atkins), 12/11/2013.  f. Recent Lead extraction of failed Jena lead and revision with new RA/RV lead and generator change with Dr. Cruz 5/25/2021 [facility]  3. Ischemic cardiomyopathy.  4. Atrial fibrillation  1.  Initial with VF during a PCI, cardioverted to atrial fibrillation. After 24 hours, converted to sinus rhythm.  2. 3/2/2022 ECV to sinus rhythm, Dr. Cruz.  5. Hypertension.  6. Hyperlipidemia.  7. Hypothyroidism.  8. GERD.  9. GILL.  10. Surgeries:  1. Surgery for nephrolithiasis      Allergies   Allergen Reactions   • Morphine And Related Hives   • Penicillins Unknown - Low Severity     Patient reports tolerating amoxicillin.   • Sulfa Antibiotics Unknown - Low Severity     Was told by MD that he is allergic but does not know the reaction          Current Outpatient Medications:   •  apixaban (ELIQUIS) 5 MG tablet tablet, Take 1 tablet by mouth 2 (Two) Times a Day., Disp: 60 tablet, Rfl: 6  •  aspirin  MG tablet, Take 325 mg by mouth Daily., Disp: , Rfl:   •  atorvastatin (LIPITOR) 80 MG tablet, Take 1 tablet by mouth Every Night., Disp: 90 tablet, Rfl: 3  •  bisoprolol (ZEBeta) 5 MG tablet, TAKE 1 TABLET EVERY DAY (Patient taking differently: Take 5 mg by mouth Daily.), Disp: 90 tablet, Rfl: 3  •  Cholecalciferol (Vitamin D3) 25 MCG (1000 UT) capsule, Take  by mouth Daily., Disp: , Rfl:   •  esomeprazole (NexIUM) 20 MG packet, Take 20 mg by mouth Every Morning Before Breakfast., Disp: , Rfl:   •  Garlic 1000 MG capsule, Take 1 tablet by mouth Daily., Disp: , Rfl:   •  levothyroxine (SYNTHROID, LEVOTHROID) 150 MCG tablet, Take 150 mcg by mouth Daily., Disp: , Rfl:   •  Multiple Vitamins-Minerals (ONE DAILY MENS PO), Take 1 tablet by mouth Daily., Disp: , Rfl:   •  nitroglycerin (NITROSTAT) 0.4 MG SL tablet, Place 1 tablet under the tongue Every 5 (Five) Minutes As Needed for Chest Pain. Take no more than 3 doses in 15 minutes., Disp: 25 tablet, Rfl: 3  •  Omega-3 Fatty Acids (OMEGA-3 FISH OIL EX ST) 880 MG capsule, Take 800 mg by mouth Daily., Disp: , Rfl:   •  sacubitril-valsartan (ENTRESTO) 49-51 MG tablet, Take 1 tablet by mouth 2 (Two) Times a Day., Disp: 60 tablet, Rfl: 5  •  sertraline (ZOLOFT) 100 MG tablet, Take 1 tablet by mouth daily., Disp: , Rfl:         History of Present Illness    Patient is a 62-year-old  male who is being seen today for 6-month follow-up of coronary artery disease, chronic systolic congestive heart failure.  Since last being seen he notes to overall be doing well.  Denies any chest pain, pressure, tightness.  Denies any increasing shortness of breath.  No reported syncope, near-syncope, or edema.  No noted atrial fibrillation.    The following portions of the patient's history were reviewed and updated  "as appropriate: allergies, current medications, past family history, past medical history, past social history, past surgical history and problem list.      Social History     Tobacco Use   • Smoking status: Never   • Smokeless tobacco: Never   Vaping Use   • Vaping Use: Never used   Substance Use Topics   • Alcohol use: No   • Drug use: No         Review of Systems   Constitutional: Negative.   Cardiovascular: Negative for chest pain, dyspnea on exertion, leg swelling, palpitations and syncope.   Respiratory: Negative.  Negative for shortness of breath.    Hematologic/Lymphatic: Negative for bleeding problem. Does not bruise/bleed easily.   Skin: Negative for rash.   Musculoskeletal: Positive for arthritis. Negative for muscle weakness and myalgias.   Gastrointestinal: Negative for heartburn, nausea and vomiting.   Neurological: Negative for dizziness, light-headedness, loss of balance and numbness.          Objective:   /60 (BP Location: Right arm, Patient Position: Sitting)   Pulse 99   Ht 167.6 cm (66\")   Wt 101 kg (223 lb)   SpO2 97%   BMI 35.99 kg/m²         Vitals reviewed.   Constitutional:       Appearance: Well-developed and not in distress.      Comments: Obese   Neck:      Vascular: No JVD.      Trachea: No tracheal deviation.   Pulmonary:      Effort: Pulmonary effort is normal.      Breath sounds: Normal breath sounds.   Cardiovascular:      Normal rate. Regular rhythm.   Edema:     Peripheral edema absent.   Abdominal:      Palpations: Abdomen is soft.      Tenderness: There is no abdominal tenderness.   Musculoskeletal:         General: No deformity. Skin:     General: Skin is warm and dry.   Neurological:      Mental Status: Alert and oriented to person, place, and time.         Procedures          Assessment:   Assessment & Plan      Diagnoses and all orders for this visit:    1. Coronary artery disease involving native coronary artery of native heart without angina pectoris (Primary)  - "     Stress Test With Myocardial Perfusion (1 Day); Future    2. Paroxysmal atrial fibrillation (HCC)  -     Stress Test With Myocardial Perfusion (1 Day); Future    3. Pure hypercholesterolemia  -     Stress Test With Myocardial Perfusion (1 Day); Future    4. Primary hypertension    5. Chronic systolic heart failure (HCC)  -     Stress Test With Myocardial Perfusion (1 Day); Future      Assessment:  1. Coronary artery disease, stable without angina.  No ischemic evaluation in greater than 5 years.  2. Paroxysmal atrial fibrillation, stable.  On Eliquis.  3. Dyslipidemia, stable.  On statin.  Labs with primary care.  4. Hypertension, stable.  On beta-blocker.  5. Chronic systolic heart failure, stable.  On Entresto.    Plan:  1. Continue current CV medications.  2. Patient overall stable from cardiac standpoint.  3. Check myocardial perfusion study in the near term as it has been more than 5 years since last ischemic evaluation.  4. We will have patient follow-up in 6 months time with device check.  Patient would like to move his outpatient device checks to [place] as this is more convenient than traveling to [place] to see electrophysiology.         FRANKIE Sung scribed this note for Dr. Jose Jones.      I have seen and examined the patient, I have reviewed the note, discussed the case with the advance practice clinician, made necessary changes and I agree with the final note.    Jose Jones MD  12/07/22  14:17 EST        Dictated utilizing Dragon dictation

## 2023-01-06 ENCOUNTER — TELEPHONE (OUTPATIENT)
Dept: CARDIOLOGY | Facility: CLINIC | Age: 63
End: 2023-01-06
Payer: MEDICARE

## 2023-01-06 NOTE — TELEPHONE ENCOUNTER
Called patient to let him know his beside home monitor isn't connecting. Phone went straight to message stating voicemail isn't set up.

## 2023-02-08 ENCOUNTER — OUTSIDE FACILITY SERVICE (OUTPATIENT)
Dept: CARDIOLOGY | Facility: CLINIC | Age: 63
End: 2023-02-08
Payer: MEDICARE

## 2023-02-08 PROCEDURE — 93018 CV STRESS TEST I&R ONLY: CPT | Performed by: INTERNAL MEDICINE

## 2023-02-08 PROCEDURE — 78452 HT MUSCLE IMAGE SPECT MULT: CPT | Performed by: INTERNAL MEDICINE

## 2023-02-08 PROCEDURE — 93016 CV STRESS TEST SUPVJ ONLY: CPT | Performed by: NURSE PRACTITIONER

## 2023-02-10 ENCOUNTER — TELEPHONE (OUTPATIENT)
Dept: CARDIOLOGY | Facility: CLINIC | Age: 63
End: 2023-02-10
Payer: MEDICARE

## 2023-02-10 NOTE — TELEPHONE ENCOUNTER
Spoke with patient, advised abnormal  stress test results done at [facility], appt made for [place] March 1, 2023 at 2:15.

## 2023-03-01 ENCOUNTER — OFFICE VISIT (OUTPATIENT)
Dept: CARDIOLOGY | Facility: CLINIC | Age: 63
End: 2023-03-01
Payer: MEDICARE

## 2023-03-01 VITALS
DIASTOLIC BLOOD PRESSURE: 70 MMHG | OXYGEN SATURATION: 97 % | SYSTOLIC BLOOD PRESSURE: 122 MMHG | HEART RATE: 70 BPM | WEIGHT: 229 LBS | BODY MASS INDEX: 36.8 KG/M2 | HEIGHT: 66 IN

## 2023-03-01 DIAGNOSIS — I10 PRIMARY HYPERTENSION: ICD-10-CM

## 2023-03-01 DIAGNOSIS — R94.39 ABNORMAL NUCLEAR STRESS TEST: Primary | ICD-10-CM

## 2023-03-01 DIAGNOSIS — I50.22 CHRONIC SYSTOLIC HEART FAILURE: ICD-10-CM

## 2023-03-01 DIAGNOSIS — R94.39 ABNORMAL MYOCARDIAL PERFUSION STUDY: ICD-10-CM

## 2023-03-01 DIAGNOSIS — I25.10 CORONARY ARTERY DISEASE INVOLVING NATIVE CORONARY ARTERY OF NATIVE HEART, UNSPECIFIED WHETHER ANGINA PRESENT: ICD-10-CM

## 2023-03-01 DIAGNOSIS — R94.39 ABNORMAL MYOCARDIAL PERFUSION STUDY: Primary | ICD-10-CM

## 2023-03-01 DIAGNOSIS — I48.0 PAROXYSMAL ATRIAL FIBRILLATION: ICD-10-CM

## 2023-03-01 PROCEDURE — 99214 OFFICE O/P EST MOD 30 MIN: CPT | Performed by: NURSE PRACTITIONER

## 2023-03-01 RX ORDER — ASPIRIN 81 MG/1
81 TABLET, CHEWABLE ORAL DAILY
COMMUNITY

## 2023-03-01 NOTE — H&P (VIEW-ONLY)
Subjective:     Encounter Date:03/01/2023    Primary Care Physician: Bia Cam MD      Patient ID: Howard Owusu is a 63 y.o. male.    Chief Complaint:Coronary Artery Disease    Problem List:   1. Coronary artery disease/ischemic cardiomyopathy:  a. [facility] for acute anterior MI, 2004: EF 30%. 100% proximal LAD now s/p 3.0 x 12 and a 2.7 x 16 Taxus stent. RCA and LCx were within normal limits.  b. [facility], 11/2004: LAD patent stent noted. Ostial D1 stenosis of 99% which was unable to be crossed. LCx and RCA were still within normal limits. EF 35%.  c. [facility], 06/2008: EF 15%. 50% distal RCA stenosis, and left coronary anatomy was unchanged.  d. [facility], 06/2011: EF 20%, 90% distal RCA (3.0 x 12 and 3.0 x 8 mm Cypher), and 90% right posterolateral (2.5 x 8 Cypher), and 85% mid LAD (3.0 x 13 mm Cypher).  e. 2/2023 MPS with old anterior MI, no viability.  Inferior lateral ischemia.  2. Ventricular tachycardia:  a. 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  b. November 2004, [employer] ICD with DFT testing done.  c. July 2008, due to ischemic cardiomyopathy, patient was upgraded to a [employer]zer Bi-V ICD by Dr. Mtz.  d. June 2010, appropriate ICD shocks.  e. Generator change, [employer] El[employer]zer Bi-V ICD (Dr. Atkins), 12/11/2013.  f. Recent Lead extraction of failed Jena lead and revision with new RA/RV lead and generator change with Dr. Cruz 5/25/2021 [facility]  3. Ischemic cardiomyopathy.  4. Atrial fibrillation  1.  Initial with VF during a PCI, cardioverted to atrial fibrillation. After 24 hours, converted to sinus rhythm.  2. 3/2/2022 ECV to sinus rhythm, Dr. Cruz.  5. Hypertension.  6. Hyperlipidemia.  7. Hypothyroidism.  8. GERD.  9. GILL.  10. Surgeries:  1. Surgery for nephrolithiasis        Allergies   Allergen Reactions   • Morphine And Related Hives   • Penicillins Unknown - Low Severity     Patient reports tolerating amoxicillin.   • Sulfa Antibiotics Unknown - Low Severity      Was told by MD that he is allergic but does not know the reaction         Current Outpatient Medications:   •  apixaban (ELIQUIS) 5 MG tablet tablet, Take 1 tablet by mouth 2 (Two) Times a Day., Disp: 60 tablet, Rfl: 6  •  aspirin 81 MG chewable tablet, Chew 1 tablet Daily., Disp: , Rfl:   •  atorvastatin (LIPITOR) 80 MG tablet, Take 1 tablet by mouth Every Night., Disp: 90 tablet, Rfl: 3  •  bisoprolol (ZEBeta) 5 MG tablet, TAKE 1 TABLET EVERY DAY (Patient taking differently: Take 1 tablet by mouth Daily.), Disp: 90 tablet, Rfl: 3  •  Cholecalciferol (Vitamin D3) 25 MCG (1000 UT) capsule, Take  by mouth Daily., Disp: , Rfl:   •  esomeprazole (NexIUM) 20 MG packet, Take 20 mg by mouth Every Morning Before Breakfast., Disp: , Rfl:   •  Garlic 1000 MG capsule, Take 1 capsule by mouth Daily., Disp: , Rfl:   •  levothyroxine (SYNTHROID, LEVOTHROID) 150 MCG tablet, Take 1 tablet by mouth Daily., Disp: , Rfl:   •  Multiple Vitamins-Minerals (ONE DAILY MENS PO), Take 1 tablet by mouth Daily., Disp: , Rfl:   •  nitroglycerin (NITROSTAT) 0.4 MG SL tablet, Place 1 tablet under the tongue Every 5 (Five) Minutes As Needed for Chest Pain. Take no more than 3 doses in 15 minutes., Disp: 25 tablet, Rfl: 3  •  Omega-3 Fatty Acids (OMEGA-3 FISH OIL EX ST) 880 MG capsule, Take 800 mg by mouth Daily., Disp: , Rfl:   •  sacubitril-valsartan (ENTRESTO) 49-51 MG tablet, Take 1 tablet by mouth 2 (Two) Times a Day., Disp: 60 tablet, Rfl: 5  •  sertraline (ZOLOFT) 100 MG tablet, Take 1 tablet by mouth Daily., Disp: , Rfl:   •  aspirin  MG tablet, Take 1 tablet by mouth Daily., Disp: , Rfl:         History of Present Illness    Patient is a 63-year-old  male who we are seeing today for follow-up status post recent cardiac stress testing which was abnormal.  Patient notes that for for 5 days after his stress test he had significant shortness of breath with exertion.  This is somewhat improving.  Has also had a few instances  "of recurrent chest pain.  No reported syncope, near-syncope, or edema.    The following portions of the patient's history were reviewed and updated as appropriate: allergies, current medications, past family history, past medical history, past social history, past surgical history and problem list.      Social History     Tobacco Use   • Smoking status: Never   • Smokeless tobacco: Never   Vaping Use   • Vaping Use: Never used   Substance Use Topics   • Alcohol use: No   • Drug use: No         Review of Systems   Constitutional: Positive for malaise/fatigue.   Cardiovascular: Positive for chest pain and dyspnea on exertion. Negative for leg swelling, palpitations and syncope.   Respiratory: Negative.  Negative for shortness of breath.    Hematologic/Lymphatic: Negative for bleeding problem. Does not bruise/bleed easily.   Skin: Negative for rash.   Musculoskeletal: Positive for arthritis. Negative for muscle weakness and myalgias.   Gastrointestinal: Negative for heartburn, nausea and vomiting.   Neurological: Negative for dizziness, light-headedness, loss of balance and numbness.          Objective:   /70 (BP Location: Left arm, Patient Position: Sitting)   Pulse 70   Ht 167.6 cm (66\")   Wt 104 kg (229 lb)   SpO2 97%   BMI 36.96 kg/m²         Vitals reviewed.   Constitutional:       Appearance: Well-developed and not in distress.   Neck:      Vascular: No JVD.      Trachea: No tracheal deviation.   Pulmonary:      Effort: Pulmonary effort is normal.      Breath sounds: Normal breath sounds.   Cardiovascular:      Normal rate. Regular rhythm.   Pulses:     Intact distal pulses.   Edema:     Peripheral edema absent.   Abdominal:      Palpations: Abdomen is soft.      Tenderness: There is no abdominal tenderness.   Musculoskeletal:         General: No deformity. Skin:     General: Skin is warm and dry.   Neurological:      Mental Status: Alert and oriented to person, place, and time. "         Procedures          Assessment:   Assessment & Plan      Diagnoses and all orders for this visit:    1. Abnormal myocardial perfusion study (Primary), noted inferolateral ischemia on recent myocardial perfusion study.  Has history of RCA stents.  Recent chest pain and shortness of breath.  -     Case Request Cath Lab: Left Heart Cath    2. Coronary artery disease involving native coronary artery of native heart, unspecified whether angina present.  Recent chest pain and shortness of breath.  On aspirin.    3. Paroxysmal atrial fibrillation (HCC), stable.  Chronically anticoagulated with Eliquis.    4. Primary hypertension, controlled.  On beta-blocker.    5. Chronic systolic heart failure (HCC), stable.  On Entresto.      Plan:  1. Continue current cardiac medications.  2. Reviewed myocardial perfusion study findings with patient today.  Given his noted history of coronary disease and recent symptoms we will proceed to cardiac catheterization plus or minus catheter-based intervention.  This was discussed with the patient who verbalizes understanding and wishes to proceed.  3. Further recommendations to follow cardiac catheterization.       Zeina DAVID             Dictated utilizing Dragon dictation

## 2023-03-01 NOTE — PROGRESS NOTES
Subjective:     Encounter Date:03/01/2023    Primary Care Physician: Bia Cam MD      Patient ID: Howard Owusu is a 63 y.o. male.    Chief Complaint:Coronary Artery Disease    Problem List:   1. Coronary artery disease/ischemic cardiomyopathy:  a. [facility] for acute anterior MI, 2004: EF 30%. 100% proximal LAD now s/p 3.0 x 12 and a 2.7 x 16 Taxus stent. RCA and LCx were within normal limits.  b. [facility], 11/2004: LAD patent stent noted. Ostial D1 stenosis of 99% which was unable to be crossed. LCx and RCA were still within normal limits. EF 35%.  c. [facility], 06/2008: EF 15%. 50% distal RCA stenosis, and left coronary anatomy was unchanged.  d. [facility], 06/2011: EF 20%, 90% distal RCA (3.0 x 12 and 3.0 x 8 mm Cypher), and 90% right posterolateral (2.5 x 8 Cypher), and 85% mid LAD (3.0 x 13 mm Cypher).  e. 2/2023 MPS with old anterior MI, no viability.  Inferior lateral ischemia.  2. Ventricular tachycardia:  a. 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  b. November 2004, [employer] ICD with DFT testing done.  c. July 2008, due to ischemic cardiomyopathy, patient was upgraded to a [employer] El[employer]zer Bi-V ICD by Dr. Mtz.  d. June 2010, appropriate ICD shocks.  e. Generator change, [employer] Eliezer Bi-V ICD (Dr. Atkins), 12/11/2013.  f. Recent Lead extraction of failed Jena lead and revision with new RA/RV lead and generator change with Dr. Cruz 5/25/2021 [facility]  3. Ischemic cardiomyopathy.  4. Atrial fibrillation  1.  Initial with VF during a PCI, cardioverted to atrial fibrillation. After 24 hours, converted to sinus rhythm.  2. 3/2/2022 ECV to sinus rhythm, Dr. Cruz.  5. Hypertension.  6. Hyperlipidemia.  7. Hypothyroidism.  8. GERD.  9. GILL.  10. Surgeries:  1. Surgery for nephrolithiasis        Allergies   Allergen Reactions   • Morphine And Related Hives   • Penicillins Unknown - Low Severity     Patient reports tolerating amoxicillin.   • Sulfa Antibiotics Unknown - Low Severity      Was told by MD that he is allergic but does not know the reaction         Current Outpatient Medications:   •  apixaban (ELIQUIS) 5 MG tablet tablet, Take 1 tablet by mouth 2 (Two) Times a Day., Disp: 60 tablet, Rfl: 6  •  aspirin 81 MG chewable tablet, Chew 1 tablet Daily., Disp: , Rfl:   •  atorvastatin (LIPITOR) 80 MG tablet, Take 1 tablet by mouth Every Night., Disp: 90 tablet, Rfl: 3  •  bisoprolol (ZEBeta) 5 MG tablet, TAKE 1 TABLET EVERY DAY (Patient taking differently: Take 1 tablet by mouth Daily.), Disp: 90 tablet, Rfl: 3  •  Cholecalciferol (Vitamin D3) 25 MCG (1000 UT) capsule, Take  by mouth Daily., Disp: , Rfl:   •  esomeprazole (NexIUM) 20 MG packet, Take 20 mg by mouth Every Morning Before Breakfast., Disp: , Rfl:   •  Garlic 1000 MG capsule, Take 1 capsule by mouth Daily., Disp: , Rfl:   •  levothyroxine (SYNTHROID, LEVOTHROID) 150 MCG tablet, Take 1 tablet by mouth Daily., Disp: , Rfl:   •  Multiple Vitamins-Minerals (ONE DAILY MENS PO), Take 1 tablet by mouth Daily., Disp: , Rfl:   •  nitroglycerin (NITROSTAT) 0.4 MG SL tablet, Place 1 tablet under the tongue Every 5 (Five) Minutes As Needed for Chest Pain. Take no more than 3 doses in 15 minutes., Disp: 25 tablet, Rfl: 3  •  Omega-3 Fatty Acids (OMEGA-3 FISH OIL EX ST) 880 MG capsule, Take 800 mg by mouth Daily., Disp: , Rfl:   •  sacubitril-valsartan (ENTRESTO) 49-51 MG tablet, Take 1 tablet by mouth 2 (Two) Times a Day., Disp: 60 tablet, Rfl: 5  •  sertraline (ZOLOFT) 100 MG tablet, Take 1 tablet by mouth Daily., Disp: , Rfl:   •  aspirin  MG tablet, Take 1 tablet by mouth Daily., Disp: , Rfl:         History of Present Illness    Patient is a 63-year-old  male who we are seeing today for follow-up status post recent cardiac stress testing which was abnormal.  Patient notes that for for 5 days after his stress test he had significant shortness of breath with exertion.  This is somewhat improving.  Has also had a few instances  "of recurrent chest pain.  No reported syncope, near-syncope, or edema.    The following portions of the patient's history were reviewed and updated as appropriate: allergies, current medications, past family history, past medical history, past social history, past surgical history and problem list.      Social History     Tobacco Use   • Smoking status: Never   • Smokeless tobacco: Never   Vaping Use   • Vaping Use: Never used   Substance Use Topics   • Alcohol use: No   • Drug use: No         Review of Systems   Constitutional: Positive for malaise/fatigue.   Cardiovascular: Positive for chest pain and dyspnea on exertion. Negative for leg swelling, palpitations and syncope.   Respiratory: Negative.  Negative for shortness of breath.    Hematologic/Lymphatic: Negative for bleeding problem. Does not bruise/bleed easily.   Skin: Negative for rash.   Musculoskeletal: Positive for arthritis. Negative for muscle weakness and myalgias.   Gastrointestinal: Negative for heartburn, nausea and vomiting.   Neurological: Negative for dizziness, light-headedness, loss of balance and numbness.          Objective:   /70 (BP Location: Left arm, Patient Position: Sitting)   Pulse 70   Ht 167.6 cm (66\")   Wt 104 kg (229 lb)   SpO2 97%   BMI 36.96 kg/m²         Vitals reviewed.   Constitutional:       Appearance: Well-developed and not in distress.   Neck:      Vascular: No JVD.      Trachea: No tracheal deviation.   Pulmonary:      Effort: Pulmonary effort is normal.      Breath sounds: Normal breath sounds.   Cardiovascular:      Normal rate. Regular rhythm.   Pulses:     Intact distal pulses.   Edema:     Peripheral edema absent.   Abdominal:      Palpations: Abdomen is soft.      Tenderness: There is no abdominal tenderness.   Musculoskeletal:         General: No deformity. Skin:     General: Skin is warm and dry.   Neurological:      Mental Status: Alert and oriented to person, place, and time. "         Procedures          Assessment:   Assessment & Plan      Diagnoses and all orders for this visit:    1. Abnormal myocardial perfusion study (Primary), noted inferolateral ischemia on recent myocardial perfusion study.  Has history of RCA stents.  Recent chest pain and shortness of breath.  -     Case Request Cath Lab: Left Heart Cath    2. Coronary artery disease involving native coronary artery of native heart, unspecified whether angina present.  Recent chest pain and shortness of breath.  On aspirin.    3. Paroxysmal atrial fibrillation (HCC), stable.  Chronically anticoagulated with Eliquis.    4. Primary hypertension, controlled.  On beta-blocker.    5. Chronic systolic heart failure (HCC), stable.  On Entresto.      Plan:  1. Continue current cardiac medications.  2. Reviewed myocardial perfusion study findings with patient today.  Given his noted history of coronary disease and recent symptoms we will proceed to cardiac catheterization plus or minus catheter-based intervention.  This was discussed with the patient who verbalizes understanding and wishes to proceed.  3. Further recommendations to follow cardiac catheterization.       Zeina DAVID             Dictated utilizing Dragon dictation

## 2023-03-02 PROBLEM — R94.39 ABNORMAL MYOCARDIAL PERFUSION STUDY: Status: ACTIVE | Noted: 2023-03-02

## 2023-03-14 ENCOUNTER — APPOINTMENT (OUTPATIENT)
Dept: GENERAL RADIOLOGY | Facility: HOSPITAL | Age: 63
End: 2023-03-14
Payer: MEDICARE

## 2023-03-14 ENCOUNTER — DOCUMENTATION (OUTPATIENT)
Dept: CARDIAC REHAB | Facility: HOSPITAL | Age: 63
End: 2023-03-14
Payer: MEDICARE

## 2023-03-14 ENCOUNTER — HOSPITAL ENCOUNTER (OUTPATIENT)
Facility: HOSPITAL | Age: 63
Setting detail: HOSPITAL OUTPATIENT SURGERY
Discharge: HOME | End: 2023-03-14
Attending: INTERNAL MEDICINE | Admitting: INTERNAL MEDICINE
Payer: MEDICARE

## 2023-03-14 VITALS
OXYGEN SATURATION: 95 % | TEMPERATURE: 97 F | WEIGHT: 228.62 LBS | HEIGHT: 66 IN | DIASTOLIC BLOOD PRESSURE: 92 MMHG | BODY MASS INDEX: 36.74 KG/M2 | HEART RATE: 70 BPM | RESPIRATION RATE: 14 BRPM | SYSTOLIC BLOOD PRESSURE: 129 MMHG

## 2023-03-14 DIAGNOSIS — R94.39 ABNORMAL MYOCARDIAL PERFUSION STUDY: ICD-10-CM

## 2023-03-14 DIAGNOSIS — I50.23 ACUTE ON CHRONIC SYSTOLIC HEART FAILURE: Primary | ICD-10-CM

## 2023-03-14 PROBLEM — I50.21 ACUTE SYSTOLIC HEART FAILURE (HCC): Status: ACTIVE | Noted: 2023-03-14

## 2023-03-14 PROBLEM — I50.22 CHRONIC SYSTOLIC HEART FAILURE (HCC): Status: ACTIVE | Noted: 2023-03-14

## 2023-03-14 LAB
ALBUMIN SERPL-MCNC: 4.6 G/DL (ref 3.5–5.2)
ALBUMIN/GLOB SERPL: 1.7 G/DL
ALP SERPL-CCNC: 88 U/L (ref 39–117)
ALT SERPL W P-5'-P-CCNC: 34 U/L (ref 1–41)
ANION GAP SERPL CALCULATED.3IONS-SCNC: 13 MMOL/L (ref 5–15)
AST SERPL-CCNC: 32 U/L (ref 1–40)
BILIRUB SERPL-MCNC: 0.6 MG/DL (ref 0–1.2)
BUN SERPL-MCNC: 18 MG/DL (ref 8–23)
BUN/CREAT SERPL: 17.1 (ref 7–25)
CALCIUM SPEC-SCNC: 9.5 MG/DL (ref 8.6–10.5)
CATH EF ESTIMATED: 20 %
CHLORIDE SERPL-SCNC: 103 MMOL/L (ref 98–107)
CHOLEST SERPL-MCNC: 116 MG/DL (ref 0–200)
CO2 SERPL-SCNC: 24 MMOL/L (ref 22–29)
CREAT SERPL-MCNC: 1.05 MG/DL (ref 0.76–1.27)
DEPRECATED RDW RBC AUTO: 51.7 FL (ref 37–54)
EGFRCR SERPLBLD CKD-EPI 2021: 78.8 ML/MIN/1.73
ERYTHROCYTE [DISTWIDTH] IN BLOOD BY AUTOMATED COUNT: 15 % (ref 12.3–15.4)
GLOBULIN UR ELPH-MCNC: 2.7 GM/DL
GLUCOSE SERPL-MCNC: 143 MG/DL (ref 65–99)
HBA1C MFR BLD: 6.3 % (ref 4.8–5.6)
HCT VFR BLD AUTO: 43.8 % (ref 37.5–51)
HDLC SERPL-MCNC: 36 MG/DL (ref 40–60)
HGB BLD-MCNC: 13.8 G/DL (ref 13–17.7)
LDLC SERPL CALC-MCNC: 55 MG/DL (ref 0–100)
LDLC/HDLC SERPL: 1.41 {RATIO}
MCH RBC QN AUTO: 29.5 PG (ref 26.6–33)
MCHC RBC AUTO-ENTMCNC: 31.5 G/DL (ref 31.5–35.7)
MCV RBC AUTO: 93.6 FL (ref 79–97)
PLATELET # BLD AUTO: 202 10*3/MM3 (ref 140–450)
PMV BLD AUTO: 11.1 FL (ref 6–12)
POTASSIUM SERPL-SCNC: 4.3 MMOL/L (ref 3.5–5.2)
PROT SERPL-MCNC: 7.3 G/DL (ref 6–8.5)
RBC # BLD AUTO: 4.68 10*6/MM3 (ref 4.14–5.8)
SODIUM SERPL-SCNC: 140 MMOL/L (ref 136–145)
TRIGL SERPL-MCNC: 146 MG/DL (ref 0–150)
VLDLC SERPL-MCNC: 25 MG/DL (ref 5–40)
WBC NRBC COR # BLD: 7.63 10*3/MM3 (ref 3.4–10.8)

## 2023-03-14 PROCEDURE — 93571 IV DOP VEL&/PRESS C FLO 1ST: CPT | Performed by: INTERNAL MEDICINE

## 2023-03-14 PROCEDURE — C1725 CATH, TRANSLUMIN NON-LASER: HCPCS | Performed by: INTERNAL MEDICINE

## 2023-03-14 PROCEDURE — 80053 COMPREHEN METABOLIC PANEL: CPT | Performed by: NURSE PRACTITIONER

## 2023-03-14 PROCEDURE — C1887 CATHETER, GUIDING: HCPCS | Performed by: INTERNAL MEDICINE

## 2023-03-14 PROCEDURE — 25510000001 IOPAMIDOL PER 1 ML: Performed by: INTERNAL MEDICINE

## 2023-03-14 PROCEDURE — 93458 L HRT ARTERY/VENTRICLE ANGIO: CPT | Performed by: INTERNAL MEDICINE

## 2023-03-14 PROCEDURE — 92928 PRQ TCAT PLMT NTRAC ST 1 LES: CPT | Performed by: INTERNAL MEDICINE

## 2023-03-14 PROCEDURE — 36415 COLL VENOUS BLD VENIPUNCTURE: CPT

## 2023-03-14 PROCEDURE — C9600 PERC DRUG-EL COR STENT SING: HCPCS | Performed by: INTERNAL MEDICINE

## 2023-03-14 PROCEDURE — C1894 INTRO/SHEATH, NON-LASER: HCPCS | Performed by: INTERNAL MEDICINE

## 2023-03-14 PROCEDURE — 25010000002 BIVALIRUDIN TRIFLUOROACETATE 250 MG RECONSTITUTED SOLUTION 1 EACH VIAL: Performed by: INTERNAL MEDICINE

## 2023-03-14 PROCEDURE — 25010000002 FENTANYL CITRATE (PF) 50 MCG/ML SOLUTION: Performed by: INTERNAL MEDICINE

## 2023-03-14 PROCEDURE — 25010000002 BIVALIRUDIN TRIFLUOROACETATE 250 MG RECONSTITUTED SOLUTION: Performed by: INTERNAL MEDICINE

## 2023-03-14 PROCEDURE — 25010000002 HEPARIN (PORCINE) PER 1000 UNITS: Performed by: INTERNAL MEDICINE

## 2023-03-14 PROCEDURE — C1769 GUIDE WIRE: HCPCS | Performed by: INTERNAL MEDICINE

## 2023-03-14 PROCEDURE — 71045 X-RAY EXAM CHEST 1 VIEW: CPT

## 2023-03-14 PROCEDURE — C1874 STENT, COATED/COV W/DEL SYS: HCPCS | Performed by: INTERNAL MEDICINE

## 2023-03-14 PROCEDURE — 83036 HEMOGLOBIN GLYCOSYLATED A1C: CPT | Performed by: NURSE PRACTITIONER

## 2023-03-14 PROCEDURE — 93799 UNLISTED CV SVC/PROCEDURE: CPT | Performed by: INTERNAL MEDICINE

## 2023-03-14 PROCEDURE — 80061 LIPID PANEL: CPT | Performed by: NURSE PRACTITIONER

## 2023-03-14 PROCEDURE — 85027 COMPLETE CBC AUTOMATED: CPT | Performed by: NURSE PRACTITIONER

## 2023-03-14 PROCEDURE — 25010000002 MIDAZOLAM PER 1 MG: Performed by: INTERNAL MEDICINE

## 2023-03-14 DEVICE — XIENCE SKYPOINT™ EVEROLIMUS ELUTING CORONARY STENT SYSTEM 3.50 MM X 15 MM / RAPID-EXCHANGE
Type: IMPLANTABLE DEVICE | Status: FUNCTIONAL
Brand: XIENCE SKYPOINT™

## 2023-03-14 RX ORDER — ASPIRIN 325 MG
325 TABLET ORAL ONCE
Status: DISCONTINUED | OUTPATIENT
Start: 2023-03-14 | End: 2023-03-14 | Stop reason: HOSPADM

## 2023-03-14 RX ORDER — SODIUM CHLORIDE 0.9 % (FLUSH) 0.9 %
10 SYRINGE (ML) INJECTION EVERY 12 HOURS SCHEDULED
Status: DISCONTINUED | OUTPATIENT
Start: 2023-03-14 | End: 2023-03-14 | Stop reason: HOSPADM

## 2023-03-14 RX ORDER — FENTANYL CITRATE 50 UG/ML
INJECTION, SOLUTION INTRAMUSCULAR; INTRAVENOUS
Status: DISCONTINUED | OUTPATIENT
Start: 2023-03-14 | End: 2023-03-14 | Stop reason: HOSPADM

## 2023-03-14 RX ORDER — CLOPIDOGREL BISULFATE 75 MG/1
TABLET ORAL
Status: DISCONTINUED | OUTPATIENT
Start: 2023-03-14 | End: 2023-03-14 | Stop reason: HOSPADM

## 2023-03-14 RX ORDER — SODIUM CHLORIDE 9 MG/ML
1-3 INJECTION, SOLUTION INTRAVENOUS CONTINUOUS
Status: DISCONTINUED | OUTPATIENT
Start: 2023-03-14 | End: 2023-03-14

## 2023-03-14 RX ORDER — ACETAMINOPHEN 325 MG/1
650 TABLET ORAL EVERY 4 HOURS PRN
Status: DISCONTINUED | OUTPATIENT
Start: 2023-03-14 | End: 2023-03-14 | Stop reason: HOSPADM

## 2023-03-14 RX ORDER — LIDOCAINE HYDROCHLORIDE 10 MG/ML
INJECTION, SOLUTION EPIDURAL; INFILTRATION; INTRACAUDAL; PERINEURAL
Status: DISCONTINUED | OUTPATIENT
Start: 2023-03-14 | End: 2023-03-14 | Stop reason: HOSPADM

## 2023-03-14 RX ORDER — HEPARIN SODIUM 1000 [USP'U]/ML
INJECTION, SOLUTION INTRAVENOUS; SUBCUTANEOUS
Status: DISCONTINUED | OUTPATIENT
Start: 2023-03-14 | End: 2023-03-14 | Stop reason: HOSPADM

## 2023-03-14 RX ORDER — SODIUM CHLORIDE 9 MG/ML
100 INJECTION, SOLUTION INTRAVENOUS CONTINUOUS
Status: ACTIVE | OUTPATIENT
Start: 2023-03-14 | End: 2023-03-14

## 2023-03-14 RX ORDER — SODIUM CHLORIDE 0.9 % (FLUSH) 0.9 %
1-10 SYRINGE (ML) INJECTION AS NEEDED
Status: DISCONTINUED | OUTPATIENT
Start: 2023-03-14 | End: 2023-03-14 | Stop reason: HOSPADM

## 2023-03-14 RX ORDER — ASPIRIN 325 MG
325 TABLET, DELAYED RELEASE (ENTERIC COATED) ORAL DAILY
Status: DISCONTINUED | OUTPATIENT
Start: 2023-03-15 | End: 2023-03-14 | Stop reason: HOSPADM

## 2023-03-14 RX ORDER — NITROGLYCERIN 0.4 MG/1
0.4 TABLET SUBLINGUAL
Status: DISCONTINUED | OUTPATIENT
Start: 2023-03-14 | End: 2023-03-14 | Stop reason: HOSPADM

## 2023-03-14 RX ORDER — ONDANSETRON 2 MG/ML
4 INJECTION INTRAMUSCULAR; INTRAVENOUS EVERY 6 HOURS PRN
Status: DISCONTINUED | OUTPATIENT
Start: 2023-03-14 | End: 2023-03-14 | Stop reason: HOSPADM

## 2023-03-14 RX ORDER — NICARDIPINE HCL-0.9% SOD CHLOR 1 MG/10 ML
SYRINGE (ML) INTRAVENOUS
Status: DISCONTINUED | OUTPATIENT
Start: 2023-03-14 | End: 2023-03-14 | Stop reason: HOSPADM

## 2023-03-14 RX ORDER — MULTIVIT WITH MINERALS/LUTEIN
250 TABLET ORAL DAILY
COMMUNITY

## 2023-03-14 RX ORDER — CLOPIDOGREL BISULFATE 75 MG/1
75 TABLET ORAL DAILY
Qty: 30 TABLET | Refills: 11 | Status: SHIPPED | OUTPATIENT
Start: 2023-03-14

## 2023-03-14 RX ORDER — SODIUM CHLORIDE 9 MG/ML
40 INJECTION, SOLUTION INTRAVENOUS AS NEEDED
Status: DISCONTINUED | OUTPATIENT
Start: 2023-03-14 | End: 2023-03-14 | Stop reason: HOSPADM

## 2023-03-14 RX ORDER — BIVALIRUDIN 250 MG/5ML
INJECTION, POWDER, LYOPHILIZED, FOR SOLUTION INTRAVENOUS
Status: DISCONTINUED | OUTPATIENT
Start: 2023-03-14 | End: 2023-03-14 | Stop reason: HOSPADM

## 2023-03-14 RX ORDER — MIDAZOLAM HYDROCHLORIDE 1 MG/ML
INJECTION INTRAMUSCULAR; INTRAVENOUS
Status: DISCONTINUED | OUTPATIENT
Start: 2023-03-14 | End: 2023-03-14 | Stop reason: HOSPADM

## 2023-03-14 RX ADMIN — SODIUM CHLORIDE 100 ML/HR: 9 INJECTION, SOLUTION INTRAVENOUS at 09:34

## 2023-03-14 NOTE — INTERVAL H&P NOTE
H&P reviewed. The patient was examined and there are no changes to the H&P.    FRANKIE Sung

## 2023-03-14 NOTE — PROGRESS NOTES
Pt. Referred for Phase II Cardiac Rehab. Staff discussed benefits of exercise, program protocol, and educational material provided. Teach back verified.  Permission granted from patient for staff to fax referral information to outlying program at this time.  Staff faxed referral info to [facility] cardiac rehab.

## 2023-03-15 ENCOUNTER — CALL CENTER PROGRAMS (OUTPATIENT)
Dept: CALL CENTER | Facility: HOSPITAL | Age: 63
End: 2023-03-15
Payer: MEDICARE

## 2023-03-15 NOTE — OUTREACH NOTE
PCI/Device Survey    Flowsheet Row Responses   Facility patient discharged from? [place]   Procedure date 03/14/23   Procedure (if device, specify in description) PCI   PCI site Right, Arm   Performing MD Dr. Jose Jones   Attempt successful? No   Unsuccessful attempts Attempt 1          Daisy WOLFE - Registered Nurse

## 2023-03-15 NOTE — OUTREACH NOTE
PCI/Device Survey    Flowsheet Row Responses   Facility patient discharged from? [place]   Procedure date 03/14/23   Procedure (if device, specify in description) PCI   PCI site Right, Arm   Performing MD Dr. Jose Jones   Attempt successful? No   Unsuccessful attempts Attempt 2          Daisy WOLFE - Registered Nurse

## 2023-04-24 NOTE — PROGRESS NOTES
Subjective:     Encounter Date:04/26/2023    Primary Care Physician: Shanelle Barrow APRN      Patient ID: Howard Owusu is a 63 y.o. male.    Chief Complaint:Coronary Artery Disease    Problem List:   1. Coronary artery disease/ischemic cardiomyopathy:  a. [facility] for acute anterior MI, 2004: EF 30%. 100% proximal LAD now s/p 3.0 x 12 and a 2.7 x 16 Taxus stent. RCA and LCx were within normal limits.  b. [facility], 11/2004: LAD patent stent noted. Ostial D1 stenosis of 99% which was unable to be crossed. LCx and RCA were still within normal limits. EF 35%.  c. [facility], 06/2008: EF 15%. 50% distal RCA stenosis, and left coronary anatomy was unchanged.  d. [facility], 06/2011: EF 20%, 90% distal RCA (3.0 x 12 and 3.0 x 8 mm Cypher), and 90% right posterolateral (2.5 x 8 Cypher), and 85% mid LAD (3.0 x 13 mm Cypher).  e. 2/2023 [facility]PS with old anterior MI, no viability.  Inferior lateral ischemia.  f. 3/14/2023 [facility] 90% proximal LAD (3.5 x 15 Xience RICHARD).  50% mid RCA with normal RFR.  EF 20%.  Normal LVEDP.  2. Ventricular tachycardia:  a. 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  b. November 2004, [employer] ICD with DFT testing done.  c. July 2008, due to ischemic cardiomyopathy, patient was upgraded to a [employer] Eliezer Bi-V ICD by Dr. Mtz.  d. June 2010, appropriate ICD shocks.  e. Generator change, [employer] Eliezer Bi-V ICD (Dr. Atkins), 12/11/2013.  f. Recent Lead extraction of failed Jena lead and revision with new RA/RV lead and generator change with Dr. Cruz 5/25/2021 [facility]  3. Ischemic cardiomyopathy.  4. Atrial fibrillation  1.  Initial with VF during a PCI, cardioverted to atrial fibrillation. After 24 hours, converted to sinus rhythm.  2. 3/2/2022 ECV to sinus rhythm, Dr. Cruz.  5. Hypertension.  6. Hyperlipidemia.  7. Hypothyroidism.  8. GERD.  9. GILL.  10. Surgeries:  1. Surgery for nephrolithiasis      Allergies   Allergen Reactions   • Morphine And Related Hives   • Penicillins  Unknown - Low Severity     Patient reports tolerating amoxicillin.   • Sulfa Antibiotics Unknown - Low Severity     Was told by MD that he is allergic but does not know the reaction         Current Outpatient Medications:   •  apixaban (ELIQUIS) 5 MG tablet tablet, Take 1 tablet by mouth 2 (Two) Times a Day., Disp: 60 tablet, Rfl: 6  •  aspirin 81 MG chewable tablet, Chew 1 tablet Daily., Disp: , Rfl:   •  atorvastatin (LIPITOR) 80 MG tablet, Take 1 tablet by mouth Every Night., Disp: 90 tablet, Rfl: 3  •  bisoprolol (ZEBeta) 5 MG tablet, TAKE 1 TABLET EVERY DAY (Patient taking differently: Take 1 tablet by mouth Daily.), Disp: 90 tablet, Rfl: 3  •  Cholecalciferol (Vitamin D3) 25 MCG (1000 UT) capsule, Take  by mouth Daily., Disp: , Rfl:   •  clopidogrel (PLAVIX) 75 MG tablet, Take 1 tablet by mouth Daily., Disp: 30 tablet, Rfl: 11  •  empagliflozin (Jardiance) 10 MG tablet tablet, Take 1 tablet by mouth Daily., Disp: 30 tablet, Rfl: 6  •  Entresto 49-51 MG tablet, TAKE ONE TABLET BY MOUTH TWICE A DAY, Disp: 60 tablet, Rfl: 5  •  esomeprazole (NexIUM) 20 MG packet, Take 20 mg by mouth Every Morning Before Breakfast., Disp: , Rfl:   •  Garlic 1000 MG capsule, Take 1 capsule by mouth Daily., Disp: , Rfl:   •  levothyroxine (SYNTHROID, LEVOTHROID) 150 MCG tablet, Take 1 tablet by mouth Daily., Disp: , Rfl:   •  Multiple Vitamins-Minerals (ONE DAILY MENS PO), Take 1 tablet by mouth Daily., Disp: , Rfl:   •  nitroglycerin (NITROSTAT) 0.4 MG SL tablet, Place 1 tablet under the tongue Every 5 (Five) Minutes As Needed for Chest Pain. Take no more than 3 doses in 15 minutes., Disp: 25 tablet, Rfl: 3  •  Omega-3 Fatty Acids (OMEGA-3 FISH OIL EX ST) 880 MG capsule, Take 800 mg by mouth Daily., Disp: , Rfl:   •  sertraline (ZOLOFT) 100 MG tablet, Take 1 tablet by mouth Daily., Disp: , Rfl:   •  vitamin C (ASCORBIC ACID) 250 MG tablet, Take 1 tablet by mouth Daily., Disp: , Rfl:         History of Present Illness    Patient is  "a 63-year-old  male who we are seeing today for follow-up status post cardiac catheterization with subsequent LAD stenting.  Patient notes significant improvement in his anginal symptoms since that time.  Denies any current chest pain, pressure, tightness.  Notes some discomfort in cold weather but otherwise does well.  Denies any increase shortness of breath.  Reported syncope, near-syncope, or edema.  States he has been compliant with his medications.    The following portions of the patient's history were reviewed and updated as appropriate: allergies, current medications, past family history, past medical history, past social history, past surgical history and problem list.      Social History     Tobacco Use   • Smoking status: Never   • Smokeless tobacco: Never   Vaping Use   • Vaping Use: Never used   Substance Use Topics   • Alcohol use: No   • Drug use: No         Review of Systems   Constitutional: Positive for malaise/fatigue.   Cardiovascular: Negative for chest pain, dyspnea on exertion, leg swelling, palpitations and syncope.   Respiratory: Negative.  Negative for shortness of breath.    Hematologic/Lymphatic: Negative for bleeding problem. Does not bruise/bleed easily.   Skin: Negative for rash.   Musculoskeletal: Positive for arthritis. Negative for muscle weakness and myalgias.   Gastrointestinal: Negative for heartburn, nausea and vomiting.   Neurological: Negative for dizziness, light-headedness, loss of balance and numbness.          Objective:   /80 (BP Location: Right arm, Patient Position: Sitting)   Pulse 68   Ht 167.6 cm (66\")   Wt 102 kg (225 lb)   SpO2 96%   BMI 36.32 kg/m²         Vitals reviewed.   Constitutional:       Appearance: Well-developed and not in distress.   Neck:      Vascular: No JVD.      Trachea: No tracheal deviation.   Pulmonary:      Effort: Pulmonary effort is normal.      Breath sounds: Normal breath sounds.   Cardiovascular:      Normal rate. " Regular rhythm.   Edema:     Peripheral edema absent.   Abdominal:      Tenderness: There is no abdominal tenderness.   Musculoskeletal:         General: No deformity. Skin:     General: Skin is warm and dry.   Neurological:      Mental Status: Alert and oriented to person, place, and time.         Procedures          Assessment:   Assessment & Plan      Diagnoses and all orders for this visit:    1. Coronary artery disease involving native coronary artery of native heart without angina pectoris (Primary), stable.  No angina.  Improved status post recent LAD stent.  On aspirin and Plavix.    2. Ischemic cardiomyopathy, stable.  Normal LVEDP at time of cardiac catheterization.  On bisoprolol and Entresto.    3. Pure hypercholesterolemia, controlled by a lipid panel at time of cardiac catheterization with LDL noted to be 55.  On atorvastatin.      Plan:  1. Discontinue aspirin and continue Plavix and Eliquis at this time.  2. Continue other current cardiac medications.  3. Follow-up in 6 months time with a device check or sooner if needed.       FRANKIE Sung     Advance Care Planning   ACP discussion was held with the patient during this visit. Patient has an advance directive (not in EMR), copy requested.        Dictated utilizing Dragon dictation

## 2023-04-25 ENCOUNTER — TELEPHONE (OUTPATIENT)
Dept: CARDIOLOGY | Facility: CLINIC | Age: 63
End: 2023-04-25
Payer: MEDICARE

## 2023-04-25 RX ORDER — APIXABAN 5 MG/1
TABLET, FILM COATED ORAL
Qty: 60 TABLET | Refills: 6 | OUTPATIENT
Start: 2023-04-25

## 2023-04-25 RX ORDER — SACUBITRIL AND VALSARTAN 49; 51 MG/1; MG/1
TABLET, FILM COATED ORAL
Qty: 60 TABLET | Refills: 5 | Status: SHIPPED | OUTPATIENT
Start: 2023-04-25

## 2023-04-25 NOTE — TELEPHONE ENCOUNTER
Called [facility] pharmacy to check for co pays for:    Jardiance 10 MG:   $47.00 per month    Entresto 49-51:  $47.00 per month    Eliquis 5 mg:  $48.69 per month

## 2023-04-26 ENCOUNTER — OFFICE VISIT (OUTPATIENT)
Dept: CARDIOLOGY | Facility: CLINIC | Age: 63
End: 2023-04-26
Payer: MEDICARE

## 2023-04-26 ENCOUNTER — TELEPHONE (OUTPATIENT)
Dept: CARDIOLOGY | Facility: CLINIC | Age: 63
End: 2023-04-26

## 2023-04-26 VITALS
HEIGHT: 66 IN | WEIGHT: 225 LBS | DIASTOLIC BLOOD PRESSURE: 80 MMHG | OXYGEN SATURATION: 96 % | BODY MASS INDEX: 36.16 KG/M2 | SYSTOLIC BLOOD PRESSURE: 128 MMHG | HEART RATE: 68 BPM

## 2023-04-26 DIAGNOSIS — I25.10 CORONARY ARTERY DISEASE INVOLVING NATIVE CORONARY ARTERY OF NATIVE HEART WITHOUT ANGINA PECTORIS: Primary | ICD-10-CM

## 2023-04-26 DIAGNOSIS — E78.00 PURE HYPERCHOLESTEROLEMIA: ICD-10-CM

## 2023-04-26 DIAGNOSIS — I25.5 ISCHEMIC CARDIOMYOPATHY: ICD-10-CM

## 2023-04-26 NOTE — LETTER
April 26, 2023       No Recipients    Patient: Howard Owusu   YOB: 1960   Date of Visit: 4/26/2023       Dear Dr. Spann Recipients:    Thank you for referring Howard Owusu to me for evaluation. Below are the relevant portions of my assessment and plan of care.    If you have questions, please do not hesitate to call me. I look forward to following Howard along with you.         Sincerely,        FRANKIE Sung        CC:   No Recipients    Zeina Moya APRN  04/26/23 1028  Signed  Subjective:     Encounter Date:04/26/2023    Primary Care Physician: Shanelle Barrow APRN     Patient ID: Howard Owusu is a 63 y.o. male.    Chief Complaint:Coronary Artery Disease    Problem List:   1. Coronary artery disease/ischemic cardiomyopathy:  a. [facility] for acute anterior MI, 2004: EF 30%. 100% proximal LAD now s/p 3.0 x 12 and a 2.7 x 16 Taxus stent. RCA and LCx were within normal limits.  b. [facility], 11/2004: LAD patent stent noted. Ostial D1 stenosis of 99% which was unable to be crossed. LCx and RCA were still within normal limits. EF 35%.  c. [facility], 06/2008: EF 15%. 50% distal RCA stenosis, and left coronary anatomy was unchanged.  d. [facility], 06/2011: EF 20%, 90% distal RCA (3.0 x 12 and 3.0 x 8 mm Cypher), and 90% right posterolateral (2.5 x 8 Cypher), and 85% mid LAD (3.0 x 13 mm Cypher).  e. 2/2023 [facility]PS with old anterior MI, no viability.  Inferior lateral ischemia.  f. 3/14/2023 [facility] 90% proximal LAD (3.5 x 15 Xience RICHARD).  50% mid RCA with normal RFR.  EF 20%.  Normal LVEDP.  2. Ventricular tachycardia:  a. 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  b. November 2004, [employer] ICD with DFT testing done.  c. July 2008, due to ischemic cardiomyopathy, patient was upgraded to a [employer]zer Bi-V ICD by Dr. Mtz.  d. June 2010, appropriate ICD shocks.  e. Generator change, [employer] El[employer]zer Bi-V ICD (Dr. Atkins), 12/11/2013.  f. Recent Lead extraction of  failed Jena lead and revision with new RA/RV lead and generator change with Dr. Cruz 5/25/2021 [facility]  3. Ischemic cardiomyopathy.  4. Atrial fibrillation  1.  Initial with VF during a PCI, cardioverted to atrial fibrillation. After 24 hours, converted to sinus rhythm.  2. 3/2/2022 ECV to sinus rhythm, Dr. Cruz.  5. Hypertension.  6. Hyperlipidemia.  7. Hypothyroidism.  8. GERD.  9. GILL.  10. Surgeries:  1. Surgery for nephrolithiasis      Allergies   Allergen Reactions   • Morphine And Related Hives   • Penicillins Unknown - Low Severity     Patient reports tolerating amoxicillin.   • Sulfa Antibiotics Unknown - Low Severity     Was told by MD that he is allergic but does not know the reaction         Current Outpatient Medications:   •  apixaban (ELIQUIS) 5 MG tablet tablet, Take 1 tablet by mouth 2 (Two) Times a Day., Disp: 60 tablet, Rfl: 6  •  aspirin 81 MG chewable tablet, Chew 1 tablet Daily., Disp: , Rfl:   •  atorvastatin (LIPITOR) 80 MG tablet, Take 1 tablet by mouth Every Night., Disp: 90 tablet, Rfl: 3  •  bisoprolol (ZEBeta) 5 MG tablet, TAKE 1 TABLET EVERY DAY (Patient taking differently: Take 1 tablet by mouth Daily.), Disp: 90 tablet, Rfl: 3  •  Cholecalciferol (Vitamin D3) 25 MCG (1000 UT) capsule, Take  by mouth Daily., Disp: , Rfl:   •  clopidogrel (PLAVIX) 75 MG tablet, Take 1 tablet by mouth Daily., Disp: 30 tablet, Rfl: 11  •  empagliflozin (Jardiance) 10 MG tablet tablet, Take 1 tablet by mouth Daily., Disp: 30 tablet, Rfl: 6  •  Entresto 49-51 MG tablet, TAKE ONE TABLET BY MOUTH TWICE A DAY, Disp: 60 tablet, Rfl: 5  •  esomeprazole (NexIUM) 20 MG packet, Take 20 mg by mouth Every Morning Before Breakfast., Disp: , Rfl:   •  Garlic 1000 MG capsule, Take 1 capsule by mouth Daily., Disp: , Rfl:   •  levothyroxine (SYNTHROID, LEVOTHROID) 150 MCG tablet, Take 1 tablet by mouth Daily., Disp: , Rfl:   •  Multiple Vitamins-Minerals (ONE DAILY MENS PO), Take 1 tablet by mouth Daily., Disp: , Rfl:   •   nitroglycerin (NITROSTAT) 0.4 MG SL tablet, Place 1 tablet under the tongue Every 5 (Five) Minutes As Needed for Chest Pain. Take no more than 3 doses in 15 minutes., Disp: 25 tablet, Rfl: 3  •  Omega-3 Fatty Acids (OMEGA-3 FISH OIL EX ST) 880 MG capsule, Take 800 mg by mouth Daily., Disp: , Rfl:   •  sertraline (ZOLOFT) 100 MG tablet, Take 1 tablet by mouth Daily., Disp: , Rfl:   •  vitamin C (ASCORBIC ACID) 250 MG tablet, Take 1 tablet by mouth Daily., Disp: , Rfl:         History of Present Illness    Patient is a 63-year-old  male who we are seeing today for follow-up status post cardiac catheterization with subsequent LAD stenting.  Patient notes significant improvement in his anginal symptoms since that time.  Denies any current chest pain, pressure, tightness.  Notes some discomfort in cold weather but otherwise does well.  Denies any increase shortness of breath.  Reported syncope, near-syncope, or edema.  States he has been compliant with his medications.    The following portions of the patient's history were reviewed and updated as appropriate: allergies, current medications, past family history, past medical history, past social history, past surgical history and problem list.      Social History     Tobacco Use   • Smoking status: Never   • Smokeless tobacco: Never   Vaping Use   • Vaping Use: Never used   Substance Use Topics   • Alcohol use: No   • Drug use: No         Review of Systems   Constitutional: Positive for malaise/fatigue.   Cardiovascular: Negative for chest pain, dyspnea on exertion, leg swelling, palpitations and syncope.   Respiratory: Negative.  Negative for shortness of breath.    Hematologic/Lymphatic: Negative for bleeding problem. Does not bruise/bleed easily.   Skin: Negative for rash.   Musculoskeletal: Positive for arthritis. Negative for muscle weakness and myalgias.   Gastrointestinal: Negative for heartburn, nausea and vomiting.   Neurological: Negative for  "dizziness, light-headedness, loss of balance and numbness.         Objective:   /80 (BP Location: Right arm, Patient Position: Sitting)   Pulse 68   Ht 167.6 cm (66\")   Wt 102 kg (225 lb)   SpO2 96%   BMI 36.32 kg/m²        Vitals reviewed.   Constitutional:       Appearance: Well-developed and not in distress.   Neck:      Vascular: No JVD.      Trachea: No tracheal deviation.   Pulmonary:      Effort: Pulmonary effort is normal.      Breath sounds: Normal breath sounds.   Cardiovascular:      Normal rate. Regular rhythm.   Edema:     Peripheral edema absent.   Abdominal:      Tenderness: There is no abdominal tenderness.   Musculoskeletal:         General: No deformity. Skin:     General: Skin is warm and dry.   Neurological:      Mental Status: Alert and oriented to person, place, and time.         Procedures         Assessment:   Assessment & Plan      Diagnoses and all orders for this visit:    1. Coronary artery disease involving native coronary artery of native heart without angina pectoris (Primary), stable.  No angina.  Improved status post recent LAD stent.  On aspirin and Plavix.    2. Ischemic cardiomyopathy, stable.  Normal LVEDP at time of cardiac catheterization.  On bisoprolol and Entresto.    3. Pure hypercholesterolemia, controlled by a lipid panel at time of cardiac catheterization with LDL noted to be 55.  On atorvastatin.      Plan:  1. Discontinue aspirin and continue Plavix and Eliquis at this time.  2. Continue other current cardiac medications.  3. Follow-up in 6 months time with a device check or sooner if needed.      FRANKIE Sung     Advance Care Planning   ACP discussion was held with the patient during this visit. Patient has an advance directive (not in EMR), copy requested.       Dictated utilizing Dragon dictation    "

## 2023-06-19 NOTE — DISCHARGE INSTRUCTIONS

## 2023-11-06 NOTE — PROGRESS NOTES
Subjective:     Encounter Date:11/07/2023    Primary Care Physician: Shanelle Barrow APRN      Patient ID: Howard Owusu is a 63 y.o. male.    Chief Complaint:Coronary Artery Disease (Refill meds)      Problem List:   Coronary artery disease/ischemic cardiomyopathy:  [facility] for acute anterior MI, 2004: EF 30%. 100% proximal LAD now s/p 3.0 x 12 and a 2.7 x 16 Taxus stent. RCA and LCx were within normal limits.  [facility], 11/2004: LAD patent stent noted. Ostial D1 stenosis of 99% which was unable to be crossed. LCx and RCA were still within normal limits. EF 35%.  [facility], 06/2008: EF 15%. 50% distal RCA stenosis, and left coronary anatomy was unchanged.  [facility], 06/2011: EF 20%, 90% distal RCA (3.0 x 12 and 3.0 x 8 mm Cypher), and 90% right posterolateral (2.5 x 8 Cypher), and 85% mid LAD (3.0 x 13 mm Cypher).  2/2023 MPS with old anterior MI, no viability.  Inferior lateral ischemia.  3/14/2023 [facility] 90% proximal LAD (3.5 x 15 Xience RICHARD).  50% mid RCA with normal RFR.  EF 20%.  Normal LVEDP.  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  November 2004, [employer] ICD with DFT testing done.  July 2008, due to ischemic cardiomyopathy, patient was upgraded to a [employer]zer Bi-V ICD by Dr. Mtz.  June 2010, appropriate ICD shocks.  Generator change, [employer] El[employer]zer Bi-V ICD (Dr. Atkins), 12/11/2013.  Recent Lead extraction of failed Jena lead and revision with new RA/RV lead and generator change with Dr. Cruz 5/25/2021 [facility]  Ischemic cardiomyopathy.  Atrial fibrillation   Initial with VF during a PCI, cardioverted to atrial fibrillation. After 24 hours, converted to sinus rhythm.  3/2/2022 ECV to sinus rhythm, Dr. Cruz.  Hypertension.  Hyperlipidemia.  Hypothyroidism.  GERD.  GILL.  Surgeries:  Surgery for nephrolithiasis      Allergies   Allergen Reactions    Morphine And Related Hives    Penicillins Unknown - Low Severity     Patient reports tolerating  amoxicillin.    Sulfa Antibiotics Unknown - Low Severity     Was told by MD that he is allergic but does not know the reaction         Current Outpatient Medications:     apixaban (ELIQUIS) 5 MG tablet tablet, Take 1 tablet by mouth 2 (Two) Times a Day., Disp: 60 tablet, Rfl: 6    atorvastatin (LIPITOR) 80 MG tablet, Take 1 tablet by mouth Every Night., Disp: 90 tablet, Rfl: 3    bisoprolol (ZEBeta) 5 MG tablet, TAKE 1 TABLET EVERY DAY (Patient taking differently: Take 1 tablet by mouth Daily.), Disp: 90 tablet, Rfl: 3    Cholecalciferol (Vitamin D3) 25 MCG (1000 UT) capsule, Take  by mouth Daily., Disp: , Rfl:     clopidogrel (PLAVIX) 75 MG tablet, Take 1 tablet by mouth Daily., Disp: 30 tablet, Rfl: 11    empagliflozin (Jardiance) 10 MG tablet tablet, Take 1 tablet by mouth Daily., Disp: 30 tablet, Rfl: 6    Entresto 49-51 MG tablet, TAKE ONE TABLET BY MOUTH TWICE A DAY, Disp: 60 tablet, Rfl: 5    esomeprazole (NexIUM) 20 MG packet, Take 20 mg by mouth Every Morning Before Breakfast., Disp: , Rfl:     Garlic 1000 MG capsule, Take 1 capsule by mouth Daily., Disp: , Rfl:     levothyroxine (SYNTHROID, LEVOTHROID) 150 MCG tablet, Take 1 tablet by mouth Daily., Disp: , Rfl:     Multiple Vitamins-Minerals (ONE DAILY MENS PO), Take 1 tablet by mouth Daily., Disp: , Rfl:     nitroglycerin (NITROSTAT) 0.4 MG SL tablet, Place 1 tablet under the tongue Every 5 (Five) Minutes As Needed for Chest Pain. Take no more than 3 doses in 15 minutes., Disp: 25 tablet, Rfl: 3    Omega-3 Fatty Acids (OMEGA-3 FISH OIL EX ST) 880 MG capsule, Take 800 mg by mouth Daily., Disp: , Rfl:     sertraline (ZOLOFT) 100 MG tablet, Take 1 tablet by mouth Daily., Disp: , Rfl:     vitamin C (ASCORBIC ACID) 250 MG tablet, Take 1 tablet by mouth Daily., Disp: , Rfl:         History of Present Illness    Patient is a 63-year-old  male who presents today for 6-month follow-up of coronary artery disease, ischemic cardiomyopathy and chronic  "systolic heart failure.  Patient notes initially after intervention his shortness of breath was improved.  However, about a month later feels that shortness of breath has been getting progressively worse.  Does note some shortness of breath at rest as well as some orthopnea.  Notes that he drinks about 5 quarts of water per day.  Also over the course of the last 3 days has had recurrent episodes of brief blindness and a shocking sensation through his head and neck.  These last only for a second and then resolved.  No noted ICD shocks on interrogation today.  Denies chest pain.    The following portions of the patient's history were reviewed and updated as appropriate: allergies, current medications, past family history, past medical history, past social history, past surgical history and problem list.      Social History     Tobacco Use    Smoking status: Never    Smokeless tobacco: Never   Vaping Use    Vaping Use: Never used   Substance Use Topics    Alcohol use: No    Drug use: No         ROS       Objective:   /82   Pulse 73   Ht 167.6 cm (66\")   Wt 96.8 kg (213 lb 6.4 oz)   SpO2 97%   BMI 34.44 kg/m²         Vitals reviewed.   Constitutional:       Appearance: Well-developed.      Comments: Obese, mildly dyspneic.   Neck:      Vascular: No JVD.      Trachea: No tracheal deviation.   Pulmonary:      Effort: Pulmonary effort is normal.      Breath sounds: Normal breath sounds.   Cardiovascular:      Normal rate. Regular rhythm.      Murmurs: There is a grade 2/6 systolic murmur.   Pulses:     Intact distal pulses.   Edema:     Peripheral edema absent.   Musculoskeletal:         General: No deformity. Skin:     General: Skin is warm and dry.   Neurological:      Mental Status: Alert and oriented to person, place, and time.         Procedures  Device check:  Biventricular ICD interrogation 22% right atrially paced, 79% biventricular paced.  72% mode switching atrial sensing changed appears to be 100%.  A " sensitivity increased 0.2 from auto since.        Assessment:   Assessment & Plan      Diagnoses and all orders for this visit:    1. Acute on chronic systolic heart failure (CMS/HCC) (Primary)    2. Coronary artery disease involving native coronary artery of native heart without angina pectoris    3. Paroxysmal atrial fibrillation    4. Primary hypertension    Other orders  -     furosemide (LASIX) 20 MG tablet; Take 1 tablet by mouth Daily.  Dispense: 30 tablet; Refill: 11  -     spironolactone (ALDACTONE) 25 MG tablet; Take 1 tablet by mouth Daily.  Dispense: 30 tablet; Refill: 11      Assessment:  Acute on chronic systolic heart failure.  Patient with noted elevated volume readings by device interrogation today.  Currently not on diuretic therapy.  On Entresto.  Coronary artery disease, stable.  Symptoms most consistent with congestive heart failure today.  Paroxysmal atrial fibrillation, stable.  Chronically anticoagulated with Eliquis.  Hypertension, mildly elevated today.  On beta-blocker.       Plan:  Given patient's noted evidence of volume overload we will add Lasix 20 mg daily and Aldactone 25 mg daily.  Continue current medications.  Discussed with patient sodium and volume restriction.  Follow-up in 4 to 6 weeks time with device check and a BMP or sooner if needed.    FRANKIE Sung scribed this dictation for Dr. Jose Jones.       I have seen and examined the patient, I have reviewed the note, discussed the case with the advance practice clinician, made necessary changes and I agree with the final note.    Jose Jones MD  11/07/23  12:37 EST      Dictated utilizing Dragon dictation

## 2023-11-07 ENCOUNTER — OFFICE VISIT (OUTPATIENT)
Dept: CARDIOLOGY | Facility: CLINIC | Age: 63
End: 2023-11-07
Payer: MEDICARE

## 2023-11-07 VITALS
BODY MASS INDEX: 34.3 KG/M2 | HEIGHT: 66 IN | OXYGEN SATURATION: 97 % | DIASTOLIC BLOOD PRESSURE: 82 MMHG | HEART RATE: 73 BPM | WEIGHT: 213.4 LBS | SYSTOLIC BLOOD PRESSURE: 149 MMHG

## 2023-11-07 DIAGNOSIS — I10 PRIMARY HYPERTENSION: ICD-10-CM

## 2023-11-07 DIAGNOSIS — I48.0 PAROXYSMAL ATRIAL FIBRILLATION: ICD-10-CM

## 2023-11-07 DIAGNOSIS — I50.23 ACUTE ON CHRONIC SYSTOLIC HEART FAILURE: Primary | ICD-10-CM

## 2023-11-07 DIAGNOSIS — I25.10 CORONARY ARTERY DISEASE INVOLVING NATIVE CORONARY ARTERY OF NATIVE HEART WITHOUT ANGINA PECTORIS: ICD-10-CM

## 2023-11-07 RX ORDER — FUROSEMIDE 20 MG/1
20 TABLET ORAL DAILY
Qty: 30 TABLET | Refills: 11 | Status: SHIPPED | OUTPATIENT
Start: 2023-11-07

## 2023-11-07 RX ORDER — SPIRONOLACTONE 25 MG/1
25 TABLET ORAL DAILY
Qty: 30 TABLET | Refills: 11 | Status: SHIPPED | OUTPATIENT
Start: 2023-11-07

## 2023-11-07 RX ORDER — SACUBITRIL AND VALSARTAN 49; 51 MG/1; MG/1
1 TABLET, FILM COATED ORAL 2 TIMES DAILY
Qty: 180 TABLET | Refills: 3 | Status: SHIPPED | OUTPATIENT
Start: 2023-11-07

## 2023-12-04 NOTE — PROGRESS NOTES
Subjective:     Encounter Date:12/06/2023    Primary Care Physician: Shanelle Barrow APRN      Patient ID: Howard Owusu is a 63 y.o. male.    Chief Complaint:Coronary Artery Disease    Problem List:   Coronary artery disease/ischemic cardiomyopathy:  [facility] for acute anterior MI, 2004: EF 30%. 100% proximal LAD now s/p 3.0 x 12 and a 2.7 x 16 Taxus stent. RCA and LCx were within normal limits.  [facility], 11/2004: LAD patent stent noted. Ostial D1 stenosis of 99% which was unable to be crossed. LCx and RCA were still within normal limits. EF 35%.  [facility], 06/2008: EF 15%. 50% distal RCA stenosis, and left coronary anatomy was unchanged.  [facility], 06/2011: EF 20%, 90% distal RCA (3.0 x 12 and 3.0 x 8 mm Cypher), and 90% right posterolateral (2.5 x 8 Cypher), and 85% mid LAD (3.0 x 13 mm Cypher).  2/2023 MPS with old anterior MI, no viability.  Inferior lateral ischemia.  3/14/2023 [facility] 90% proximal LAD (3.5 x 15 Xience RICHARD).  50% mid RCA with normal RFR.  EF 20%.  Normal LVEDP.  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  November 2004, [employer] ICD with DFT testing done.  July 2008, due to ischemic cardiomyopathy, patient was upgraded to a [employer] Bi-V ICD by Dr. Mtz.  June 2010, appropriate ICD shocks.  Generator change, [employer] Bi-V ICD (Dr. Atkins), 12/11/2013.  Recent Lead extraction of failed Jena lead and revision with new RA/RV lead and generator change with Dr. Cruz 5/25/2021 [facility]  Ischemic cardiomyopathy.  Atrial fibrillation   Initial with VF during a PCI, cardioverted to atrial fibrillation. After 24 hours, converted to sinus rhythm.  3/2/2022 ECV to sinus rhythm, Dr. Cruz.  Hypertension.  Hyperlipidemia.  Hypothyroidism.  GERD.  GILL.  Surgeries:  Surgery for nephrolithiasis      Allergies   Allergen Reactions    Morphine And Related Hives    Penicillins Unknown - Low Severity     Patient reports tolerating amoxicillin.    Sulfa  Antibiotics Unknown - Low Severity     Was told by MD that he is allergic but does not know the reaction         Current Outpatient Medications:     apixaban (ELIQUIS) 5 MG tablet tablet, Take 1 tablet by mouth 2 (Two) Times a Day., Disp: 180 tablet, Rfl: 3    atorvastatin (LIPITOR) 80 MG tablet, Take 1 tablet by mouth Every Night., Disp: 90 tablet, Rfl: 3    bisoprolol (ZEBeta) 5 MG tablet, TAKE 1 TABLET EVERY DAY (Patient taking differently: Take 1 tablet by mouth Daily.), Disp: 90 tablet, Rfl: 3    Cholecalciferol (Vitamin D3) 25 MCG (1000 UT) capsule, Take  by mouth Daily., Disp: , Rfl:     clopidogrel (PLAVIX) 75 MG tablet, Take 1 tablet by mouth Daily., Disp: 30 tablet, Rfl: 11    empagliflozin (Jardiance) 10 MG tablet tablet, Take 1 tablet by mouth Daily., Disp: 90 tablet, Rfl: 3    esomeprazole (NexIUM) 20 MG packet, Take 20 mg by mouth Every Morning Before Breakfast., Disp: , Rfl:     furosemide (LASIX) 20 MG tablet, Take 1 tablet by mouth Daily., Disp: 30 tablet, Rfl: 11    Garlic 1000 MG capsule, Take 1 capsule by mouth Daily., Disp: , Rfl:     levothyroxine (SYNTHROID, LEVOTHROID) 150 MCG tablet, Take 1 tablet by mouth Daily., Disp: , Rfl:     Multiple Vitamins-Minerals (ONE DAILY MENS PO), Take 1 tablet by mouth Daily., Disp: , Rfl:     nitroglycerin (NITROSTAT) 0.4 MG SL tablet, Place 1 tablet under the tongue Every 5 (Five) Minutes As Needed for Chest Pain. Take no more than 3 doses in 15 minutes., Disp: 25 tablet, Rfl: 3    Omega-3 Fatty Acids (OMEGA-3 FISH OIL EX ST) 880 MG capsule, Take 800 mg by mouth Daily., Disp: , Rfl:     sacubitril-valsartan (Entresto) 49-51 MG tablet, Take 1 tablet by mouth 2 (Two) Times a Day., Disp: 180 tablet, Rfl: 3    sertraline (ZOLOFT) 100 MG tablet, Take 1 tablet by mouth Daily., Disp: , Rfl:     spironolactone (ALDACTONE) 25 MG tablet, Take 1 tablet by mouth Daily., Disp: 30 tablet, Rfl: 11    vitamin C (ASCORBIC ACID) 250 MG tablet, Take 1 tablet by mouth Daily.,  "Disp: , Rfl:         History of Present Illness    Patient returns today with known CAD ischemic cardiomyopathy, for further evaluation of dyspnea and imbalance.  Patient notes several months of progressive dyspnea on exertion, and some mild orthopnea.  No angina.  No peripheral edema.  He also notes 2 weeks of imbalance.  2 weeks ago he had severe episode of dysarthria, which had difficult word finding and producing speech.  He did not seek medical attention, and his symptoms improved the next day, but have not returned to normal.  No other motor or sensory symptoms.    The following portions of the patient's history were reviewed and updated as appropriate: allergies, current medications, past family history, past medical history, past social history, past surgical history and problem list.      Social History     Tobacco Use    Smoking status: Never    Smokeless tobacco: Never   Vaping Use    Vaping Use: Never used   Substance Use Topics    Alcohol use: No    Drug use: No         ROS       Objective:   /98   Pulse 78   Ht 167.6 cm (66\")   Wt 94.4 kg (208 lb 3.2 oz)   SpO2 96%   BMI 33.60 kg/m²         Vitals reviewed.   Constitutional:       Appearance: Well-developed.      Comments: Obese, mildly dyspneic.   Neck:      Vascular: No JVD.      Trachea: No tracheal deviation.   Pulmonary:      Effort: Pulmonary effort is normal.      Breath sounds: Normal breath sounds.   Cardiovascular:      Normal rate. Regular rhythm.   Pulses:     Intact distal pulses.   Edema:     Peripheral edema absent.   Musculoskeletal:         General: No deformity. Skin:     General: Skin is warm and dry.   Neurological:      Mental Status: Alert and oriented to person, place, and time.         Procedures  Device check:  Less than 1% atrial paced 46% biventricular pacing.  No VT or VF.  Patient is persistent A-fib/mode switch.  Rate is set at 70.  But has conduction faster than this predominantly.  4 and half years of battery " life.        Assessment:   Assessment & Plan      Diagnoses and all orders for this visit:    1. Coronary artery disease involving native coronary artery of native heart without angina pectoris (Primary)      1.  Coronary artery disease, 8-month status post LAD PCI.  No current angina  2.  Ischemic cardiomyopathy last LVEF of 20%.  Currently functional class II-3 with increasing dyspnea and orthopnea.  Temporally related to loss of BiV function (only 40 to 50%).  On max tolerated GDMT  3.  Atrial fibrillation, ventricular rate 70-90, higher than the ventricular rate limit for his biventricular pacer.  4.  Transient expressive aphasia, possible TIA/CVA.  Needs evaluation  5.  Hypertension well-controlled on current medical therapy  6.  Dyslipidemia on high intensity statin    Recommendations:  1.  Evaluate his neurologic symptoms with a carotid duplex, as well as a CT of head to rule out CVA.  2.  Check echocardiogram with [facility] to evaluate LVEF and for LV thrombus.  3.  Add Lasix 20 mg daily  4.  Given his high ventricular rates with persistent A-fib, as well as his worsening volume status would benefit from AV node ablation to ensure biventricular pacing.  I have discussed this with lecture physiology who concurs.  5.  Follow-up after the above testing, and AV node ablation.         Advance Care Planning   ACP discussion was held with the patient during this visit. Patient has an advance directive (not in EMR), copy requested.      Jose Jones MD    Dictated utilizing Dragon dictation

## 2023-12-06 ENCOUNTER — OFFICE VISIT (OUTPATIENT)
Dept: CARDIOLOGY | Facility: CLINIC | Age: 63
End: 2023-12-06
Payer: MEDICARE

## 2023-12-06 VITALS
OXYGEN SATURATION: 96 % | DIASTOLIC BLOOD PRESSURE: 98 MMHG | HEIGHT: 66 IN | HEART RATE: 78 BPM | BODY MASS INDEX: 33.46 KG/M2 | SYSTOLIC BLOOD PRESSURE: 146 MMHG | WEIGHT: 208.2 LBS

## 2023-12-06 DIAGNOSIS — I48.0 PAROXYSMAL ATRIAL FIBRILLATION: ICD-10-CM

## 2023-12-06 DIAGNOSIS — I25.10 CORONARY ARTERY DISEASE INVOLVING NATIVE CORONARY ARTERY OF NATIVE HEART WITHOUT ANGINA PECTORIS: Primary | ICD-10-CM

## 2023-12-06 DIAGNOSIS — I25.10 CORONARY ARTERY DISEASE INVOLVING NATIVE CORONARY ARTERY OF NATIVE HEART WITHOUT ANGINA PECTORIS: ICD-10-CM

## 2023-12-06 DIAGNOSIS — I25.5 ISCHEMIC CARDIOMYOPATHY: Primary | ICD-10-CM

## 2023-12-06 DIAGNOSIS — Z95.810 ICD (IMPLANTABLE CARDIOVERTER-DEFIBRILLATOR), BIVENTRICULAR, IN SITU: ICD-10-CM

## 2023-12-06 DIAGNOSIS — I50.21 ACUTE SYSTOLIC HEART FAILURE: ICD-10-CM

## 2023-12-06 DIAGNOSIS — I47.20 V-TACH: ICD-10-CM

## 2023-12-06 DIAGNOSIS — R47.01 APHASIA OF UNKNOWN ORIGIN: ICD-10-CM

## 2023-12-06 PROCEDURE — 1160F RVW MEDS BY RX/DR IN RCRD: CPT | Performed by: INTERNAL MEDICINE

## 2023-12-06 PROCEDURE — 1159F MED LIST DOCD IN RCRD: CPT | Performed by: INTERNAL MEDICINE

## 2023-12-06 PROCEDURE — 3077F SYST BP >= 140 MM HG: CPT | Performed by: INTERNAL MEDICINE

## 2023-12-06 PROCEDURE — 99214 OFFICE O/P EST MOD 30 MIN: CPT | Performed by: INTERNAL MEDICINE

## 2023-12-06 PROCEDURE — 3080F DIAST BP >= 90 MM HG: CPT | Performed by: INTERNAL MEDICINE

## 2024-02-13 ENCOUNTER — HOSPITAL ENCOUNTER (OUTPATIENT)
Dept: CARDIOLOGY | Facility: HOSPITAL | Age: 64
Discharge: HOME | End: 2024-02-13
Payer: MEDICARE

## 2024-02-13 VITALS
BODY MASS INDEX: 33.43 KG/M2 | WEIGHT: 208 LBS | HEIGHT: 66 IN | HEIGHT: 66 IN | BODY MASS INDEX: 33.43 KG/M2 | WEIGHT: 208 LBS

## 2024-02-13 DIAGNOSIS — Z95.810 ICD (IMPLANTABLE CARDIOVERTER-DEFIBRILLATOR), BIVENTRICULAR, IN SITU: ICD-10-CM

## 2024-02-13 DIAGNOSIS — I25.10 CORONARY ARTERY DISEASE INVOLVING NATIVE CORONARY ARTERY OF NATIVE HEART WITHOUT ANGINA PECTORIS: ICD-10-CM

## 2024-02-13 DIAGNOSIS — I25.5 ISCHEMIC CARDIOMYOPATHY: ICD-10-CM

## 2024-02-13 DIAGNOSIS — R47.01 APHASIA OF UNKNOWN ORIGIN: ICD-10-CM

## 2024-02-13 DIAGNOSIS — I50.21 ACUTE SYSTOLIC HEART FAILURE: ICD-10-CM

## 2024-02-13 DIAGNOSIS — I47.20 V-TACH: ICD-10-CM

## 2024-02-13 DIAGNOSIS — I48.0 PAROXYSMAL ATRIAL FIBRILLATION: ICD-10-CM

## 2024-02-13 LAB
ASCENDING AORTA: 3.3 CM
BH CV ECHO MEAS - AO MAX PG: 8.4 MMHG
BH CV ECHO MEAS - AO MEAN PG: 4 MMHG
BH CV ECHO MEAS - AO ROOT DIAM: 3.3 CM
BH CV ECHO MEAS - AO V2 MAX: 145 CM/SEC
BH CV ECHO MEAS - AO V2 VTI: 25.5 CM
BH CV ECHO MEAS - AVA(I,D): 1.16 CM2
BH CV ECHO MEAS - EDV(CUBED): 171.9 ML
BH CV ECHO MEAS - EDV(MOD-SP2): 204 ML
BH CV ECHO MEAS - EDV(MOD-SP4): 161 ML
BH CV ECHO MEAS - EF(MOD-SP2): 56.4 %
BH CV ECHO MEAS - EF(MOD-SP4): 32.9 %
BH CV ECHO MEAS - ESV(CUBED): 91.1 ML
BH CV ECHO MEAS - ESV(MOD-SP2): 89 ML
BH CV ECHO MEAS - ESV(MOD-SP4): 108 ML
BH CV ECHO MEAS - FS: 19.1 %
BH CV ECHO MEAS - IVS/LVPW: 0.93 CM
BH CV ECHO MEAS - IVSD: 0.96 CM
BH CV ECHO MEAS - LA DIMENSION: 5 CM
BH CV ECHO MEAS - LAT PEAK E' VEL: 12.6 CM/SEC
BH CV ECHO MEAS - LV DIASTOLIC VOL/BSA (35-75): 79.2 CM2
BH CV ECHO MEAS - LV MASS(C)D: 215.4 GRAMS
BH CV ECHO MEAS - LV MAX PG: 1.25 MMHG
BH CV ECHO MEAS - LV MEAN PG: 1 MMHG
BH CV ECHO MEAS - LV SYSTOLIC VOL/BSA (12-30): 53.1 CM2
BH CV ECHO MEAS - LV V1 MAX: 55.9 CM/SEC
BH CV ECHO MEAS - LV V1 VTI: 10.4 CM
BH CV ECHO MEAS - LVIDD: 5.6 CM
BH CV ECHO MEAS - LVIDS: 4.5 CM
BH CV ECHO MEAS - LVOT AREA: 2.8 CM2
BH CV ECHO MEAS - LVOT DIAM: 1.9 CM
BH CV ECHO MEAS - LVPWD: 1.03 CM
BH CV ECHO MEAS - MED PEAK E' VEL: 8.22 CM/SEC
BH CV ECHO MEAS - MR MAX PG: 95.3 MMHG
BH CV ECHO MEAS - MR MAX VEL: 488 CM/SEC
BH CV ECHO MEAS - MR MEAN PG: 58 MMHG
BH CV ECHO MEAS - MR MEAN VEL: 356 CM/SEC
BH CV ECHO MEAS - MR VTI: 160 CM
BH CV ECHO MEAS - MV DEC SLOPE: 500 CM/SEC2
BH CV ECHO MEAS - MV DEC TIME: 0.21 SEC
BH CV ECHO MEAS - MV E MAX VEL: 121.7 CM/SEC
BH CV ECHO MEAS - MV MAX PG: 7.4 MMHG
BH CV ECHO MEAS - MV MEAN PG: 3 MMHG
BH CV ECHO MEAS - MV P1/2T: 83.8 MSEC
BH CV ECHO MEAS - MV V2 VTI: 34.4 CM
BH CV ECHO MEAS - MVA(P1/2T): 2.6 CM2
BH CV ECHO MEAS - MVA(VTI): 0.86 CM2
BH CV ECHO MEAS - PA ACC SLOPE: 367 CM/SEC2
BH CV ECHO MEAS - PA ACC TIME: 0.18 SEC
BH CV ECHO MEAS - PA V2 MAX: 92.1 CM/SEC
BH CV ECHO MEAS - PI END-D VEL: 139 CM/SEC
BH CV ECHO MEAS - RAP SYSTOLE: 8 MMHG
BH CV ECHO MEAS - RF(MV,LVOT)(1DIAM): 0.9 CM
BH CV ECHO MEAS - RVSP: 32.2 MMHG
BH CV ECHO MEAS - SI(MOD-SP2): 56.5 ML/M2
BH CV ECHO MEAS - SI(MOD-SP4): 26.1 ML/M2
BH CV ECHO MEAS - SV(LVOT): 29.5 ML
BH CV ECHO MEAS - SV(MOD-SP2): 115 ML
BH CV ECHO MEAS - SV(MOD-SP4): 53 ML
BH CV ECHO MEAS - TAPSE (>1.6): 1.5 CM
BH CV ECHO MEAS - TR MAX PG: 24.2 MMHG
BH CV ECHO MEAS - TR MAX VEL: 246 CM/SEC
BH CV ECHO MEASUREMENTS AVERAGE E/E' RATIO: 11.69
BH CV XLRA - RV BASE: 4.4 CM
BH CV XLRA - RV LENGTH: 7.3 CM
BH CV XLRA - RV MID: 3.2 CM
BH CV XLRA - TDI S': 8.22 CM/SEC
BH CV XLRA MEAS LEFT DIST CCA EDV: 21 CM/SEC
BH CV XLRA MEAS LEFT DIST CCA PSV: 83.1 CM/SEC
BH CV XLRA MEAS LEFT DIST ICA EDV: 38.4 CM/SEC
BH CV XLRA MEAS LEFT DIST ICA PSV: 97.1 CM/SEC
BH CV XLRA MEAS LEFT ICA/CCA RATIO: 1.12
BH CV XLRA MEAS LEFT MID CCA EDV: 30 CM/SEC
BH CV XLRA MEAS LEFT MID CCA PSV: 103 CM/SEC
BH CV XLRA MEAS LEFT MID ICA EDV: 48.2 CM/SEC
BH CV XLRA MEAS LEFT MID ICA PSV: 115 CM/SEC
BH CV XLRA MEAS LEFT PROX CCA EDV: 26.5 CM/SEC
BH CV XLRA MEAS LEFT PROX CCA PSV: 106 CM/SEC
BH CV XLRA MEAS LEFT PROX ECA EDV: 15.7 CM/SEC
BH CV XLRA MEAS LEFT PROX ECA PSV: 61.1 CM/SEC
BH CV XLRA MEAS LEFT PROX ICA EDV: 18.9 CM/SEC
BH CV XLRA MEAS LEFT PROX ICA PSV: 60.9 CM/SEC
BH CV XLRA MEAS LEFT PROX SCLA EDV: 0 CM/SEC
BH CV XLRA MEAS LEFT PROX SCLA PSV: 109 CM/SEC
BH CV XLRA MEAS LEFT VERTEBRAL A EDV: 11.2 CM/SEC
BH CV XLRA MEAS LEFT VERTEBRAL A PSV: 43.6 CM/SEC
BH CV XLRA MEAS RIGHT DIST CCA EDV: 20.5 CM/SEC
BH CV XLRA MEAS RIGHT DIST CCA PSV: 68.5 CM/SEC
BH CV XLRA MEAS RIGHT DIST ICA EDV: 26.7 CM/SEC
BH CV XLRA MEAS RIGHT DIST ICA PSV: 84.1 CM/SEC
BH CV XLRA MEAS RIGHT ICA/CCA RATIO: 1.69
BH CV XLRA MEAS RIGHT MID CCA EDV: 17 CM/SEC
BH CV XLRA MEAS RIGHT MID CCA PSV: 68.1 CM/SEC
BH CV XLRA MEAS RIGHT MID ICA EDV: 33.8 CM/SEC
BH CV XLRA MEAS RIGHT MID ICA PSV: 115 CM/SEC
BH CV XLRA MEAS RIGHT PROX CCA EDV: 20.4 CM/SEC
BH CV XLRA MEAS RIGHT PROX CCA PSV: 117 CM/SEC
BH CV XLRA MEAS RIGHT PROX ECA EDV: 9.4 CM/SEC
BH CV XLRA MEAS RIGHT PROX ECA PSV: 48.2 CM/SEC
BH CV XLRA MEAS RIGHT PROX ICA EDV: 18.9 CM/SEC
BH CV XLRA MEAS RIGHT PROX ICA PSV: 59.3 CM/SEC
BH CV XLRA MEAS RIGHT PROX SCLA EDV: 17.3 CM/SEC
BH CV XLRA MEAS RIGHT PROX SCLA PSV: 77.8 CM/SEC
BH CV XLRA MEAS RIGHT VERTEBRAL A EDV: 13.5 CM/SEC
BH CV XLRA MEAS RIGHT VERTEBRAL A PSV: 43.5 CM/SEC
IVRT: 124 MS
LEFT ARM BP: NORMAL MMHG
LEFT ATRIUM VOLUME INDEX: 51.7 ML/M2
LV EF 2D ECHO EST: 35 %
MR PISA EROA: 0.14 CM2
MV REGURGITANT FRACTION: 7 %
MV REGURGITANT VOLUME: 22 CC
MV VENA CONTRACTA: 0.8 CM
PISA ALIASING VEL: 0.31 M/S
PISA RADIUS: 0.6 CM

## 2024-02-13 PROCEDURE — 93880 EXTRACRANIAL BILAT STUDY: CPT

## 2024-02-13 PROCEDURE — 25010000002 SULFUR HEXAFLUORIDE MICROSPH 60.7-25 MG RECONSTITUTED SUSPENSION: Performed by: INTERNAL MEDICINE

## 2024-02-13 PROCEDURE — 93306 TTE W/DOPPLER COMPLETE: CPT

## 2024-02-13 PROCEDURE — 93306 TTE W/DOPPLER COMPLETE: CPT | Performed by: INTERNAL MEDICINE

## 2024-02-13 PROCEDURE — 93880 EXTRACRANIAL BILAT STUDY: CPT | Performed by: INTERNAL MEDICINE

## 2024-02-13 RX ADMIN — SULFUR HEXAFLUORIDE 2 ML: KIT at 10:20

## 2024-02-16 ENCOUNTER — TELEPHONE (OUTPATIENT)
Dept: CARDIOLOGY | Facility: CLINIC | Age: 64
End: 2024-02-16
Payer: MEDICARE

## 2024-02-19 ENCOUNTER — TELEPHONE (OUTPATIENT)
Dept: CARDIOLOGY | Facility: CLINIC | Age: 64
End: 2024-02-19
Payer: MEDICARE

## 2024-02-19 NOTE — TELEPHONE ENCOUNTER
Milka fatima from [facility] trauma resident to ask Dr. Jones's thoughts regarding patient being on Eliquis.  He is on Plavix, and aspirin, which they do not ask to change, however when they attempted to restart the Eliquis, he began to bleed again.  Somewhere in the recent past he was started on Lovenox for a DVT.  Per Dr. Jones may stop Plavix and remain on   Aspirin 81 mg, for CAD, eliquis for Afib.  Advised Dr. Joseph

## 2024-02-23 NOTE — PROGRESS NOTES
Subjective:     Encounter Date:02/26/2024    Primary Care Physician: Shanelle Barrow APRN      Patient ID: Howard Owusu is a 64 y.o. male.    Chief Complaint:Coronary Artery Disease      Problem List:   Coronary artery disease/ischemic cardiomyopathy:  [facility] for acute anterior MI, 2004: EF 30%. 100% proximal LAD now s/p 3.0 x 12 and a 2.7 x 16 Taxus stent. RCA and LCx were within normal limits.  [facility], 11/2004: LAD patent stent noted. Ostial D1 stenosis of 99% which was unable to be crossed. LCx and RCA were still within normal limits. EF 35%.  [facility], 06/2008: EF 15%. 50% distal RCA stenosis, and left coronary anatomy was unchanged.  [facility], 06/2011: EF 20%, 90% distal RCA (3.0 x 12 and 3.0 x 8 mm Cypher), and 90% right posterolateral (2.5 x 8 Cypher), and 85% mid LAD (3.0 x 13 mm Cypher).  2/2023 MPS with old anterior MI, no viability.  Inferior lateral ischemia.  3/14/2023 [facility] 90% proximal LAD (3.5 x 15 Xience RICHARD).  50% mid RCA with normal RFR.  EF 20%.  Normal LVEDP.  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  November 2004, [employer] ICD with DFT testing done.  July 2008, due to ischemic cardiomyopathy, patient was upgraded to a [employer] Bi-V ICD by Dr. Mtz.  June 2010, appropriate ICD shocks.  Generator change, [employer] Bi-V ICD (Dr. Atkins), 12/11/2013.  Recent Lead extraction of failed Jena lead and revision with new RA/RV lead and generator change with Dr. Cruz 5/25/2021 [facility]  Ischemic cardiomyopathy.  Atrial fibrillation   Initial with VF during a PCI, cardioverted to atrial fibrillation. After 24 hours, converted to sinus rhythm.  3/2/2022 ECV to sinus rhythm, Dr. Cruz.  Hypertension.  Hyperlipidemia.  Hypothyroidism.  GERD.  GILL.  Surgeries:  Surgery for nephrolithiasis      Allergies   Allergen Reactions    Morphine And Related Hives    Penicillins Unknown - Low Severity     Patient reports tolerating amoxicillin.    Sulfa  Antibiotics Unknown - Low Severity     Was told by MD that he is allergic but does not know the reaction         Current Outpatient Medications:     apixaban (ELIQUIS) 5 MG tablet tablet, Take 1 tablet by mouth 2 (Two) Times a Day., Disp: 180 tablet, Rfl: 3    aspirin 81 MG chewable tablet, Chew 1 tablet Daily., Disp: , Rfl:     atorvastatin (LIPITOR) 80 MG tablet, Take 1 tablet by mouth Every Night., Disp: 90 tablet, Rfl: 3    bisoprolol (ZEBeta) 5 MG tablet, TAKE 1 TABLET EVERY DAY (Patient taking differently: Take 1 tablet by mouth Daily.), Disp: 90 tablet, Rfl: 3    Cholecalciferol (Vitamin D3) 25 MCG (1000 UT) capsule, Take 1 capsule by mouth Daily., Disp: , Rfl:     clopidogrel (PLAVIX) 75 MG tablet, Take 1 tablet by mouth Daily., Disp: 30 tablet, Rfl: 11    coenzyme Q10 100 MG capsule, Take 1 capsule by mouth Daily., Disp: , Rfl:     empagliflozin (Jardiance) 10 MG tablet tablet, Take 1 tablet by mouth Daily., Disp: 90 tablet, Rfl: 3    Garlic 1000 MG capsule, Take 1 capsule by mouth Daily., Disp: , Rfl:     levothyroxine (SYNTHROID, LEVOTHROID) 150 MCG tablet, Take 1 tablet by mouth Daily., Disp: , Rfl:     Magnesium Oxide -Mg Supplement 400 (240 Mg) MG tablet, Take 1 tablet by mouth Daily., Disp: , Rfl:     methocarbamol (ROBAXIN) 500 MG tablet, Take 1 tablet by mouth 3 (Three) Times a Day., Disp: , Rfl:     Misc Natural Products (OSTEO BI-FLEX ADV TRIPLE ST PO), Take 1 tablet by mouth Daily., Disp: , Rfl:     Multiple Vitamins-Minerals (ONE DAILY MENS PO), Take 1 tablet by mouth Daily., Disp: , Rfl:     nitroglycerin (NITROSTAT) 0.4 MG SL tablet, Place 1 tablet under the tongue Every 5 (Five) Minutes As Needed for Chest Pain. Take no more than 3 doses in 15 minutes., Disp: 25 tablet, Rfl: 3    OMEGA-3 KRILL OIL PO, Take 1 tablet by mouth Daily., Disp: , Rfl:     omeprazole (priLOSEC) 40 MG capsule, Take 1 capsule by mouth Daily., Disp: , Rfl:     sacubitril-valsartan (Entresto) 49-51 MG tablet, Take 1  "tablet by mouth 2 (Two) Times a Day., Disp: 180 tablet, Rfl: 3    sertraline (ZOLOFT) 100 MG tablet, Take 1 tablet by mouth Daily., Disp: , Rfl:     vitamin C (ASCORBIC ACID) 250 MG tablet, Take 1 tablet by mouth Daily., Disp: , Rfl:         History of Present Illness    Patient is a 64-year-old  male who presents today for follow-up of coronary artery disease, ischemic cardiomyopathy.  Since last being seen patient had a prolonged hospitalization after fall, subsequent anemia and found to have retroperitoneal bleed.  Patient notes that he was taking Lovenox, Eliquis, aspirin, and Plavix.  Patient was eventually discharged on simply Eliquis and aspirin.  Patient notes that he has had multiple falls over the last few months but has not had any falls in the last 2-1/2 weeks.  He is walking around his home with the assistance of a walker and is currently using a cane otherwise.  Denies any further syncope.  No significant edema.  Has some in his left leg that is improved with elevation of his extremities.  Notes that he never began Lasix.  Feels that his breathing is overall at baseline.  He is scheduled for AV node ablation next week with electrophysiology.  H&H has been overall stable.    The following portions of the patient's history were reviewed and updated as appropriate: allergies, current medications, past family history, past medical history, past social history, past surgical history and problem list.      Social History     Tobacco Use    Smoking status: Never    Smokeless tobacco: Never   Vaping Use    Vaping Use: Never used   Substance Use Topics    Alcohol use: No    Drug use: No         ROS       Objective:   /84 (BP Location: Left arm, Patient Position: Sitting)   Pulse 94   Ht 167.6 cm (66\")   Wt 93.4 kg (206 lb)   SpO2 98%   BMI 33.25 kg/m²         Vitals reviewed.   Constitutional:       Appearance: Well-developed and not in distress.      Comments: Walks with a cane   Neck:      " Vascular: No JVD.      Trachea: No tracheal deviation.   Pulmonary:      Effort: Pulmonary effort is normal.      Breath sounds: Normal breath sounds.   Cardiovascular:      Normal rate. Irregularly irregular rhythm.      Murmurs: There is no murmur.   Edema:     Peripheral edema present.     Ankle: 1+ edema of the left ankle.  Musculoskeletal:         General: No deformity. Skin:     General: Skin is warm and dry.   Neurological:      Mental Status: Alert and oriented to person, place, and time.           ECG 12 Lead    Date/Time: 2/26/2024 2:59 PM  Performed by: Zeina Moya APRN    Authorized by: Zeina Moya APRN  Comparison: compared with previous ECG from 3/2/2022  Comparison to previous ECG: AV pacing has been replaced by atrial fibrillation  Rhythm: atrial fibrillation    Clinical impression: abnormal EKG        Device interrogation:  Biventricular ICD programmed DDDR at a rate of 60.  Less than 1% atrially paced, 33% biventricular paced.  Battery voltage 56% (3.7 to 4.5 years).  Charge time 8.3 seconds.  Underlying rhythm is atrial fibrillation with conduction in the 80s.  No episodes of ventricular tachycardia or VF.  100% mode switched for greater than 1 year.        Assessment:   Assessment & Plan      Diagnoses and all orders for this visit:    1. Coronary artery disease involving native coronary artery of native heart without angina pectoris (Primary), stable.  No active angina.  Almost 1 year status post proximal LAD stenting.  Currently on aspirin.  Plavix discontinued after recent hospitalization for RP bleed.    2. Chronic HFrEF (heart failure with reduced ejection fraction), currently appears stable.  Recent worsened exacerbation, with noted significant decrease of biventricular pacing. No evidence of acute volume overload.  Significant decrease in biventricular pacing by device interrogation today.    3. Permanent atrial fibrillation.  Anticoagulated with Eliquis.  Upcoming AV node  ablation with electrophysiology next week.      Plan:  Continue current cardiac medications for now.  Proceed with upcoming AV node ablation as scheduled.  Will reevaluate medical therapy and need for further diuretics post procedure.  Follow-up in 3 months time in the [facility] or sooner if needed.       Zeina DAVID             Dictated utilizing Dragon dictation

## 2024-02-23 NOTE — H&P (VIEW-ONLY)
Subjective:     Encounter Date:02/26/2024    Primary Care Physician: Shanelle Barrow APRN      Patient ID: Howard Owusu is a 64 y.o. male.    Chief Complaint:Coronary Artery Disease      Problem List:   Coronary artery disease/ischemic cardiomyopathy:  [facility] for acute anterior MI, 2004: EF 30%. 100% proximal LAD now s/p 3.0 x 12 and a 2.7 x 16 Taxus stent. RCA and LCx were within normal limits.  [facility], 11/2004: LAD patent stent noted. Ostial D1 stenosis of 99% which was unable to be crossed. LCx and RCA were still within normal limits. EF 35%.  [facility], 06/2008: EF 15%. 50% distal RCA stenosis, and left coronary anatomy was unchanged.  [facility], 06/2011: EF 20%, 90% distal RCA (3.0 x 12 and 3.0 x 8 mm Cypher), and 90% right posterolateral (2.5 x 8 Cypher), and 85% mid LAD (3.0 x 13 mm Cypher).  2/2023 MPS with old anterior MI, no viability.  Inferior lateral ischemia.  3/14/2023 [facility] 90% proximal LAD (3.5 x 15 Xience RICHARD).  50% mid RCA with normal RFR.  EF 20%.  Normal LVEDP.  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  November 2004, [employer] ICD with DFT testing done.  July 2008, due to ischemic cardiomyopathy, patient was upgraded to a [employer] Bi-V ICD by Dr. Mtz.  June 2010, appropriate ICD shocks.  Generator change, [employer] Bi-V ICD (Dr. Atkins), 12/11/2013.  Recent Lead extraction of failed [place] lead and revision with new RA/RV lead and generator change with Dr. Cruz 5/25/2021 [facility]  Ischemic cardiomyopathy.  Atrial fibrillation   Initial with VF during a PCI, cardioverted to atrial fibrillation. After 24 hours, converted to sinus rhythm.  3/2/2022 ECV to sinus rhythm, Dr. Cruz.  Hypertension.  Hyperlipidemia.  Hypothyroidism.  GERD.  GILL.  Surgeries:  Surgery for nephrolithiasis      Allergies   Allergen Reactions    Morphine And Related Hives    Penicillins Unknown - Low Severity     Patient reports tolerating amoxicillin.    Sulfa  Antibiotics Unknown - Low Severity     Was told by MD that he is allergic but does not know the reaction         Current Outpatient Medications:     apixaban (ELIQUIS) 5 MG tablet tablet, Take 1 tablet by mouth 2 (Two) Times a Day., Disp: 180 tablet, Rfl: 3    aspirin 81 MG chewable tablet, Chew 1 tablet Daily., Disp: , Rfl:     atorvastatin (LIPITOR) 80 MG tablet, Take 1 tablet by mouth Every Night., Disp: 90 tablet, Rfl: 3    bisoprolol (ZEBeta) 5 MG tablet, TAKE 1 TABLET EVERY DAY (Patient taking differently: Take 1 tablet by mouth Daily.), Disp: 90 tablet, Rfl: 3    Cholecalciferol (Vitamin D3) 25 MCG (1000 UT) capsule, Take 1 capsule by mouth Daily., Disp: , Rfl:     clopidogrel (PLAVIX) 75 MG tablet, Take 1 tablet by mouth Daily., Disp: 30 tablet, Rfl: 11    coenzyme Q10 100 MG capsule, Take 1 capsule by mouth Daily., Disp: , Rfl:     empagliflozin (Jardiance) 10 MG tablet tablet, Take 1 tablet by mouth Daily., Disp: 90 tablet, Rfl: 3    Garlic 1000 MG capsule, Take 1 capsule by mouth Daily., Disp: , Rfl:     levothyroxine (SYNTHROID, LEVOTHROID) 150 MCG tablet, Take 1 tablet by mouth Daily., Disp: , Rfl:     Magnesium Oxide -Mg Supplement 400 (240 Mg) MG tablet, Take 1 tablet by mouth Daily., Disp: , Rfl:     methocarbamol (ROBAXIN) 500 MG tablet, Take 1 tablet by mouth 3 (Three) Times a Day., Disp: , Rfl:     Misc Natural Products (OSTEO BI-FLEX ADV TRIPLE ST PO), Take 1 tablet by mouth Daily., Disp: , Rfl:     Multiple Vitamins-Minerals (ONE DAILY MENS PO), Take 1 tablet by mouth Daily., Disp: , Rfl:     nitroglycerin (NITROSTAT) 0.4 MG SL tablet, Place 1 tablet under the tongue Every 5 (Five) Minutes As Needed for Chest Pain. Take no more than 3 doses in 15 minutes., Disp: 25 tablet, Rfl: 3    OMEGA-3 KRILL OIL PO, Take 1 tablet by mouth Daily., Disp: , Rfl:     omeprazole (priLOSEC) 40 MG capsule, Take 1 capsule by mouth Daily., Disp: , Rfl:     sacubitril-valsartan (Entresto) 49-51 MG tablet, Take 1  "tablet by mouth 2 (Two) Times a Day., Disp: 180 tablet, Rfl: 3    sertraline (ZOLOFT) 100 MG tablet, Take 1 tablet by mouth Daily., Disp: , Rfl:     vitamin C (ASCORBIC ACID) 250 MG tablet, Take 1 tablet by mouth Daily., Disp: , Rfl:         History of Present Illness    Patient is a 64-year-old  male who presents today for follow-up of coronary artery disease, ischemic cardiomyopathy.  Since last being seen patient had a prolonged hospitalization after fall, subsequent anemia and found to have retroperitoneal bleed.  Patient notes that he was taking Lovenox, Eliquis, aspirin, and Plavix.  Patient was eventually discharged on simply Eliquis and aspirin.  Patient notes that he has had multiple falls over the last few months but has not had any falls in the last 2-1/2 weeks.  He is walking around his home with the assistance of a walker and is currently using a cane otherwise.  Denies any further syncope.  No significant edema.  Has some in his left leg that is improved with elevation of his extremities.  Notes that he never began Lasix.  Feels that his breathing is overall at baseline.  He is scheduled for AV node ablation next week with electrophysiology.  H&H has been overall stable.    The following portions of the patient's history were reviewed and updated as appropriate: allergies, current medications, past family history, past medical history, past social history, past surgical history and problem list.      Social History     Tobacco Use    Smoking status: Never    Smokeless tobacco: Never   Vaping Use    Vaping Use: Never used   Substance Use Topics    Alcohol use: No    Drug use: No         ROS       Objective:   /84 (BP Location: Left arm, Patient Position: Sitting)   Pulse 94   Ht 167.6 cm (66\")   Wt 93.4 kg (206 lb)   SpO2 98%   BMI 33.25 kg/m²         Vitals reviewed.   Constitutional:       Appearance: Well-developed and not in distress.      Comments: Walks with a cane   Neck:      " Vascular: No JVD.      Trachea: No tracheal deviation.   Pulmonary:      Effort: Pulmonary effort is normal.      Breath sounds: Normal breath sounds.   Cardiovascular:      Normal rate. Irregularly irregular rhythm.      Murmurs: There is no murmur.   Edema:     Peripheral edema present.     Ankle: 1+ edema of the left ankle.  Musculoskeletal:         General: No deformity. Skin:     General: Skin is warm and dry.   Neurological:      Mental Status: Alert and oriented to person, place, and time.           ECG 12 Lead    Date/Time: 2/26/2024 2:59 PM  Performed by: Zeina Moya APRN    Authorized by: Zeina Moya APRN  Comparison: compared with previous ECG from 3/2/2022  Comparison to previous ECG: AV pacing has been replaced by atrial fibrillation  Rhythm: atrial fibrillation    Clinical impression: abnormal EKG        Device interrogation:  Biventricular ICD programmed DDDR at a rate of 60.  Less than 1% atrially paced, 33% biventricular paced.  Battery voltage 56% (3.7 to 4.5 years).  Charge time 8.3 seconds.  Underlying rhythm is atrial fibrillation with conduction in the 80s.  No episodes of ventricular tachycardia or VF.  100% mode switched for greater than 1 year.        Assessment:   Assessment & Plan      Diagnoses and all orders for this visit:    1. Coronary artery disease involving native coronary artery of native heart without angina pectoris (Primary), stable.  No active angina.  Almost 1 year status post proximal LAD stenting.  Currently on aspirin.  Plavix discontinued after recent hospitalization for RP bleed.    2. Chronic HFrEF (heart failure with reduced ejection fraction), currently appears stable.  Recent worsened exacerbation, with noted significant decrease of biventricular pacing. No evidence of acute volume overload.  Significant decrease in biventricular pacing by device interrogation today.    3. Permanent atrial fibrillation.  Anticoagulated with Eliquis.  Upcoming AV node  ablation with electrophysiology next week.      Plan:  Continue current cardiac medications for now.  Proceed with upcoming AV node ablation as scheduled.  Will reevaluate medical therapy and need for further diuretics post procedure.  Follow-up in 3 months time in the [facility] or sooner if needed.       Zeina DAVID             Dictated utilizing Dragon dictation

## 2024-02-26 ENCOUNTER — OFFICE VISIT (OUTPATIENT)
Dept: CARDIOLOGY | Facility: CLINIC | Age: 64
End: 2024-02-26
Payer: MEDICARE

## 2024-02-26 VITALS
HEIGHT: 66 IN | DIASTOLIC BLOOD PRESSURE: 84 MMHG | HEART RATE: 94 BPM | WEIGHT: 206 LBS | OXYGEN SATURATION: 98 % | BODY MASS INDEX: 33.11 KG/M2 | SYSTOLIC BLOOD PRESSURE: 128 MMHG

## 2024-02-26 DIAGNOSIS — I48.21 PERMANENT ATRIAL FIBRILLATION: ICD-10-CM

## 2024-02-26 DIAGNOSIS — I25.10 CORONARY ARTERY DISEASE INVOLVING NATIVE CORONARY ARTERY OF NATIVE HEART WITHOUT ANGINA PECTORIS: Primary | ICD-10-CM

## 2024-02-26 DIAGNOSIS — I50.22 CHRONIC HFREF (HEART FAILURE WITH REDUCED EJECTION FRACTION): ICD-10-CM

## 2024-02-26 PROCEDURE — 3074F SYST BP LT 130 MM HG: CPT | Performed by: NURSE PRACTITIONER

## 2024-02-26 PROCEDURE — 99214 OFFICE O/P EST MOD 30 MIN: CPT | Performed by: NURSE PRACTITIONER

## 2024-02-26 PROCEDURE — 3079F DIAST BP 80-89 MM HG: CPT | Performed by: NURSE PRACTITIONER

## 2024-02-26 PROCEDURE — 93000 ELECTROCARDIOGRAM COMPLETE: CPT | Performed by: NURSE PRACTITIONER

## 2024-02-26 PROCEDURE — 93284 PRGRMG EVAL IMPLANTABLE DFB: CPT | Performed by: NURSE PRACTITIONER

## 2024-02-26 RX ORDER — LANOLIN ALCOHOL/MO/W.PET/CERES
400 CREAM (GRAM) TOPICAL DAILY
COMMUNITY
Start: 2024-01-24

## 2024-02-26 RX ORDER — METHOCARBAMOL 500 MG/1
500 TABLET, FILM COATED ORAL 3 TIMES DAILY
COMMUNITY
Start: 2024-02-21 | End: 2024-03-02

## 2024-02-26 RX ORDER — ASPIRIN 81 MG/1
81 TABLET, CHEWABLE ORAL DAILY
COMMUNITY

## 2024-02-26 RX ORDER — OMEPRAZOLE 40 MG/1
40 CAPSULE, DELAYED RELEASE ORAL DAILY
COMMUNITY

## 2024-02-26 RX ORDER — UBIDECARENONE 100 MG
100 CAPSULE ORAL DAILY
COMMUNITY

## 2024-03-06 ENCOUNTER — HOSPITAL ENCOUNTER (OUTPATIENT)
Facility: HOSPITAL | Age: 64
Discharge: HOME | End: 2024-03-06
Attending: INTERNAL MEDICINE | Admitting: INTERNAL MEDICINE
Payer: MEDICARE

## 2024-03-06 VITALS
WEIGHT: 200.4 LBS | OXYGEN SATURATION: 94 % | TEMPERATURE: 97.5 F | DIASTOLIC BLOOD PRESSURE: 86 MMHG | HEART RATE: 80 BPM | HEIGHT: 66 IN | BODY MASS INDEX: 32.21 KG/M2 | SYSTOLIC BLOOD PRESSURE: 125 MMHG | RESPIRATION RATE: 16 BRPM

## 2024-03-06 DIAGNOSIS — I25.10 CORONARY ARTERY DISEASE INVOLVING NATIVE CORONARY ARTERY OF NATIVE HEART WITHOUT ANGINA PECTORIS: ICD-10-CM

## 2024-03-06 DIAGNOSIS — I50.21 ACUTE SYSTOLIC HEART FAILURE: ICD-10-CM

## 2024-03-06 DIAGNOSIS — I48.0 PAROXYSMAL ATRIAL FIBRILLATION: ICD-10-CM

## 2024-03-06 DIAGNOSIS — I47.20 V-TACH: ICD-10-CM

## 2024-03-06 DIAGNOSIS — Z95.810 ICD (IMPLANTABLE CARDIOVERTER-DEFIBRILLATOR), BIVENTRICULAR, IN SITU: ICD-10-CM

## 2024-03-06 DIAGNOSIS — I25.5 ISCHEMIC CARDIOMYOPATHY: ICD-10-CM

## 2024-03-06 PROBLEM — I48.19 PERSISTENT ATRIAL FIBRILLATION: Status: ACTIVE | Noted: 2024-03-06

## 2024-03-06 LAB
ANION GAP SERPL CALCULATED.3IONS-SCNC: 12 MMOL/L (ref 5–15)
BUN SERPL-MCNC: 13 MG/DL (ref 8–23)
BUN/CREAT SERPL: 18.8 (ref 7–25)
CALCIUM SPEC-SCNC: 9.1 MG/DL (ref 8.6–10.5)
CHLORIDE SERPL-SCNC: 105 MMOL/L (ref 98–107)
CO2 SERPL-SCNC: 21 MMOL/L (ref 22–29)
CREAT SERPL-MCNC: 0.69 MG/DL (ref 0.76–1.27)
DEPRECATED RDW RBC AUTO: 96.9 FL (ref 37–54)
EGFRCR SERPLBLD CKD-EPI 2021: 102.1 ML/MIN/1.73
ERYTHROCYTE [DISTWIDTH] IN BLOOD BY AUTOMATED COUNT: 25.2 % (ref 12.3–15.4)
GLUCOSE SERPL-MCNC: 77 MG/DL (ref 65–99)
HCT VFR BLD AUTO: 38.1 % (ref 37.5–51)
HGB BLD-MCNC: 11.9 G/DL (ref 13–17.7)
MCH RBC QN AUTO: 32.7 PG (ref 26.6–33)
MCHC RBC AUTO-ENTMCNC: 31.2 G/DL (ref 31.5–35.7)
MCV RBC AUTO: 104.7 FL (ref 79–97)
PLATELET # BLD AUTO: 205 10*3/MM3 (ref 140–450)
PMV BLD AUTO: 11 FL (ref 6–12)
POTASSIUM SERPL-SCNC: 3.8 MMOL/L (ref 3.5–5.2)
RBC # BLD AUTO: 3.64 10*6/MM3 (ref 4.14–5.8)
SODIUM SERPL-SCNC: 138 MMOL/L (ref 136–145)
WBC NRBC COR # BLD AUTO: 4.66 10*3/MM3 (ref 3.4–10.8)

## 2024-03-06 PROCEDURE — 25010000002 FENTANYL CITRATE (PF) 50 MCG/ML SOLUTION: Performed by: INTERNAL MEDICINE

## 2024-03-06 PROCEDURE — C2630 CATH, EP, COOL-TIP: HCPCS | Performed by: INTERNAL MEDICINE

## 2024-03-06 PROCEDURE — 99152 MOD SED SAME PHYS/QHP 5/>YRS: CPT | Performed by: INTERNAL MEDICINE

## 2024-03-06 PROCEDURE — C1766 INTRO/SHEATH,STRBLE,NON-PEEL: HCPCS | Performed by: INTERNAL MEDICINE

## 2024-03-06 PROCEDURE — 85027 COMPLETE CBC AUTOMATED: CPT | Performed by: INTERNAL MEDICINE

## 2024-03-06 PROCEDURE — 25010000002 MIDAZOLAM PER 1 MG: Performed by: INTERNAL MEDICINE

## 2024-03-06 PROCEDURE — 25010000002 ONDANSETRON PER 1 MG: Performed by: INTERNAL MEDICINE

## 2024-03-06 PROCEDURE — 99153 MOD SED SAME PHYS/QHP EA: CPT | Performed by: INTERNAL MEDICINE

## 2024-03-06 PROCEDURE — 80048 BASIC METABOLIC PNL TOTAL CA: CPT | Performed by: INTERNAL MEDICINE

## 2024-03-06 PROCEDURE — 93600 BUNDLE OF HIS RECORDING: CPT | Performed by: INTERNAL MEDICINE

## 2024-03-06 PROCEDURE — 93650 ICAR CATH ABLTJ AV NODE FUNC: CPT | Performed by: INTERNAL MEDICINE

## 2024-03-06 PROCEDURE — C1760 CLOSURE DEV, VASC: HCPCS | Performed by: INTERNAL MEDICINE

## 2024-03-06 PROCEDURE — C1894 INTRO/SHEATH, NON-LASER: HCPCS | Performed by: INTERNAL MEDICINE

## 2024-03-06 RX ORDER — ONDANSETRON 2 MG/ML
INJECTION INTRAMUSCULAR; INTRAVENOUS
Status: DISCONTINUED | OUTPATIENT
Start: 2024-03-06 | End: 2024-03-06 | Stop reason: HOSPADM

## 2024-03-06 RX ORDER — FUROSEMIDE 10 MG/ML
60 INJECTION INTRAMUSCULAR; INTRAVENOUS ONCE
Status: DISCONTINUED | OUTPATIENT
Start: 2024-03-06 | End: 2024-03-06 | Stop reason: HOSPADM

## 2024-03-06 RX ORDER — FENTANYL CITRATE 50 UG/ML
INJECTION, SOLUTION INTRAMUSCULAR; INTRAVENOUS
Status: DISCONTINUED | OUTPATIENT
Start: 2024-03-06 | End: 2024-03-06 | Stop reason: HOSPADM

## 2024-03-06 RX ORDER — MIDAZOLAM HYDROCHLORIDE 1 MG/ML
INJECTION INTRAMUSCULAR; INTRAVENOUS
Status: DISCONTINUED | OUTPATIENT
Start: 2024-03-06 | End: 2024-03-06 | Stop reason: HOSPADM

## 2024-03-06 RX ORDER — METHOCARBAMOL 500 MG/1
500 TABLET, FILM COATED ORAL 3 TIMES DAILY PRN
COMMUNITY

## 2024-03-06 NOTE — Clinical Note
Replaced previous sheath in the right femoral vein. RFV Agilis exchanged for short 8FR sheath over wire

## 2024-03-06 NOTE — OP NOTE
Cardiac Electrophysiology Procedure Note          [facility]                      AV NODE CATHETER ABLATION     PROCEDURES PERFORMED:    Catheter ablation of the atrioventricular (AV) node.  His Bundle Recording.    PREPROCEDURAL DIAGNOSES:     Permanent Atrial fibrillation  [id number] score of 4  Permanent pacemaker implanted previously    POST PROCEDURE DIANGOSES:    As above.    INDICATION FOR PROCEDURE:  Briefly, Howard Owusu is a 64 y.o. male with a history of longstanding atrial fibrillation who has failed pharmacologic rate control.    MODERATE SEDATION FOR PROCEDURE:     Moderate sedation was given during this procedure.    I supervised and directed RN to administer this sedation.  This staff member also monitored the patient's hemodynamic and respiratory status and response to these medications.  Please see the full detailed procedure report generated by the electrophysiology laboratory staff.  The patient tolerated moderate sedation well.  There were no complications regarding sedation.  The total dose of fentanyl was 100 mcg and the total dose of midazolam was 3 mg.  The total dose of Brevital was 40mg.  First sedation was administered at 1236 and continued through 1327.    PROCEDURE NARRATIVE:  The patient was able to give written informed consent after revisiting the key portions of the risk versus benefit profile of the procedure.  This discussion was framed by our lengthy conversations  (please see our detailed notes).  Patient verbalized strong understanding of this discussion and a strong desire to proceed with the procedure.  Please note that this detailed informed consent process utilized mutual and shared decision making process between all parties involved, principally the physician and patient, but also potentially with input from the patient's selected family and friends.  '  The patient was brought to the EP laboratory in the post absorptive  state. The patient was then prepared and dropped in a routing sterile fashion.  Seldinger access was obtained at the right common femoral vein with a single venipuncture. An SR0 sheath was placed in an over the wire fashion into the inferior vena cava / right atrium.    An ablation catheter was placed through the SR0 sheath and positioned at the His bundle.  The HV interval was measured as 61 msec.    The ablation catheter was then positioned at the compact AV node.    Radiofrequency application was performed with a maximum of 50 w power for not less than 90 seconds.  This resulted in complete heart block.    The patient was then monitored for 10 minutes during which the patient was noted to have no escape rhythm and was pacemaker dependent.    The catheters and sheath was then removed from the body.    Vascade closure x 1.  Bedrest 2 hours.  Home this afternoon.    COMPLICATIONS: none    EBL: minimal    RADIATION EXPOSURE: 2 mGy over 2 minutes    KEY PROCEDURAL FINDINGS:     Successful AV node ablation   Stable and nominal pacemaker function    POST PROCEDURAL PLAN:    1.  Home later this afternoon  2.  Anticoagulation will be resumed  The patient will be seen at our office per routine follow up.

## 2024-03-06 NOTE — INTERVAL H&P NOTE
H&P reviewed. The patient was examined and there are no changes to the H&P.       EP Pre-Procedure Report  Cardiovascular Laboratory  [facility]    Patient:  Howard Owusu  :  1960    DATE: 3/6/2024      MEDICATIONS:  Prior to Admission medications    Medication Sig Start Date End Date Taking? Authorizing Provider   apixaban (ELIQUIS) 5 MG tablet tablet Take 1 tablet by mouth 2 (Two) Times a Day. 23  Yes Jose Jones MD   aspirin 81 MG chewable tablet Chew 1 tablet Daily.   Yes Ceferino Barriga MD   atorvastatin (LIPITOR) 80 MG tablet Take 1 tablet by mouth Every Night. 22  Yes Aime Hayes MD   bisoprolol (ZEBeta) 5 MG tablet TAKE 1 TABLET EVERY DAY  Patient taking differently: Take 1 tablet by mouth Daily. 21  Yes Zeina Moya APRN   Cholecalciferol (Vitamin D3) 25 MCG (1000 UT) capsule Take 1 capsule by mouth Daily.   Yes Ceferino Barriga MD   coenzyme Q10 100 MG capsule Take 1 capsule by mouth Daily.   Yes Ceferino Barriga MD   empagliflozin (Jardiance) 10 MG tablet tablet Take 1 tablet by mouth Daily. 23  Yes Jose Jones MD   Garlic 1000 MG capsule Take 1 capsule by mouth Daily.   Yes Ceferino Barriga MD   levothyroxine (SYNTHROID, LEVOTHROID) 150 MCG tablet Take 1 tablet by mouth Daily.   Yes Ceferino Barriga MD   Magnesium Oxide -Mg Supplement 400 (240 Mg) MG tablet Take 1 tablet by mouth Daily. 24  Yes Ceferino Barriga MD   methocarbamol (ROBAXIN) 500 MG tablet Take 1 tablet by mouth 3 (Three) Times a Day As Needed for Muscle Spasms.   Yes Ceferino Barriga MD   Misc Natural Products (OSTEO BI-FLEX ADV TRIPLE ST PO) Take 1 tablet by mouth Daily.   Yes Ceferino Barriga MD   Multiple Vitamins-Minerals (ONE DAILY MENS PO) Take 1 tablet by mouth Daily.   Yes Ceferino Barriga MD   nitroglycerin (NITROSTAT) 0.4 MG SL tablet Place 1 tablet under the tongue Every 5 (Five) Minutes As Needed for Chest Pain.  Take no more than 3 doses in 15 minutes. 4/8/20  Yes Jose Jones MD   OMEGA-3 KRILL OIL PO Take 1 capsule by mouth Daily.   Yes Ceferino Barriga MD   omeprazole (priLOSEC) 40 MG capsule Take 1 capsule by mouth Daily.   Yes Ceferino Barriga MD   sacubitril-valsartan (Entresto) 49-51 MG tablet Take 1 tablet by mouth 2 (Two) Times a Day. 11/7/23  Yes Jose Jones MD   sertraline (ZOLOFT) 100 MG tablet Take 1 tablet by mouth Daily. 10/3/14  Yes Ceferino Barriga MD   vitamin C (ASCORBIC ACID) 250 MG tablet Take 1 tablet by mouth Daily.   Yes Ceferino Barriga MD       Past medical & surgical history, social and family history reviewed in the electronic medical record.    Physical Exam:    Vitals:   Vitals:    03/06/24 0850   BP: (!) 138/108   Pulse: 71   Resp: 18   Temp: 97.5 °F (36.4 °C)   SpO2: 98%          03/06/24  0850   Weight: 90.9 kg (200 lb 6.4 oz)   Body mass index is 32.35 kg/m².    GENERAL: No apparent distress.  No significant changes since last exam.  CHEST: Clear to auscultation bilaterally no stridor no wheeze.  CV: S1, S2, regular without Murmurs, Rubs or Gallops  EXTREMITIES: No edema.              IMPRESSION:Cheif Complaint EP: Atrial Fibrillation      PLAN:  Procedure to perform: AVN ablation        Electronically signed by DANIEL Mondragon, 03/06/24, 9:06 AM EST.

## 2024-03-07 ENCOUNTER — CALL CENTER PROGRAMS (OUTPATIENT)
Dept: CALL CENTER | Facility: HOSPITAL | Age: 64
End: 2024-03-07
Payer: MEDICARE

## 2024-03-07 NOTE — OUTREACH NOTE
PCI/Device Survey      Flowsheet Row Responses   Facility patient discharged from? [place]   Procedure date 03/06/24   Procedure (if device, specify in description) Ablation   Performing MD Dr. Maurizio Cruz   Attempt successful? No   Unsuccessful attempts Attempt 2            FORREST GROVE - Registered Nurse

## 2024-03-07 NOTE — OUTREACH NOTE
PCI/Device Survey      Flowsheet Row Responses   Facility patient discharged from? [facility]   Procedure date 03/06/24   Procedure (if device, specify in description) Ablation  [Right femoral]   Performing MD Dr. Maurizio Cruz   Attempt successful? No   Unsuccessful attempts Attempt 1            FORREST GROVE - Registered Nurse

## 2024-03-13 RX ORDER — SACUBITRIL AND VALSARTAN 49; 51 MG/1; MG/1
1 TABLET, FILM COATED ORAL 2 TIMES DAILY
Qty: 180 TABLET | Refills: 3 | Status: SHIPPED | OUTPATIENT
Start: 2024-03-13

## 2024-06-10 ENCOUNTER — OFFICE VISIT (OUTPATIENT)
Dept: CARDIOLOGY | Facility: CLINIC | Age: 64
End: 2024-06-10
Payer: MEDICARE

## 2024-06-10 VITALS
HEART RATE: 80 BPM | OXYGEN SATURATION: 98 % | SYSTOLIC BLOOD PRESSURE: 72 MMHG | DIASTOLIC BLOOD PRESSURE: 40 MMHG | BODY MASS INDEX: 30.7 KG/M2 | WEIGHT: 191 LBS | HEIGHT: 66 IN

## 2024-06-10 DIAGNOSIS — I48.19 PERSISTENT ATRIAL FIBRILLATION: ICD-10-CM

## 2024-06-10 DIAGNOSIS — I25.5 ISCHEMIC CARDIOMYOPATHY: Primary | ICD-10-CM

## 2024-06-10 DIAGNOSIS — Z95.810 ICD (IMPLANTABLE CARDIOVERTER-DEFIBRILLATOR), BIVENTRICULAR, IN SITU: ICD-10-CM

## 2024-06-10 PROCEDURE — 3078F DIAST BP <80 MM HG: CPT | Performed by: INTERNAL MEDICINE

## 2024-06-10 PROCEDURE — 93284 PRGRMG EVAL IMPLANTABLE DFB: CPT | Performed by: INTERNAL MEDICINE

## 2024-06-10 PROCEDURE — 3074F SYST BP LT 130 MM HG: CPT | Performed by: INTERNAL MEDICINE

## 2024-06-10 PROCEDURE — 99214 OFFICE O/P EST MOD 30 MIN: CPT | Performed by: INTERNAL MEDICINE

## 2024-06-10 RX ORDER — SPIRONOLACTONE 50 MG/1
50 TABLET, FILM COATED ORAL DAILY
COMMUNITY
Start: 2024-06-05

## 2024-06-10 RX ORDER — ACETAMINOPHEN 500 MG
500 TABLET ORAL EVERY 6 HOURS PRN
COMMUNITY

## 2024-06-10 RX ORDER — DOCUSATE SODIUM 100 MG/1
100 CAPSULE, LIQUID FILLED ORAL 2 TIMES DAILY
COMMUNITY

## 2024-06-10 RX ORDER — CARVEDILOL 3.12 MG/1
3.12 TABLET ORAL 2 TIMES DAILY WITH MEALS
COMMUNITY
Start: 2024-05-09

## 2024-06-10 RX ORDER — TRAZODONE HYDROCHLORIDE 50 MG/1
50 TABLET ORAL NIGHTLY
COMMUNITY

## 2024-06-10 RX ORDER — DABIGATRAN ETEXILATE 150 MG/1
150 CAPSULE ORAL
COMMUNITY
Start: 2024-06-05 | End: 2024-06-10 | Stop reason: SDUPTHER

## 2024-06-10 RX ORDER — FLUOXETINE HYDROCHLORIDE 20 MG/1
20 CAPSULE ORAL DAILY
COMMUNITY
Start: 2024-06-05

## 2024-06-10 NOTE — PROGRESS NOTES
Cardiac Electrophysiology Outpatient Follow Up Note            [facility]    Follow Up Office Visit      Howard Owusu  [id number]  06/10/2024  [unfilled]  [unfilled]    Primary Care Physician: Shanelle Barrow APRN    Referred By: No ref. provider found    Subjective     Chief Complaint:   Diagnoses and all orders for this visit:    1. Ischemic cardiomyopathy (Primary)    2. ICD (implantable cardioverter-defibrillator), biventricular, in situ    3. Persistent atrial fibrillation      Chief Complaint   Patient presents with    Paroxysmal atrial fibrillation       History of Present Illness:   Howard Owusu is a 64 y.o. male who presents to my electrophysiology clinic for follow up of above complaints.  RNs had a rough go.  He has been in the hospital a couple of times but since his AV node ablation in March of this year his symptoms have dramatically improved for the better.  He still does have some difficulty with lower extremity edema now and then.      Past Medical History:   Past Medical History:   Diagnosis Date    Arrhythmia     Arthritis     Atrial fibrillation     single episode during PCI with conversion to NSR witin 24 hrs    Blood clot in vein 05/07/2024    right groin    Coronary artery disease 2004    S/P Taxus RICHARD prox LAD, Cath June 2011 EF 20%, S/P distal RCA 3.0x12 and 3.0x8 Cypher, PLB 2.5x8 Cypher, and mid ALD 3.0x 13 Cypher    GERD (gastroesophageal reflux disease)     History of kidney stones     2000    Hyperlipidemia     Hypertension     Hypothyroidism     Ischemic cardiomyopathy     Hx of Inducible VT per EP study Nov 2004, S/P [employer] Eliezer ICD implant, upgrade to Bi-V iCD July 2008 Dr. Mtz June 2010 appropriate ICD shocks, Gen change 12/11/13 MGR    Obstructive sleep apnea     Old MI (myocardial infarction)        Past Surgical History:   Past Surgical History:   Procedure Laterality Date    CARDIAC CATHETERIZATION      CARDIAC  CATHETERIZATION N/A 3/14/2023    Procedure: Left Heart Cath;  Surgeon: Jose Jones MD;  Location:  [facility];  Service: Cardiovascular;  Laterality: N/A;    CARDIAC ELECTROPHYSIOLOGY PROCEDURE N/A 11/17/2020    Procedure: ICD DC generator change Generator change at patient's earliest convenience.  Given the atrial lead noise, will reevaluate at that time to see if atrial lead revision is also necessary. ;  Surgeon: Maurizio Cruz DO;  Location:  [facility] EP [facility]VAS[facility];  Service: Cardiology;  Laterality: N/A;    CARDIAC ELECTROPHYSIOLOGY PROCEDURE N/A 04/09/2021    Procedure: [employer] Eliezer BiV ICD RV lead, new;  Surgeon: Maurizio Cruz DO;  Location:  [facility] EP [facility];  Service: Cardiology;  Laterality: N/A;    CARDIAC ELECTROPHYSIOLOGY PROCEDURE N/A 05/25/2021    Procedure: RV ICD lead extraction (SJM) new RV lead insertion, no meds to hold, Hybrid OR, CT/OR back up;  Surgeon: Maurizio Cruz DO;  Location:  [facility] EP [facility];  Service: Cardiovascular;  Laterality: N/A;    CARDIAC ELECTROPHYSIOLOGY PROCEDURE N/A 05/25/2021    Procedure: EP/CRM Study;  Surgeon: Marlon Bartlett MD;  Location:  [facility];  Service: Cardiovascular;  Laterality: N/A;    CARDIAC ELECTROPHYSIOLOGY PROCEDURE N/A 3/6/2024    Procedure: AV node ablation;  Surgeon: Maurizio Cruz DO;  Location:  [facility];  Service: Cardiovascular;  Laterality: N/A;  please schedule after echo, ct of head and carotid    CARPAL TUNNEL RELEASE Bilateral     INSERT / REPLACE / REMOVE PACEMAKER      INTERNAL CARDIAC DEFIBRILLATOR INSERTION      KIDNEY STONE SURGERY      PACEMAKER REPLACEMENT      Generator change, [employer] Eliezer Bi-V ICD (Dr. Atkins), 12/11/2013.       Family History:   Family History   Problem Relation Age of Onset    Pulmonary fibrosis Mother     Alzheimer's disease Mother     Heart attack Father     No Known Problems Sister     No Known Problems Brother     No Known  Problems Brother        Social History:   Social History     Socioeconomic History    Marital status:    Tobacco Use    Smoking status: Never     Passive exposure: Current    Smokeless tobacco: Never   Vaping Use    Vaping status: Never Used   Substance and Sexual Activity    Alcohol use: No    Drug use: No    Sexual activity: Defer       Medications:     Current Outpatient Medications:     acetaminophen (TYLENOL) 500 MG tablet, Take 1 tablet by mouth Every 6 (Six) Hours As Needed for Mild Pain., Disp: , Rfl:     aspirin 81 MG chewable tablet, Chew 1 tablet Daily., Disp: , Rfl:     atorvastatin (LIPITOR) 80 MG tablet, Take 1 tablet by mouth Every Night., Disp: 90 tablet, Rfl: 3    B Complex Vitamins (VITAMIN B COMPLEX PO), Take  by mouth., Disp: , Rfl:     carvedilol (COREG) 3.125 MG tablet, Take 1 tablet by mouth 2 (Two) Times a Day With Meals., Disp: , Rfl:     dabigatran etexilate (PRADAXA) 150 MG capsu, Take 1 capsule by mouth., Disp: , Rfl:     docusate sodium (COLACE) 100 MG capsule, Take 1 capsule by mouth 2 (Two) Times a Day., Disp: , Rfl:     FLUoxetine (PROzac) 20 MG capsule, Take 1 capsule by mouth Daily., Disp: , Rfl:     Garlic 1000 MG capsule, Take 1 capsule by mouth Daily., Disp: , Rfl:     levothyroxine (SYNTHROID, LEVOTHROID) 150 MCG tablet, Take 1 tablet by mouth Daily., Disp: , Rfl:     methocarbamol (ROBAXIN) 500 MG tablet, Take 1.5 tablets by mouth 3 (Three) Times a Day As Needed for Muscle Spasms., Disp: , Rfl:     Misc Natural Products (OSTEO BI-FLEX ADV TRIPLE ST PO), Take 1 tablet by mouth Daily., Disp: , Rfl:     Multiple Vitamins-Minerals (ONE DAILY MENS PO), Take 1 tablet by mouth Daily., Disp: , Rfl:     Naproxen Sodium (ALEVE PO), Take  by mouth., Disp: , Rfl:     OMEGA-3 KRILL OIL PO, Take 1 capsule by mouth Daily., Disp: , Rfl:     omeprazole (priLOSEC) 40 MG capsule, Take 1 capsule by mouth Daily., Disp: , Rfl:     sacubitril-valsartan (Entresto) 49-51 MG tablet, Take 1  "tablet by mouth 2 (Two) Times a Day., Disp: 180 tablet, Rfl: 3    spironolactone (ALDACTONE) 50 MG tablet, Take 1 tablet by mouth Daily., Disp: , Rfl:     traZODone (DESYREL) 50 MG tablet, Take 1 tablet by mouth Every Night., Disp: , Rfl:     apixaban (ELIQUIS) 5 MG tablet tablet, Take 1 tablet by mouth 2 (Two) Times a Day. (Patient not taking: Reported on 6/10/2024), Disp: 180 tablet, Rfl: 3    Cholecalciferol (Vitamin D3) 25 MCG (1000 UT) capsule, Take 1 capsule by mouth Daily. (Patient not taking: Reported on 6/10/2024), Disp: , Rfl:     coenzyme Q10 100 MG capsule, Take 1 capsule by mouth Daily. (Patient not taking: Reported on 6/10/2024), Disp: , Rfl:     empagliflozin (Jardiance) 10 MG tablet tablet, Take 1 tablet by mouth Daily. (Patient not taking: Reported on 6/10/2024), Disp: 90 tablet, Rfl: 3    Magnesium Oxide -Mg Supplement 400 (240 Mg) MG tablet, Take 1 tablet by mouth Daily. (Patient not taking: Reported on 6/10/2024), Disp: , Rfl:     nitroglycerin (NITROSTAT) 0.4 MG SL tablet, Place 1 tablet under the tongue Every 5 (Five) Minutes As Needed for Chest Pain. Take no more than 3 doses in 15 minutes. (Patient not taking: Reported on 6/10/2024), Disp: 25 tablet, Rfl: 3    vitamin C (ASCORBIC ACID) 250 MG tablet, Take 1 tablet by mouth Daily. (Patient not taking: Reported on 6/10/2024), Disp: , Rfl:     Allergies:   Allergies   Allergen Reactions    Morphine And Codeine Hives    Penicillins Unknown - Low Severity     Patient reports tolerating amoxicillin.    Sulfa Antibiotics Unknown - Low Severity     Was told by MD that he is allergic but does not know the reaction       Objective   Vital Signs:   Vitals:    06/10/24 1513   BP: (!) 72/40   BP Location: Left arm   Patient Position: Sitting   Pulse: 80   SpO2: 98%   Weight: 86.6 kg (191 lb)   Height: 167.6 cm (66\")       PHYSICAL EXAM  General appearance: Awake, alert, cooperative  Head: Normocephalic, without obvious abnormality, atraumatic  Eyes: " "Conjunctivae/corneas clear, EOMs intact  Neck: no adenopathy, no carotid bruit, no JVD, and thyroid: not enlarged  Lungs: clear to auscultation bilaterally and no rhonchi or crackles\", ' symmetric  Heart: regular rate and rhythm, S1, S2 normal, no murmur, click, rub or gallop  Abdomen: Soft, non-tender, bowel sounds normal,  no organomegaly  Extremities:  2+ BILAT EDEMA  Skin: Skin color, turgor normal, no rashes or lesions  Neurologic: Grossly normal     Lab Results   Component Value Date    GLUCOSE 77 03/06/2024    CALCIUM 9.1 03/06/2024     03/06/2024    K 3.8 03/06/2024    CO2 21.0 (L) 03/06/2024     03/06/2024    BUN 13 03/06/2024    CREATININE 0.69 (L) 03/06/2024    EGFRIFNONA 99 07/09/2021    BCR 18.8 03/06/2024    ANIONGAP 12.0 03/06/2024     Lab Results   Component Value Date    WBC 4.66 03/06/2024    HGB 11.9 (L) 03/06/2024    HCT 38.1 03/06/2024    .7 (H) 03/06/2024     03/06/2024     Lab Results   Component Value Date    INR 1.3 (H) 02/16/2024     No results found for: \"TSH\", \"C7JWKXI\", \"I4EUKUO\", \"THYROIDAB\"    Cardiac Testing:     I personally viewed and interpreted the patient's EKG/Telemetry/lab data    Procedures    Tobacco Cessation: N/A  Obstructive Sleep Apnea Screening: Completed    Advance Care Planning   ACP discussion was declined by the patient. Patient does not have an advance directive, declines further assistance.       Assessment & Plan    Diagnoses and all orders for this visit:    1. Ischemic cardiomyopathy (Primary)    2. ICD (implantable cardioverter-defibrillator), biventricular, in situ    3. Persistent atrial fibrillation         Diagnosis Plan   1. Ischemic cardiomyopathy  Chronic systolic heart failure.  Chronically low blood pressure.  Intolerant to multiple medications.    Missed an appointment with Dr. Jones because he was admitted to  with heart failure.    That said he feels a whole lot better after AV node ablation and says that things are " dramatically improved.  His AV node ablation was back in March of this year.  He is now pacemaker dependent.      2. ICD (implantable cardioverter-defibrillator), biventricular, in situ  Resynchronization ICD.  [employer] system.  Pacemaker dependent.  Underlying atrial rhythm is A-fib.  Complete heart block.      This patient's Cardiac Implanted Electronic Device was manually interrogated and reprogrammed during the patient encounter today.  Iterative programming changes were manually made to determine the sensing threshold, pacing threshold, lead impedance as well as underlying cardiac rhythm.  These programming changes were not limited to but included some or all of the following when appropriate: pacing mode, programmed AV delays, blanking periods, and refractory periods.  Data obtained as a result of these manual programing changes informed the patient's CIED permanent programming.        3. Persistent atrial fibrillation  AV node ablation definitive rate control.  Long-term anticoagulation.  On apixaban.        Body mass index is 30.83 kg/m².    I spent 39 minutes in consultation with this patient which included more than 65% of this time in direct face-to-face counseling, physical examination and discussion of my assessment and findings and this shared decision making with the patient.  The remainder of the time not spent face-to-face was performing one, some or all of the following actions: preparing to see the patient (e.g. reviewing tests, prior clinicians' notes, etc), ordering medications, tests or procedures, coordination of care, discussion of the plan with other healthcare providers, documenting clinical information in epic as well as independently interpreting results and communication of these results to the patient family and/or caregiver(s).  Please note that this explicitly excludes time spent on other separate billable services such as performing procedures or test interpretation, when  applicable.      Follow Up:       Thank you for allowing me to participate in the care of your patient. Please to not hesitate to contact me with additional questions or concerns.      Maurizio Cruz DO, FACC, RS  Cardiac Electrophysiologist  [facility] / [facility]

## 2024-06-14 ENCOUNTER — OUTSIDE FACILITY SERVICE (OUTPATIENT)
Dept: CARDIOLOGY | Facility: CLINIC | Age: 64
End: 2024-06-14
Payer: MEDICARE

## 2024-06-14 PROCEDURE — 93325 DOPPLER ECHO COLOR FLOW MAPG: CPT | Performed by: INTERNAL MEDICINE

## 2024-06-14 PROCEDURE — 93320 DOPPLER ECHO COMPLETE: CPT | Performed by: INTERNAL MEDICINE

## 2024-06-14 PROCEDURE — 93312 ECHO TRANSESOPHAGEAL: CPT | Performed by: INTERNAL MEDICINE

## 2024-08-30 NOTE — PROGRESS NOTES
Subjective:     Encounter Date:09/03/2024    Primary Care Physician: Shanelle Barrow APRN      Patient ID: Howard Owusu is a 64 y.o. male.    Chief Complaint:Coronary Artery Disease        Problem List:   Coronary artery disease/ischemic cardiomyopathy:  [facility] for acute anterior MI, 2004: EF 30%. 100% proximal LAD now s/p 3.0 x 12 and a 2.7 x 16 Taxus stent. RCA and LCx were within normal limits.  [facility], 11/2004: LAD patent stent noted. Ostial D1 stenosis of 99% which was unable to be crossed. LCx and RCA were still within normal limits. EF 35%.  [facility], 06/2008: EF 15%. 50% distal RCA stenosis, and left coronary anatomy was unchanged.  [facility], 06/2011: EF 20%, 90% distal RCA (3.0 x 12 and 3.0 x 8 mm Cypher), and 90% right posterolateral (2.5 x 8 Cypher), and 85% mid LAD (3.0 x 13 mm Cypher).  2/2023 MPS with old anterior MI, no viability.  Inferior lateral ischemia.  3/14/2023 [facility] 90% proximal LAD (3.5 x 15 Xience RICHARD).  50% mid RCA with normal RFR.  EF 20%.  Normal LVEDP.  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  November 2004, [employer] El[employer]zer ICD with DFT testing done.  July 2008, due to ischemic cardiomyopathy, patient was upgraded to a [employer] Eliezer Bi-V ICD by Dr. Mtz.  June 2010, appropriate ICD shocks.  Generator change, [employer] Eliezer Bi-V ICD (Dr. Atkins), 12/11/2013.  Recent Lead extraction of failed [place]ena lead and revision with new RA/RV lead and generator change with Dr. Cruz 5/25/2021 TARUN  6/14/2024 MICHAEL with EF of 20 to 25%.  Severe TR.  Moderate MVP with moderate to severe regurgitation.  7/2024 hospitalization at [facility] with reported ICD lead thrombus.  Ischemic cardiomyopathy.  Atrial fibrillation   Initial with VF during a PCI, cardioverted to atrial fibrillation. After 24 hours, converted to sinus rhythm.  3/2/2022 ECV to sinus rhythm, Dr. Cruz.  3/6/2024 AV node ablation,   [place]  Hypertension.  Hyperlipidemia.  Hypothyroidism.  GERD.  GILL.  Surgeries:  Surgery for nephrolithiasis        Allergies   Allergen Reactions    Morphine And Codeine Hives    Penicillins Unknown - Low Severity     Patient reports tolerating amoxicillin.    Sulfa Antibiotics Unknown - Low Severity     Was told by MD that he is allergic but does not know the reaction         Current Outpatient Medications:     aspirin 81 MG chewable tablet, Chew 1 tablet Daily., Disp: , Rfl:     atorvastatin (LIPITOR) 80 MG tablet, Take 1 tablet by mouth Every Night., Disp: 90 tablet, Rfl: 3    B Complex Vitamins (VITAMIN B COMPLEX PO), Take  by mouth., Disp: , Rfl:     bumetanide (BUMEX) 2 MG tablet, Take 1 tablet by mouth Daily., Disp: , Rfl:     levothyroxine (SYNTHROID, LEVOTHROID) 150 MCG tablet, Take 1 tablet by mouth Daily., Disp: , Rfl:     methocarbamol (ROBAXIN) 500 MG tablet, Take 1.5 tablets by mouth 3 (Three) Times a Day As Needed for Muscle Spasms., Disp: , Rfl:     midodrine (PROAMATINE) 10 MG tablet, Take 1 tablet by mouth 3 (Three) Times a Day., Disp: , Rfl:     Multiple Vitamins-Minerals (ONE DAILY MENS PO), Take 1 tablet by mouth Daily., Disp: , Rfl:     omeprazole (priLOSEC) 40 MG capsule, Take 1 capsule by mouth Daily., Disp: , Rfl:     potassium chloride ER (K-TAB) 20 MEQ tablet controlled-release ER tablet, Take 2 tablets by mouth Daily., Disp: , Rfl:     Pradaxa 150 MG capsu, Take 1 capsule by mouth 2 (Two) Times a Day., Disp: , Rfl:     traZODone (DESYREL) 50 MG tablet, Take 1 tablet by mouth Every Night., Disp: , Rfl:     Xigduo XR 5-1000 MG tablet, , Disp: , Rfl:     coenzyme Q10 100 MG capsule, Take 1 capsule by mouth Daily. (Patient not taking: Reported on 6/10/2024), Disp: , Rfl:         History of Present Illness    Patient is a 64-year-old  male who is being seen today for follow-up of chronic HFrEF, coronary artery disease, VT, ICD and cardiac risk factors.  Since last seen in the office  "patient has undergone AV node ablation.  He is also had right knee arthroscopy.  He suffered a fall and was in the hospital with a hematoma.  He then developed sepsis in the middle of June.  This was felt due to his right arthroscopy site.  He was admitted to [facility].  A MICHAEL was performed at [facility] which suggested possible device lead thrombus.  He was then transferred to [facility] for further management.  Patient had blood cultures which were positive for Enterococcus and he was treated with 6 weeks of IV antibiotics which she completed on 8/5/2024.  He has not had repeat MICHAEL since that time.  Patient is unclear about his medications.  His Eliquis was transitioned to dabigatran.  No current infectious symptoms other than recurrent effusion of his right knee.  Notes that he needs to see someone to \"get this drained\".  No reported syncope, presyncope.  Has not been following his blood pressure, was discharged home on midodrine.  Notes that at time of discharge she was down to roughly 830 pounds.  He is gained roughly 30 pounds since discharge.    The following portions of the patient's history were reviewed and updated as appropriate: allergies, current medications, past family history, past medical history, past social history, past surgical history and problem list.      Social History     Tobacco Use    Smoking status: Never     Passive exposure: Current    Smokeless tobacco: Never   Vaping Use    Vaping status: Never Used   Substance Use Topics    Alcohol use: No    Drug use: No         ROS       Objective:   /83   Pulse 83   Ht 167.6 cm (66\")   Wt 75.1 kg (165 lb 9.6 oz)   SpO2 96%   BMI 26.73 kg/m²         Vitals reviewed.   Constitutional:       Appearance: Well-developed and not in distress.   Neck:      Vascular: No JVD.      Trachea: No tracheal deviation.   Pulmonary:      Effort: Pulmonary effort is normal.      Breath sounds: Normal breath sounds.   Cardiovascular:      " Normal rate. Regular rhythm.      Murmurs: There is no murmur.   Edema:     Peripheral edema absent.   Musculoskeletal:         General: No deformity. Skin:     General: Skin is warm and dry.   Neurological:      Mental Status: Alert and oriented to person, place, and time.         Procedures  Device check:  Normal functioning biventricular ICD.  Mode VVIR, rate of 80.  96% biventricular paced.  Battery voltage 49%, underlying rhythm atrial fibrillation.  No events.  3 years to EVAN.  Base rate decreased to 70.        Assessment:   Assessment & Plan      Diagnoses and all orders for this visit:    1. Ischemic cardiomyopathy (Primary), EF 20 to 25%.  Had MICHAEL performed at [facility].  No active heart failure.  Not currently on GDMT secondary to hypotension post sepsis.    2. Coronary artery disease involving native coronary artery of native heart without angina pectoris, stable.  No angina.  On aspirin.    3. Chronic HFrEF (heart failure with reduced ejection fraction), stable.  No evidence of volume overload.  On Bumex.    4. Permanent atrial fibrillation, Eliquis transition to get Pradaxa by [facility].    5. 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  However, this was noted in the setting of Enterococcus sepsis and bacteremia.  Do have some concerns for possible vegetation.      Plan:  Reviewed patient's current medications with him in the office today.  At this time we will decrease midodrine to 5 mg 3 times daily.  Patient needs repeat MICHAEL to follow-up on abnormality of his device lead.  We will schedule this in the next 1 to 2 weeks.  At time of MICHAEL will consider possibly discontinuing midodrine if blood pressure normal.  Continue other current cardiac medications for now.  Follow-up in the office in 4 weeks time or sooner if needed.       Zeina DAVID     Advance Care Planning   ACP discussion was held with the patient during this visit.  Patient has an advance directive (not in EMR), copy requested.        Dictated utilizing Dragon dictation

## 2024-09-03 ENCOUNTER — OFFICE VISIT (OUTPATIENT)
Dept: CARDIOLOGY | Facility: CLINIC | Age: 64
End: 2024-09-03
Payer: MEDICARE

## 2024-09-03 VITALS
HEIGHT: 66 IN | BODY MASS INDEX: 26.61 KG/M2 | WEIGHT: 165.6 LBS | SYSTOLIC BLOOD PRESSURE: 152 MMHG | DIASTOLIC BLOOD PRESSURE: 83 MMHG | OXYGEN SATURATION: 96 % | HEART RATE: 83 BPM

## 2024-09-03 DIAGNOSIS — I25.5 ISCHEMIC CARDIOMYOPATHY: Primary | ICD-10-CM

## 2024-09-03 DIAGNOSIS — T85.868A: ICD-10-CM

## 2024-09-03 DIAGNOSIS — I48.21 PERMANENT ATRIAL FIBRILLATION: ICD-10-CM

## 2024-09-03 DIAGNOSIS — I25.10 CORONARY ARTERY DISEASE INVOLVING NATIVE CORONARY ARTERY OF NATIVE HEART WITHOUT ANGINA PECTORIS: ICD-10-CM

## 2024-09-03 DIAGNOSIS — I50.22 CHRONIC HFREF (HEART FAILURE WITH REDUCED EJECTION FRACTION): ICD-10-CM

## 2024-09-03 RX ORDER — ATENOLOL 100 MG/1
150 TABLET ORAL 2 TIMES DAILY
COMMUNITY
Start: 2024-07-02

## 2024-09-03 RX ORDER — POTASSIUM CHLORIDE 1500 MG/1
40 TABLET, EXTENDED RELEASE ORAL DAILY
COMMUNITY
Start: 2024-08-01

## 2024-09-03 RX ORDER — DAPAGLIFLOZIN AND METFORMIN HYDROCHLORIDE 5; 1000 MG/1; MG/1
TABLET, FILM COATED, EXTENDED RELEASE ORAL
COMMUNITY
Start: 2024-08-01

## 2024-09-03 RX ORDER — BUMETANIDE 2 MG/1
2 TABLET ORAL DAILY
COMMUNITY
Start: 2024-07-02

## 2024-09-03 RX ORDER — MIDODRINE HYDROCHLORIDE 10 MG/1
10 TABLET ORAL 3 TIMES DAILY
COMMUNITY
Start: 2024-07-02

## 2024-09-03 NOTE — LETTER
September 3, 2024       No Recipients    Patient: Howard Owusu   YOB: 1960   Date of Visit: 9/3/2024     Dear FRANKIE Lopez:       Thank you for referring Howard Owusu to me for evaluation. Below are the relevant portions of my assessment and plan of care.    If you have questions, please do not hesitate to call me. I look forward to following Howard along with you.         Sincerely,        FRANKIE Sung        CC:   No Recipients    Zeina Moya APRN  09/03/24 1234  Sign when Signing Visit  Subjective:     Encounter Date:09/03/2024    Primary Care Physician: Shanelle Barrow APRN      Patient ID: Howard Owusu is a 64 y.o. male.    Chief Complaint:Coronary Artery Disease        Problem List:   Coronary artery disease/ischemic cardiomyopathy:  [facility] for acute anterior MI, 2004: EF 30%. 100% proximal LAD now s/p 3.0 x 12 and a 2.7 x 16 Taxus stent. RCA and LCx were within normal limits.  [facility], 11/2004: LAD patent stent noted. Ostial D1 stenosis of 99% which was unable to be crossed. LCx and RCA were still within normal limits. EF 35%.  [facility], 06/2008: EF 15%. 50% distal RCA stenosis, and left coronary anatomy was unchanged.  [facility], 06/2011: EF 20%, 90% distal RCA (3.0 x 12 and 3.0 x 8 mm Cypher), and 90% right posterolateral (2.5 x 8 Cypher), and 85% mid LAD (3.0 x 13 mm Cypher).  2/2023 [facility] with old anterior MI, no viability.  Inferior lateral ischemia.  3/14/2023 [facility] 90% proximal LAD (3.5 x 15 Xience RICHARD).  50% mid RCA with normal RFR.  EF 20%.  Normal LVEDP.  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  November 2004, [employer] ICD with DFT testing done.  July 2008, due to ischemic cardiomyopathy, patient was upgraded to a [employer] Bi-V ICD by Dr. Mtz.  June 2010, appropriate ICD shocks.  Generator change, [employer] Bi-V ICD (Dr. Atkins), 12/11/2013.  Recent Lead extraction of failed Jena lead and  revision with new RA/RV lead and generator change with Dr. Cruz 5/25/2021 [facility]  6/14/2024 MICHAEL with EF of 20 to 25%.  Severe TR.  Moderate MVP with moderate to severe regurgitation.  7/2024 hospitalization at [facility] with reported ICD lead thrombus.  Ischemic cardiomyopathy.  Atrial fibrillation   Initial with VF during a PCI, cardioverted to atrial fibrillation. After 24 hours, converted to sinus rhythm.  3/2/2022 ECV to sinus rhythm, Dr. Cruz.  3/6/2024 AV node ablation, Dr. Cruz  Hypertension.  Hyperlipidemia.  Hypothyroidism.  GERD.  GILL.  Surgeries:  Surgery for nephrolithiasis        Allergies   Allergen Reactions   • Morphine And Codeine Hives   • Penicillins Unknown - Low Severity     Patient reports tolerating amoxicillin.   • Sulfa Antibiotics Unknown - Low Severity     Was told by MD that he is allergic but does not know the reaction         Current Outpatient Medications:   •  aspirin 81 MG chewable tablet, Chew 1 tablet Daily., Disp: , Rfl:   •  atorvastatin (LIPITOR) 80 MG tablet, Take 1 tablet by mouth Every Night., Disp: 90 tablet, Rfl: 3  •  B Complex Vitamins (VITAMIN B COMPLEX PO), Take  by mouth., Disp: , Rfl:   •  bumetanide (BUMEX) 2 MG tablet, Take 1 tablet by mouth Daily., Disp: , Rfl:   •  levothyroxine (SYNTHROID, LEVOTHROID) 150 MCG tablet, Take 1 tablet by mouth Daily., Disp: , Rfl:   •  methocarbamol (ROBAXIN) 500 MG tablet, Take 1.5 tablets by mouth 3 (Three) Times a Day As Needed for Muscle Spasms., Disp: , Rfl:   •  midodrine (PROAMATINE) 10 MG tablet, Take 1 tablet by mouth 3 (Three) Times a Day., Disp: , Rfl:   •  Multiple Vitamins-Minerals (ONE DAILY MENS PO), Take 1 tablet by mouth Daily., Disp: , Rfl:   •  omeprazole (priLOSEC) 40 MG capsule, Take 1 capsule by mouth Daily., Disp: , Rfl:   •  potassium chloride ER (K-TAB) 20 MEQ tablet controlled-release ER tablet, Take 2 tablets by mouth Daily., Disp: , Rfl:   •  Pradaxa 150 MG capsu, Take 1 capsule by mouth 2 (Two) Times a Day.,  "Disp: , Rfl:   •  traZODone (DESYREL) 50 MG tablet, Take 1 tablet by mouth Every Night., Disp: , Rfl:   •  Xigduo XR 5-1000 MG tablet, , Disp: , Rfl:   •  coenzyme Q10 100 MG capsule, Take 1 capsule by mouth Daily. (Patient not taking: Reported on 6/10/2024), Disp: , Rfl:         History of Present Illness    Patient is a 64-year-old  male who is being seen today for follow-up of chronic HFrEF, coronary artery disease, VT, ICD and cardiac risk factors.  Since last seen in the office patient has undergone AV node ablation.  He is also had right knee arthroscopy.  He suffered a fall and was in the hospital with a hematoma.  He then developed sepsis in the middle of June.  This was felt due to his right arthroscopy site.  He was admitted to [facility].  A MICHAEL was performed at [facility] which suggested possible device lead thrombus.  He was then transferred to [facility] for further management.  Patient had blood cultures which were positive for Enterococcus and he was treated with 6 weeks of IV antibiotics which she completed on 8/5/2024.  He has not had repeat MICHAEL since that time.  Patient is unclear about his medications.  His Eliquis was transitioned to dabigatran.  No current infectious symptoms other than recurrent effusion of his right knee.  Notes that he needs to see someone to \"get this drained\".  No reported syncope, presyncope.  Has not been following his blood pressure, was discharged home on midodrine.  Notes that at time of discharge she was down to roughly 830 pounds.  He is gained roughly 30 pounds since discharge.    The following portions of the patient's history were reviewed and updated as appropriate: allergies, current medications, past family history, past medical history, past social history, past surgical history and problem list.      Social History     Tobacco Use   • Smoking status: Never     Passive exposure: Current   • Smokeless tobacco: Never   Vaping Use   • " "Vaping status: Never Used   Substance Use Topics   • Alcohol use: No   • Drug use: No         ROS       Objective:   /83   Pulse 83   Ht 167.6 cm (66\")   Wt 75.1 kg (165 lb 9.6 oz)   SpO2 96%   BMI 26.73 kg/m²         Vitals reviewed.   Constitutional:       Appearance: Well-developed and not in distress.   Neck:      Vascular: No JVD.      Trachea: No tracheal deviation.   Pulmonary:      Effort: Pulmonary effort is normal.      Breath sounds: Normal breath sounds.   Cardiovascular:      Normal rate. Regular rhythm.      Murmurs: There is no murmur.   Edema:     Peripheral edema absent.   Musculoskeletal:         General: No deformity. Skin:     General: Skin is warm and dry.   Neurological:      Mental Status: Alert and oriented to person, place, and time.         Procedures  Device check:  Normal functioning biventricular ICD.  Mode VVIR, rate of 80.  96% biventricular paced.  Battery voltage 49%, underlying rhythm atrial fibrillation.  No events.  3 years to EVAN.  Base rate decreased to 70.        Assessment:   Assessment & Plan     Diagnoses and all orders for this visit:    1. Ischemic cardiomyopathy (Primary), EF 20 to 25%.  [facility]  No active heart failure.  Not currently on GDMT secondary to hypotension post sepsis.    2. Coronary artery disease involving native coronary artery of native heart without angina pectoris, stable.  No angina.  On aspirin.    3. Chronic HFrEF (heart failure with reduced ejection fraction), stable.  No evidence of volume overload.  On Bumex.    4. Permanent atrial fibrillation, Eliquis transition to get Pradaxa by [facility].    5. 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  However, this was noted in the setting of Enterococcus sepsis and bacteremia.  Do have some concerns for possible vegetation.      Plan:  Reviewed patient's current medications with him in the office today.  " At this time we will decrease midodrine to 5 mg 3 times daily.  Patient needs repeat MICHAEL to follow-up on abnormality of his device lead.  We will schedule this in the next 1 to 2 weeks.  At time of MICHAEL will consider possibly discontinuing midodrine if blood pressure normal.  Continue other current cardiac medications for now.  Follow-up in the office in 4 weeks time or sooner if needed.       Zeina DAVID     Advance Care Planning  ACP discussion was held with the patient during this visit. Patient has an advance directive (not in EMR), copy requested.        Dictated utilizing Dragon dictation

## 2024-09-24 ENCOUNTER — HOSPITAL ENCOUNTER (OUTPATIENT)
Dept: CARDIOLOGY | Facility: HOSPITAL | Age: 64
Discharge: HOME | End: 2024-09-24
Payer: MEDICARE

## 2024-09-24 ENCOUNTER — LAB (OUTPATIENT)
Dept: LAB | Facility: HOSPITAL | Age: 64
End: 2024-09-24
Payer: MEDICARE

## 2024-09-24 VITALS
DIASTOLIC BLOOD PRESSURE: 85 MMHG | HEART RATE: 68 BPM | RESPIRATION RATE: 20 BRPM | SYSTOLIC BLOOD PRESSURE: 137 MMHG | OXYGEN SATURATION: 98 %

## 2024-09-24 DIAGNOSIS — I48.21 PERMANENT ATRIAL FIBRILLATION: ICD-10-CM

## 2024-09-24 DIAGNOSIS — I50.22 CHRONIC HFREF (HEART FAILURE WITH REDUCED EJECTION FRACTION): ICD-10-CM

## 2024-09-24 DIAGNOSIS — T85.868A: ICD-10-CM

## 2024-09-24 DIAGNOSIS — I47.29 VENTRICULAR TACHYCARDIA (PAROXYSMAL): Primary | ICD-10-CM

## 2024-09-24 LAB
BH CV ECHO MEAS - TR MAX PG: 27 MMHG
BH CV ECHO MEAS - TR MAX VEL: 260 CM/SEC
BH CV VAS BP LEFT ARM: NORMAL MMHG
LV EF 2D ECHO EST: 25 %

## 2024-09-24 PROCEDURE — 93321 DOPPLER ECHO F-UP/LMTD STD: CPT

## 2024-09-24 PROCEDURE — 93321 DOPPLER ECHO F-UP/LMTD STD: CPT | Performed by: INTERNAL MEDICINE

## 2024-09-24 PROCEDURE — 93325 DOPPLER ECHO COLOR FLOW MAPG: CPT | Performed by: INTERNAL MEDICINE

## 2024-09-24 PROCEDURE — 87076 CULTURE ANAEROBE IDENT EACH: CPT | Performed by: INTERNAL MEDICINE

## 2024-09-24 PROCEDURE — 87040 BLOOD CULTURE FOR BACTERIA: CPT | Performed by: INTERNAL MEDICINE

## 2024-09-24 PROCEDURE — 93325 DOPPLER ECHO COLOR FLOW MAPG: CPT

## 2024-09-24 PROCEDURE — 93312 ECHO TRANSESOPHAGEAL: CPT

## 2024-09-24 PROCEDURE — 93312 ECHO TRANSESOPHAGEAL: CPT | Performed by: INTERNAL MEDICINE

## 2024-09-24 RX ORDER — ETOMIDATE 2 MG/ML
INJECTION INTRAVENOUS
Status: COMPLETED | OUTPATIENT
Start: 2024-09-24 | End: 2024-09-24

## 2024-09-24 RX ADMIN — ETOMIDATE 8 MG: 20 INJECTION, SOLUTION INTRAVENOUS at 09:16

## 2024-09-25 ENCOUNTER — HOSPITAL ENCOUNTER (INPATIENT)
Facility: HOSPITAL | Age: 64
LOS: 6 days | Discharge: NURSING, REHAB OR LONG-TERM CARE | DRG: 286 | End: 2024-10-01
Attending: FAMILY MEDICINE | Admitting: HOSPITALIST
Payer: MEDICARE

## 2024-09-25 DIAGNOSIS — I38 HEART VALVE DISEASE: Primary | ICD-10-CM

## 2024-09-25 LAB
ALBUMIN SERPL-MCNC: 3.2 G/DL (ref 3.5–5.2)
ALBUMIN/GLOB SERPL: 0.8 G/DL
ALP SERPL-CCNC: 127 U/L (ref 39–117)
ALT SERPL W P-5'-P-CCNC: 147 U/L (ref 1–41)
ANION GAP SERPL CALCULATED.3IONS-SCNC: 12 MMOL/L (ref 5–15)
AST SERPL-CCNC: 265 U/L (ref 1–40)
BASOPHILS # BLD AUTO: 0.04 10*3/MM3 (ref 0–0.2)
BASOPHILS NFR BLD AUTO: 0.9 % (ref 0–1.5)
BILIRUB SERPL-MCNC: 0.6 MG/DL (ref 0–1.2)
BUN SERPL-MCNC: 15 MG/DL (ref 8–23)
BUN/CREAT SERPL: 18.3 (ref 7–25)
CALCIUM SPEC-SCNC: 8.8 MG/DL (ref 8.6–10.5)
CHLORIDE SERPL-SCNC: 103 MMOL/L (ref 98–107)
CO2 SERPL-SCNC: 18 MMOL/L (ref 22–29)
CREAT SERPL-MCNC: 0.82 MG/DL (ref 0.76–1.27)
D-LACTATE SERPL-SCNC: 3.1 MMOL/L (ref 0.5–2)
DEPRECATED RDW RBC AUTO: 60.5 FL (ref 37–54)
EGFRCR SERPLBLD CKD-EPI 2021: 96.9 ML/MIN/1.73
EOSINOPHIL # BLD AUTO: 0.46 10*3/MM3 (ref 0–0.4)
EOSINOPHIL NFR BLD AUTO: 10.4 % (ref 0.3–6.2)
ERYTHROCYTE [DISTWIDTH] IN BLOOD BY AUTOMATED COUNT: 18.8 % (ref 12.3–15.4)
FERRITIN SERPL-MCNC: 51.02 NG/ML (ref 30–400)
GLOBULIN UR ELPH-MCNC: 4.1 GM/DL
GLUCOSE SERPL-MCNC: 107 MG/DL (ref 65–99)
HCT VFR BLD AUTO: 26.3 % (ref 37.5–51)
HGB BLD-MCNC: 7.8 G/DL (ref 13–17.7)
IMM GRANULOCYTES # BLD AUTO: 0.01 10*3/MM3 (ref 0–0.05)
IMM GRANULOCYTES NFR BLD AUTO: 0.2 % (ref 0–0.5)
INR PPP: 1.88 (ref 0.89–1.12)
IRON 24H UR-MRATE: 14 MCG/DL (ref 59–158)
IRON SATN MFR SERPL: 3 % (ref 20–50)
LDH SERPL-CCNC: 292 U/L (ref 135–225)
LYMPHOCYTES # BLD AUTO: 0.65 10*3/MM3 (ref 0.7–3.1)
LYMPHOCYTES NFR BLD AUTO: 14.7 % (ref 19.6–45.3)
MAGNESIUM SERPL-MCNC: 1.7 MG/DL (ref 1.6–2.4)
MCH RBC QN AUTO: 26 PG (ref 26.6–33)
MCHC RBC AUTO-ENTMCNC: 29.7 G/DL (ref 31.5–35.7)
MCV RBC AUTO: 87.7 FL (ref 79–97)
MONOCYTES # BLD AUTO: 0.65 10*3/MM3 (ref 0.1–0.9)
MONOCYTES NFR BLD AUTO: 14.7 % (ref 5–12)
NEUTROPHILS NFR BLD AUTO: 2.62 10*3/MM3 (ref 1.7–7)
NEUTROPHILS NFR BLD AUTO: 59.1 % (ref 42.7–76)
NRBC BLD AUTO-RTO: 0 /100 WBC (ref 0–0.2)
PHOSPHATE SERPL-MCNC: 2.7 MG/DL (ref 2.5–4.5)
PLATELET # BLD AUTO: 160 10*3/MM3 (ref 140–450)
PMV BLD AUTO: 10 FL (ref 6–12)
POTASSIUM SERPL-SCNC: 4.3 MMOL/L (ref 3.5–5.2)
PROCALCITONIN SERPL-MCNC: 0.08 NG/ML (ref 0–0.25)
PROT SERPL-MCNC: 7.3 G/DL (ref 6–8.5)
PROTHROMBIN TIME: 21.7 SECONDS (ref 12.2–14.5)
RBC # BLD AUTO: 3 10*6/MM3 (ref 4.14–5.8)
RETICS # AUTO: 0.05 10*6/MM3 (ref 0.02–0.13)
RETICS/RBC NFR AUTO: 1.82 % (ref 0.7–1.9)
SODIUM SERPL-SCNC: 133 MMOL/L (ref 136–145)
TIBC SERPL-MCNC: 405 MCG/DL (ref 298–536)
TRANSFERRIN SERPL-MCNC: 272 MG/DL (ref 200–360)
TROPONIN T SERPL HS-MCNC: 14 NG/L
WBC NRBC COR # BLD AUTO: 4.43 10*3/MM3 (ref 3.4–10.8)

## 2024-09-25 PROCEDURE — 84466 ASSAY OF TRANSFERRIN: CPT | Performed by: FAMILY MEDICINE

## 2024-09-25 PROCEDURE — 84100 ASSAY OF PHOSPHORUS: CPT | Performed by: FAMILY MEDICINE

## 2024-09-25 PROCEDURE — 83605 ASSAY OF LACTIC ACID: CPT | Performed by: FAMILY MEDICINE

## 2024-09-25 PROCEDURE — 99223 1ST HOSP IP/OBS HIGH 75: CPT | Performed by: FAMILY MEDICINE

## 2024-09-25 PROCEDURE — 87040 BLOOD CULTURE FOR BACTERIA: CPT | Performed by: FAMILY MEDICINE

## 2024-09-25 PROCEDURE — 85025 COMPLETE CBC W/AUTO DIFF WBC: CPT | Performed by: FAMILY MEDICINE

## 2024-09-25 PROCEDURE — 84484 ASSAY OF TROPONIN QUANT: CPT | Performed by: FAMILY MEDICINE

## 2024-09-25 PROCEDURE — 83735 ASSAY OF MAGNESIUM: CPT | Performed by: FAMILY MEDICINE

## 2024-09-25 PROCEDURE — 85045 AUTOMATED RETICULOCYTE COUNT: CPT | Performed by: FAMILY MEDICINE

## 2024-09-25 PROCEDURE — 82728 ASSAY OF FERRITIN: CPT | Performed by: FAMILY MEDICINE

## 2024-09-25 PROCEDURE — 83615 LACTATE (LD) (LDH) ENZYME: CPT | Performed by: FAMILY MEDICINE

## 2024-09-25 PROCEDURE — 85610 PROTHROMBIN TIME: CPT | Performed by: FAMILY MEDICINE

## 2024-09-25 PROCEDURE — 80053 COMPREHEN METABOLIC PANEL: CPT | Performed by: FAMILY MEDICINE

## 2024-09-25 PROCEDURE — 84145 PROCALCITONIN (PCT): CPT | Performed by: FAMILY MEDICINE

## 2024-09-25 PROCEDURE — 83540 ASSAY OF IRON: CPT | Performed by: FAMILY MEDICINE

## 2024-09-25 RX ORDER — BISACODYL 10 MG
10 SUPPOSITORY, RECTAL RECTAL DAILY PRN
Status: DISCONTINUED | OUTPATIENT
Start: 2024-09-25 | End: 2024-10-02 | Stop reason: HOSPADM

## 2024-09-25 RX ORDER — POLYETHYLENE GLYCOL 3350 17 G/17G
17 POWDER, FOR SOLUTION ORAL DAILY PRN
Status: DISCONTINUED | OUTPATIENT
Start: 2024-09-25 | End: 2024-10-02 | Stop reason: HOSPADM

## 2024-09-25 RX ORDER — VANCOMYCIN 1.75 GRAM/500 ML IN 0.9 % SODIUM CHLORIDE INTRAVENOUS
1750 ONCE
Status: COMPLETED | OUTPATIENT
Start: 2024-09-26 | End: 2024-09-26

## 2024-09-25 RX ORDER — ACETAMINOPHEN 160 MG/5ML
650 SOLUTION ORAL EVERY 4 HOURS PRN
Status: DISCONTINUED | OUTPATIENT
Start: 2024-09-25 | End: 2024-10-02 | Stop reason: HOSPADM

## 2024-09-25 RX ORDER — ACETAMINOPHEN 325 MG/1
650 TABLET ORAL EVERY 4 HOURS PRN
Status: DISCONTINUED | OUTPATIENT
Start: 2024-09-25 | End: 2024-10-02 | Stop reason: HOSPADM

## 2024-09-25 RX ORDER — ACETAMINOPHEN 650 MG/1
650 SUPPOSITORY RECTAL EVERY 4 HOURS PRN
Status: DISCONTINUED | OUTPATIENT
Start: 2024-09-25 | End: 2024-10-02 | Stop reason: HOSPADM

## 2024-09-25 RX ORDER — NITROGLYCERIN 0.4 MG/1
0.4 TABLET SUBLINGUAL
Status: DISCONTINUED | OUTPATIENT
Start: 2024-09-25 | End: 2024-10-02 | Stop reason: HOSPADM

## 2024-09-25 RX ORDER — DABIGATRAN ETEXILATE 150 MG/1
150 CAPSULE ORAL 2 TIMES DAILY
COMMUNITY
Start: 2024-06-12 | End: 2024-10-01 | Stop reason: HOSPADM

## 2024-09-25 RX ORDER — SODIUM CHLORIDE 0.9 % (FLUSH) 0.9 %
10 SYRINGE (ML) INJECTION EVERY 12 HOURS SCHEDULED
Status: DISCONTINUED | OUTPATIENT
Start: 2024-09-25 | End: 2024-10-02 | Stop reason: HOSPADM

## 2024-09-25 RX ORDER — SODIUM CHLORIDE 9 MG/ML
40 INJECTION, SOLUTION INTRAVENOUS AS NEEDED
Status: DISCONTINUED | OUTPATIENT
Start: 2024-09-25 | End: 2024-10-02 | Stop reason: HOSPADM

## 2024-09-25 RX ORDER — AMOXICILLIN 250 MG
2 CAPSULE ORAL 2 TIMES DAILY PRN
Status: DISCONTINUED | OUTPATIENT
Start: 2024-09-25 | End: 2024-10-02 | Stop reason: HOSPADM

## 2024-09-25 RX ORDER — ENOXAPARIN SODIUM 100 MG/ML
30 INJECTION SUBCUTANEOUS DAILY
Status: DISCONTINUED | OUTPATIENT
Start: 2024-09-26 | End: 2024-10-02 | Stop reason: HOSPADM

## 2024-09-25 RX ORDER — SODIUM CHLORIDE 0.9 % (FLUSH) 0.9 %
10 SYRINGE (ML) INJECTION AS NEEDED
Status: DISCONTINUED | OUTPATIENT
Start: 2024-09-25 | End: 2024-10-02 | Stop reason: HOSPADM

## 2024-09-25 RX ORDER — BISACODYL 5 MG/1
5 TABLET, DELAYED RELEASE ORAL DAILY PRN
Status: DISCONTINUED | OUTPATIENT
Start: 2024-09-25 | End: 2024-10-02 | Stop reason: HOSPADM

## 2024-09-25 NOTE — Clinical Note
Hemostasis started on the right radial artery. R-Band was used in achieving hemostasis. Radial compression device applied to vessel. Hemostasis achieved successfully. Closure device additional comment: TR-12 ML

## 2024-09-26 PROBLEM — I38 HEART VALVE DISEASE: Status: ACTIVE | Noted: 2024-09-26

## 2024-09-26 PROBLEM — R94.39 ABNORMAL MYOCARDIAL PERFUSION STUDY: Status: RESOLVED | Noted: 2023-03-02 | Resolved: 2024-09-26

## 2024-09-26 PROBLEM — E43 SEVERE MALNUTRITION: Status: ACTIVE | Noted: 2024-09-26

## 2024-09-26 LAB
ANION GAP SERPL CALCULATED.3IONS-SCNC: 12 MMOL/L (ref 5–15)
BASOPHILS # BLD AUTO: 0.02 10*3/MM3 (ref 0–0.2)
BASOPHILS NFR BLD AUTO: 0.4 % (ref 0–1.5)
BUN SERPL-MCNC: 15 MG/DL (ref 8–23)
BUN/CREAT SERPL: 21.4 (ref 7–25)
CALCIUM SPEC-SCNC: 8.7 MG/DL (ref 8.6–10.5)
CHLORIDE SERPL-SCNC: 104 MMOL/L (ref 98–107)
CO2 SERPL-SCNC: 19 MMOL/L (ref 22–29)
CREAT SERPL-MCNC: 0.7 MG/DL (ref 0.76–1.27)
D-LACTATE SERPL-SCNC: 1.8 MMOL/L (ref 0.5–2)
D-LACTATE SERPL-SCNC: 2.3 MMOL/L (ref 0.5–2)
DAT POLY-SP REAG RBC QL: NEGATIVE
DEPRECATED RDW RBC AUTO: 60.6 FL (ref 37–54)
EGFRCR SERPLBLD CKD-EPI 2021: 101.6 ML/MIN/1.73
EOSINOPHIL # BLD AUTO: 0.53 10*3/MM3 (ref 0–0.4)
EOSINOPHIL NFR BLD AUTO: 11.4 % (ref 0.3–6.2)
ERYTHROCYTE [DISTWIDTH] IN BLOOD BY AUTOMATED COUNT: 18.9 % (ref 12.3–15.4)
FOLATE SERPL-MCNC: 15.5 NG/ML (ref 4.78–24.2)
GEN 5 2HR TROPONIN T REFLEX: 13 NG/L
GLUCOSE SERPL-MCNC: 79 MG/DL (ref 65–99)
HAPTOGLOB SERPL-MCNC: 89 MG/DL (ref 30–200)
HCT VFR BLD AUTO: 26.4 % (ref 37.5–51)
HGB BLD-MCNC: 7.8 G/DL (ref 13–17.7)
IMM GRANULOCYTES # BLD AUTO: 0.01 10*3/MM3 (ref 0–0.05)
IMM GRANULOCYTES NFR BLD AUTO: 0.2 % (ref 0–0.5)
LYMPHOCYTES # BLD AUTO: 0.79 10*3/MM3 (ref 0.7–3.1)
LYMPHOCYTES NFR BLD AUTO: 17 % (ref 19.6–45.3)
MAGNESIUM SERPL-MCNC: 1.7 MG/DL (ref 1.6–2.4)
MCH RBC QN AUTO: 26.1 PG (ref 26.6–33)
MCHC RBC AUTO-ENTMCNC: 29.5 G/DL (ref 31.5–35.7)
MCV RBC AUTO: 88.3 FL (ref 79–97)
MONOCYTES # BLD AUTO: 0.48 10*3/MM3 (ref 0.1–0.9)
MONOCYTES NFR BLD AUTO: 10.3 % (ref 5–12)
NEUTROPHILS NFR BLD AUTO: 2.82 10*3/MM3 (ref 1.7–7)
NEUTROPHILS NFR BLD AUTO: 60.7 % (ref 42.7–76)
NRBC BLD AUTO-RTO: 0 /100 WBC (ref 0–0.2)
PHOSPHATE SERPL-MCNC: 2.7 MG/DL (ref 2.5–4.5)
PLATELET # BLD AUTO: 164 10*3/MM3 (ref 140–450)
PMV BLD AUTO: 10.4 FL (ref 6–12)
POTASSIUM SERPL-SCNC: 4.2 MMOL/L (ref 3.5–5.2)
RBC # BLD AUTO: 2.99 10*6/MM3 (ref 4.14–5.8)
SODIUM SERPL-SCNC: 135 MMOL/L (ref 136–145)
TROPONIN T DELTA: -1 NG/L
VANCOMYCIN SERPL-MCNC: 34.5 MCG/ML (ref 5–40)
VIT B12 BLD-MCNC: 1719 PG/ML (ref 211–946)
WBC NRBC COR # BLD AUTO: 4.65 10*3/MM3 (ref 3.4–10.8)

## 2024-09-26 PROCEDURE — 83605 ASSAY OF LACTIC ACID: CPT | Performed by: FAMILY MEDICINE

## 2024-09-26 PROCEDURE — 25010000002 AMPICILLIN PER 500 MG: Performed by: INTERNAL MEDICINE

## 2024-09-26 PROCEDURE — 97162 PT EVAL MOD COMPLEX 30 MIN: CPT

## 2024-09-26 PROCEDURE — 80048 BASIC METABOLIC PNL TOTAL CA: CPT | Performed by: FAMILY MEDICINE

## 2024-09-26 PROCEDURE — 83735 ASSAY OF MAGNESIUM: CPT | Performed by: FAMILY MEDICINE

## 2024-09-26 PROCEDURE — 97165 OT EVAL LOW COMPLEX 30 MIN: CPT

## 2024-09-26 PROCEDURE — 25810000003 SODIUM CHLORIDE 0.9 % SOLUTION 250 ML FLEX CONT

## 2024-09-26 PROCEDURE — 25010000002 PIPERACILLIN SOD-TAZOBACTAM PER 1 G

## 2024-09-26 PROCEDURE — 25010000002 VANCOMYCIN 1 G RECONSTITUTED SOLUTION 1 EACH VIAL

## 2024-09-26 PROCEDURE — 25010000002 MAGNESIUM SULFATE 4 GM/100ML SOLUTION: Performed by: FAMILY MEDICINE

## 2024-09-26 PROCEDURE — 97530 THERAPEUTIC ACTIVITIES: CPT

## 2024-09-26 PROCEDURE — 25010000002 CEFTRIAXONE PER 250 MG: Performed by: INTERNAL MEDICINE

## 2024-09-26 PROCEDURE — 84100 ASSAY OF PHOSPHORUS: CPT | Performed by: FAMILY MEDICINE

## 2024-09-26 PROCEDURE — 99232 SBSQ HOSP IP/OBS MODERATE 35: CPT | Performed by: INTERNAL MEDICINE

## 2024-09-26 PROCEDURE — 84484 ASSAY OF TROPONIN QUANT: CPT | Performed by: FAMILY MEDICINE

## 2024-09-26 PROCEDURE — 83010 ASSAY OF HAPTOGLOBIN QUANT: CPT | Performed by: FAMILY MEDICINE

## 2024-09-26 PROCEDURE — 82746 ASSAY OF FOLIC ACID SERUM: CPT | Performed by: FAMILY MEDICINE

## 2024-09-26 PROCEDURE — 82607 VITAMIN B-12: CPT | Performed by: FAMILY MEDICINE

## 2024-09-26 PROCEDURE — 85025 COMPLETE CBC W/AUTO DIFF WBC: CPT | Performed by: FAMILY MEDICINE

## 2024-09-26 PROCEDURE — 25010000002 VANCOMYCIN HCL IN NACL 1.75-0.9 GM/500ML-% SOLUTION

## 2024-09-26 PROCEDURE — 99222 1ST HOSP IP/OBS MODERATE 55: CPT | Performed by: PHYSICIAN ASSISTANT

## 2024-09-26 PROCEDURE — 25010000002 ENOXAPARIN PER 10 MG: Performed by: FAMILY MEDICINE

## 2024-09-26 PROCEDURE — 80202 ASSAY OF VANCOMYCIN: CPT

## 2024-09-26 PROCEDURE — 86880 COOMBS TEST DIRECT: CPT | Performed by: FAMILY MEDICINE

## 2024-09-26 RX ORDER — TRAMADOL HYDROCHLORIDE 50 MG/1
50 TABLET ORAL EVERY 6 HOURS PRN
Status: DISCONTINUED | OUTPATIENT
Start: 2024-09-26 | End: 2024-10-02 | Stop reason: HOSPADM

## 2024-09-26 RX ORDER — MAGNESIUM SULFATE HEPTAHYDRATE 40 MG/ML
4 INJECTION, SOLUTION INTRAVENOUS ONCE
Status: COMPLETED | OUTPATIENT
Start: 2024-09-26 | End: 2024-09-26

## 2024-09-26 RX ADMIN — AMPICILLIN SODIUM 2 G: 2 INJECTION, POWDER, FOR SOLUTION INTRAMUSCULAR; INTRAVENOUS at 22:16

## 2024-09-26 RX ADMIN — Medication 10 ML: at 08:22

## 2024-09-26 RX ADMIN — Medication 1750 MG: at 01:18

## 2024-09-26 RX ADMIN — ENOXAPARIN SODIUM 30 MG: 30 INJECTION SUBCUTANEOUS at 08:21

## 2024-09-26 RX ADMIN — Medication 10 ML: at 00:33

## 2024-09-26 RX ADMIN — Medication 10 ML: at 20:26

## 2024-09-26 RX ADMIN — TRAMADOL HYDROCHLORIDE 50 MG: 50 TABLET ORAL at 15:54

## 2024-09-26 RX ADMIN — PIPERACILLIN AND TAZOBACTAM 3.38 G: 3; .375 INJECTION, POWDER, LYOPHILIZED, FOR SOLUTION INTRAVENOUS at 08:21

## 2024-09-26 RX ADMIN — SODIUM CHLORIDE 2000 MG: 900 INJECTION INTRAVENOUS at 20:26

## 2024-09-26 RX ADMIN — MAGNESIUM SULFATE IN WATER FOR 4 G: 40 INJECTION INTRAVENOUS at 04:44

## 2024-09-26 RX ADMIN — VANCOMYCIN HYDROCHLORIDE 1000 MG: 1 INJECTION, POWDER, LYOPHILIZED, FOR SOLUTION INTRAVENOUS at 15:59

## 2024-09-26 RX ADMIN — PIPERACILLIN AND TAZOBACTAM 3.38 G: 3; .375 INJECTION, POWDER, LYOPHILIZED, FOR SOLUTION INTRAVENOUS at 00:31

## 2024-09-26 RX ADMIN — TRAMADOL HYDROCHLORIDE 50 MG: 50 TABLET ORAL at 22:16

## 2024-09-26 RX ADMIN — AMPICILLIN SODIUM 2 G: 2 INJECTION, POWDER, FOR SOLUTION INTRAVENOUS at 19:36

## 2024-09-26 NOTE — PROGRESS NOTES
"  [facility] at [facility]   Inpatient Progress Note       LOS: 1 day   Patient Care Team:  Shanelle Barrow APRN as PCP - General (Nurse Practitioner)  Zeina Moya APRN as Nurse Practitioner (Cardiology)  Shanelle Barrow APRN (Nurse Practitioner)  Jose Jones MD as Consulting Physician (Cardiology)    Chief Complaint: Follow-up for endocarditis and HFrEF    Subjective     Interval History:     Patient resting comfortably.  Notes he \"felt well\" when discharged in the hospital and finished antibiotics 2 months ago.  Has had slow decrease in energy, appetite, and overall malaise fatigue is increased.    Review of Systems:   Pertinent positives noted in history, exam, and assessment. Otherwise reviewed and negative.      Objective     Vitals:  Blood pressure 140/77, pulse 74, temperature 97 °F (36.1 °C), temperature source Axillary, resp. rate 18, height 167.6 cm (66\"), weight 72.1 kg (159 lb), SpO2 94%.     Intake/Output Summary (Last 24 hours) at 9/26/2024 1053  Last data filed at 9/26/2024 0645  Gross per 24 hour   Intake 240 ml   Output 450 ml   Net -210 ml     Vitals reviewed.   Constitutional:       Appearance: Well-developed and not in distress.   Neck:      Thyroid: No thyromegaly.      Vascular: No carotid bruit or JVD.   Pulmonary:      Breath sounds: Normal breath sounds.   Cardiovascular:      Regular rhythm.      Murmurs: There is a grade 2/6 systolic murmur at the LLSB.      No gallop. No S3 and S4 gallop.   Pulses:     Intact distal pulses.      Carotid: 2+ bilaterally.     Radial: 2+ bilaterally.  Edema:     Peripheral edema absent.   Abdominal:      General: Bowel sounds are normal.      Palpations: Abdomen is soft. There is no abdominal mass.      Tenderness: There is no abdominal tenderness.   Musculoskeletal:         General: No deformity.      Extremities: No clubbing present.Skin:     General: Skin is warm and dry.      Findings: No rash.   Neurological:      Mental " Status: Alert and oriented to person, place, and time.            Results Review:     I reviewed the patient's new clinical results.    Results from last 7 days   Lab Units 09/26/24  0333   WBC 10*3/mm3 4.65   HEMOGLOBIN g/dL 7.8*   HEMATOCRIT % 26.4*   PLATELETS 10*3/mm3 164     Results from last 7 days   Lab Units 09/26/24  0333 09/25/24  2152   SODIUM mmol/L 135* 133*   POTASSIUM mmol/L 4.2 4.3   CHLORIDE mmol/L 104 103   CO2 mmol/L 19.0* 18.0*   BUN mg/dL 15 15   CREATININE mg/dL 0.70* 0.82   CALCIUM mg/dL 8.7 8.8   BILIRUBIN mg/dL  --  0.6   ALK PHOS U/L  --  127*   ALT (SGPT) U/L  --  147*   AST (SGOT) U/L  --  265*   GLUCOSE mg/dL 79 107*     Results from last 7 days   Lab Units 09/26/24  0333   SODIUM mmol/L 135*   POTASSIUM mmol/L 4.2   CHLORIDE mmol/L 104   CO2 mmol/L 19.0*   BUN mg/dL 15   CREATININE mg/dL 0.70*   GLUCOSE mg/dL 79   CALCIUM mg/dL 8.7     Results from last 7 days   Lab Units 09/25/24  2152   INR  1.88*     Lab Results   Lab Value Date/Time    TROPONINT 13 09/26/2024 0020    TROPONINT 14 09/25/2024 2152    TROPONINT <0.010 04/08/2021 1802                 Results from last 7 days   Lab Units 09/26/24  0020 09/25/24  2152   HSTROP T ng/L 13 14     MICHAEL 9/24/2024    Left ventricular systolic function is moderately decreased. Estimated left ventricular EF = 25%    Moderate mitral valve regurgitation is present.    2 electronic lead is present in the right atrium. Large spherical mass/vegetation on right ventricular lead near tricuspid annulus. A second larger 1 cm mobile vegetation on lead seen proximally. Third vegetation small seen on right atrial lead..    There is a small mobile mass on the tricuspid valve affecting the septal leaflet.    Severe tricuspid valve regurgitation is present.    Estimated right ventricular systolic pressure from tricuspid regurgitation is moderately elevated (45-55 mmHg).    Tele: Ventricular paced    Assessment:      Endocarditis    Ischemic cardiomyopathy     Coronary artery disease    Hypertension    Hypothyroidism    Obstructive sleep apnea    V-tach    ICD (implantable cardioverter-defibrillator), biventricular, in situ    Subclavian vein stenosis    Chronic systolic heart failure    Persistent atrial fibrillation    Heart valve disease      Chronic HFrEF  EF 25%.  GDMT had been recently discontinued secondary to hypotension during hospitalization at [facility].  Midodrine recently discontinued.  Endocarditis  Large spherical mass/vegetation on RV lead near tricuspid annulus.  1 cm mobile vegetation on lead seen proximally, third vegetation seen on right atrial lead.  Small mobile mass on the tricuspid valve affecting the septal leaflet.  Severe tricuspid regurgitation, with vegetation  Moderate mitral regurgitation, central, appears to be due to LV dilatation/dysfunction.  CAD, stable  Hypertension    Plan:  Multiple ICD lead masses, likely vegetations, previous enterococcal bacteremia/endocarditis treated at [facility] earlier this summer.  Have discussed with EP, and ID.  Will treat for endocarditis for now completely given the positive blood cultures preliminarily.  3.  Given his multiple masses failed medical therapy, severe/wide-open TR, may be best served with operative intervention for tricuspid valve replacement, extraction of leads, and placement of epicardial leads.  Patient is pacemaker dependent will need transient away of LV pacing pending final implant of new BiV ICD.  Continue GDMT as tolerated by blood pressure for ischemic cardiomyopathy  If going to the OR will need preoperative cardiac cath.      FRANKIE Sung   Dictated utilizing Dragon dictation  I have seen and examined the patient, I have reviewed the note, discussed the case with the advance practice clinician, made necessary changes and I agree with the final note.    Jose Jones MD  09/26/24  12:54 EDT  \

## 2024-09-26 NOTE — CONSULTS
INFECTIOUS DISEASE CONSULT/INITIAL HOSPITAL VISIT    Howard Owusu  1960  [id number]    Date of Consult: 9/26/2024    Admission Date: 9/25/2024      Requesting Provider: No Known Provider  Evaluating Physician: Flo Marcial MD    Reason for Consultation: ICD lead endocarditis    History of present illness:    Patient is a 64 y.o. 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  he underwent lead extraction of a failed RV lead and revision of a new RA/RV lead  with generator change on 5/25/2021 by Dr. Cruz.  he underwent a right knee arthroscopy and suffered a fall in June 2024 after which she developed sepsis with enterococcal bacteremia.  A MICHAEL at [facility] suggested possible lead thrombus and he was transferred to [facility].  Blood cultures grew Enterococcus and he received 6 weeks of intravenous antibiotic therapy which were completed on 8/5/2024.   He underwent a repeat MICHAEL here on 9/24/2024 revealing moderate mitral regurgitation, a large spherical mass/vegetation on the right ventricular lead near the tricuspid ambulance, and a second large 1 cm mobile vegetation on the lead proximally and a third vegetation on the right atrial lead.  There is a single mobile mass on the tricuspid valve leaflet affecting the septal leaflet.  He has severe tricuspid regurgitation.   Dr. Jones contacted me yesterday about this complex situation.  He informed me that the patient was hospitalized this summer at [facility] with multiple blood cultures positive for enterococcus and associated septic arthritis.  He had received 6 weeks of intravenous antibiotic therapy but did not undergo removal of his ICD leads which were noted to have vegetations  versus thrombus.   We were concerned that this likely represented ICD lead endocarditis and that he should undergo  ICD lead extraction.   Blood cultures were obtained on 9/24 with 1 out of 2 sets growing gram-positive  bacilli in the anaerobic bottle.  Repeat blood cultures were performed on 9/25.  He is now on intravenous vancomycin and Zosyn.  He has been afebrile since his admission.     I can visualize some of the records from [facility].  He was seen by Dr. 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   Infectious disease thought he would have a high risk for persistent infection/relapse without pacemaker lead extraction.  There was apparently some disagreement with cardiology who thought that he just had a thrombus  on the ICD lead and that the right knee septic arthritis was the primary focus of his enterococcal bacteremia.  He is listed as having a history of allergy to penicillin but has tolerated ampicillin.  He is currently tolerating Zosyn.   He complains of some chronic back pain after a fall.  He denies increased right knee pain.  He denies recurrent fevers and shaking chills.    Past Medical History:   Diagnosis Date    Arrhythmia     Arthritis     Atrial fibrillation     single episode during PCI with conversion to NSR witin 24 hrs    Blood clot in vein 05/07/2024    right groin    Coronary artery disease 2004    S/P Taxus RICHARD prox LAD, Cath June 2011 EF 20%, S/P distal RCA 3.0x12 and 3.0x8 Cypher, PLB 2.5x8 Cypher, and mid ALD 3.0x 13 Cypher    GERD (gastroesophageal reflux disease)     History of kidney stones     2000    Hyperlipidemia     Hypertension     Hypothyroidism     Ischemic cardiomyopathy     Hx of Inducible VT per EP study Nov 2004, S/P [employer] ICD implant, upgrade to Bi-V iCD July 2008 Dr. Mtz June 2010 appropriate ICD shocks, Gen change 12/11/13 MGR    Obstructive  sleep apnea     Old MI (myocardial infarction)        Past Surgical History:   Procedure Laterality Date    CARDIAC CATHETERIZATION      CARDIAC CATHETERIZATION N/A 3/14/2023    Procedure: Left Heart Cath;  Surgeon: Jose Jones MD;  Location:  [facility] CATH INVASIVE [facility];  Service: Cardiovascular;  Laterality: N/A;    CARDIAC ELECTROPHYSIOLOGY PROCEDURE N/A 11/17/2020    Procedure: ICD DC generator change Generator change at patient's earliest convenience.  Given the atrial lead noise, will reevaluate at that time to see if atrial lead revision is also necessary. ;  Surgeon: Maurizio Cruz DO;  Location:  [facility] EP INVASIVE LOCATION;  Service: Cardiology;  Laterality: N/A;    CARDIAC ELECTROPHYSIOLOGY PROCEDURE N/A 04/09/2021    Procedure: [employer] BiV ICD RV lead, new;  Surgeon: Maurizio Cruz DO;  Location:  [facility] EP INVASIVE LOCATION;  Service: Cardiology;  Laterality: N/A;    CARDIAC ELECTROPHYSIOLOGY PROCEDURE N/A 05/25/2021    Procedure: RV ICD lead extraction (SJM) new RV lead insertion, no meds to hold, Hybrid OR, CT/OR back up;  Surgeon: Maurizio Cruz DO;  Location:  [facility] EP INVASIVE LOCATION;  Service: Cardiovascular;  Laterality: N/A;    CARDIAC ELECTROPHYSIOLOGY PROCEDURE N/A 05/25/2021    Procedure: EP/CRM Study;  Surgeon: Marlon Bartlett MD;  Location:  [facility] EP INVASIVE LOCATION;  Service: Cardiovascular;  Laterality: N/A;    CARDIAC ELECTROPHYSIOLOGY PROCEDURE N/A 3/6/2024    Procedure: AV node ablation;  Surgeon: Maurizio Cruz DO;  Location:  [facility] EP INVASIVE LOCATION;  Service: Cardiovascular;  Laterality: N/A;  please schedule after echo, ct of head and carotid    CARPAL TUNNEL RELEASE Bilateral     INSERT / REPLACE / REMOVE PACEMAKER      INTERNAL CARDIAC DEFIBRILLATOR INSERTION      KIDNEY STONE SURGERY      PACEMAKER REPLACEMENT      Generator change, [employer]zer Bi-V ICD (Dr. Atkins), 12/11/2013.       Family History   Problem Relation Age of Onset    Pulmonary fibrosis Mother      Alzheimer's disease Mother     Heart attack Father     No Known Problems Sister     No Known Problems Brother     No Known Problems Brother        Social History     Socioeconomic History    Marital status:    Tobacco Use    Smoking status: Never     Passive exposure: Current    Smokeless tobacco: Never   Vaping Use    Vaping status: Never Used   Substance and Sexual Activity    Alcohol use: No    Drug use: No    Sexual activity: Defer       Allergies   Allergen Reactions    Morphine And Codeine Hives and Rash     Other Reaction(s): PAINFUL RASH    Penicillins Unknown - Low Severity, Mental Status Change and Other (See Comments)     Patient reports tolerating amoxicillin.    Other Reaction(s): LOC      Patient reports tolerating amoxicillin.    Sulfa Antibiotics Unknown - Low Severity, Other (See Comments) and Unknown (See Comments)     Was told by MD that he is allergic but does not know the reaction         Medication:    Current Facility-Administered Medications:     acetaminophen (TYLENOL) tablet 650 mg, 650 mg, Oral, Q4H PRN **OR** acetaminophen (TYLENOL) 160 MG/5ML oral solution 650 mg, 650 mg, Oral, Q4H PRN **OR** acetaminophen (TYLENOL) suppository 650 mg, 650 mg, Rectal, Q4H PRN, Primitivo Trevizo DO    sennosides-docusate (PERICOLACE) 8.6-50 MG per tablet 2 tablet, 2 tablet, Oral, BID PRN **AND** polyethylene glycol (MIRALAX) packet 17 g, 17 g, Oral, Daily PRN **AND** bisacodyl (DULCOLAX) EC tablet 5 mg, 5 mg, Oral, Daily PRN **AND** bisacodyl (DULCOLAX) suppository 10 mg, 10 mg, Rectal, Daily PRN, Primitivo Trevizo DO    Calcium Replacement - Follow Nurse / BPA Driven Protocol, , Does not apply, PRNSanthosh Robert, DO    Enoxaparin Sodium (LOVENOX) syringe 30 mg, 30 mg, Subcutaneous, Daily, Primitivo Trevizo DO    Magnesium Cardiology Dose Replacement - Follow Nurse / BPA Driven Protocol, , Does not apply, PRSanthosh BUSTILLO Robert, DO    magnesium sulfate 4g/100mL (PREMIX) infusion, 4 g, Intravenous, Once,  Primitivo Trevizo DO, 4 g at 09/26/24 0444    nitroglycerin (NITROSTAT) SL tablet 0.4 mg, 0.4 mg, Sublingual, Q5 Min PRN, Primitivo Trevizo DO    Pharmacy Consult - Pharmacy to dose, , Does not apply, Continuous PRNSanthosh Robert, DO    Phosphorus Replacement - Follow Nurse / BPA Driven Protocol, , Does not apply, PRNSanthosh Robert, DO    piperacillin-tazobactam (ZOSYN) 3.375 g IVPB in 100 mL NS MBP (CD), 3.375 g, Intravenous, Q8H, Carlos Weaver RPH    Potassium Replacement - Follow Nurse / BPA Driven Protocol, , Does not apply, PRNSanthosh Robert, DO    sodium chloride 0.9 % flush 10 mL, 10 mL, Intravenous, Q12H, Primitivo Trevizo DO, 10 mL at 09/26/24 0033    sodium chloride 0.9 % flush 10 mL, 10 mL, Intravenous, PRNSanthosh Robert, DO    sodium chloride 0.9 % infusion 40 mL, 40 mL, Intravenous, PRNSanthosh Robert, DO    vancomycin (VANCOCIN) 1,000 mg in sodium chloride 0.9 % 250 mL IVPB-VTB, 1,000 mg, Intravenous, Q12H, Carlos Weaver RPH    Antibiotics:  Anti-Infectives (From admission, onward)      Ordered     Dose/Rate Route Frequency Start Stop    09/25/24 2258  vancomycin (VANCOCIN) 1,000 mg in sodium chloride 0.9 % 250 mL IVPB-VTB        Ordering Provider: Carlos Weaver RPH    1,000 mg  250 mL/hr over 60 Minutes Intravenous Every 12 Hours 09/26/24 1100 10/10/24 1059    09/25/24 2258  piperacillin-tazobactam (ZOSYN) 3.375 g IVPB in 100 mL NS MBP (CD)        Ordering Provider: Carlos Weaver RPH    3.375 g  over 4 Hours Intravenous Every 8 Hours 09/26/24 0800 10/10/24 0759    09/25/24 2245  vancomycin IVPB 1750 mg in 0.9% Sodium Chloride (premix) 500 mL        Ordering Provider: Carlos Weaver RPH    1,750 mg  285.7 mL/hr over 105 Minutes Intravenous Once 09/26/24 0000 09/26/24 0303    09/25/24 2258  piperacillin-tazobactam (ZOSYN) 3.375 g IVPB in 100 mL NS MBP (CD)        Ordering Provider: Carlos Weaver RPH    3.375 g  over 30 Minutes Intravenous Once 09/25/24 2345 09/26/24 0101              Review  of Systems:  Constitutional-- No Fever, chills or sweats.   HEENT-- No new vision, hearing or throat complaints.   CV--  ischemic cardiomyopathy with systolic congestive heart failure.  Atrial fibrillation.    Resp-- No SOB/coug  GI- No nausea, vomiting, or diarrhea.    -- No dysuria, hematuria, or flank pain.    Heme- No active bruising or bleeding;  MS--  recent septic arthritis.  He has chronic back pain  After a fall months ago.  He has some mild residual right knee discomfort  Neuro-- No acute focal weakness or numbness in the arms or legs.    Skin--No rashes or lesions      Physical Exam:   Vital Signs  Temp (24hrs), Av.9 °F (36.6 °C), Min:97.8 °F (36.6 °C), Max:98 °F (36.7 °C)    Temp  Min: 97.8 °F (36.6 °C)  Max: 98 °F (36.7 °C)  BP  Min: 126/78  Max: 126/78  Pulse  Min: 73  Max: 73  Resp  Min: 16  Max: 16  SpO2  Min: 96 %  Max: 96 %    GENERAL: Awake and alert, in no acute distress.   HEENT: Normocephalic, atraumatic.  PERRL. EOMI. No conjunctival injection. No icterus. Oropharynx clear without evidence of thrush or exudate.  NECK: Supple  HEART:  3/6 systolic murmur.  No pericardial rub.  LUNGS: Clear to auscultation bilaterally without wheezing, rales, rhonchi. Normal respiratory effort. Nonlabored.  ABDOMEN: Soft, nontender, nondistended. No rebound or guarding. NO mass or HSM.  EXT:  No cyanosis, clubbing or edema.   :  Without Tamez catheter.  MSK: No joint effusions or erythema  SKIN: Warm and dry without cutaneous eruptions on Inspection/palpation.    NEURO: Oriented to PPT.  Motor 5/5 strength  PSYCHIATRIC: Normal insight and judgment. Cooperative with PE    Laboratory Data    Results from last 7 days   Lab Units 24  0333 24  2152   WBC 10*3/mm3 4.65 4.43   HEMOGLOBIN g/dL 7.8* 7.8*   HEMATOCRIT % 26.4* 26.3*   PLATELETS 10*3/mm3 164 160     Results from last 7 days   Lab Units 24  0333   SODIUM mmol/L 135*   POTASSIUM mmol/L 4.2   CHLORIDE mmol/L 104   CO2 mmol/L 19.0*  "  BUN mg/dL 15   CREATININE mg/dL 0.70*   GLUCOSE mg/dL 79   CALCIUM mg/dL 8.7     Results from last 7 days   Lab Units 09/25/24  2152   ALK PHOS U/L 127*   BILIRUBIN mg/dL 0.6   ALT (SGPT) U/L 147*   AST (SGOT) U/L 265*             Results from last 7 days   Lab Units 09/26/24  0333   LACTATE mmol/L 1.8         Results from last 7 days   Lab Units 09/26/24  0333   VANCOMYCIN RM mcg/mL 34.50     Estimated Creatinine Clearance: 105.9 mL/min (A) (by C-G formula based on SCr of 0.7 mg/dL (L)).      Microbiology:  Blood Culture   Date Value Ref Range Status   09/24/2024 No growth at 24 hours  Preliminary     No results found for: \"BCIDPCR\", \"CXREFLEX\", \"CSFCX\", \"CULTURETIS\"  No results found for: \"CULTURES\", \"HSVCX\", \"URCX\"  No results found for: \"EYECULTURE\", \"GCCX\", \"HSVCULTURE\", \"LABHSV\"  No results found for: \"LEGIONELLA\", \"MRSACX\", \"MUMPSCX\", \"MYCOPLASCX\"  No results found for: \"NOCARDIACX\", \"STOOLCX\"  No results found for: \"THROATCX\", \"UNSTIMCULT\", \"URINECX\", \"CULTURE\", \"VZVCULTUR\"  No results found for: \"VIRALCULTU\", \"WOUNDCX\"        Radiology:  Imaging Results (Last 72 Hours)       ** No results found for the last 72 hours. **              Impression:   1.  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  He is at high risk for persistent enterococcal infection but now has a positive blood culture for gram-positive  bacilli in the anaerobic bottle.  This blood culture from 9/24 is not finalized.    His presentation in June appears to be highly consistent with enterococcal ICD lead endocarditis with secondary right knee septic arthritis. This requires extraction of the ICD lead for cure. If extraction of the lead is deemed too risky, then oral antibiotic suppression is indicated.  I will change his vancomycin/Zosyn to ampicillin/ceftriaxone pending further culture data  2.  Enterococcal bacteremia-6/24  3.  Positive blood culture-with gram-positive bacilli " in the anaerobic bottle.   The significance of this is unclear.  It could be a skin contaminant or a pathogen.  4. History of right knee septic arthritis-  5.  Coronary artery disease  6.  Ischemic cardiomyopathy  7.  HFrEF-his EF is 25%  8.  Transaminitis-this may be secondary to congestive hepatopathy related to his severe TR.  9.  Anemia  10.  Atrial fibrillation  11.  History of penicillin allergy-despite his penicillin allergy he has tolerated amoxicillin and is now tolerating Zosyn.    PLAN/RECOMMENDATIONS:   Thank you for asking us to see Howard Owusu, I recommend the followin.  Blood cultures-pending  2.  ICD lead extraction  3.  Discontinue vancomycin  4.  Discontinue Zosyn  5.  Rocephin 2 g IV every 12 hours  6.  Ampicillin 2 g IV every 4 hours     I discussed his complex situation with Dr. Jones.    Flo Marcial MD  2024  06:46 EDT

## 2024-09-26 NOTE — PROGRESS NOTES
Pharmacy Consult - Vancomycin Dosing and Monitoring    Howard Owusu is a 64 y.o. male receiving vancomycin therapy.     Indication: Infective Endocarditis  Consulting Provider: DO PONCHO Leslie Consult: no    Goal AUC: 400-600 mg/L*hr    Current Antimicrobial Therapy  Anti-Infectives (From admission, onward)      Ordered     Dose/Rate Route Frequency Start Stop    09/25/24 2258  vancomycin (VANCOCIN) 1,000 mg in sodium chloride 0.9 % 250 mL IVPB-VTB        Ordering Provider: Carlos Weaver RPH    1,000 mg  250 mL/hr over 60 Minutes Intravenous Every 12 Hours 09/26/24 1100 10/10/24 1059    09/25/24 2258  piperacillin-tazobactam (ZOSYN) 3.375 g IVPB in 100 mL NS MBP (CD)        Ordering Provider: Carlos Weaver RPH    3.375 g  over 4 Hours Intravenous Every 8 Hours 09/26/24 0800 10/10/24 0759    09/25/24 2245  vancomycin IVPB 1750 mg in 0.9% Sodium Chloride (premix) 500 mL        Ordering Provider: Carlos Weaver RPH    1,750 mg  285.7 mL/hr over 105 Minutes Intravenous Once 09/26/24 0000 09/26/24 0303    09/25/24 2258  piperacillin-tazobactam (ZOSYN) 3.375 g IVPB in 100 mL NS MBP (CD)        Ordering Provider: Carlos Weaver RPH    3.375 g  over 30 Minutes Intravenous Once 09/25/24 2345 09/26/24 0101          Allergies  Allergies as of 09/25/2024 - Reviewed 09/25/2024   Allergen Reaction Noted    Morphine and codeine Hives and Rash 05/10/2016    Penicillins Unknown - Low Severity, Mental Status Change, and Other (See Comments) 05/10/2016    Sulfa antibiotics Unknown - Low Severity, Other (See Comments), and Unknown (See Comments) 05/10/2016     Labs  Results from last 7 days   Lab Units 09/26/24  0333 09/25/24  2152   BUN mg/dL 15 15   CREATININE mg/dL 0.70* 0.82     Results from last 7 days   Lab Units 09/26/24  0333 09/25/24  2152   WBC 10*3/mm3 4.65 4.43     Evaluation of Dosing     Last Dose Received in the ED/Outside Facility: n/a  Is Patient on Dialysis or Renal Replacement: no    Height - 167.6  "cm (66\")  Weight - 72.1 kg (159 lb)    Estimated Creatinine Clearance: 105.9 mL/min (A) (by C-G formula based on SCr of 0.7 mg/dL (L)).    I/O last 3 completed shifts:  In: 240 [P.O.:240]  Out: 450 [Urine:450]    Microbiology and Radiology  Microbiology Results (last 10 days)       Procedure Component Value - Date/Time    Blood Culture - Blood, Arm, Right [[id number]]  (Normal) Collected: 09/24/24 1012    Lab Status: Preliminary result Specimen: Blood from Arm, Right Updated: 09/26/24 1030     Blood Culture No growth at 2 days    Narrative:      Less than seven (7) mL's of blood was collected.  Insufficient quantity may yield false negative results.    Blood Culture - Blood, Hand, Left [[id number]]  (Abnormal) Collected: 09/24/24 1000    Lab Status: Preliminary result Specimen: Blood from Hand, Left Updated: 09/26/24 0620     Blood Culture Abnormal Stain     Gram Stain Anaerobic Bottle Gram positive bacilli    Narrative:      Less than seven (7) mL's of blood was collected.  Insufficient quantity may yield false negative results.  Blood culture does not meet the specified criteria for PCR identification.  If pregnant, immunocompromised, or clinical concern for meningitis, call lab to run BCID for Listeria monocytogenes.          Reported Vancomycin Levels  Results from last 7 days   Lab Units 09/26/24  0333   VANCOMYCIN RM mcg/mL 34.50             [employer] AUC Calculation:    Current AUC: 429 mg/L*hr    Predicted Steady State AUC on Current Dose: 504 mg/L*hr  _________________________________    Predicted Steady State AUC on New Dose: --- mg/L*hr    Assessment/Plan:     Pharmacy consulted to dose vancomycin for infective endocarditis, goal -600 mg/L*hr.  Patient is currently on a maintenance regimen of vancomycin 1000 (~13.9 mg/kg) every 12 hours with estimated  mg/L*hr.   Pertinent labs: Afebrile, Scr 0.7/CrCl 105.9 ml/min, WBC 4.65  Vancomycin AM random on 9/26 was 34.5 mcg/mL. This random was " collected ~30 minutes after the infusion of the loading dose.   Will continue current dose.   Will continue to follow and adjust vancomycin dose as needed based on renal function, cultures, and patient clinical status.    Thank you,    Adnrea Goncalves, PharmD  9/26/2024  12:32 EDT

## 2024-09-26 NOTE — PLAN OF CARE
Goal Outcome Evaluation:  Plan of Care Reviewed With: patient        Progress: no change  Outcome Evaluation: Pt present at/near baseline with mild generalized weakness compared to baseline and chronic LBP. OT signing off, please reconsult if needed. Defer to PT for therapy recs, otherwise OT recommends d/c to home with assist prn from sibling.      Anticipated Discharge Disposition (OT): home, home with assist

## 2024-09-26 NOTE — THERAPY DISCHARGE NOTE
Acute Care - Occupational Therapy Discharge  [facility]    Patient Name: Howard Owusu  : 1960    MRN: [id number]                              Today's Date: 2024       Admit Date: 2024    Visit Dx: No diagnosis found.  Patient Active Problem List   Diagnosis    Ischemic cardiomyopathy    Coronary artery disease    Hyperlipidemia    Hypertension    GERD (gastroesophageal reflux disease)    Hypothyroidism    Obstructive sleep apnea    V-tach    ICD (implantable cardioverter-defibrillator), biventricular, in situ    Subclavian vein stenosis    Chronic systolic heart failure    Persistent atrial fibrillation    Endocarditis    Heart valve disease     Past Medical History:   Diagnosis Date    Arthritis     Atrial fibrillation     single episode during PCI with conversion to NSR witin 24 hrs    Blood clot in vein 2024    right groin    Coronary artery disease     S/P Taxus RICHARD prox LAD, Cath 2011 EF 20%, S/P distal RCA 3.0x12 and 3.0x8 Cypher, PLB 2.5x8 Cypher, and mid ALD 3.0x 13 Cypher    GERD (gastroesophageal reflux disease)     History of kidney stones         Hyperlipidemia     Hypertension     Hypothyroidism     Ischemic cardiomyopathy     Hx of Inducible VT per EP study 2004, S/P [employer] Eliezer ICD implant, upgrade to Bi-V iCD 2008 Dr. Mtz 2010 appropriate ICD shocks, Gen change 13 MGR    Obstructive sleep apnea     Old MI (myocardial infarction)      Past Surgical History:   Procedure Laterality Date    CARDIAC CATHETERIZATION      CARDIAC CATHETERIZATION N/A 2023    Procedure: Left Heart Cath;  Surgeon: Jose Jones MD;  Location: [facility] INVASIVE LOCATION;  Service: Cardiovascular;  Laterality: N/A;    CARDIAC ELECTROPHYSIOLOGY PROCEDURE N/A 2020    Procedure: ICD DC generator change Generator change at patient's earliest convenience.  Given the atrial lead noise, will reevaluate at that time to see if atrial lead revision is also  necessary. ;  Surgeon: Maurizio Cruz DO;  Location:  [facility] INVASIVE LOCATION;  Service: Cardiology;  Laterality: N/A;    CARDIAC ELECTROPHYSIOLOGY PROCEDURE N/A 04/09/2021    Procedure: [employer] Eliezer BiV ICD RV lead, new;  Surgeon: Maurizio Cruz DO;  Location: [facility] INVASIVE LOCATION;  Service: Cardiology;  Laterality: N/A;    CARDIAC ELECTROPHYSIOLOGY PROCEDURE N/A 05/25/2021    Procedure: RV ICD lead extraction (SJM) new RV lead insertion, no meds to hold, Hybrid OR, CT/OR back up;  Surgeon: Maurizio Cruz DO;  Location:  [facility] INVASIVE LOCATION;  Service: Cardiovascular;  Laterality: N/A;    CARDIAC ELECTROPHYSIOLOGY PROCEDURE N/A 05/25/2021    Procedure: EP/CRM Study;  Surgeon: Marlon Bartlett MD;  Location:  [facility] INVASIVE LOCATION;  Service: Cardiovascular;  Laterality: N/A;    CARDIAC ELECTROPHYSIOLOGY PROCEDURE N/A 03/06/2024    Procedure: AV node ablation;  Surgeon: Maurizio Cruz DO;  Location:  [facility] INVASIVE LOCATION;  Service: Cardiovascular;  Laterality: N/A;  please schedule after echo, ct of head and carotid    CARPAL TUNNEL RELEASE Bilateral     KIDNEY STONE SURGERY        General Information       Row Name 09/26/24 1510          OT Time and Intention    Document Type discharge evaluation/summary  -CS     Mode of Treatment occupational therapy  -CS       Row Name 09/26/24 1510          General Information    Patient Profile Reviewed yes  -CS     Prior Level of Function independent:;all household mobility;ADL's  Pt reports brother assists with bed mobility occasionally, otherwise Ind with ADLs and uses SPC or RW for mobility depending on back pain that day. Reports several falls over last year  -CS     Existing Precautions/Restrictions fall;other (see comments)  chronic LBP, personal SPC in room  -CS     Barriers to Rehab medically complex;previous functional deficit  -CS       Row Name 09/26/24 1510          Living Environment    People in Home sibling(s)  -CS       Row Name  09/26/24 1510          Stairs Within Home, Primary    Stairs, Within Home, Primary multi-level home, stairs to necessary areas of home 10 + 12  -CS       Row Name 09/26/24 1510          Cognition    Orientation Status (Cognition) oriented x 4  -CS       Row Name 09/26/24 1510          Safety Issues, Functional Mobility    Safety Issues Affecting Function (Mobility) safety precaution awareness;safety precautions follow-through/compliance  -CS     Impairments Affecting Function (Mobility) pain;strength  -CS               User Key  (r) = Recorded By, (t) = Taken By, (c) = Cosigned By      Initials Name Provider Type    CS Nakul Mosher, OT Occupational Therapist                   Mobility/ADL's       Row Name 09/26/24 1512          Bed Mobility    Bed Mobility supine-sit;scooting/bridging  -CS     Scooting/Bridging [place] (Bed Mobility) supervision  -CS     Supine-Sit [place] (Bed Mobility) supervision  -CS     Assistive Device (Bed Mobility) bed rails;head of bed elevated  -CS     Comment, (Bed Mobility) appopriate sequencing intact, no dizziness reported  -CS       Row Name 09/26/24 1512          Transfers    Transfers sit-stand transfer;bed-chair transfer  -CS     Comment, (Transfers) wide based cane w/ RUE for mobility, no dizziness reported, antalgic gait observed with further mobility  -CS       Row Name 09/26/24 1512          Bed-Chair Transfer    Bed-Chair [place] (Transfers) standby assist  -CS       Row Name 09/26/24 1512          Sit-Stand Transfer    Sit-Stand [place] (Transfers) modified independence  -CS       Row Name 09/26/24 1512          Functional Mobility    Functional Mobility- Comment SBA with personal AD  -CS     Patient was able to Ambulate yes  -CS       Row Name 09/26/24 1512          Activities of Daily Living    BADL Assessment/Intervention lower body dressing;grooming  -CS       Row Name 09/26/24 1512          Lower Body Dressing Assessment/Training    [place]  Level (Lower Body Dressing) don;socks;pants/bottoms;shoes/slippers;independent  -CS     Position (Lower Body Dressing) unsupported sitting  -CS       Row Name 09/26/24 1512          Grooming Assessment/Training    [place] Level (Grooming) hair care, combing/brushing;wash face, hands;supervision  -CS     Position (Grooming) sink side  -CS               User Key  (r) = Recorded By, (t) = Taken By, (c) = Cosigned By      Initials Name Provider Type     Nakul Mosher OT Occupational Therapist                   Obj/Interventions       Row Name 09/26/24 1515          Sensory Assessment (Somatosensory)    Sensory Assessment (Somatosensory) bilateral UE  -CS     Bilateral UE Sensory Assessment general sensation;light touch awareness;light touch localization;intact  -[facility] Name 09/26/24 1515          Vision Assessment/Intervention    Visual Impairment/Limitations WFL;corrective lenses full-time  -       Row Name 09/26/24 1515          Range of Motion Comprehensive    General Range of Motion bilateral upper extremity ROM WFL  -       Row Name 09/26/24 1515          Strength Comprehensive (MMT)    General Manual Muscle Testing (MMT) Assessment upper extremity strength deficits identified  -CS     Comment, General Manual Muscle Testing (MMT) Assessment BUE grossly 4+/5, no significant asymmetry observed  -CS       Row Name 09/26/24 1515          Motor Skills    Motor Skills coordination;functional endurance  -CS     Coordination bilateral;upper extremity;finger to nose;bimanual skills;WFL  -CS     Functional Endurance moderate, SOA following in-room mobility  -       Row Name 09/26/24 1515          Balance    Balance Assessment sitting static balance;sitting dynamic balance;standing static balance;standing dynamic balance  -CS     Static Sitting Balance independent  -CS     Dynamic Sitting Balance independent  -CS     Position, Sitting Balance unsupported;sitting edge of bed  -CS     Static Standing  Balance supervision  -CS     Dynamic Standing Balance standby assist  -CS     Position/Device Used, Standing Balance supported  -CS     Balance Interventions sitting;standing;sit to stand;occupation based/functional task  -CS     Comment, Balance no LOB during assessment  -CS               User Key  (r) = Recorded By, (t) = Taken By, (c) = Cosigned By      Initials Name Provider Type    CS Nakul Mosher, OT Occupational Therapist                   Goals/Plan    No documentation.                  Clinical Impression       Row Name 09/26/24 1517          Pain Assessment    Additional Documentation Pain Scale: FACES Pre/Post-Treatment (Group)  -CS       Row Name 09/26/24 1517          Pain Scale: FACES Pre/Post-Treatment    Pain: FACES Scale, Pretreatment 0-->no hurt  -CS     Posttreatment Pain Rating 0-->no hurt  -CS       Row Name 09/26/24 1517          Plan of Care Review    Plan of Care Reviewed With patient  -CS     Progress no change  -CS     Outcome Evaluation Pt present at/near baseline with mild generalized weakness compared to baseline and chronic LBP. OT signing off, please reconsult if needed. Defer to PT for therapy recs, otherwise OT recommends d/c to home with assist prn from sibling.  -       Row Name 09/26/24 1517          Therapy Assessment/Plan (OT)    Patient/Family Therapy Goal Statement (OT) return home  -CS     Rehab Potential (OT) good, to achieve stated therapy goals  -     Criteria for Skilled Therapeutic Interventions Met (OT) yes;meets criteria;skilled treatment is necessary  -CS     Therapy Frequency (OT) daily  -CS       Row Name 09/26/24 1517          Therapy Plan Review/Discharge Plan (OT)    Anticipated Discharge Disposition (OT) home;home with assist  -       Row Name 09/26/24 1517          Vital Signs    Pre Systolic BP Rehab --  RN cleared for eval  -CS     O2 Delivery Pre Treatment room air  -CS     O2 Delivery Intra Treatment room air  -CS     O2 Delivery Post Treatment  room air  -CS     Pre Patient Position Supine  -CS     Intra Patient Position Standing  -CS     Post Patient Position Sitting  -CS       Row Name 09/26/24 1517          Positioning and Restraints    Pre-Treatment Position in bed  -CS     Post Treatment Position chair  -CS     In Chair notified nsg;reclined;sitting;call light within reach;encouraged to call for assist;exit alarm on;LUE elevated;RUE elevated;legs elevated;waffle cushion;on mechanical lift sling  -CS               User Key  (r) = Recorded By, (t) = Taken By, (c) = Cosigned By      Initials Name Provider Type    CS Nakul Mosher, OT Occupational Therapist                   Outcome Measures       Row Name 09/26/24 1519          How much help from another is currently needed...    Putting on and taking off regular lower body clothing? 4  -CS     Bathing (including washing, rinsing, and drying) 3  -CS     Toileting (which includes using toilet bed pan or urinal) 4  -CS     Putting on and taking off regular upper body clothing 4  -CS     Taking care of personal grooming (such as brushing teeth) 4  -CS     Eating meals 4  -CS     AM-PAC 6 Clicks Score (OT) 23  -CS       Row Name 09/26/24 0800          How much help from another person do you currently need...    Turning from your back to your side while in flat bed without using bedrails? 4  -JM     Moving from lying on back to sitting on the side of a flat bed without bedrails? 4  -JM     Moving to and from a bed to a chair (including a wheelchair)? 4  -JM     Standing up from a chair using your arms (e.g., wheelchair, bedside chair)? 3  -JM     Climbing 3-5 steps with a railing? 3  -JM     To walk in hospital room? 3  -JM     AM-[facility] 6 Clicks Score (PT) 21  -JM     Highest Level of Mobility Goal 6 --> Walk 10 steps or more  -       Row Name 09/26/24 1519          Functional Assessment    Outcome Measure Options AM-PAC 6 Clicks Daily Activity (OT)  -CS               User Key  (r) = Recorded By, (t) =  Taken By, (c) = Cosigned By      Initials Name Provider Type     Nakul Mosher OT Occupational Therapist    Janice Hay, RN Registered Nurse                  Occupational Therapy Education       Title: PT OT SLP Therapies (In Progress)       Topic: Occupational Therapy (In Progress)       Point: ADL training (Not Started)       Description:   Instruct learner(s) on proper safety adaptation and remediation techniques during self care or transfers.   Instruct in proper use of assistive devices.                  Learner Progress:  Not documented in this visit.              Point: Home exercise program (Not Started)       Description:   Instruct learner(s) on appropriate technique for monitoring, assisting and/or progressing therapeutic exercises/activities.                  Learner Progress:  Not documented in this visit.              Point: Precautions (Done)       Description:   Instruct learner(s) on prescribed precautions during self-care and functional transfers.                  Learning Progress Summary             Patient Acceptance, E,D, VU,DU by  at 9/26/2024 1520                         Point: Body mechanics (Not Started)       Description:   Instruct learner(s) on proper positioning and spine alignment during self-care, functional mobility activities and/or exercises.                  Learner Progress:  Not documented in this visit.                              User Key       Initials Effective Dates Name Provider Type Discipline     06/16/21 -  Nakul Mosher OT Occupational Therapist OT                  OT Recommendation and Plan  Therapy Frequency (OT): daily  Plan of Care Review  Plan of Care Reviewed With: patient  Progress: no change  Outcome Evaluation: Pt present at/near baseline with mild generalized weakness compared to baseline and chronic LBP. OT signing off, please reconsult if needed. Defer to PT for therapy recs, otherwise OT recommends d/c to home with assist prn from  sibling.  Plan of Care Reviewed With: patient  Outcome Evaluation: Pt present at/near baseline with mild generalized weakness compared to baseline and chronic LBP. OT signing off, please reconsult if needed. Defer to PT for therapy recs, otherwise OT recommends d/c to home with assist prn from sibling.     Time Calculation:   Evaluation Complexity (OT)  Review Occupational Profile/Medical/Therapy History Complexity: brief/low complexity  Assessment, Occupational Performance/Identification of Deficit Complexity: 1-3 performance deficits  Clinical Decision Making Complexity (OT): problem focused assessment/low complexity  Overall Complexity of Evaluation (OT): low complexity     Time Calculation- OT       Row Name 09/26/24 1522             Time Calculation- OT    OT Start Time 1430  -CS      OT Received On 09/26/24  -CS         Untimed Charges    OT Eval/Re-eval Minutes 47  -CS         Total Minutes    Untimed Charges Total Minutes 47  -CS       Total Minutes 47  -CS                User Key  (r) = Recorded By, (t) = Taken By, (c) = Cosigned By      Initials Name Provider Type    CS Nakul Mosher OT Occupational Therapist                  Therapy Charges for Today       Code Description Service Date Service Provider Modifiers Qty    [id number]  OT EVAL LOW COMPLEXITY 4 9/26/2024 Nakul Mosher OT GO 1               OT Discharge Summary  Anticipated Discharge Disposition (OT): home, home with assist  Reason for Discharge: Independent, At baseline function  Discharge Destination: Home, Home with assist    Nakul Mosher OT  9/26/2024

## 2024-09-26 NOTE — PROGRESS NOTES
[facility] Medicine Services  PROGRESS NOTE    Patient Name: Howard Owuus  : 1960  MRN: [id number]    Date of Admission: 2024  Primary Care Physician: Shanelle Barrow APRN    Subjective   Subjective     CC: Follow-up abnormal MICHAEL    HPI: No acute events overnight patient states that he had a rough night, did not get much sleep.  He denies any fevers or chills, no SOA        Objective   Objective     Vital Signs:   Temp:  [97.8 °F (36.6 °C)-98 °F (36.7 °C)] 97.8 °F (36.6 °C)  Heart Rate:  [73] 73  Resp:  [16] 16  BP: (126)/(78) 126/78     Physical Exam:  Constitutional: No acute distress, awake, alert  HENT: NCAT, mucous membranes moist  Respiratory: Clear to auscultation bilaterally, respiratory effort normal   Cardiovascular: RRR, no murmurs, rubs, or gallops  Gastrointestinal: Positive bowel sounds, soft, nontender, nondistended  Musculoskeletal: No bilateral ankle edema  Psychiatric: Appropriate affect, cooperative  Neurologic: Oriented x 3, nonfocal  Skin: No rashes      Results Reviewed:  LAB RESULTS:      Lab 24  0020 24   WBC 4.65  --  4.43   HEMOGLOBIN 7.8*  --  7.8*   HEMATOCRIT 26.4*  --  26.3*   PLATELETS 164  --  160   NEUTROS ABS 2.82  --  2.62   IMMATURE GRANS (ABS) 0.01  --  0.01   LYMPHS ABS 0.79  --  0.65*   MONOS ABS 0.48  --  0.65   EOS ABS 0.53*  --  0.46*   MCV 88.3  --  87.7   PROCALCITONIN  --   --  0.08   LACTATE 1.8 2.3* 3.1*   LDH  --   --  292*   PROTIME  --   --  21.7*         Lab 24  0333 24   SODIUM 135* 133*   POTASSIUM 4.2 4.3   CHLORIDE 104 103   CO2 19.0* 18.0*   ANION GAP 12.0 12.0   BUN 15 15   CREATININE 0.70* 0.82   EGFR 101.6 96.9   GLUCOSE 79 107*   CALCIUM 8.7 8.8   MAGNESIUM 1.7 1.7   PHOSPHORUS 2.7 2.7         Lab 24  2152   TOTAL PROTEIN 7.3   ALBUMIN 3.2*   GLOBULIN 4.1   ALT (SGPT) 147*   AST (SGOT) 265*   BILIRUBIN 0.6   ALK PHOS 127*         Lab 24  0020  09/25/24 2152   HSTROP T 13 14   PROTIME  --  21.7*   INR  --  1.88*             Lab 09/25/24 2152   IRON 14*   IRON SATURATION (TSAT) 3*   TIBC 405   TRANSFERRIN 272   FERRITIN 51.02         Brief Urine Lab Results  (Last result in the past 365 days)        Color   Clarity   Blood   Leuk Est   Nitrite   Protein   CREAT   Urine HCG        02/16/24 0104 Dark Yellow   Clear     Negative                       Microbiology Results Abnormal       None            Adult Transesophageal Echo (MICHAEL) W/ Cont if Necessary Per Protocol    Result Date: 9/24/2024    Left ventricular systolic function is moderately decreased. Estimated left ventricular EF = 25%   Moderate mitral valve regurgitation is present.   2 electronic lead is present in the right atrium. Large spherical mass/vegetation on right ventricular lead near tricuspid annulus. A second larger 1 cm mobile vegetation on lead seen proximally. Third vegetation small seen on right atrial lead..   There is a small mobile mass on the tricuspid valve affecting the septal leaflet.   Severe tricuspid valve regurgitation is present.   Estimated right ventricular systolic pressure from tricuspid regurgitation is moderately elevated (45-55 mmHg).      Results for orders placed during the hospital encounter of 09/24/24    Adult Transesophageal Echo (MICHAEL) W/ Cont if Necessary Per Protocol    Interpretation Summary    Left ventricular systolic function is moderately decreased. Estimated left ventricular EF = 25%    Moderate mitral valve regurgitation is present.    2 electronic lead is present in the right atrium. Large spherical mass/vegetation on right ventricular lead near tricuspid annulus. A second larger 1 cm mobile vegetation on lead seen proximally. Third vegetation small seen on right atrial lead..    There is a small mobile mass on the tricuspid valve affecting the septal leaflet.    Severe tricuspid valve regurgitation is present.    Estimated right ventricular systolic  pressure from tricuspid regurgitation is moderately elevated (45-55 mmHg).      Current medications:  Scheduled Meds:enoxaparin, 30 mg, Subcutaneous, Daily  magnesium sulfate, 4 g, Intravenous, Once  piperacillin-tazobactam, 3.375 g, Intravenous, Q8H  sodium chloride, 10 mL, Intravenous, Q12H  vancomycin, 1,000 mg, Intravenous, Q12H      Continuous Infusions:Pharmacy Consult - Pharmacy to dose,       PRN Meds:.  acetaminophen **OR** acetaminophen **OR** acetaminophen    senna-docusate sodium **AND** polyethylene glycol **AND** bisacodyl **AND** bisacodyl    Calcium Replacement - Follow Nurse / BPA Driven Protocol    Magnesium Cardiology Dose Replacement - Follow Nurse / BPA Driven Protocol    nitroglycerin    Pharmacy Consult - Pharmacy to dose    Phosphorus Replacement - Follow Nurse / BPA Driven Protocol    Potassium Replacement - Follow Nurse / BPA Driven Protocol    sodium chloride    sodium chloride    Assessment & Plan   Assessment & Plan     Active Hospital Problems    Diagnosis  POA    **Endocarditis [I38]  Yes      Resolved Hospital Problems   No resolved problems to display.        Brief Hospital Course to date:  Howard Owusu is a 64 y.o. male with chronic HFrEF, hypertension, hyperlipidemia, ischemic cardiomyopathy, hypothyroidism patient with recent dyspnea with exertion generalized weakness.  Echo done 9/24 revealed multiple vegetations on leads, patient admitted for further evaluation management    Infected ICD leads  -MICHAEL findings as above  -Blood cultures from 9/24 growing gram-positive bacilli, follow-up speciation, follow-up repeat cultures  -He is currently not septic, continue antibiotics with IV Zosyn and vancomycin, ID consulted to assist  -Cardiology consulted,  he will likely need pacemaker extraction    Anemia  -H&H lower than previous, etiology remains unknown  -Follow-up iron profile, ferritin  -Continue to closely monitor, transfuse when  hemoglobin<7    CAD  Hyperlipidemia  Hypertension  Ischemic cardiomyopathy  HFrEF EF 25%  Hypertension  -BP currently stable, he seems compensated  -Resume home Pradaxa, aspirin, statin and Bumex    Hypothyroidism  -Continue Synthroid    GILL    All problems as above are new to me today    Expected Discharge Location and Transportation: TBD  Expected Discharge   Expected discharge date/ time has not been documented.     VTE Prophylaxis:  Pharmacologic VTE prophylaxis orders are present.         AM-PAC 6 Clicks Score (PT): 24 (09/25/24 2045)    CODE STATUS:   Code Status and Medical Interventions: CPR (Attempt to Resuscitate); Full Support   Ordered at: 09/25/24 2139     Level Of Support Discussed With:    Patient     Code Status (Patient has no pulse and is not breathing):    CPR (Attempt to Resuscitate)     Medical Interventions (Patient has pulse or is breathing):    Full Support       Talya Lr MD  09/26/24

## 2024-09-26 NOTE — PLAN OF CARE
Problem: Fall Injury Risk  Goal: Absence of Fall and Fall-Related Injury  Outcome: Progressing  Intervention: Identify and Manage Contributors  Recent Flowsheet Documentation  Taken 9/26/2024 0600 by May Eid RN  Medication Review/Management: medications reviewed  Taken 9/26/2024 0440 by May Eid RN  Medication Review/Management: medications reviewed  Taken 9/26/2024 0145 by May Eid RN  Medication Review/Management: medications reviewed  Taken 9/26/2024 0031 by May Eid RN  Medication Review/Management: medications reviewed  Taken 9/25/2024 2255 by May Eid RN  Medication Review/Management: medications reviewed  Taken 9/25/2024 2045 by May Eid RN  Medication Review/Management: medications reviewed  Intervention: Promote Injury-Free Environment  Recent Flowsheet Documentation  Taken 9/26/2024 0600 by May Eid RN  Safety Promotion/Fall Prevention:   activity supervised   fall prevention program maintained   clutter free environment maintained   assistive device/personal items within reach   safety round/check completed  Taken 9/26/2024 0440 by May Eid RN  Safety Promotion/Fall Prevention:   activity supervised   fall prevention program maintained   clutter free environment maintained   assistive device/personal items within reach   safety round/check completed  Taken 9/26/2024 0145 by May Eid RN  Safety Promotion/Fall Prevention:   activity supervised   fall prevention program maintained   clutter free environment maintained   assistive device/personal items within reach   safety round/check completed  Taken 9/26/2024 0031 by May Eid RN  Safety Promotion/Fall Prevention:   activity supervised   fall prevention program maintained   clutter free environment maintained   assistive device/personal items within reach   safety round/check completed  Taken 9/25/2024 2255 by May Eid RN  Safety Promotion/Fall  Prevention:   activity supervised   fall prevention program maintained   clutter free environment maintained   assistive device/personal items within reach   safety round/check completed  Taken 9/25/2024 2045 by May Eid RN  Safety Promotion/Fall Prevention:   activity supervised   fall prevention program maintained   clutter free environment maintained   assistive device/personal items within reach   safety round/check completed     Problem: Adult Inpatient Plan of Care  Goal: Plan of Care Review  Outcome: Progressing  Goal: Patient-Specific Goal (Individualized)  Outcome: Progressing  Goal: Absence of Hospital-Acquired Illness or Injury  Outcome: Progressing  Intervention: Identify and Manage Fall Risk  Recent Flowsheet Documentation  Taken 9/26/2024 0600 by May Eid RN  Safety Promotion/Fall Prevention:   activity supervised   fall prevention program maintained   clutter free environment maintained   assistive device/personal items within reach   safety round/check completed  Taken 9/26/2024 0440 by May Eid RN  Safety Promotion/Fall Prevention:   activity supervised   fall prevention program maintained   clutter free environment maintained   assistive device/personal items within reach   safety round/check completed  Taken 9/26/2024 0145 by May Eid RN  Safety Promotion/Fall Prevention:   activity supervised   fall prevention program maintained   clutter free environment maintained   assistive device/personal items within reach   safety round/check completed  Taken 9/26/2024 0031 by May Eid RN  Safety Promotion/Fall Prevention:   activity supervised   fall prevention program maintained   clutter free environment maintained   assistive device/personal items within reach   safety round/check completed  Taken 9/25/2024 2255 by May Eid RN  Safety Promotion/Fall Prevention:   activity supervised   fall prevention program maintained   clutter free environment  maintained   assistive device/personal items within reach   safety round/check completed  Taken 9/25/2024 2045 by May Eid RN  Safety Promotion/Fall Prevention:   activity supervised   fall prevention program maintained   clutter free environment maintained   assistive device/personal items within reach   safety round/check completed  Intervention: Prevent Skin Injury  Recent Flowsheet Documentation  Taken 9/26/2024 0600 by Mya Eid RN  Body Position:   turned   right  Skin Protection:   adhesive use limited   tubing/devices free from skin contact  Taken 9/26/2024 0440 by May Eid RN  Body Position:   turned   left  Skin Protection:   adhesive use limited   tubing/devices free from skin contact  Taken 9/26/2024 0145 by May Eid RN  Skin Protection:   adhesive use limited   tubing/devices free from skin contact  Taken 9/26/2024 0031 by May Eid RN  Skin Protection:   adhesive use limited   tubing/devices free from skin contact  Taken 9/25/2024 2255 by May Eid RN  Body Position: position changed independently  Skin Protection:   adhesive use limited   tubing/devices free from skin contact  Taken 9/25/2024 2045 by May Eid RN  Body Position: position changed independently  Intervention: Prevent Infection  Recent Flowsheet Documentation  Taken 9/26/2024 0600 by May Eid RN  Infection Prevention: hand hygiene promoted  Taken 9/26/2024 0145 by May Eid RN  Infection Prevention:   hand hygiene promoted   single patient room provided  Goal: Optimal Comfort and Wellbeing  Outcome: Progressing  Intervention: Monitor Pain and Promote Comfort  Recent Flowsheet Documentation  Taken 9/26/2024 0440 by May Eid RN  Pain Management Interventions: position adjusted  Goal: Readiness for Transition of Care  Outcome: Progressing  Intervention: Mutually Develop Transition Plan  Recent Flowsheet Documentation  Taken 9/25/2024 2258 by Stanton  GWEN Olvera  Transportation Anticipated: family or friend will provide  Patient/Family Anticipated Services at Transition: none  Patient/Family Anticipates Transition to: home with family  Taken 9/25/2024 2251 by May Eid, GWEN  Equipment Currently Used at Home:   cane, quad tip   grab bar   shower chair     Problem: Pain Acute  Goal: Acceptable Pain Control and Functional Ability  Outcome: Progressing  Intervention: Prevent or Manage Pain  Recent Flowsheet Documentation  Taken 9/26/2024 0600 by May Eid, RN  Medication Review/Management: medications reviewed  Taken 9/26/2024 0440 by May Eid, RN  Medication Review/Management: medications reviewed  Taken 9/26/2024 0145 by May Eid, RN  Medication Review/Management: medications reviewed  Taken 9/26/2024 0031 by May Eid, RN  Medication Review/Management: medications reviewed  Taken 9/25/2024 2255 by May Eid, RN  Medication Review/Management: medications reviewed  Taken 9/25/2024 2045 by May Eid RN  Medication Review/Management: medications reviewed  Intervention: Develop Pain Management Plan  Recent Flowsheet Documentation  Taken 9/26/2024 0440 by May Eid, RN  Pain Management Interventions: position adjusted     Problem: Infection  Goal: Absence of Infection Signs and Symptoms  Outcome: Progressing   Goal Outcome Evaluation:               First dose vancomycin and zosyn given on shift. IV magnesium currently infusing at 25mL/hr. NPO diet since midnight. ID, cardiac, and case management consults. Will continue to monitor.

## 2024-09-26 NOTE — PROGRESS NOTES
Malnutrition Severity Assessment    Patient Name:  Howard Owusu  YOB: 1960  MRN: [id number]  Admit Date:  9/25/2024    Patient meets criteria for : Severe Malnutrition    Comments:  Patient meets acute illness related severe malnutrition criteria with indicators for severe decrease in energy intake, severe weight loss, and moderate muscle/fat loss.    Malnutrition Severity Assessment  Malnutrition Type: Acute Disease or Injury - Related Malnutrition  Malnutrition Type (Last 8 Hours)       Malnutrition Severity Assessment       Row Name 09/26/24 1208       Malnutrition Severity Assessment    Malnutrition Type Acute Disease or Injury - Related Malnutrition      Row Name 09/26/24 1208       Insufficient Energy Intake     Insufficient Energy Intake Findings Severe    Insufficient Energy Intake  < or equal to 50% of est. energy requirement for > or equal to 5d)      Row Name 09/26/24 1208       Unintentional Weight Loss     Unintentional Weight Loss Findings Severe    Unintentional Weight Loss  Weight loss greater than 7.5% in three months      Row Name 09/26/24 1208       Muscle Loss    Loss of Muscle Mass Findings Moderate    [place] Region Moderate - slight depression    Clavicle Bone Region Moderate - some protrusion in females, visible in males    Acromion Bone Region --  mild    Dorsal Hand Region None    Patellar Region Moderate - patella more prominent, less muscle definition around patella    Anterior Thigh Region Moderate - mild depression on inner thigh    Posterior Calf Region Moderate - some roundness, slight firmness      Row Name 09/26/24 1208       Fat Loss    Subcutaneous Fat Loss Findings Moderate    Orbital Region  --  mild    Upper Arm Region Moderate - some fat tissue, not ample      Row Name 09/26/24 1208       Declining Functional Status    Declining Functional Status Findings N/A      Row Name 09/26/24 1208       Criteria Met (Must meet criteria for severity in at least 2 of these  categories: M Wasting, Fat Loss, Fluid, Secondary Signs, Wt. Status, Intake)    Patient meets criteria for  Severe Malnutrition                    Electronically signed by:  Daxa Bryant MS,RD,LD  09/26/24 12:10 EDT

## 2024-09-26 NOTE — CONSULTS
[facility] Electrophyiology        Date of Hospital Visit: 24      Place of Service: [facility]    Patient Name: Howard Owusu  :1960    Referral Provider: Hospitalist  Primary Care Provider: Shanelle Barrow APRN Cheif Complaint EP: VT, Ischemic cardiomyopathy, and Endocarditis           Problem List:  Active Hospital Problems    Diagnosis  POA    **Endocarditis [I38]  Yes     MICHAEL, 2024: 2 electronic lead is present in the right atrium. Large spherical mass/vegetation on right ventricular lead near tricuspid annulus. A second larger 1 cm mobile vegetation on lead seen proximally. Third vegetation small seen on right atrial lead. There is a small mobile mass on the tricuspid valve affecting the septal leaflet. Severe tricuspid valve regurgitation is present.       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  2004, [employer] ICD with DFT testing done.  2008, due to ischemic cardiomyopathy, patient was upgraded to a [employer] Bi-V ICD by Dr. Mtz.  2010, appropriate ICD shocks.  Generator change, [employer] Bi-V ICD (Dr. Atkins), 2013.  Recent Lead extraction of failed [place] lead and revision with new RA/RV lead and generator change with Dr. Cruz 2021 [facility]      Ischemic cardiomyopathy [I25.5]  Yes     Priority: High     2004, [employer] ICD with DFT testing done.  2008, due to ischemic cardiomyopathy, patient was upgraded to a [employer] Bi-V ICD by Dr. Mtz.  2010, appropriate ICD shocks.  Generator change, [employer] Bi-V ICD (Dr. Atkins), 2013.  Revision of right atrial and RV leads to new ICD pulse generator and  retention of previously implanted LV endocardial lead, 5/25/2021  Echo, 6/14/2024: EF 20-25%.  Moderate mitral valve prolapse involving the middle segment of the anterior leaflet.  Moderate to severe MR.      Heart valve disease [I38]  Yes     Priority: Medium     Echo, 6/14/2024: EF 20-25%.  Moderate mitral valve prolapse involving the middle segment of the anterior leaflet.  Moderate to severe MR.  MICHAEL, 9/24/2024:  Moderate mitral valve regurgitation is present. Severe tricuspid valve regurgitation is present. Estimated right ventricular systolic pressure from tricuspid regurgitation is moderately elevated (45-55 mmHg).      Chronic systolic heart failure [I50.22]  Yes     Priority: Medium    Coronary artery disease [I25.10]  Yes     Priority: Medium     [facility] for acute anterior MI, 2004: EF 30%. 100% proximal LAD now s/p 3.0 x 12 and a 2.7 x 16 Taxus stent. RCA and LCx were within normal limits.  [facility], 11/2004: LAD patent stent noted. Ostial D1 stenosis of 99% which was unable to be crossed. LCx and RCA were still within normal limits. EF 35%.  [facility], 06/2008: EF 15%. 50% distal RCA stenosis, and left coronary anatomy was unchanged.  [facility], 06/2011: EF 20%, 90% distal RCA (3.0 x 12 and 3.0 x 8 mm Cypher), and 90% right posterolateral (2.5 x 8 Cypher), and 85% mid LAD (3.0 x 13 mm Cypher).  2/2023 [facility]PS with old anterior MI, no viability.  Inferior lateral ischemia.  3/14/2023 [facility] 90% proximal LAD (3.5 x 15 Xience RICHARD).  50% mid RCA with normal RFR.  EF 20%.  Normal LVEDP.        Hypertension [I10]  Yes     Priority: Low    Obstructive sleep apnea [G47.33]  Yes     Priority: Low    Subclavian vein stenosis [I87.1]  Yes    Hypothyroidism [E03.9]  Yes           History of Present Illness:  This is a 64-year-old male followed by electrophysiology for ischemic cardiomyopathy/VT with a resynchronization ICD and persistent, long-term atrial fibrillation with recent AV node ablation.  Since we last saw him he presented to the  [facility] in [place] with a complaint of fatigue and dyspnea.  He underwent a transesophageal echocardiogram showing valvular thrombus versus vegetation.  He was transferred to [facility] in [place] he underwent a 6-week course of antibiotics and was discharged to home.  He was recently evaluated by his primary cardiologist Dr. Carcamo who scheduled him for a transesophageal echocardiogram for follow-up.  That exam shows multiple vegetations with blood cultures positive for gram-positive bacilli.          Past Surgical History:   Procedure Laterality Date    CARDIAC CATHETERIZATION      CARDIAC CATHETERIZATION N/A 3/14/2023    Procedure: Left Heart Cath;  Surgeon: Jose Jones MD;  Location:  [facility] CATH INVASIVE LOCATION;  Service: Cardiovascular;  Laterality: N/A;    CARDIAC ELECTROPHYSIOLOGY PROCEDURE N/A 11/17/2020    Procedure: ICD DC generator change Generator change at patient's earliest convenience.  Given the atrial lead noise, will reevaluate at that time to see if atrial lead revision is also necessary. ;  Surgeon: Maurizio Cruz DO;  Location:  [facility] EP INVASIVE LOCATION;  Service: Cardiology;  Laterality: N/A;    CARDIAC ELECTROPHYSIOLOGY PROCEDURE N/A 04/09/2021    Procedure: [employer] BiV ICD RV lead, new;  Surgeon: Maurizio Cruz DO;  Location: [facility] EP INVASIVE LOCATION;  Service: Cardiology;  Laterality: N/A;    CARDIAC ELECTROPHYSIOLOGY PROCEDURE N/A 05/25/2021    Procedure: RV ICD lead extraction (SJM) new RV lead insertion, no meds to hold, Hybrid OR, CT/OR back up;  Surgeon: Maurizio Cruz DO;  Location:  [facility] EP INVASIVE LOCATION;  Service: Cardiovascular;  Laterality: N/A;    CARDIAC ELECTROPHYSIOLOGY PROCEDURE N/A 05/25/2021    Procedure: EP/CRM Study;  Surgeon: Marlon Bartlett MD;  Location: [facility] EP INVASIVE LOCATION;  Service: Cardiovascular;  Laterality: N/A;    CARDIAC ELECTROPHYSIOLOGY PROCEDURE N/A 3/6/2024    Procedure: AV node ablation;  Surgeon: Maurizio Cruz  DO;  Location: [facility] EP INVASIVE LOCATION;  Service: Cardiovascular;  Laterality: N/A;  please schedule after echo, ct of head and carotid    CARPAL TUNNEL RELEASE Bilateral     INSERT / REPLACE / REMOVE PACEMAKER      INTERNAL CARDIAC DEFIBRILLATOR INSERTION      KIDNEY STONE SURGERY      PACEMAKER REPLACEMENT      Generator change, [employer] Bi-V ICD (Dr. Atkins), 12/11/2013.       Allergies   Allergen Reactions    Morphine And Codeine Hives and Rash     Other Reaction(s): PAINFUL RASH    Penicillins Other (See Comments), Mental Status Change and Unknown - Low Severity     Patient reports tolerating amoxicillin.    Other Reaction(s): LOC      Patient reports tolerating amoxicillin. Patient has tolerated Zosyn    Sulfa Antibiotics Unknown - Low Severity, Other (See Comments) and Unknown (See Comments)     Was told by MD that he is allergic but does not know the reaction       Current Outpatient Medications   Medication Instructions    aspirin 81 mg, Oral, Daily    atorvastatin (LIPITOR) 80 mg, Oral, Nightly    B Complex Vitamins (VITAMIN B COMPLEX PO) Oral    bumetanide (BUMEX) 2 mg, Oral, Daily    coenzyme Q10 100 mg, Daily    dabigatran etexilate (PRADAXA) 150 mg, Oral, 2 Times Daily    levothyroxine (SYNTHROID, LEVOTHROID) 150 mcg, Oral, Daily    methocarbamol (ROBAXIN) 750 mg, Oral, 3 Times Daily PRN    Multiple Vitamins-Minerals (ONE DAILY MENS PO) 1 tablet, Oral, Daily    omeprazole (PRILOSEC) 40 mg, Oral, Daily    potassium chloride ER (K-TAB) 20 MEQ tablet controlled-release ER tablet 40 mEq, Oral, Daily, Pt takes 1 tab BID rather than 2 tabs daily    traZODone (DESYREL) 50 mg, Oral, Nightly    Xigduo XR 5-1000 MG tablet Take 1 tablet by mouth Daily.          Scheduled Meds:  enoxaparin, 30 mg, Subcutaneous, Daily  piperacillin-tazobactam, 3.375 g, Intravenous, Q8H  sodium chloride, 10 mL, Intravenous, Q12H  vancomycin, 1,000 mg, Intravenous, Q12H      Continuous Infusions:Pharmacy Consult - Pharmacy to  "dose,             Social History     Socioeconomic History    Marital status:    Tobacco Use    Smoking status: Never     Passive exposure: Current    Smokeless tobacco: Never   Vaping Use    Vaping status: Never Used   Substance and Sexual Activity    Alcohol use: No    Drug use: No    Sexual activity: Defer       Family History   Problem Relation Age of Onset    Pulmonary fibrosis Mother     Alzheimer's disease Mother     Heart attack Father     No Known Problems Sister     No Known Problems Brother     No Known Problems Brother        REVIEW OF SYSTEMS:   Review of Systems   Constitutional: Negative.   HENT: Negative.     Eyes: Negative.    Respiratory:  Positive for shortness of breath.    Endocrine: Negative.    Hematologic/Lymphatic: Negative.    Skin: Negative.    Musculoskeletal:  Positive for back pain.   Gastrointestinal: Negative.    Genitourinary: Negative.    Neurological: Negative.    Psychiatric/Behavioral: Negative.     Allergic/Immunologic: Negative.    All other systems reviewed and are negative.           Objective:  Vitals:    09/25/24 2055 09/25/24 2130 09/25/24 2325 09/26/24 0330   BP: 126/78      BP Location: Right arm      Patient Position: Lying      Pulse: 73      Resp: 16  16 16   Temp: 98 °F (36.7 °C)  97.9 °F (36.6 °C) 97.8 °F (36.6 °C)   TempSrc: Oral  Oral Oral   SpO2: 96%      Weight:  72.1 kg (159 lb)     Height:  167.6 cm (66\")       Body mass index is 25.66 kg/m².  Flowsheet Rows      Flowsheet Row First Filed Value   Admission Height 167.6 cm (66\") Documented at 09/25/2024 2130   Admission Weight 72.1 kg (159 lb) Documented at 09/25/2024 2130            Intake/Output Summary (Last 24 hours) at 9/26/2024 0933  Last data filed at 9/26/2024 0440  Gross per 24 hour   Intake 240 ml   Output 300 ml   Net -60 ml       Vitals reviewed.   Constitutional:       Appearance: Not in distress.   Eyes:      Conjunctiva/sclera: Conjunctivae normal.      Pupils: Pupils are equal, round, " and reactive to light.   Neck:      Vascular: JVD normal.   Pulmonary:      Effort: Pulmonary effort is normal.      Breath sounds: Normal breath sounds.   Chest:      Chest wall: Not tender to palpatation.   Cardiovascular:      Normal rate. Regular rhythm.      Murmurs: There is a systolic murmur.      No gallop.  No click. No rub.   Pulses:     Intact distal pulses.   Edema:     Peripheral edema absent.   Abdominal:      General: Bowel sounds are normal.      Palpations: Abdomen is soft.   Musculoskeletal: Normal range of motion.         General: No deformity.      Cervical back: Normal range of motion. Skin:     General: Skin is warm and dry.   Neurological:      General: No focal deficit present.      Mental Status: Alert.               Lab Review:                CBC:      Lab 09/26/24 0333 09/25/24 2152   WBC 4.65 4.43   HEMOGLOBIN 7.8* 7.8*   HEMATOCRIT 26.4* 26.3*   PLATELETS 164 160   NEUTROS ABS 2.82 2.62   IMMATURE GRANS (ABS) 0.01 0.01   LYMPHS ABS 0.79 0.65*   MONOS ABS 0.48 0.65   EOS ABS 0.53* 0.46*   MCV 88.3 87.7     CMP:        Lab 09/26/24 0333 09/25/24 2152   SODIUM 135* 133*   POTASSIUM 4.2 4.3   CHLORIDE 104 103   CO2 19.0* 18.0*   ANION GAP 12.0 12.0   BUN 15 15   CREATININE 0.70* 0.82   EGFR 101.6 96.9   GLUCOSE 79 107*   CALCIUM 8.7 8.8   MAGNESIUM 1.7 1.7   PHOSPHORUS 2.7 2.7   TOTAL PROTEIN  --  7.3   ALBUMIN  --  3.2*   GLOBULIN  --  4.1   ALT (SGPT)  --  147*   AST (SGOT)  --  265*   BILIRUBIN  --  0.6   ALK PHOS  --  127*     Results from last 7 days   Lab Units 09/26/24  0333   MAGNESIUM mg/dL 1.7     Estimated Creatinine Clearance: 105.9 mL/min (A) (by C-G formula based on SCr of 0.7 mg/dL (L)).    CARDIAC LABS:      Lab 09/26/24 0020 09/25/24 2152   HSTROP T 13 14   PROTIME  --  21.7*   INR  --  1.88*           EKG/ Telemetry:         Result Review:  I have personally reviewed the results from the time of admission and agree with these findings:  [x]  Laboratory  []   Radiology  [x]  EKG/Telemetry   []  Pathology  [x]  Old records  []  Other:        Assessment and Plan:     Ischemic cardiomyopathy/VT  Normal functioning resynchronization ICD with multiple lead vegetations  Permanent atrial fibrillation  AV node ablation/complete heart block  Pacemaker dependent  Endocarditis  Moderate MR and severe TR  Multiple vegetations  Antibiotics per ID  Will need full system extraction and concomitant valve surgery.  CT surgery consulted  Will ask surgery to place epicardial leads for future right sided ICD placement  After surgery he will need a leadless permanent pacemaker as bridge to future right sided resynchronization ICD.                Electronically signed by DANIEL Mondragon, 09/26/24, 2:26 PM EDT.

## 2024-09-26 NOTE — PAYOR COMM NOTE
"Howard Spears (64 y.o. Male)       Date of Birth   1960    Social Security Number       Address   [address] [id number]    Home Phone   [phone]    MRN   [id number]       [facility]    Marital Status                               Admission Date   24    Admission Type   Urgent    Admitting Provider   Talay Lr MD    Attending Provider   Talya Lr MD    Department, Room/Bed   [facility] 3E, S336/1       Discharge Date       Discharge Disposition       Discharge Destination                                 Attending Provider: Talya Lr MD    Allergies: Morphine And Codeine, Penicillins, Sulfa Antibiotics    Isolation: None   Infection: None   Code Status: CPR    Ht: 167.6 cm (66\")   Wt: 72.1 kg (159 lb)    Admission Cmt: None   Principal Problem: Endocarditis [I38]                   Active Insurance as of 2024       Primary Coverage       Payor Plan Insurance Group Employer/Plan Group    ANTHEM MEDICARE REPLACEMENT ANTHEM MEDICARE ADVANTAGE [id number]       Payor Plan Address Payor Plan Phone Number Payor Plan Fax Number Effective Dates    [address] [phone]  2024 - None Entered    [facility] [address]         Subscriber Name Subscriber Birth Date Member ID       HOWARD SPEARS 1960 [id number]                     Emergency Contacts        (Rel.) Home Phone Work Phone Mobile Phone    Kincaid,Phoenix (Brother) [phone] -- [phone]    Slim Blanchard (Friend) [phone] -- [phone]                 History & Physical        Primitivo Trevizo DO at 24 2205              [facility]  HISTORY AND PHYSICAL    Patient Name: Howard Spears  : 1960  MRN: [id number]  Primary Care Physician: Shanelle Barrow APRN  Date of admission: 2024      Subjective  Subjective     Chief Complaint:  Vegetations on leads    HPI:  Howard Spears is a 64 y.o. " male directly admitted from his home at the direction of his cardiologist, Dr. Jones.  Patient had a transesophageal echocardiogram performed on September 24 that showed large spherical mass/vegetation on the right ventricular lead near the annulus, once more on the proximal lead, and third of the right atrial lead.  Patient also had blood cultures performed same day, that showed gram-positive bacilli.  Patient reports at home over the last few weeks he has been progressively dyspneic, worse with exertion, feels generally weak.  Patient denies any fevers or chills.       Personal History     Past Medical History:   Diagnosis Date    Arrhythmia     Arthritis     Atrial fibrillation     single episode during PCI with conversion to NSR witin 24 hrs    Blood clot in vein 05/07/2024    right groin    Coronary artery disease 2004    S/P Taxus RICHARD prox LAD, Cath June 2011 EF 20%, S/P distal RCA 3.0x12 and 3.0x8 Cypher, PLB 2.5x8 Cypher, and mid ALD 3.0x 13 Cypher    GERD (gastroesophageal reflux disease)     History of kidney stones     2000    Hyperlipidemia     Hypertension     Hypothyroidism     Ischemic cardiomyopathy     Hx of Inducible VT per EP study Nov 2004, S/P [employer] Eliezer ICD implant, upgrade to Bi-V iCD July 2008 Dr. Mtz June 2010 appropriate ICD shocks, Gen change 12/11/13 MGR    Obstructive sleep apnea     Old MI (myocardial infarction)            Past Surgical History:   Procedure Laterality Date    CARDIAC CATHETERIZATION      CARDIAC CATHETERIZATION N/A 3/14/2023    Procedure: Left Heart Cath;  Surgeon: Jose Jones MD;  Location:  [facility] CATH INVASIVE LOCATION;  Service: Cardiovascular;  Laterality: N/A;    CARDIAC ELECTROPHYSIOLOGY PROCEDURE N/A 11/17/2020    Procedure: ICD DC generator change Generator change at patient's earliest convenience.  Given the atrial lead noise, will reevaluate at that time to see if atrial lead revision is also necessary. ;  Surgeon: Maurizio Cruz DO;  Location:  [facility]  EP INVASIVE LOCATION;  Service: Cardiology;  Laterality: N/A;    CARDIAC ELECTROPHYSIOLOGY PROCEDURE N/A 04/09/2021    Procedure: [employer] Eliezer BiV ICD RV lead, new;  Surgeon: Maurizio Cruz DO;  Location: [facility] EP INVASIVE LOCATION;  Service: Cardiology;  Laterality: N/A;    CARDIAC ELECTROPHYSIOLOGY PROCEDURE N/A 05/25/2021    Procedure: RV ICD lead extraction (SJM) new RV lead insertion, no meds to hold, Hybrid OR, CT/OR back up;  Surgeon: Maurizio Cruz DO;  Location:  [facility] EP INVASIVE LOCATION;  Service: Cardiovascular;  Laterality: N/A;    CARDIAC ELECTROPHYSIOLOGY PROCEDURE N/A 05/25/2021    Procedure: EP/CRM Study;  Surgeon: Marlon Bartlett MD;  Location: [facility] EP INVASIVE LOCATION;  Service: Cardiovascular;  Laterality: N/A;    CARDIAC ELECTROPHYSIOLOGY PROCEDURE N/A 3/6/2024    Procedure: AV node ablation;  Surgeon: Maurizio Cruz DO;  Location: [facility] EP INVASIVE LOCATION;  Service: Cardiovascular;  Laterality: N/A;  please schedule after echo, ct of head and carotid    CARPAL TUNNEL RELEASE Bilateral     INSERT / REPLACE / REMOVE PACEMAKER      INTERNAL CARDIAC DEFIBRILLATOR INSERTION      KIDNEY STONE SURGERY      PACEMAKER REPLACEMENT      Generator change, [employer] Eliezer Bi-V ICD (Dr. Atkins), 12/11/2013.       Family History: family history includes Alzheimer's disease in his mother; Heart attack in his father; No Known Problems in his brother, brother, and sister; Pulmonary fibrosis in his mother.     Social History:  reports that he has never smoked. He has been exposed to tobacco smoke. He has never used smokeless tobacco. He reports that he does not drink alcohol and does not use drugs.  Social History     Social History Narrative    Caffeine 2.5 coke, decaf tea        Medications:  Available home medication information reviewed.  B Complex Vitamins, aspirin, atorvastatin, bumetanide, coenzyme Q10, dabigatran etexilate, dapagliflozin-metformin HCl ER, levothyroxine, methocarbamol, multivitamin  with minerals, omeprazole, potassium chloride ER, and traZODone    Allergies   Allergen Reactions    Morphine And Codeine Hives and Rash     Other Reaction(s): PAINFUL RASH    Penicillins Unknown - Low Severity, Mental Status Change and Other (See Comments)     Patient reports tolerating amoxicillin.    Other Reaction(s): LOC      Patient reports tolerating amoxicillin.    Sulfa Antibiotics Unknown - Low Severity, Other (See Comments) and Unknown (See Comments)     Was told by MD that he is allergic but does not know the reaction       Objective  Objective     Vital Signs:   Temp:  [97.9 °F (36.6 °C)-98 °F (36.7 °C)] 98 °F (36.7 °C)  Heart Rate:  [73] 73  Resp:  [16] 16  BP: (126)/(78) 126/78       Physical Exam   Constitutional: Awake, alert, chronically ill-appearing.  Eyes: PERRLA, sclerae anicteric, no conjunctival injection  HENT: NCAT, mucous membranes moist  Neck: Supple, no thyromegaly, no lymphadenopathy, trachea midline  Respiratory: Clear to auscultation bilaterally, nonlabored respirations   Cardiovascular: RRR, no murmurs, rubs, or gallops, palpable pedal pulses bilaterally  Gastrointestinal: Positive bowel sounds, soft, nontender, nondistended  Musculoskeletal: No bilateral ankle edema, no clubbing or cyanosis to extremities  Psychiatric: Appropriate affect, cooperative  Neurologic: Oriented x 3, strength symmetric in all extremities, Cranial Nerves grossly intact to confrontation, speech clear  Skin: No rashes     Result Review:  I have personally reviewed the results from the time of this admission to 9/25/2024 22:05 EDT and agree with these findings:  [x]  Laboratory list / accordion  [x]  Microbiology  [x]  Radiology  [x]  EKG/Telemetry   [x]  Cardiology/Vascular   []  Pathology  [x]  Old records  []  Other:  Most notable findings include: MRI as above.      LAB RESULTS:            UA          2/16/2024    01:04   Urinalysis   Squamous Epithelial Cells, UA 0-2       Specific Gravity, UA >=1.030        Blood, UA Moderate       Leukocytes, UA Negative       RBC, UA >50       Bacteria, UA Negative          Details          This result is from an external source.               Microbiology Results (last 10 days)       Procedure Component Value - Date/Time    Blood Culture - Blood, Arm, Right [[id number]]  (Normal) Collected: 09/24/24 1012    Lab Status: Preliminary result Specimen: Blood from Arm, Right Updated: 09/25/24 1030     Blood Culture No growth at 24 hours    Narrative:      Less than seven (7) mL's of blood was collected.  Insufficient quantity may yield false negative results.    Blood Culture - Blood, Hand, Left [[id number]]  (Abnormal) Collected: 09/24/24 1000    Lab Status: Preliminary result Specimen: Blood from Hand, Left Updated: 09/25/24 1738     Blood Culture Abnormal Stain     Gram Stain Anaerobic Bottle Gram positive bacilli    Narrative:      Less than seven (7) mL's of blood was collected.  Insufficient quantity may yield false negative results.  Blood culture does not meet the specified criteria for PCR identification.  If pregnant, immunocompromised, or clinical concern for meningitis, call lab to run BCID for Listeria monocytogenes.            Adult Transesophageal Echo (MICHAEL) W/ Cont if Necessary Per Protocol    Result Date: 9/24/2024    Left ventricular systolic function is moderately decreased. Estimated left ventricular EF = 25%   Moderate mitral valve regurgitation is present.   2 electronic lead is present in the right atrium. Large spherical mass/vegetation on right ventricular lead near tricuspid annulus. A second larger 1 cm mobile vegetation on lead seen proximally. Third vegetation small seen on right atrial lead..   There is a small mobile mass on the tricuspid valve affecting the septal leaflet.   Severe tricuspid valve regurgitation is present.   Estimated right ventricular systolic pressure from tricuspid regurgitation is moderately elevated (45-55 mmHg).      Results for  orders placed during the hospital encounter of 09/24/24    Adult Transesophageal Echo (MICHAEL) W/ Cont if Necessary Per Protocol    Interpretation Summary    Left ventricular systolic function is moderately decreased. Estimated left ventricular EF = 25%    Moderate mitral valve regurgitation is present.    2 electronic lead is present in the right atrium. Large spherical mass/vegetation on right ventricular lead near tricuspid annulus. A second larger 1 cm mobile vegetation on lead seen proximally. Third vegetation small seen on right atrial lead..    There is a small mobile mass on the tricuspid valve affecting the septal leaflet.    Severe tricuspid valve regurgitation is present.    Estimated right ventricular systolic pressure from tricuspid regurgitation is moderately elevated (45-55 mmHg).      Assessment & Plan  Assessment & Plan     Bacterial endocarditis  Infected ICD leads  Patient is not septic.  Will start broad-spectrum antibiotics, IV vancomycin/IV Zosyn.  Cardiology, EP cardiology, infective disease consult in AM.  Tentative plan for ICD removal, will defer medical decision making in this regard to cardiology services.  Repeat blood cultures.  Anemia, unknown etiology  Anemia panel.  Hemoccult  No indication for transfusion at this time, consider for hemoglobin less than 7.  Coronary artery disease  Hypertension  Hyperlipidemia  Hypothyroidism  Ischemic cardiomyopathy  Obstructive sleep apnea.  Continue home medications as appropriate.    Total time spent: 55 minutes  Time spent includes time reviewing chart, face-to-face time, counseling patient/family/caregiver, ordering medications/tests/procedures, communicating with other health care professionals, documenting clinical information in the electronic health record, and coordination of care.      VTE Prophylaxis:  Pharmacologic VTE prophylaxis orders are present.    CODE STATUS:    Code Status and Medical Interventions: CPR (Attempt to Resuscitate);  Full Support   Ordered at: 24     Level Of Support Discussed With:    Patient     Code Status (Patient has no pulse and is not breathing):    CPR (Attempt to Resuscitate)     Medical Interventions (Patient has pulse or is breathing):    Full Support       Expected Discharge   Expected discharge date/ time has not been documented.     Primitivo Trevizo DO  24     Electronically signed by Primitivo Trevizo DO at 24 2241          Physician Progress Notes (all)        Talya Lr MD at 24 0630              [facility]  PROGRESS NOTE    Patient Name: Howard Owusu  : 1960  MRN: [id number]    Date of Admission: 2024  Primary Care Physician: Shanelle Barrow APRN    Subjective   Subjective     CC: Follow-up abnormal MICHAEL    HPI: No acute events overnight patient states that he had a rough night, did not get much sleep.  He denies any fevers or chills, no SOA        Objective   Objective     Vital Signs:   Temp:  [97.8 °F (36.6 °C)-98 °F (36.7 °C)] 97.8 °F (36.6 °C)  Heart Rate:  [73] 73  Resp:  [16] 16  BP: (126)/(78) 126/78     Physical Exam:  Constitutional: No acute distress, awake, alert  HENT: NCAT, mucous membranes moist  Respiratory: Clear to auscultation bilaterally, respiratory effort normal   Cardiovascular: RRR, no murmurs, rubs, or gallops  Gastrointestinal: Positive bowel sounds, soft, nontender, nondistended  Musculoskeletal: No bilateral ankle edema  Psychiatric: Appropriate affect, cooperative  Neurologic: Oriented x 3, nonfocal  Skin: No rashes      Results Reviewed:  LAB RESULTS:      Lab 24  0333 24  0020 242   WBC 4.65  --  4.43   HEMOGLOBIN 7.8*  --  7.8*   HEMATOCRIT 26.4*  --  26.3*   PLATELETS 164  --  160   NEUTROS ABS 2.82  --  2.62   IMMATURE GRANS (ABS) 0.01  --  0.01   LYMPHS ABS 0.79  --  0.65*   MONOS ABS 0.48  --  0.65   EOS ABS 0.53*  --  0.46*   MCV 88.3  --  87.7   PROCALCITONIN  --   --   0.08   LACTATE 1.8 2.3* 3.1*   LDH  --   --  292*   PROTIME  --   --  21.7*         Lab 09/26/24  0333 09/25/24 2152   SODIUM 135* 133*   POTASSIUM 4.2 4.3   CHLORIDE 104 103   CO2 19.0* 18.0*   ANION GAP 12.0 12.0   BUN 15 15   CREATININE 0.70* 0.82   EGFR 101.6 96.9   GLUCOSE 79 107*   CALCIUM 8.7 8.8   MAGNESIUM 1.7 1.7   PHOSPHORUS 2.7 2.7         Lab 09/25/24 2152   TOTAL PROTEIN 7.3   ALBUMIN 3.2*   GLOBULIN 4.1   ALT (SGPT) 147*   AST (SGOT) 265*   BILIRUBIN 0.6   ALK PHOS 127*         Lab 09/26/24  0020 09/25/24 2152   HSTROP T 13 14   PROTIME  --  21.7*   INR  --  1.88*             Lab 09/25/24 2152   IRON 14*   IRON SATURATION (TSAT) 3*   TIBC 405   TRANSFERRIN 272   FERRITIN 51.02         Brief Urine Lab Results  (Last result in the past 365 days)        Color   Clarity   Blood   Leuk Est   Nitrite   Protein   CREAT   Urine HCG        02/16/24 0104 Dark Yellow   Clear     Negative                       Microbiology Results Abnormal       None            Adult Transesophageal Echo (MICHAEL) W/ Cont if Necessary Per Protocol    Result Date: 9/24/2024    Left ventricular systolic function is moderately decreased. Estimated left ventricular EF = 25%   Moderate mitral valve regurgitation is present.   2 electronic lead is present in the right atrium. Large spherical mass/vegetation on right ventricular lead near tricuspid annulus. A second larger 1 cm mobile vegetation on lead seen proximally. Third vegetation small seen on right atrial lead..   There is a small mobile mass on the tricuspid valve affecting the septal leaflet.   Severe tricuspid valve regurgitation is present.   Estimated right ventricular systolic pressure from tricuspid regurgitation is moderately elevated (45-55 mmHg).      Results for orders placed during the hospital encounter of 09/24/24    Adult Transesophageal Echo (MICHAEL) W/ Cont if Necessary Per Protocol    Interpretation Summary    Left ventricular systolic function is moderately  decreased. Estimated left ventricular EF = 25%    Moderate mitral valve regurgitation is present.    2 electronic lead is present in the right atrium. Large spherical mass/vegetation on right ventricular lead near tricuspid annulus. A second larger 1 cm mobile vegetation on lead seen proximally. Third vegetation small seen on right atrial lead..    There is a small mobile mass on the tricuspid valve affecting the septal leaflet.    Severe tricuspid valve regurgitation is present.    Estimated right ventricular systolic pressure from tricuspid regurgitation is moderately elevated (45-55 mmHg).      Current medications:  Scheduled Meds:enoxaparin, 30 mg, Subcutaneous, Daily  magnesium sulfate, 4 g, Intravenous, Once  piperacillin-tazobactam, 3.375 g, Intravenous, Q8H  sodium chloride, 10 mL, Intravenous, Q12H  vancomycin, 1,000 mg, Intravenous, Q12H      Continuous Infusions:Pharmacy Consult - Pharmacy to dose,       PRN Meds:.  acetaminophen **OR** acetaminophen **OR** acetaminophen    senna-docusate sodium **AND** polyethylene glycol **AND** bisacodyl **AND** bisacodyl    Calcium Replacement - Follow Nurse / BPA Driven Protocol    Magnesium Cardiology Dose Replacement - Follow Nurse / BPA Driven Protocol    nitroglycerin    Pharmacy Consult - Pharmacy to dose    Phosphorus Replacement - Follow Nurse / BPA Driven Protocol    Potassium Replacement - Follow Nurse / BPA Driven Protocol    sodium chloride    sodium chloride    Assessment & Plan   Assessment & Plan     Active Hospital Problems    Diagnosis  POA    **Endocarditis [I38]  Yes      Resolved Hospital Problems   No resolved problems to display.        Brief Hospital Course to date:  Howard Owusu is a 64 y.o. male with chronic HFrEF, hypertension, hyperlipidemia, ischemic cardiomyopathy, hypothyroidism patient with recent dyspnea with exertion generalized weakness.  Echo done 9/24 revealed multiple vegetations on leads, patient admitted for further evaluation  management    Bacterial endocarditis  Infected ICD leads  -MICHAEL findings as above  -Blood cultures from 9/24 growing gram-positive bacilli, follow-up speciation, follow-up repeat cultures  -He is currently not septic, continue antibiotics with IV Zosyn and vancomycin, ID consulted to assist  -Cardiology consulted    Anemia  -H&H lower than previous, etiology remains unknown  -Follow-up iron profile, ferritin  -Continue to closely monitor, transfuse when hemoglobin<7    CAD  Hyperlipidemia  Hypertension  Ischemic cardiomyopathy  HFrEF EF 25%  Hypertension  -BP currently stable, he seems compensated  -Resume home Pradaxa, aspirin, statin and Bumex    Hypothyroidism  -Continue Synthroid    GILL    All problems as above are new to me today    Expected Discharge Location and Transportation: TBD  Expected Discharge   Expected discharge date/ time has not been documented.     VTE Prophylaxis:  Pharmacologic VTE prophylaxis orders are present.         AM-PAC 6 Clicks Score (PT): 24 (09/25/24 2045)    CODE STATUS:   Code Status and Medical Interventions: CPR (Attempt to Resuscitate); Full Support   Ordered at: 09/25/24 2634     Level Of Support Discussed With:    Patient     Code Status (Patient has no pulse and is not breathing):    CPR (Attempt to Resuscitate)     Medical Interventions (Patient has pulse or is breathing):    Full Support       Talya Lr MD  09/26/24        Electronically signed by Talya Lr MD at 09/26/24 6077

## 2024-09-26 NOTE — SIGNIFICANT NOTE
09/26/24 1352   SLP Deferred Reason   SLP Deferred Reason Routine  (Per RN report, pt is NPO for cardiac work up and possible procedure. SLP to reassess when cleared for PO.)

## 2024-09-26 NOTE — PLAN OF CARE
Goal Outcome Evaluation:  Plan of Care Reviewed With: patient           Outcome Evaluation: PT evaluation completed. Pt presents below baseline levels of mobility with chronic backpain and significantly decreased activity tolerance warranting need for IP PT services to address deficits and facilitate return to baseline. Recommend home with assist and OP PT services.      Anticipated Discharge Disposition (PT): home with assist, home with outpatient therapy services

## 2024-09-26 NOTE — PROGRESS NOTES
"          Clinical Nutrition Assessment     Patient Name: Howard Owusu  YOB: 1960  MRN: [id number]  Date of Encounter: 09/26/24 12:15 EDT  Admission date: 9/25/2024  Reason for Visit: MST score 2+, Unintentional weight loss, Reduced oral intake    Assessment   Nutrition Assessment   Admission Diagnosis:  Endocarditis [I38]    Problem List:    Endocarditis    Ischemic cardiomyopathy    Coronary artery disease    Hypertension    Hypothyroidism    Obstructive sleep apnea    V-tach    ICD (implantable cardioverter-defibrillator), biventricular, in situ    Subclavian vein stenosis    Chronic systolic heart failure    Persistent atrial fibrillation    Heart valve disease      PMH:   He  has a past medical history of Arthritis, Atrial fibrillation, Blood clot in vein (05/07/2024), Coronary artery disease (2004), GERD (gastroesophageal reflux disease), History of kidney stones, Hyperlipidemia, Hypertension, Hypothyroidism, Ischemic cardiomyopathy, Obstructive sleep apnea, and Old MI (myocardial infarction).    PSH:  He  has a past surgical history that includes Cardiac catheterization; Cardiac electrophysiology procedure (N/A, 11/17/2020); Cardiac electrophysiology procedure (N/A, 04/09/2021); Kidney stone surgery; Cardiac electrophysiology procedure (N/A, 05/25/2021); Cardiac electrophysiology procedure (N/A, 05/25/2021); Carpal tunnel release (Bilateral); Cardiac catheterization (N/A, 03/14/2023); and Cardiac electrophysiology procedure (N/A, 03/06/2024).    Applicable Nutrition History:       Anthropometrics     Height: Height: 167.6 cm (66\")  Last Filed Weight: Weight: 72.1 kg (159 lb) (09/25/24 2130)  Method: Weight Method: Bed scale  BMI: BMI (Calculated): 25.7    UBW:     Weight      Weight (kg) Weight (lbs) Weight Method   4/26/2023 102.059 kg  225 lb     11/7/2023 96.798 kg  213 lb 6.4 oz     12/6/2023 94.439 kg  208 lb 3.2 oz     2/13/2024 94.348 kg  208 lb      94.348 kg  208 lb     2/26/2024 " 93.441 kg  206 lb     3/6/2024 90.9 kg  200 lb 6.4 oz  Standing scale    6/10/2024 86.637 kg  191 lb     9/3/2024 75.116 kg  165 lb 9.6 oz     9/25/2024 72.122 kg  159 lb  Bed scale      Weight change: weight loss of 32 lbs (16.8%) over 3 month(s)    Significant?  Yes    Nutrition Focused Physical Exam    Date:  09/26/24        Patient meets criteria for malnutrition diagnosis, see MSA note.     Subjective   Reported/Observed/Food/Nutrition Related History:     09/26/24  Patient reported losing a significant amount of weight over the past 3 months. Stated he admitted to a hospital in [place] during that time and fighting an illness. Stated he didn't and continues to not have any appetite. Has been only eating around 1 meal/day (maybe soup and/or 1/2-1 sandwich). Has been drinking 2-3 ONS per day. Would like to continue doing this once PO diet advanced (likes chocolate). Declined any difficulties chewing or swallowing. NKFA.    Current Nutrition Prescription   PO: NPO Diet NPO Type: Strict NPO  Oral Nutrition Supplement: n/a  Intake: N/A    Assessment & Plan   Nutrition Diagnosis   Date:  09/26/24  Updated:  Problem Malnutrition, acute severe   Etiology Recent hospitalization   Signs/Symptoms </=50% of EEN x >/=5d, significant weight loss of >7.5% x 3mo, moderate muscle wasting, and moderate subcutaneous fat loss   Status: New    Goal:   Nutrition to support treatment and Advance diet as medically feasible/appropriate      Nutrition Intervention      Follow treatment progress, Care plan reviewed, Interview for preferences, Await begin PO, Encourage intake, Supplement provided    Patient meets malnutrition criteria, see MSA for full assessment.  Ordering boost glucose TID for when PO diet advances  Recommend advancing PO diet when feasible  Encourage adequate nutrition    Monitoring/Evaluation:   Per protocol, I&O, Pertinent labs, Weight, GI status, Symptoms, POC/GOC    Daxa Bryant MS,RD,LD  Time Spent: 30min

## 2024-09-26 NOTE — CASE MANAGEMENT/SOCIAL WORK
Discharge Planning Assessment  [facility]     Patient Name: Howard Owusu  MRN: [id number]  Today's Date: 9/26/2024    Admit Date: 9/25/2024    Plan: IDP   Discharge Needs Assessment       Row Name 09/26/24 1313       Living Environment    People in Home alone    Current Living Arrangements home    Potentially Unsafe Housing Conditions none    In the past 12 months has the electric, gas, oil, or water company threatened to shut off services in your home? No    Primary Care Provided by self    Provides Primary Care For no one    Family Caregiver if Needed sibling(s)    Family Caregiver Names Phonix    Quality of Family Relationships unable to assess    Able to Return to Prior Arrangements yes       Resource/Environmental Concerns    Resource/Environmental Concerns none    Transportation Concerns none       Transportation Needs    In the past 12 months, has lack of transportation kept you from medical appointments or from getting medications? no    In the past 12 months, has lack of transportation kept you from meetings, work, or from getting things needed for daily living? No       Food Insecurity    Within the past 12 months, you worried that your food would run out before you got the money to buy more. Never true    Within the past 12 months, the food you bought just didn't last and you didn't have money to get more. Never true       Transition Planning    Patient/Family Anticipates Transition to home with family    Patient/Family Anticipated Services at Transition none    Transportation Anticipated family or friend will provide       Discharge Needs Assessment    Readmission Within the Last 30 Days no previous admission in last 30 days    Equipment Currently Used at Home cane, quad tip;grab bar;shower chair    Concerns to be Addressed no discharge needs identified;denies needs/concerns at this time    Anticipated Changes Related to Illness none    Equipment Needed After Discharge none                    Discharge Plan       Row Name 09/26/24 1315       Plan    Plan IDP    Patient/Family in Agreement with Plan yes    Plan Comments Spoke with sandro at bedside for IDP. Lives in a multi level house in [place] with brother. IADL. Walks with cane and has RW. No HH/OPPT. PCP Shanelel Barrow. [facility] Plan is home with transportation. CM will cont to follow    Final Discharge Disposition Code 01 - home or self-care                  Continued Care and Services - Admitted Since 9/25/2024    No active coordination exists for this encounter.       Expected Discharge Date and Time       Expected Discharge Date Expected Discharge Time    Sep 30, 2024            Demographic Summary       Row Name 09/26/24 1313       General Information    Admission Type inpatient    Arrived From home    Referral Source admission list    Reason for Consult discharge planning    Preferred Language English                   Functional Status       Row Name 09/26/24 1313       Functional Status    Usual Activity Tolerance good    Current Activity Tolerance good       Physical Activity    On average, how many days per week do you engage in moderate to strenuous exercise (like a brisk walk)? 0 days    On average, how many minutes do you engage in exercise at this level? 0 min    Number of minutes of exercise per week 0       Functional Status, IADL    Medications independent    Meal Preparation independent    Housekeeping independent    Laundry independent    Shopping independent                   Psychosocial    No documentation.                  Abuse/Neglect    No documentation.                  Legal    No documentation.                  Substance Abuse    No documentation.                  Patient Forms    No documentation.                     Charo Olguin RN

## 2024-09-26 NOTE — H&P
[facility]  HISTORY AND PHYSICAL    Patient Name: Howard Owusu  : 1960  MRN: [id number]  Primary Care Physician: Shanelle Barrow APRN  Date of admission: 2024      Subjective   Subjective     Chief Complaint:  Vegetations on leads    HPI:  Howard Owusu is a 64 y.o. male directly admitted from his home at the direction of his cardiologist, Dr. Jones.  Patient had a transesophageal echocardiogram performed on  that showed large spherical mass/vegetation on the right ventricular lead near the annulus, once more on the proximal lead, and third of the right atrial lead.  Patient also had blood cultures performed same day, that showed gram-positive bacilli.  Patient reports at home over the last few weeks he has been progressively dyspneic, worse with exertion, feels generally weak.  Patient denies any fevers or chills.       Personal History     Past Medical History:   Diagnosis Date    Arrhythmia     Arthritis     Atrial fibrillation     single episode during PCI with conversion to NSR witin 24 hrs    Blood clot in vein 2024    right groin    Coronary artery disease     S/P Taxus RICHARD prox LAD, Cath 2011 EF 20%, S/P distal RCA 3.0x12 and 3.0x8 Cypher, PLB 2.5x8 Cypher, and mid ALD 3.0x 13 Cypher    GERD (gastroesophageal reflux disease)     History of kidney stones         Hyperlipidemia     Hypertension     Hypothyroidism     Ischemic cardiomyopathy     Hx of Inducible VT per EP study 2004, S/P [employer] Eliezer ICD implant, upgrade to Bi-V iCD 2008 Dr. Mtz 2010 appropriate ICD shocks, Gen change 13 MGR    Obstructive sleep apnea     Old MI (myocardial infarction)            Past Surgical History:   Procedure Laterality Date    CARDIAC CATHETERIZATION      CARDIAC CATHETERIZATION N/A 3/14/2023    Procedure: Left Heart Cath;  Surgeon: Jose Jones MD;  Location: [facility] CATH INVASIVE LOCATION;  Service:  Cardiovascular;  Laterality: N/A;    CARDIAC ELECTROPHYSIOLOGY PROCEDURE N/A 11/17/2020    Procedure: ICD DC generator change Generator change at patient's earliest convenience.  Given the atrial lead noise, will reevaluate at that time to see if atrial lead revision is also necessary. ;  Surgeon: Maurizio Cruz DO;  Location:  [facility] EP INVASIVE LOCATION;  Service: Cardiology;  Laterality: N/A;    CARDIAC ELECTROPHYSIOLOGY PROCEDURE N/A 04/09/2021    Procedure: [employer] Eliezer BiV ICD RV lead, new;  Surgeon: Maurizio Cruz DO;  Location:  [facility] EP INVASIVE LOCATION;  Service: Cardiology;  Laterality: N/A;    CARDIAC ELECTROPHYSIOLOGY PROCEDURE N/A 05/25/2021    Procedure: RV ICD lead extraction (SJM) new RV lead insertion, no meds to hold, Hybrid OR, CT/OR back up;  Surgeon: Maurizio Cruz DO;  Location:  [facility] EP INVASIVE LOCATION;  Service: Cardiovascular;  Laterality: N/A;    CARDIAC ELECTROPHYSIOLOGY PROCEDURE N/A 05/25/2021    Procedure: EP/CRM Study;  Surgeon: Marlon Bartlett MD;  Location:  [facility] EP INVASIVE LOCATION;  Service: Cardiovascular;  Laterality: N/A;    CARDIAC ELECTROPHYSIOLOGY PROCEDURE N/A 3/6/2024    Procedure: AV node ablation;  Surgeon: Maurizio Cruz DO;  Location:  [facility] EP INVASIVE LOCATION;  Service: Cardiovascular;  Laterality: N/A;  please schedule after echo, ct of head and carotid    CARPAL TUNNEL RELEASE Bilateral     INSERT / REPLACE / REMOVE PACEMAKER      INTERNAL CARDIAC DEFIBRILLATOR INSERTION      KIDNEY STONE SURGERY      PACEMAKER REPLACEMENT      Generator change, St. Eliezer Bi-V ICD (Dr. Atkins), 12/11/2013.       Family History: family history includes Alzheimer's disease in his mother; Heart attack in his father; No Known Problems in his brother, brother, and sister; Pulmonary fibrosis in his mother.     Social History:  reports that he has never smoked. He has been exposed to tobacco smoke. He has never used smokeless tobacco. He reports that he does not drink alcohol and  does not use drugs.  Social History     Social History Narrative    Caffeine 2.5 coke, decaf tea        Medications:  Available home medication information reviewed.  B Complex Vitamins, aspirin, atorvastatin, bumetanide, coenzyme Q10, dabigatran etexilate, dapagliflozin-metformin HCl ER, levothyroxine, methocarbamol, multivitamin with minerals, omeprazole, potassium chloride ER, and traZODone    Allergies   Allergen Reactions    Morphine And Codeine Hives and Rash     Other Reaction(s): PAINFUL RASH    Penicillins Unknown - Low Severity, Mental Status Change and Other (See Comments)     Patient reports tolerating amoxicillin.    Other Reaction(s): LOC      Patient reports tolerating amoxicillin.    Sulfa Antibiotics Unknown - Low Severity, Other (See Comments) and Unknown (See Comments)     Was told by MD that he is allergic but does not know the reaction       Objective   Objective     Vital Signs:   Temp:  [97.9 °F (36.6 °C)-98 °F (36.7 °C)] 98 °F (36.7 °C)  Heart Rate:  [73] 73  Resp:  [16] 16  BP: (126)/(78) 126/78       Physical Exam   Constitutional: Awake, alert, chronically ill-appearing.  Eyes: PERRLA, sclerae anicteric, no conjunctival injection  HENT: NCAT, mucous membranes moist  Neck: Supple, no thyromegaly, no lymphadenopathy, trachea midline  Respiratory: Clear to auscultation bilaterally, nonlabored respirations   Cardiovascular: RRR, no murmurs, rubs, or gallops, palpable pedal pulses bilaterally  Gastrointestinal: Positive bowel sounds, soft, nontender, nondistended  Musculoskeletal: No bilateral ankle edema, no clubbing or cyanosis to extremities  Psychiatric: Appropriate affect, cooperative  Neurologic: Oriented x 3, strength symmetric in all extremities, Cranial Nerves grossly intact to confrontation, speech clear  Skin: No rashes     Result Review:  I have personally reviewed the results from the time of this admission to 9/25/2024 22:05 EDT and agree with these findings:  [x]  Laboratory list  / accordion  [x]  Microbiology  [x]  Radiology  [x]  EKG/Telemetry   [x]  Cardiology/Vascular   []  Pathology  [x]  Old records  []  Other:  Most notable findings include: MRI as above.      LAB RESULTS:            UA          2/16/2024    01:04   Urinalysis   Squamous Epithelial Cells, UA 0-2       Specific Gravity, UA >=1.030       Blood, UA Moderate       Leukocytes, UA Negative       RBC, UA >50       Bacteria, UA Negative          Details          This result is from an external source.               Microbiology Results (last 10 days)       Procedure Component Value - Date/Time    Blood Culture - Blood, Arm, Right [[id number]]  (Normal) Collected: 09/24/24 1012    Lab Status: Preliminary result Specimen: Blood from Arm, Right Updated: 09/25/24 1030     Blood Culture No growth at 24 hours    Narrative:      Less than seven (7) mL's of blood was collected.  Insufficient quantity may yield false negative results.    Blood Culture - Blood, Hand, Left [[id number]]  (Abnormal) Collected: 09/24/24 1000    Lab Status: Preliminary result Specimen: Blood from Hand, Left Updated: 09/25/24 1738     Blood Culture Abnormal Stain     Gram Stain Anaerobic Bottle Gram positive bacilli    Narrative:      Less than seven (7) mL's of blood was collected.  Insufficient quantity may yield false negative results.  Blood culture does not meet the specified criteria for PCR identification.  If pregnant, immunocompromised, or clinical concern for meningitis, call lab to run BCID for Listeria monocytogenes.            Adult Transesophageal Echo (MICHAEL) W/ Cont if Necessary Per Protocol    Result Date: 9/24/2024    Left ventricular systolic function is moderately decreased. Estimated left ventricular EF = 25%   Moderate mitral valve regurgitation is present.   2 electronic lead is present in the right atrium. Large spherical mass/vegetation on right ventricular lead near tricuspid annulus. A second larger 1 cm mobile vegetation on lead seen  proximally. Third vegetation small seen on right atrial lead..   There is a small mobile mass on the tricuspid valve affecting the septal leaflet.   Severe tricuspid valve regurgitation is present.   Estimated right ventricular systolic pressure from tricuspid regurgitation is moderately elevated (45-55 mmHg).      Results for orders placed during the hospital encounter of 09/24/24    Adult Transesophageal Echo (MICHAEL) W/ Cont if Necessary Per Protocol    Interpretation Summary    Left ventricular systolic function is moderately decreased. Estimated left ventricular EF = 25%    Moderate mitral valve regurgitation is present.    2 electronic lead is present in the right atrium. Large spherical mass/vegetation on right ventricular lead near tricuspid annulus. A second larger 1 cm mobile vegetation on lead seen proximally. Third vegetation small seen on right atrial lead..    There is a small mobile mass on the tricuspid valve affecting the septal leaflet.    Severe tricuspid valve regurgitation is present.    Estimated right ventricular systolic pressure from tricuspid regurgitation is moderately elevated (45-55 mmHg).      Assessment & Plan   Assessment & Plan     Bacterial endocarditis  Infected ICD leads  Patient is not septic.  Will start broad-spectrum antibiotics, IV vancomycin/IV Zosyn.  Cardiology, EP cardiology, infective disease consult in AM.  Tentative plan for ICD removal, will defer medical decision making in this regard to cardiology services.  Repeat blood cultures.  Anemia, unknown etiology  Anemia panel.  Hemoccult  No indication for transfusion at this time, consider for hemoglobin less than 7.  Coronary artery disease  Hypertension  Hyperlipidemia  Hypothyroidism  Ischemic cardiomyopathy  Obstructive sleep apnea.  Continue home medications as appropriate.    Total time spent: 55 minutes  Time spent includes time reviewing chart, face-to-face time, counseling patient/family/caregiver, ordering  medications/tests/procedures, communicating with other health care professionals, documenting clinical information in the electronic health record, and coordination of care.      VTE Prophylaxis:  Pharmacologic VTE prophylaxis orders are present.    CODE STATUS:    Code Status and Medical Interventions: CPR (Attempt to Resuscitate); Full Support   Ordered at: 09/25/24 6713     Level Of Support Discussed With:    Patient     Code Status (Patient has no pulse and is not breathing):    CPR (Attempt to Resuscitate)     Medical Interventions (Patient has pulse or is breathing):    Full Support       Expected Discharge   Expected discharge date/ time has not been documented.     Primitivo Trevizo, DO  09/25/24

## 2024-09-26 NOTE — PROGRESS NOTES
"Pharmacy Consult - Vancomycin Dosing and Monitoring    Howard Owusu is a 64 y.o. male receiving vancomycin therapy.     Indication: Infective Endocarditis  Consulting Provider: DO PONCHO Leslie Consult: no    Goal AUC: 400-600 mg/L*hr    Current Antimicrobial Therapy  Anti-Infectives (From admission, onward)      Ordered     Dose/Rate Route Frequency Start Stop    09/25/24 2258  vancomycin (VANCOCIN) 1,000 mg in sodium chloride 0.9 % 250 mL IVPB-VTB        Ordering Provider: Carlos Weaver RPH    1,000 mg  250 mL/hr over 60 Minutes Intravenous Every 12 Hours 09/26/24 1100 10/10/24 1059    09/25/24 2258  piperacillin-tazobactam (ZOSYN) 3.375 g IVPB in 100 mL NS MBP (CD)        Ordering Provider: Carlos Weaver RPPALMIRA    3.375 g  over 4 Hours Intravenous Every 8 Hours 09/26/24 0800 10/10/24 0759    09/25/24 2245  vancomycin IVPB 1750 mg in 0.9% Sodium Chloride (premix) 500 mL        Ordering Provider: Carlos Weaver RPH    1,750 mg  285.7 mL/hr over 105 Minutes Intravenous Once 09/26/24 0000      09/25/24 2258  piperacillin-tazobactam (ZOSYN) 3.375 g IVPB in 100 mL NS MBP (CD)        Ordering Provider: Carlos Weaver RP    3.375 g  over 30 Minutes Intravenous Once 09/25/24 2345            Allergies  Allergies as of 09/25/2024 - Reviewed 09/25/2024   Allergen Reaction Noted    Morphine and codeine Hives and Rash 05/10/2016    Penicillins Unknown - Low Severity, Mental Status Change, and Other (See Comments) 05/10/2016    Sulfa antibiotics Unknown - Low Severity, Other (See Comments), and Unknown (See Comments) 05/10/2016     Labs  Results from last 7 days   Lab Units 09/25/24  2152   BUN mg/dL 15   CREATININE mg/dL 0.82     Results from last 7 days   Lab Units 09/25/24  2152   WBC 10*3/mm3 4.43     Evaluation of Dosing     Last Dose Received in the ED/Outside Facility: n/a  Is Patient on Dialysis or Renal Replacement: no    Height - 167.6 cm (66\")  Weight - 72.1 kg (159 lb)    Estimated Creatinine " Clearance: 90.4 mL/min (by C-G formula based on SCr of 0.82 mg/dL).    No intake/output data recorded.    Microbiology and Radiology  Microbiology Results (last 10 days)       Procedure Component Value - Date/Time    Blood Culture - Blood, Arm, Right [[id number]]  (Normal) Collected: 09/24/24 1012    Lab Status: Preliminary result Specimen: Blood from Arm, Right Updated: 09/25/24 1030     Blood Culture No growth at 24 hours    Narrative:      Less than seven (7) mL's of blood was collected.  Insufficient quantity may yield false negative results.    Blood Culture - Blood, Hand, Left [[id number]]  (Abnormal) Collected: 09/24/24 1000    Lab Status: Preliminary result Specimen: Blood from Hand, Left Updated: 09/25/24 1738     Blood Culture Abnormal Stain     Gram Stain Anaerobic Bottle Gram positive bacilli    Narrative:      Less than seven (7) mL's of blood was collected.  Insufficient quantity may yield false negative results.  Blood culture does not meet the specified criteria for PCR identification.  If pregnant, immunocompromised, or clinical concern for meningitis, call lab to run BCID for Listeria monocytogenes.          Reported Vancomycin Levels              Insight[facility] AUC Calculation:    Current AUC: -- mg/L*hr    Predicted Steady State AUC on Current Dose: -- mg/L*hr  _________________________________    Predicted Steady State AUC on New Dose: 553 mg/L*hr    Assessment/Plan:     Pharmacy consulted to dose vancomycin for infective endocarditis, goal -600 mg/L*hr.  Patient loaded with vancomycin 1750 ( ~ 24 mg/kg)  Culture data is positive for gram positive bacilli, species identification pending  Based on evaluation, initiate a maintenance regimen of vancomycin 1000 ( ~ 13.9 mg/kg) every 12 hours  A vancomycin random will be assessed on 9/26 with AM labs.  Will continue to follow and adjust vancomycin dose as needed based on renal function, cultures, and patient clinical status.    Thank you,    Carlos VILLEGAS  [facility]  9/25/2024  23:01 EDT

## 2024-09-26 NOTE — THERAPY EVALUATION
Patient Name: Howard Owusu  : 1960    MRN: [id number]                              Today's Date: 2024       Admit Date: 2024    Visit Dx: No diagnosis found.  Patient Active Problem List   Diagnosis    Ischemic cardiomyopathy    Coronary artery disease    Hyperlipidemia    Hypertension    GERD (gastroesophageal reflux disease)    Hypothyroidism    Obstructive sleep apnea    V-tach    ICD (implantable cardioverter-defibrillator), biventricular, in situ    Subclavian vein stenosis    Chronic systolic heart failure    Persistent atrial fibrillation    Endocarditis    Heart valve disease     Past Medical History:   Diagnosis Date    Arthritis     Atrial fibrillation     single episode during PCI with conversion to NSR witin 24 hrs    Blood clot in vein 2024    right groin    Coronary artery disease     S/P Taxus RICHARD prox LAD, Cath 2011 EF 20%, S/P distal RCA 3.0x12 and 3.0x8 Cypher, PLB 2.5x8 Cypher, and mid ALD 3.0x 13 Cypher    GERD (gastroesophageal reflux disease)     History of kidney stones         Hyperlipidemia     Hypertension     Hypothyroidism     Ischemic cardiomyopathy     Hx of Inducible VT per EP study 2004, S/P [employer] El[employer]zer ICD implant, upgrade to Bi-V iCD 2008 Dr. Mtz 2010 appropriate ICD shocks, Gen change 13 MGR    Obstructive sleep apnea     Old MI (myocardial infarction)      Past Surgical History:   Procedure Laterality Date    CARDIAC CATHETERIZATION      CARDIAC CATHETERIZATION N/A 2023    Procedure: Left Heart Cath;  Surgeon: Jose Jones MD;  Location: [facility] CATH INVASIVE LOCATION;  Service: Cardiovascular;  Laterality: N/A;    CARDIAC ELECTROPHYSIOLOGY PROCEDURE N/A 2020    Procedure: ICD DC generator change Generator change at patient's earliest convenience.  Given the atrial lead noise, will reevaluate at that time to see if atrial lead revision is also necessary. ;  Surgeon: Maurizio Cruz DO;  Location:  [facility]  INVASIVE LOCATION;  Service: Cardiology;  Laterality: N/A;    CARDIAC ELECTROPHYSIOLOGY PROCEDURE N/A 04/09/2021    Procedure: [employer] El[employer]zer BiV ICD RV lead, new;  Surgeon: Maurizio Cruz DO;  Location: [facility] INVASIVE LOCATION;  Service: Cardiology;  Laterality: N/A;    CARDIAC ELECTROPHYSIOLOGY PROCEDURE N/A 05/25/2021    Procedure: RV ICD lead extraction (SJM) new RV lead insertion, no meds to hold, Hybrid OR, CT/OR back up;  Surgeon: Maurizio Cruz DO;  Location: [facility] INVASIVE LOCATION;  Service: Cardiovascular;  Laterality: N/A;    CARDIAC ELECTROPHYSIOLOGY PROCEDURE N/A 05/25/2021    Procedure: EP/CRM Study;  Surgeon: Marlon Bartlett MD;  Location: [facility] INVASIVE LOCATION;  Service: Cardiovascular;  Laterality: N/A;    CARDIAC ELECTROPHYSIOLOGY PROCEDURE N/A 03/06/2024    Procedure: AV node ablation;  Surgeon: Maurizio Cruz DO;  Location:  [facility] INVASIVE LOCATION;  Service: Cardiovascular;  Laterality: N/A;  please schedule after echo, ct of head and carotid    CARPAL TUNNEL RELEASE Bilateral     KIDNEY STONE SURGERY        General Information       Row Name 09/26/24 1530          Physical Therapy Time and Intention    Document Type evaluation  -ND     Mode of Treatment physical therapy  -ND       Row Name 09/26/24 1530          General Information    Patient Profile Reviewed yes  -ND     Prior Level of Function independent:;all household mobility;gait;transfer;bed mobility  Avoids stairs due to limited activity tolerance at baseline. Intermittent assist from brother when back pain is severe. Quad tip cane.  -ND     Existing Precautions/Restrictions fall;other (see comments)  Chronic LBP, quad tip cane from home in room.  -ND     Barriers to Rehab medically complex;previous functional deficit  -ND       Row Name 09/26/24 1530          Living Environment    People in Home sibling(s)  -ND       Row Name 09/26/24 1530          Home Main Entrance    Number of Stairs, Main Entrance two  -ND      Stair Railings, Main Entrance none  -ND       Row Name 09/26/24 1530          Stairs Within Home, Primary    Stairs, Within Home, Primary All needs met on single level  -ND       Row Name 09/26/24 1530          Cognition    Orientation Status (Cognition) oriented x 4  -ND       Row Name 09/26/24 1530          Safety Issues, Functional Mobility    Safety Issues Affecting Function (Mobility) safety precaution awareness;safety precautions follow-through/compliance;sequencing abilities  -ND     Impairments Affecting Function (Mobility) endurance/activity tolerance;shortness of breath;strength;pain  -ND               User Key  (r) = Recorded By, (t) = Taken By, (c) = Cosigned By      Initials Name Provider Type    ND Bety Castle, PT Physical Therapist                   Mobility       Row Name 09/26/24 1531          Bed Mobility    Comment, (Bed Mobility) Pt found and left sitting UIC.  -ND       Row Name 09/26/24 1531          Sit-Stand Transfer    Sit-Stand [place] (Transfers) modified independence  -ND     Assistive Device (Sit-Stand Transfers) cane, quad tip  -ND     Comment, (Sit-Stand Transfer) x1 from chair, denies dizziness.  -ND       Row Name 09/26/24 1531          Gait/Stairs (Locomotion)    [place] Level (Gait) contact guard;verbal cues  -ND     Assistive Device (Gait) cane, quad tip  -ND     Distance in Feet (Gait) 25  -ND     [place] Level (Stairs) contact guard;verbal cues  -ND     Assistive Device (Stairs) cane, quad tip  -ND     Handrail Location (Stairs) left side (descending)  -ND     Number of Steps (Stairs) 3  -ND     Ascending Technique (Stairs) step-over-step  -ND     Descending Technique (Stairs) step-over-step  -ND     Stairs, Impairments other (see comments)  [place]  -ND     Comment, (Gait/Stairs) Pt ambulates 25' to stairwell and desc/asc 3 steps with quad tip cane and L desc HR. Following stairs pt is significantly winded and takes prolonged standing rest break while leaning  against wall due to fatigue. Pt reports poor activity tolerance at baseline starting ~6 months ago.  -ND               User Key  (r) = Recorded By, (t) = Taken By, (c) = Cosigned By      Initials Name Provider Type    Bety Monroe PT Physical Therapist                   Obj/Interventions       Row Name 09/26/24 1534          Range of Motion Comprehensive    General Range of Motion bilateral lower extremity ROM WFL  -ND       Row Name 09/26/24 1534          Strength Comprehensive (MMT)    General Manual Muscle Testing (MMT) Assessment lower extremity strength deficits identified  -ND     Comment, General Manual Muscle Testing (MMT) Assessment BLE grossly 4/5.  -ND       Row Name 09/26/24 1534          Balance    Balance Assessment sitting static balance;sitting dynamic balance;sit to stand dynamic balance;standing static balance;standing dynamic balance  -ND     Static Sitting Balance independent  -ND     Dynamic Sitting Balance independent  -ND     Position, Sitting Balance unsupported;sitting in chair  -ND     Sit to Stand Dynamic Balance standby assist;verbal cues  -ND     Static Standing Balance standby assist  -ND     Dynamic Standing Balance contact guard  -ND     Position/Device Used, Standing Balance supported;cane, straight  -ND     Balance Interventions sitting;standing;sit to stand;supported;static;dynamic  -ND     Comment, Balance no LOB or knee buckling noted.  -ND       Row Name 09/26/24 1534          Sensory Assessment (Somatosensory)    Sensory Assessment (Somatosensory) LE sensation intact  -ND               User Key  (r) = Recorded By, (t) = Taken By, (c) = Cosigned By      Initials Name Provider Type    Bety Monroe PT Physical Therapist                   Goals/Plan       Row Name 09/26/24 1536          Bed Mobility Goal 1 (PT)    Activity/Assistive Device (Bed Mobility Goal 1, PT) sit to supine/supine to sit  -ND     [place] Level/Cues Needed (Bed Mobility Goal 1, PT)  independent  -ND     Time Frame (Bed Mobility Goal 1, PT) short term goal (STG);3 days  -ND       Row Name 09/26/24 1536          Transfer Goal 1 (PT)    Activity/Assistive Device (Transfer Goal 1, PT) sit-to-stand/stand-to-sit;bed-to-chair/chair-to-bed  -ND     [place] Level/Cues Needed (Transfer Goal 1, PT) modified independence  -ND     Time Frame (Transfer Goal 1, PT) long term goal (LTG);5 days  -ND       Row Name 09/26/24 1536          Gait Training Goal 1 (PT)    Activity/Assistive Device (Gait Training Goal 1, PT) gait (walking locomotion);increase endurance/gait distance  -ND     [place] Level (Gait Training Goal 1, PT) modified independence  -ND     Distance (Gait Training Goal 1, PT) 250  -ND     Time Frame (Gait Training Goal 1, PT) long term goal (LTG);5 days  -ND       Row Name 09/26/24 1536          Stairs Goal 1 (PT)    Activity/Assistive Device (Stairs Goal 1, PT) ascending stairs;descending stairs  -ND     [place] Level/Cues Needed (Stairs Goal 1, PT) modified independence  -ND     Number of Stairs (Stairs Goal 1, PT) 2  -ND     Time Frame (Stairs Goal 1, PT) long term goal (LTG);5 days  -ND       Row Name 09/26/24 1536          Therapy Assessment/Plan (PT)    Planned Therapy Interventions (PT) balance training;bed mobility training;gait training;home exercise program;patient/family education;transfer training;postural re-education;ROM (range of motion);strengthening;stair training  -ND               User Key  (r) = Recorded By, (t) = Taken By, (c) = Cosigned By      Initials Name Provider Type    ND Bety Castle, PT Physical Therapist                   Clinical Impression       Row Name 09/26/24 1535          Pain    Pretreatment Pain Rating 9/10  -ND     Posttreatment Pain Rating 9/10  -ND     Pain Location generalized  -ND     Pain Location - back  -ND     Pain Intervention(s) Repositioned;Nursing Notified;Ambulation/increased activity  -ND       Row Name 09/26/24 1535           Plan of Care Review    Plan of Care Reviewed With patient  -ND     Outcome Evaluation PT evaluation completed. Pt presents below baseline levels of mobility with chronic backpain and significantly decreased activity tolerance warranting need for IP PT services to address deficits and facilitate return to baseline. Recommend home with assist and OP PT services.  -ND       Row Name 09/26/24 1535          Therapy Assessment/Plan (PT)    Patient/Family Therapy Goals Statement (PT) Return to baseline.  -ND     Rehab Potential (PT) good, to achieve stated therapy goals  -ND     Criteria for Skilled Interventions Met (PT) yes;meets criteria;skilled treatment is necessary  -ND     Therapy Frequency (PT) daily  -ND     Predicted Duration of Therapy Intervention (PT) 5 days.  -ND       Row Name 09/26/24 1535          Vital Signs    Pre Systolic BP Rehab 141  -ND     Pre Treatment Diastolic BP 90  -ND     Post Systolic BP Rehab 141  -ND     Post Treatment Diastolic BP 92  -ND     Pretreatment Heart Rate (beats/min) 71  -ND     Posttreatment Heart Rate (beats/min) 72  -ND     Pre SpO2 (%) 98  -ND     O2 Delivery Pre Treatment room air  -ND     O2 Delivery Intra Treatment room air  -ND     Post SpO2 (%) 99  -ND     O2 Delivery Post Treatment room air  -ND     Pre Patient Position Sitting  -ND     Intra Patient Position Standing  -ND     Post Patient Position Sitting  -ND       Row Name 09/26/24 1535          Positioning and Restraints    Pre-Treatment Position sitting in chair/recliner  -ND     Post Treatment Position chair  -ND     In Chair notified nsg;reclined;legs elevated;call light within reach;encouraged to call for assist;exit alarm on;waffle cushion  -ND               User Key  (r) = Recorded By, (t) = Taken By, (c) = Cosigned By      Initials Name Provider Type    Bety Monroe, PT Physical Therapist                   Outcome Measures       Row Name 09/26/24 1537 09/26/24 0800       How much help from  another person do you currently need...    Turning from your back to your side while in flat bed without using bedrails? 4  -ND 4  -JM    Moving from lying on back to sitting on the side of a flat bed without bedrails? 4  -ND 4  -JM    Moving to and from a bed to a chair (including a wheelchair)? 3  -ND 4  -JM    Standing up from a chair using your arms (e.g., wheelchair, bedside chair)? 4  -ND 3  -JM    Climbing 3-5 steps with a railing? 3  -ND 3  -JM    To walk in hospital room? 3  -ND 3  -JM    AM-PAC 6 Clicks Score (PT) 21  -ND 21  -JM    Highest Level of Mobility Goal 6 --> Walk 10 steps or more  -ND 6 --> Walk 10 steps or more  -      Row Name 09/26/24 153 09/26/24 1519       Functional Assessment    Outcome Measure Options AM-PAC 6 Clicks Basic Mobility (PT)  -ND AM-PAC 6 Clicks Daily Activity (OT)  -              User Key  (r) = Recorded By, (t) = Taken By, (c) = Cosigned By      Initials Name Provider Type    CS Nakul Mosher OT Occupational Therapist    Janice Hay, RN Registered Nurse    Bety Monroe, PT Physical Therapist                                 Physical Therapy Education       Title: PT OT SLP Therapies (In Progress)       Topic: Physical Therapy (In Progress)       Point: Mobility training (Done)       Learning Progress Summary             Patient Acceptance, E, VU by ND at 9/26/2024 1537                         Point: Home exercise program (Not Started)       Learner Progress:  Not documented in this visit.              Point: Body mechanics (Done)       Learning Progress Summary             Patient Acceptance, E, VU by ND at 9/26/2024 1537                         Point: Precautions (Done)       Learning Progress Summary             Patient Acceptance, E, VU by ND at 9/26/2024 1537                                         User Key       Initials Effective Dates Name Provider Type Discipline    ND 11/16/23 -  Bety Castle, GRISEL Physical Therapist PT                   PT Recommendation and Plan  Planned Therapy Interventions (PT): balance training, bed mobility training, gait training, home exercise program, patient/family education, transfer training, postural re-education, ROM (range of motion), strengthening, stair training  Plan of Care Reviewed With: patient  Outcome Evaluation: PT evaluation completed. Pt presents below baseline levels of mobility with chronic backpain and significantly decreased activity tolerance warranting need for IP PT services to address deficits and facilitate return to baseline. Recommend home with assist and OP PT services.     Time Calculation:   PT Evaluation Complexity  History, PT Evaluation Complexity: 3 or more personal factors and/or comorbidities  Examination of Body Systems (PT Eval Complexity): total of 4 or more elements  Clinical Presentation (PT Evaluation Complexity): evolving  Clinical Decision Making (PT Evaluation Complexity): moderate complexity  Overall Complexity (PT Evaluation Complexity): moderate complexity     PT Charges       Row Name 09/26/24 1538             Time Calculation    Start Time 1508  -ND      PT Received On 09/26/24  -ND      PT Goal Re-Cert Due Date 10/06/24  -ND         Timed Charges    00718 - PT Therapeutic Activity Minutes 8  -ND         Untimed Charges    PT Eval/Re-eval Minutes 31  -ND         Total Minutes    Timed Charges Total Minutes 8  -ND      Untimed Charges Total Minutes 31  -ND       Total Minutes 39  -ND                User Key  (r) = Recorded By, (t) = Taken By, (c) = Cosigned By      Initials Name Provider Type    ND Bety Castle, PT Physical Therapist                  Therapy Charges for Today       Code Description Service Date Service Provider Modifiers Qty    71803864485 HC PT EVAL MOD COMPLEXITY 3 9/26/2024 Bety Castle, PT GP 1    74741094881 HC PT THERAPEUTIC ACT EA 15 MIN 9/26/2024 Bety Castle, PT GP 1            PT G-Codes  Outcome Measure Options: AM-PAC 6  Clicks Basic Mobility (PT)  AM-PAC 6 Clicks Score (PT): 21  AM-PAC 6 Clicks Score (OT): 23  PT Discharge Summary  Anticipated Discharge Disposition (PT): home with assist, home with outpatient therapy services    Bety Castle, PT  9/26/2024

## 2024-09-27 LAB
ACANTHOCYTES BLD QL SMEAR: NORMAL
ANION GAP SERPL CALCULATED.3IONS-SCNC: 15 MMOL/L (ref 5–15)
ANISOCYTOSIS BLD QL: NORMAL
BACTERIA SPEC AEROBE CULT: ABNORMAL
BASOPHILS # BLD AUTO: 0.05 10*3/MM3 (ref 0–0.2)
BASOPHILS NFR BLD AUTO: 1.3 % (ref 0–1.5)
BUN SERPL-MCNC: 12 MG/DL (ref 8–23)
BUN/CREAT SERPL: 16.9 (ref 7–25)
CALCIUM SPEC-SCNC: 9 MG/DL (ref 8.6–10.5)
CATH EF ESTIMATED: 20 %
CHLORIDE SERPL-SCNC: 104 MMOL/L (ref 98–107)
CO2 SERPL-SCNC: 14 MMOL/L (ref 22–29)
CREAT SERPL-MCNC: 0.71 MG/DL (ref 0.76–1.27)
DEPRECATED RDW RBC AUTO: 61.3 FL (ref 37–54)
EGFRCR SERPLBLD CKD-EPI 2021: 101.2 ML/MIN/1.73
EOSINOPHIL # BLD AUTO: 0.55 10*3/MM3 (ref 0–0.4)
EOSINOPHIL NFR BLD AUTO: 14.5 % (ref 0.3–6.2)
ERYTHROCYTE [DISTWIDTH] IN BLOOD BY AUTOMATED COUNT: 19 % (ref 12.3–15.4)
GLUCOSE SERPL-MCNC: 74 MG/DL (ref 65–99)
GRAM STN SPEC: ABNORMAL
HCT VFR BLD AUTO: 31.1 % (ref 37.5–51)
HGB BLD-MCNC: 9 G/DL (ref 13–17.7)
HYPOCHROMIA BLD QL: NORMAL
IMM GRANULOCYTES # BLD AUTO: 0.01 10*3/MM3 (ref 0–0.05)
IMM GRANULOCYTES NFR BLD AUTO: 0.3 % (ref 0–0.5)
ISOLATED FROM: ABNORMAL
LYMPHOCYTES # BLD AUTO: 0.78 10*3/MM3 (ref 0.7–3.1)
LYMPHOCYTES NFR BLD AUTO: 20.6 % (ref 19.6–45.3)
MAGNESIUM SERPL-MCNC: 2 MG/DL (ref 1.6–2.4)
MCH RBC QN AUTO: 25.4 PG (ref 26.6–33)
MCHC RBC AUTO-ENTMCNC: 28.9 G/DL (ref 31.5–35.7)
MCV RBC AUTO: 87.6 FL (ref 79–97)
MONOCYTES # BLD AUTO: 0.5 10*3/MM3 (ref 0.1–0.9)
MONOCYTES NFR BLD AUTO: 13.2 % (ref 5–12)
NEUTROPHILS NFR BLD AUTO: 1.9 10*3/MM3 (ref 1.7–7)
NEUTROPHILS NFR BLD AUTO: 50.1 % (ref 42.7–76)
NRBC BLD AUTO-RTO: 0.5 /100 WBC (ref 0–0.2)
PHOSPHATE SERPL-MCNC: 2.6 MG/DL (ref 2.5–4.5)
PLAT MORPH BLD: NORMAL
PLATELET # BLD AUTO: 148 10*3/MM3 (ref 140–450)
PMV BLD AUTO: 11.1 FL (ref 6–12)
POTASSIUM SERPL-SCNC: 5.1 MMOL/L (ref 3.5–5.2)
RBC # BLD AUTO: 3.55 10*6/MM3 (ref 4.14–5.8)
SODIUM SERPL-SCNC: 133 MMOL/L (ref 136–145)
WBC MORPH BLD: NORMAL
WBC NRBC COR # BLD AUTO: 3.79 10*3/MM3 (ref 3.4–10.8)

## 2024-09-27 PROCEDURE — 25010000002 CEFTRIAXONE PER 250 MG: Performed by: INTERNAL MEDICINE

## 2024-09-27 PROCEDURE — 25510000001 IOPAMIDOL PER 1 ML: Performed by: INTERNAL MEDICINE

## 2024-09-27 PROCEDURE — 85025 COMPLETE CBC W/AUTO DIFF WBC: CPT | Performed by: FAMILY MEDICINE

## 2024-09-27 PROCEDURE — 93458 L HRT ARTERY/VENTRICLE ANGIO: CPT | Performed by: INTERNAL MEDICINE

## 2024-09-27 PROCEDURE — 99232 SBSQ HOSP IP/OBS MODERATE 35: CPT | Performed by: INTERNAL MEDICINE

## 2024-09-27 PROCEDURE — 80048 BASIC METABOLIC PNL TOTAL CA: CPT | Performed by: FAMILY MEDICINE

## 2024-09-27 PROCEDURE — 84100 ASSAY OF PHOSPHORUS: CPT | Performed by: FAMILY MEDICINE

## 2024-09-27 PROCEDURE — 25010000002 AMPICILLIN PER 500 MG: Performed by: INTERNAL MEDICINE

## 2024-09-27 PROCEDURE — 99221 1ST HOSP IP/OBS SF/LOW 40: CPT | Performed by: THORACIC SURGERY (CARDIOTHORACIC VASCULAR SURGERY)

## 2024-09-27 PROCEDURE — 25010000002 NICARDIPINE 2.5 MG/ML SOLUTION: Performed by: INTERNAL MEDICINE

## 2024-09-27 PROCEDURE — C1769 GUIDE WIRE: HCPCS | Performed by: INTERNAL MEDICINE

## 2024-09-27 PROCEDURE — B2111ZZ FLUOROSCOPY OF MULTIPLE CORONARY ARTERIES USING LOW OSMOLAR CONTRAST: ICD-10-PCS | Performed by: INTERNAL MEDICINE

## 2024-09-27 PROCEDURE — 99232 SBSQ HOSP IP/OBS MODERATE 35: CPT | Performed by: PHYSICIAN ASSISTANT

## 2024-09-27 PROCEDURE — C1894 INTRO/SHEATH, NON-LASER: HCPCS | Performed by: INTERNAL MEDICINE

## 2024-09-27 PROCEDURE — 25010000002 FENTANYL CITRATE (PF) 50 MCG/ML SOLUTION: Performed by: INTERNAL MEDICINE

## 2024-09-27 PROCEDURE — 25810000003 SODIUM CHLORIDE 0.9 % SOLUTION: Performed by: INTERNAL MEDICINE

## 2024-09-27 PROCEDURE — 25010000002 HEPARIN (PORCINE) PER 1000 UNITS: Performed by: INTERNAL MEDICINE

## 2024-09-27 PROCEDURE — B2151ZZ FLUOROSCOPY OF LEFT HEART USING LOW OSMOLAR CONTRAST: ICD-10-PCS | Performed by: INTERNAL MEDICINE

## 2024-09-27 PROCEDURE — 4A023N7 MEASUREMENT OF CARDIAC SAMPLING AND PRESSURE, LEFT HEART, PERCUTANEOUS APPROACH: ICD-10-PCS | Performed by: INTERNAL MEDICINE

## 2024-09-27 PROCEDURE — 25010000002 KETOROLAC TROMETHAMINE PER 15 MG: Performed by: INTERNAL MEDICINE

## 2024-09-27 PROCEDURE — 83735 ASSAY OF MAGNESIUM: CPT | Performed by: FAMILY MEDICINE

## 2024-09-27 PROCEDURE — 85007 BL SMEAR W/DIFF WBC COUNT: CPT | Performed by: FAMILY MEDICINE

## 2024-09-27 PROCEDURE — 25010000002 MIDAZOLAM PER 1 MG: Performed by: INTERNAL MEDICINE

## 2024-09-27 PROCEDURE — 25010000002 KETOROLAC TROMETHAMINE PER 15 MG: Performed by: NURSE PRACTITIONER

## 2024-09-27 RX ORDER — MIDAZOLAM HYDROCHLORIDE 1 MG/ML
INJECTION INTRAMUSCULAR; INTRAVENOUS
Status: DISCONTINUED | OUTPATIENT
Start: 2024-09-27 | End: 2024-09-27 | Stop reason: HOSPADM

## 2024-09-27 RX ORDER — IOPAMIDOL 755 MG/ML
INJECTION, SOLUTION INTRAVASCULAR
Status: DISCONTINUED | OUTPATIENT
Start: 2024-09-27 | End: 2024-09-27 | Stop reason: HOSPADM

## 2024-09-27 RX ORDER — FENTANYL CITRATE 50 UG/ML
INJECTION, SOLUTION INTRAMUSCULAR; INTRAVENOUS
Status: DISCONTINUED | OUTPATIENT
Start: 2024-09-27 | End: 2024-09-27 | Stop reason: HOSPADM

## 2024-09-27 RX ORDER — NITROGLYCERIN 0.4 MG/1
0.4 TABLET SUBLINGUAL
Status: CANCELLED | OUTPATIENT
Start: 2024-09-27

## 2024-09-27 RX ORDER — DIPHENHYDRAMINE HYDROCHLORIDE 50 MG/ML
25 INJECTION INTRAMUSCULAR; INTRAVENOUS EVERY 6 HOURS PRN
Status: DISCONTINUED | OUTPATIENT
Start: 2024-09-27 | End: 2024-10-02 | Stop reason: HOSPADM

## 2024-09-27 RX ORDER — HEPARIN SODIUM 1000 [USP'U]/ML
INJECTION, SOLUTION INTRAVENOUS; SUBCUTANEOUS
Status: DISCONTINUED | OUTPATIENT
Start: 2024-09-27 | End: 2024-09-27 | Stop reason: HOSPADM

## 2024-09-27 RX ORDER — KETOROLAC TROMETHAMINE 15 MG/ML
15 INJECTION, SOLUTION INTRAMUSCULAR; INTRAVENOUS ONCE
Status: COMPLETED | OUTPATIENT
Start: 2024-09-27 | End: 2024-09-27

## 2024-09-27 RX ORDER — ALPRAZOLAM 0.25 MG
0.25 TABLET ORAL 3 TIMES DAILY PRN
Status: DISCONTINUED | OUTPATIENT
Start: 2024-09-27 | End: 2024-10-02 | Stop reason: HOSPADM

## 2024-09-27 RX ORDER — LORAZEPAM 0.5 MG/1
0.5 TABLET ORAL ONCE
Status: COMPLETED | OUTPATIENT
Start: 2024-09-27 | End: 2024-09-27

## 2024-09-27 RX ORDER — FUROSEMIDE 40 MG
40 TABLET ORAL DAILY
Status: DISCONTINUED | OUTPATIENT
Start: 2024-09-27 | End: 2024-10-02 | Stop reason: HOSPADM

## 2024-09-27 RX ORDER — KETOROLAC TROMETHAMINE 15 MG/ML
15 INJECTION, SOLUTION INTRAMUSCULAR; INTRAVENOUS 3 TIMES DAILY PRN
Status: DISPENSED | OUTPATIENT
Start: 2024-09-27 | End: 2024-09-28

## 2024-09-27 RX ORDER — ACETAMINOPHEN 325 MG/1
650 TABLET ORAL EVERY 4 HOURS PRN
Status: DISCONTINUED | OUTPATIENT
Start: 2024-09-27 | End: 2024-10-02 | Stop reason: HOSPADM

## 2024-09-27 RX ORDER — LIDOCAINE HYDROCHLORIDE 10 MG/ML
INJECTION, SOLUTION EPIDURAL; INFILTRATION; INTRACAUDAL; PERINEURAL
Status: DISCONTINUED | OUTPATIENT
Start: 2024-09-27 | End: 2024-09-27 | Stop reason: HOSPADM

## 2024-09-27 RX ORDER — ONDANSETRON 2 MG/ML
4 INJECTION INTRAMUSCULAR; INTRAVENOUS EVERY 6 HOURS PRN
Status: CANCELLED | OUTPATIENT
Start: 2024-09-27

## 2024-09-27 RX ADMIN — AMPICILLIN SODIUM 2 G: 2 INJECTION, POWDER, FOR SOLUTION INTRAMUSCULAR; INTRAVENOUS at 08:15

## 2024-09-27 RX ADMIN — AMPICILLIN SODIUM 2 G: 2 INJECTION, POWDER, FOR SOLUTION INTRAMUSCULAR; INTRAVENOUS at 02:34

## 2024-09-27 RX ADMIN — KETOROLAC TROMETHAMINE 15 MG: 15 INJECTION, SOLUTION INTRAMUSCULAR; INTRAVENOUS at 00:13

## 2024-09-27 RX ADMIN — Medication 10 ML: at 20:37

## 2024-09-27 RX ADMIN — Medication 10 ML: at 09:26

## 2024-09-27 RX ADMIN — AMPICILLIN SODIUM 2 G: 2 INJECTION, POWDER, FOR SOLUTION INTRAMUSCULAR; INTRAVENOUS at 11:10

## 2024-09-27 RX ADMIN — TRAMADOL HYDROCHLORIDE 50 MG: 50 TABLET ORAL at 08:26

## 2024-09-27 RX ADMIN — LORAZEPAM 0.5 MG: 0.5 TABLET ORAL at 11:10

## 2024-09-27 RX ADMIN — SODIUM CHLORIDE 2000 MG: 900 INJECTION INTRAVENOUS at 09:25

## 2024-09-27 RX ADMIN — AMPICILLIN SODIUM 2 G: 2 INJECTION, POWDER, FOR SOLUTION INTRAMUSCULAR; INTRAVENOUS at 23:12

## 2024-09-27 RX ADMIN — SODIUM CHLORIDE 2000 MG: 900 INJECTION INTRAVENOUS at 20:37

## 2024-09-27 RX ADMIN — KETOROLAC TROMETHAMINE 15 MG: 15 INJECTION, SOLUTION INTRAMUSCULAR; INTRAVENOUS at 09:55

## 2024-09-27 RX ADMIN — AMPICILLIN SODIUM 2 G: 2 INJECTION, POWDER, FOR SOLUTION INTRAMUSCULAR; INTRAVENOUS at 20:36

## 2024-09-27 NOTE — PROGRESS NOTES
INFECTIOUS DISEASE Progress Note    Howard Owusu  1960  [id number]    Admission Date: 9/25/2024      Requesting Provider: No Known Provider  Evaluating Physician: Flo Marcial MD    Reason for Consultation: ICD lead endocarditis    History of present illness:      9/26/2024: Patient is a 64 y.o. 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  he underwent lead extraction of a failed RV lead and revision of a new RA/RV lead  with generator change on 5/25/2021 by Dr. Cruz.  he underwent a right knee arthroscopy and suffered a fall in June 2024 after which she developed sepsis with enterococcal bacteremia.  A MICHAEL at [facility] suggested possible lead thrombus and he was transferred to [facility].  Blood cultures grew Enterococcus and he received 6 weeks of intravenous antibiotic therapy which were completed on 8/5/2024.   He underwent a repeat MICHAEL here on 9/24/2024 revealing moderate mitral regurgitation, a large spherical mass/vegetation on the right ventricular lead near the tricuspid ambulance, and a second large 1 cm mobile vegetation on the lead proximally and a third vegetation on the right atrial lead.  There is a single mobile mass on the tricuspid valve leaflet affecting the septal leaflet.  He has severe tricuspid regurgitation.   Dr. Jones contacted me yesterday about this complex situation.  He informed me that the patient was hospitalized this summer at [facility] with multiple blood cultures positive for enterococcus and associated septic arthritis.  He had received 6 weeks of intravenous antibiotic therapy but did not undergo removal of his ICD leads which were noted to have vegetations versus thrombus.   We were concerned  that this likely represented ICD lead endocarditis and that he should undergo  ICD lead extraction.   Blood cultures were obtained on 9/24 with 1 out of 2 sets growing gram-positive  bacilli in the anaerobic bottle.  Repeat blood cultures were performed on 9/25.  He is now on intravenous vancomycin and Zosyn.  He has been afebrile since his admission.     I can visualize some of the records from [facility].  He was seen by Dr. 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   Infectious disease thought he would have a high risk for persistent infection/relapse without pacemaker lead extraction.  There was apparently some disagreement with cardiology who thought that he just had a thrombus  on the ICD lead and that the right knee septic arthritis was the primary focus of his enterococcal bacteremia.  He is listed as having a history of allergy to penicillin but has tolerated ampicillin.  He is currently tolerating Zosyn.   He complains of some chronic back pain after a fall.  He denies increased right knee pain.  He denies recurrent fevers and shaking chills.    9/27/2024:  He remains afebrile.  Blood cultures from 9/24 are growing corynebacterium  In 1 out of 2 sets.  Blood cultures from 9/25 are no growth so far.  He denies nausea and vomiting.  He has decreased malaise.    Past Medical History:   Diagnosis Date    Arthritis     Atrial fibrillation     single episode during PCI with conversion to NSR witin 24 hrs    Blood clot in vein 05/07/2024    right groin    Coronary artery disease 2004    S/P Taxus RICHARD prox LAD, Cath June 2011 EF 20%, S/P distal RCA 3.0x12 and 3.0x8 Cypher, PLB 2.5x8 Cypher, and mid ALD 3.0x 13 Cypher    GERD (gastroesophageal reflux disease)     History of kidney stones     2000    Hyperlipidemia     Hypertension     Hypothyroidism     Ischemic cardiomyopathy     Hx of Inducible VT  per EP study Nov 2004, S/P St Eliezer ICD implant, upgrade to Bi-V iCD July 2008 Dr. Mtz June 2010 appropriate ICD shocks, Gen change 12/11/13 MGR    Obstructive sleep apnea     Old MI (myocardial infarction)        Past Surgical History:   Procedure Laterality Date    CARDIAC CATHETERIZATION      CARDIAC CATHETERIZATION N/A 03/14/2023    Procedure: Left Heart Cath;  Surgeon: Jose Jones MD;  Location:  [facility] CATH INVASIVE LOCATION;  Service: Cardiovascular;  Laterality: N/A;    CARDIAC ELECTROPHYSIOLOGY PROCEDURE N/A 11/17/2020    Procedure: ICD DC generator change Generator change at patient's earliest convenience.  Given the atrial lead noise, will reevaluate at that time to see if atrial lead revision is also necessary. ;  Surgeon: Maurizio Cruz DO;  Location:  [facility] EP INVASIVE LOCATION;  Service: Cardiology;  Laterality: N/A;    CARDIAC ELECTROPHYSIOLOGY PROCEDURE N/A 04/09/2021    Procedure: St Eliezer BiV ICD RV lead, new;  Surgeon: Maurizio Cruz DO;  Location:  [facility] EP INVASIVE LOCATION;  Service: Cardiology;  Laterality: N/A;    CARDIAC ELECTROPHYSIOLOGY PROCEDURE N/A 05/25/2021    Procedure: RV ICD lead extraction (SJM) new RV lead insertion, no meds to hold, Hybrid OR, CT/OR back up;  Surgeon: Maurizio Cruz DO;  Location:  [facility] EP INVASIVE LOCATION;  Service: Cardiovascular;  Laterality: N/A;    CARDIAC ELECTROPHYSIOLOGY PROCEDURE N/A 05/25/2021    Procedure: EP/CRM Study;  Surgeon: Marlon Bartlett MD;  Location:  [facility] EP INVASIVE LOCATION;  Service: Cardiovascular;  Laterality: N/A;    CARDIAC ELECTROPHYSIOLOGY PROCEDURE N/A 03/06/2024    Procedure: AV node ablation;  Surgeon: Maurizio Cruz DO;  Location:  [facility] EP INVASIVE LOCATION;  Service: Cardiovascular;  Laterality: N/A;  please schedule after echo, ct of head and carotid    CARPAL TUNNEL RELEASE Bilateral     KIDNEY STONE SURGERY         Family History   Problem Relation Age of Onset    Pulmonary fibrosis Mother     Alzheimer's  disease Mother     Heart attack Father     No Known Problems Sister     No Known Problems Brother     No Known Problems Brother        Social History     Socioeconomic History    Marital status:    Tobacco Use    Smoking status: Never     Passive exposure: Current    Smokeless tobacco: Never   Vaping Use    Vaping status: Never Used   Substance and Sexual Activity    Alcohol use: No    Drug use: No    Sexual activity: Defer       Allergies   Allergen Reactions    Morphine And Codeine Hives and Rash     Other Reaction(s): PAINFUL RASH    Penicillins Other (See Comments), Mental Status Change and Unknown - Low Severity     Patient reports tolerating amoxicillin.    Other Reaction(s): LOC      Patient reports tolerating amoxicillin. Patient has tolerated Zosyn    Sulfa Antibiotics Unknown - Low Severity, Other (See Comments) and Unknown (See Comments)     Was told by MD that he is allergic but does not know the reaction         Medication:    Current Facility-Administered Medications:     acetaminophen (TYLENOL) tablet 650 mg, 650 mg, Oral, Q4H PRN **OR** acetaminophen (TYLENOL) 160 MG/5ML oral solution 650 mg, 650 mg, Oral, Q4H PRN **OR** acetaminophen (TYLENOL) suppository 650 mg, 650 mg, Rectal, Q4H PRN, Primitivo Trevizo DO    ampicillin 2000 mg IVPB in 100 mL NS (MBP), 2 g, Intravenous, Q4H, Flo Marcial MD, Last Rate: 200 mL/hr at 09/27/24 0815, 2 g at 09/27/24 0815    sennosides-docusate (PERICOLACE) 8.6-50 MG per tablet 2 tablet, 2 tablet, Oral, BID PRN **AND** polyethylene glycol (MIRALAX) packet 17 g, 17 g, Oral, Daily PRN **AND** bisacodyl (DULCOLAX) EC tablet 5 mg, 5 mg, Oral, Daily PRN **AND** bisacodyl (DULCOLAX) suppository 10 mg, 10 mg, Rectal, Daily PRN, Primitivo Trevizo DO    Calcium Replacement - Follow Nurse / BPA Driven Protocol, , Does not apply, PRN, Primitivo Trevizo DO    cefTRIAXone (ROCEPHIN) 2,000 mg in sodium chloride 0.9 % 100 mL MBP, 2,000 mg, Intravenous, Q12H, Flo Marcial  MD, Last Rate: 200 mL/hr at 09/26/24 2026, 2,000 mg at 09/26/24 2026    Enoxaparin Sodium (LOVENOX) syringe 30 mg, 30 mg, Subcutaneous, Daily, Primitivo Trevizo DO, 30 mg at 09/26/24 0821    Magnesium Cardiology Dose Replacement - Follow Nurse / BPA Driven Protocol, , Does not apply, PRN, Primitivo Trevizo DO    nitroglycerin (NITROSTAT) SL tablet 0.4 mg, 0.4 mg, Sublingual, Q5 Min PRN, Primitivo Trevizo DO    Phosphorus Replacement - Follow Nurse / BPA Driven Protocol, , Does not apply, PRNSanthosh Robert, DO    Potassium Replacement - Follow Nurse / BPA Driven Protocol, , Does not apply, PRNSanthosh Robert, DO    sodium chloride 0.9 % flush 10 mL, 10 mL, Intravenous, Q12H, Primitivo Trevizo DO, 10 mL at 09/26/24 2026    sodium chloride 0.9 % flush 10 mL, 10 mL, Intravenous, PRN, Primitivo Trevizo DO    sodium chloride 0.9 % infusion 40 mL, 40 mL, Intravenous, PRN, Primitivo Trevizo DO    traMADol (ULTRAM) tablet 50 mg, 50 mg, Oral, Q6H PRN, Talya Lr MD, 50 mg at 09/27/24 0826    Antibiotics:  Anti-Infectives (From admission, onward)      Ordered     Dose/Rate Route Frequency Start Stop    09/26/24 1959  ampicillin 2000 mg IVPB in 100 mL NS (MBP)        Ordering Provider: Flo Marcial MD    2 g  200 mL/hr over 30 Minutes Intravenous Every 4 Hours 09/26/24 2300 11/07/24 2259    09/26/24 1824  cefTRIAXone (ROCEPHIN) 2,000 mg in sodium chloride 0.9 % 100 mL MBP        Ordering Provider: Flo Marcial MD    2,000 mg  200 mL/hr over 30 Minutes Intravenous Every 12 Hours 09/26/24 1900 11/07/24 1859    09/25/24 2245  vancomycin IVPB 1750 mg in 0.9% Sodium Chloride (premix) 500 mL        Ordering Provider: Carlos Weaver, [facility]    1,750 mg  285.7 mL/hr over 105 Minutes Intravenous Once 09/26/24 0000 09/26/24 0303    09/25/24 2258  piperacillin-tazobactam (ZOSYN) 3.375 g IVPB in 100 mL NS MBP (CD)        Ordering Provider: Carlos Weaver RPH    3.375 g  over 30 Minutes Intravenous Once 09/25/24 2345 09/26/24 0106               Review of Systems:    See HPI    Physical Exam:   Vital Signs  Temp (24hrs), Av.7 °F (36.5 °C), Min:97.5 °F (36.4 °C), Max:98 °F (36.7 °C)    Temp  Min: 97.5 °F (36.4 °C)  Max: 98 °F (36.7 °C)  BP  Min: 137/95  Max: 150/87  Pulse  Min: 70  Max: 77  Resp  Min: 16  Max: 18  SpO2  Min: 96 %  Max: 99 %    GENERAL: Awake and alert, in no acute distress.   HEENT: Normocephalic, atraumatic.  PERRL. EOMI. No conjunctival injection. No icterus. Oropharynx clear without evidence of thrush or exudate.  NECK: Supple  HEART:  3/6 systolic murmur.  No pericardial rub.  LUNGS: Clear to auscultation bilaterally without wheezing, rales, rhonchi. Normal respiratory effort.   ABDOMEN: Soft, nontender, nondistended. No rebound or guarding. NO mass or HSM.  EXT:  No cyanosis, clubbing or edema.   :  Without Tamez catheter.  MSK: No joint effusions or erythema  SKIN: Warm and dry without cutaneous eruptions on Inspection/palpation.    NEURO: Oriented to PPT.  Motor 5/5 strength  PSYCHIATRIC: Normal insight and judgment. Cooperative with PE    Laboratory Data    Results from last 7 days   Lab Units 24   WBC 10*3/mm3 4.65 4.43   HEMOGLOBIN g/dL 7.8* 7.8*   HEMATOCRIT % 26.4* 26.3*   PLATELETS 10*3/mm3 164 160     Results from last 7 days   Lab Units 24  0333   SODIUM mmol/L 135*   POTASSIUM mmol/L 4.2   CHLORIDE mmol/L 104   CO2 mmol/L 19.0*   BUN mg/dL 15   CREATININE mg/dL 0.70*   GLUCOSE mg/dL 79   CALCIUM mg/dL 8.7     Results from last 7 days   Lab Units 24  2152   ALK PHOS U/L 127*   BILIRUBIN mg/dL 0.6   ALT (SGPT) U/L 147*   AST (SGOT) U/L 265*             Results from last 7 days   Lab Units 24  0333   LACTATE mmol/L 1.8         Results from last 7 days   Lab Units 24  0333   VANCOMYCIN RM mcg/mL 34.50     Estimated Creatinine Clearance: 106 mL/min (A) (by C-G formula based on SCr of 0.7 mg/dL (L)).      Microbiology:  Blood Culture   Date Value Ref Range Status  "  09/24/2024 No growth at 24 hours  Preliminary     No results found for: \"BCIDPCR\", \"CXREFLEX\", \"CSFCX\", \"CULTURETIS\"  No results found for: \"CULTURES\", \"HSVCX\", \"URCX\"  No results found for: \"EYECULTURE\", \"GCCX\", \"HSVCULTURE\", \"LABHSV\"  No results found for: \"LEGIONELLA\", \"MRSACX\", \"MUMPSCX\", \"MYCOPLASCX\"  No results found for: \"NOCARDIACX\", \"STOOLCX\"  No results found for: \"THROATCX\", \"UNSTIMCULT\", \"URINECX\", \"CULTURE\", \"VZVCULTUR\"  No results found for: \"VIRALCULTU\", \"WOUNDCX\"        Radiology:  Imaging Results (Last 72 Hours)       ** No results found for the last 72 hours. **              Impression:   1.  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  He is at high risk for persistent enterococcal infection.  He is now on high-dose intravenous ampicillin and ceftriaxone.  2.  Enterococcal bacteremia-6/24  3.  Corynebacterium bacteremia-the significance of this is unclear.  It could be a skin contaminant or a pathogen.  4. History of right knee septic arthritis-6/24  5.  Coronary artery disease  6.  Ischemic cardiomyopathy  7.  HFrEF-his EF is 25%  8.  Transaminitis-this may be secondary to congestive hepatopathy related to his severe TR.  9.  Anemia  10.  Atrial fibrillation  11.  History of penicillin allergy- he is tolerating ampicillin    PLAN/RECOMMENDATIONS:   1.  Blood cultures-pending  2.  ICD lead extraction  3.  Rocephin 2 g IV every 12 hours  4.  Ampicillin 2 g IV every 4 hours       Flo Marcial MD  9/27/2024  08:46 EDT                "

## 2024-09-27 NOTE — CONSULTS
[facility] Cardiothoracic Surgery In-Patient Consult    Name:  Howard Owusu  MRN Number:  [id number]  Date of Admission:  9/25/2024  Date of Consultation: 9/26/2024    Consulting Provider:    PCP: Shanelle Barrow APRN  IP Care Team:  Patient Care Team:  Shanelle Barrow APRN as PCP - General (Nurse Practitioner)  Zeina Moya APRN as Nurse Practitioner (Cardiology)  Shanelle Barrow APRN (Nurse Practitioner)  Jose Jones MD as Consulting Physician (Cardiology)    Reason for Consultation: Endocarditis    History of Present Illness:    Howard Owusu is a 64 y.o. male with a history of hypertension, hyperlipidemia, ischemic cardiomyopathy-EF 25%, history of endocarditis, CAD s/p stents, sleep apnea V. tach s/p ICD, A-fib s/p cardioversion s/p AV node ablation with Dr. Cruz on 3/6/2024, hypothyroidism, and never smoker. He presented to [facility] on 9/25 for a large vegetation seen on the right ventricular lead and bacteremia.  Blood cultures were positive for corynebacterium.  Sternal echo findings revealed LVEF 25%, moderate mitral valve regurgitation, a second larger 1 cm mobile vegetation seen on the right atrium lead approximately and a third vegetation which is noted to be small seen on the right atrial lead, small mobile mass on the tricuspid valve, and severe tricuspid valve regurgitation.  He denies any fevers, chills, malaise, chest pain, or dyspnea.     Review of Systems:  Review of Systems   Constitutional:  Positive for activity change and fatigue.   HENT: Negative.     Eyes: Negative.    Respiratory: Negative.     Cardiovascular: Negative.    Gastrointestinal: Negative.    Endocrine: Negative.    Genitourinary: Negative.    Musculoskeletal: Negative.    Skin: Negative.    Allergic/Immunologic: Negative.    Neurological: Negative.    Psychiatric/Behavioral: Negative.         Hospital Problems:   Endocarditis    Ischemic cardiomyopathy    Coronary artery disease    Hypertension     Hypothyroidism    Obstructive sleep apnea    V-tach    ICD (implantable cardioverter-defibrillator), biventricular, in situ    Subclavian vein stenosis    Chronic systolic heart failure    Persistent atrial fibrillation    Heart valve disease    Severe malnutrition       Past Medical History:    Past Medical History:   Diagnosis Date    Arthritis     Atrial fibrillation     single episode during PCI with conversion to NSR witin 24 hrs    Blood clot in vein 05/07/2024    right groin    Coronary artery disease 2004    S/P Taxus RICHARD prox LAD, Cath June 2011 EF 20%, S/P distal RCA 3.0x12 and 3.0x8 Cypher, PLB 2.5x8 Cypher, and mid ALD 3.0x 13 Cypher    GERD (gastroesophageal reflux disease)     History of kidney stones     2000    Hyperlipidemia     Hypertension     Hypothyroidism     Ischemic cardiomyopathy     Hx of Inducible VT per EP study Nov 2004, S/P St Eliezer ICD implant, upgrade to Bi-V iCD July 2008 Dr. Mtz June 2010 appropriate ICD shocks, Gen change 12/11/13 MGR    Obstructive sleep apnea     Old MI (myocardial infarction)        Past Surgical History:    Past Surgical History:   Procedure Laterality Date    CARDIAC CATHETERIZATION      CARDIAC CATHETERIZATION N/A 03/14/2023    Procedure: Left Heart Cath;  Surgeon: Jose Jones MD;  Location:  [facility] CATH INVASIVE LOCATION;  Service: Cardiovascular;  Laterality: N/A;    CARDIAC ELECTROPHYSIOLOGY PROCEDURE N/A 11/17/2020    Procedure: ICD DC generator change Generator change at patient's earliest convenience.  Given the atrial lead noise, will reevaluate at that time to see if atrial lead revision is also necessary. ;  Surgeon: Maurizio Cruz DO;  Location: [facility] EP INVASIVE LOCATION;  Service: Cardiology;  Laterality: N/A;    CARDIAC ELECTROPHYSIOLOGY PROCEDURE N/A 04/09/2021    Procedure: [employer] Eliezer BiV ICD RV lead, new;  Surgeon: Maurizio Cruz DO;  Location: [facility] EP INVASIVE LOCATION;  Service: Cardiology;  Laterality: N/A;    CARDIAC  ELECTROPHYSIOLOGY PROCEDURE N/A 05/25/2021    Procedure: RV ICD lead extraction (SJM) new RV lead insertion, no meds to hold, Hybrid OR, CT/OR back up;  Surgeon: Maurizio Cruz DO;  Location:  [facility] EP INVASIVE LOCATION;  Service: Cardiovascular;  Laterality: N/A;    CARDIAC ELECTROPHYSIOLOGY PROCEDURE N/A 05/25/2021    Procedure: EP/CRM Study;  Surgeon: Marlon Bartlett MD;  Location:  [facility] EP INVASIVE LOCATION;  Service: Cardiovascular;  Laterality: N/A;    CARDIAC ELECTROPHYSIOLOGY PROCEDURE N/A 03/06/2024    Procedure: AV node ablation;  Surgeon: Maurizio Cruz DO;  Location:  [facility] EP INVASIVE LOCATION;  Service: Cardiovascular;  Laterality: N/A;  please schedule after echo, ct of head and carotid    CARPAL TUNNEL RELEASE Bilateral     KIDNEY STONE SURGERY         Family History:    Family History   Problem Relation Age of Onset    Pulmonary fibrosis Mother     Alzheimer's disease Mother     Heart attack Father     No Known Problems Sister     No Known Problems Brother     No Known Problems Brother        Social History:    Social History     Socioeconomic History    Marital status:    Tobacco Use    Smoking status: Never     Passive exposure: Current    Smokeless tobacco: Never   Vaping Use    Vaping status: Never Used   Substance and Sexual Activity    Alcohol use: No    Drug use: No    Sexual activity: Defer       Allergies:  Allergies   Allergen Reactions    Morphine And Codeine Hives and Rash     Other Reaction(s): PAINFUL RASH    Penicillins Other (See Comments), Mental Status Change and Unknown - Low Severity     Patient reports tolerating amoxicillin.    Other Reaction(s): LOC      Patient reports tolerating amoxicillin. Patient has tolerated Zosyn    Sulfa Antibiotics Unknown - Low Severity, Other (See Comments) and Unknown (See Comments)     Was told by MD that he is allergic but does not know the reaction         Physical Exam:  Vital Signs:    Temp:  [97.5 °F (36.4 °C)-98 °F (36.7 °C)]  97.7 °F (36.5 °C)  Heart Rate:  [70-77] 71  Resp:  [16-18] 16  BP: (141-150)/(87-92) 141/90  Body mass index is 25.68 kg/m².     Physical Exam  Vitals and nursing note reviewed.   Constitutional:       Appearance: Normal appearance.   HENT:      Head: Normocephalic.      Mouth/Throat:      Pharynx: Oropharynx is clear.   Eyes:      Pupils: Pupils are equal, round, and reactive to light.   Cardiovascular:      Rate and Rhythm: Normal rate.      Pulses: Normal pulses.      Heart sounds:      Systolic murmur is present with a grade of 2/6.   Pulmonary:      Effort: Pulmonary effort is normal.   Abdominal:      General: Bowel sounds are normal.   Musculoskeletal:      Cervical back: Normal range of motion.   Skin:     General: Skin is warm.   Neurological:      General: No focal deficit present.      Mental Status: He is alert and oriented to person, place, and time.   Psychiatric:         Mood and Affect: Mood normal.         Behavior: Behavior normal.         Labs/Imaging/Procedures:   Results from last 7 days   Lab Units 09/26/24  0333 09/25/24  2152   SODIUM mmol/L 135* 133*   POTASSIUM mmol/L 4.2 4.3   CHLORIDE mmol/L 104 103   CO2 mmol/L 19.0* 18.0*   BUN mg/dL 15 15   CREATININE mg/dL 0.70* 0.82   CALCIUM mg/dL 8.7 8.8   BILIRUBIN mg/dL  --  0.6   ALK PHOS U/L  --  127*   ALT (SGPT) U/L  --  147*   AST (SGOT) U/L  --  265*   GLUCOSE mg/dL 79 107*     Results from last 7 days   Lab Units 09/26/24  0020 09/25/24 2152   HSTROP T ng/L 13 14     Results from last 7 days   Lab Units 09/26/24  0333 09/25/24 2152   WBC 10*3/mm3 4.65 4.43   HEMOGLOBIN g/dL 7.8* 7.8*   HEMATOCRIT % 26.4* 26.3*   PLATELETS 10*3/mm3 164 160     Results from last 7 days   Lab Units 09/25/24 2152   INR  1.88*     Results from last 7 days   Lab Units 09/26/24 0333   MAGNESIUM mg/dL 1.7         No radiology results for the last 30 days.   LHC: Results for orders placed during the hospital encounter of 03/14/23    Cardiac  Catheterization/Vascular Study    Conclusion    90% proximal 80 stenosis treated 3.5 x 15 Xience EES    50% mid RCA stenosis with normal RFR of 0.96    LVEF 20%    Normal LVEDP     Echo: Results for orders placed during the hospital encounter of 09/24/24    Adult Transesophageal Echo (MICHAEL) W/ Cont if Necessary Per Protocol    Interpretation Summary    Left ventricular systolic function is moderately decreased. Estimated left ventricular EF = 25%    Moderate mitral valve regurgitation is present.    2 electronic lead is present in the right atrium. Large spherical mass/vegetation on right ventricular lead near tricuspid annulus. A second larger 1 cm mobile vegetation on lead seen proximally. Third vegetation small seen on right atrial lead..    There is a small mobile mass on the tricuspid valve affecting the septal leaflet.    Severe tricuspid valve regurgitation is present.    Estimated right ventricular systolic pressure from tricuspid regurgitation is moderately elevated (45-55 mmHg).     Carotid Duplex: Results for orders placed during the hospital encounter of 02/13/24    Duplex Carotid Ultrasound CAR    Interpretation Summary    Right internal carotid artery demonstrates a less than 50% stenosis.    Left internal carotid artery demonstrates normal flow without evidence of hemodynamically significant stenosis.      Assessment:    Endocarditis    Ischemic cardiomyopathy    Coronary artery disease    Hypertension    Hypothyroidism    Obstructive sleep apnea    V-tach    ICD (implantable cardioverter-defibrillator), biventricular, in situ    Subclavian vein stenosis    Chronic systolic heart failure    Persistent atrial fibrillation    Heart valve disease    Severe malnutrition    Howard Owusu is a 64 y.o. male with a history of hypertension, hyperlipidemia, ischemic cardiomyopathy-EF 25%, history of endocarditis, CAD s/p stents, sleep apnea V. tach s/p ICD, A-fib s/p cardioversion s/p AV node ablation with   [facility] on 3/6/2024, hypothyroidism, and never smoker. He presented to [facility] on 9/25 for a large vegetation seen on the right ventricular lead and bacteremia.  Blood cultures were positive for corynebacterium.  Sternal echo findings revealed LVEF 25%, moderate mitral valve regurgitation, a second larger 1 cm mobile vegetation seen on the right atrium lead approximately and a third vegetation which is noted to be small seen on the right atrial lead, small mobile mass on the tricuspid valve, and severe tricuspid valve regurgitation.  He denies any fevers, chills, malaise, chest pain, or dyspnea.     Plan:  Continue with preoperative workup-[facility] planned for today  ID consult  Surgery TBD by Dr. Татьяна Cardozo, APRN  07:49 EDT  09/27/24     Thank you for allowing us to participate in the care of your patient. Please do not hesitate to contact us with additional questions or concerns.

## 2024-09-27 NOTE — CASE MANAGEMENT/SOCIAL WORK
Continued Stay Note  [facility]     Patient Name: Howard Owusu  MRN: [id number]  Today's Date: 9/27/2024    Admit Date: 9/25/2024    Plan: Home   Discharge Plan       Row Name 09/27/24 1110       Plan    Plan Home    Plan Comments Discussed in MDR. Mr. Owusu is not medically ready for discharge. He is going for a heart cath today. CM will follow through out his hospital stay and will assist with any discharge needs, as they become available.    Final Discharge Disposition Code 01 - home or self-care                   Discharge Codes    No documentation.                 Expected Discharge Date and Time       Expected Discharge Date Expected Discharge Time    Sep 30, 2024               Quita Henderson RN

## 2024-09-27 NOTE — PLAN OF CARE
Goal Outcome Evaluation:              Outcome Evaluation: Patient is alert and oriented times four. VSS. On RA with 90%. Had left heart cath today. Right radial site, clean dry and intact. TR band off at 1745. Complained of chronic back pain. Up ad lo and independent. No complaints at this point. Care ongoing.

## 2024-09-27 NOTE — PLAN OF CARE
Problem: Fall Injury Risk  Goal: Absence of Fall and Fall-Related Injury  Outcome: Progressing  Intervention: Identify and Manage Contributors  Recent Flowsheet Documentation  Taken 9/27/2024 0200 by Leyla Rivera RN  Self-Care Promotion: independence encouraged  Taken 9/27/2024 0000 by Leyla Rivera RN  Self-Care Promotion: independence encouraged  Taken 9/26/2024 2200 by Leyla Rivera RN  Self-Care Promotion: independence encouraged  Taken 9/26/2024 2026 by Leyla Rivera RN  Medication Review/Management: medications reviewed  Self-Care Promotion: independence encouraged  Intervention: Promote Injury-Free Environment  Recent Flowsheet Documentation  Taken 9/27/2024 0200 by Leyla Rivera RN  Safety Promotion/Fall Prevention:   activity supervised   assistive device/personal items within reach   clutter free environment maintained   fall prevention program maintained   lighting adjusted   nonskid shoes/slippers when out of bed   room organization consistent   safety round/check completed  Taken 9/27/2024 0000 by Leyla Rivera RN  Safety Promotion/Fall Prevention:   activity supervised   assistive device/personal items within reach   clutter free environment maintained   fall prevention program maintained   lighting adjusted   nonskid shoes/slippers when out of bed   room organization consistent   safety round/check completed  Taken 9/26/2024 2200 by Leyla Rivera RN  Safety Promotion/Fall Prevention:   activity supervised   assistive device/personal items within reach   clutter free environment maintained   fall prevention program maintained   lighting adjusted   nonskid shoes/slippers when out of bed   room organization consistent   safety round/check completed  Taken 9/26/2024 2026 by Leyla Rivera RN  Safety Promotion/Fall Prevention:   assistive device/personal items within reach   activity supervised   clutter free environment maintained   fall prevention program maintained    lighting adjusted   nonskid shoes/slippers when out of bed   room organization consistent   safety round/check completed     Problem: Adult Inpatient Plan of Care  Goal: Plan of Care Review  Outcome: Progressing  Goal: Patient-Specific Goal (Individualized)  Outcome: Progressing  Goal: Absence of Hospital-Acquired Illness or Injury  Outcome: Progressing  Intervention: Identify and Manage Fall Risk  Recent Flowsheet Documentation  Taken 9/27/2024 0200 by Leyla Rivera RN  Safety Promotion/Fall Prevention:   activity supervised   assistive device/personal items within reach   clutter free environment maintained   fall prevention program maintained   lighting adjusted   nonskid shoes/slippers when out of bed   room organization consistent   safety round/check completed  Taken 9/27/2024 0000 by Leyla Rivera RN  Safety Promotion/Fall Prevention:   activity supervised   assistive device/personal items within reach   clutter free environment maintained   fall prevention program maintained   lighting adjusted   nonskid shoes/slippers when out of bed   room organization consistent   safety round/check completed  Taken 9/26/2024 2200 by Leyla Rivera RN  Safety Promotion/Fall Prevention:   activity supervised   assistive device/personal items within reach   clutter free environment maintained   fall prevention program maintained   lighting adjusted   nonskid shoes/slippers when out of bed   room organization consistent   safety round/check completed  Taken 9/26/2024 2026 by Leyla Rivera RN  Safety Promotion/Fall Prevention:   assistive device/personal items within reach   activity supervised   clutter free environment maintained   fall prevention program maintained   lighting adjusted   nonskid shoes/slippers when out of bed   room organization consistent   safety round/check completed  Intervention: Prevent Skin Injury  Recent Flowsheet Documentation  Taken 9/27/2024 0200 by Leyla Rivera, RN  Body  Position:   position changed independently   sitting up in bed  Skin Protection:   adhesive use limited   tubing/devices free from skin contact  Taken 9/27/2024 0000 by Leyla Rivera RN  Body Position:   position changed independently   sitting up in bed  Skin Protection:   adhesive use limited   tubing/devices free from skin contact  Taken 9/26/2024 2200 by Leyla Rivera RN  Body Position:   position changed independently   supine, legs elevated  Skin Protection:   adhesive use limited   tubing/devices free from skin contact  Taken 9/26/2024 2026 by Leyla Rivera RN  Body Position:   position changed independently   sitting up in bed  Skin Protection:   adhesive use limited   tubing/devices free from skin contact  Intervention: Prevent and Manage VTE (Venous Thromboembolism) Risk  Recent Flowsheet Documentation  Taken 9/27/2024 0200 by Leyla Rivera RN  Activity Management: activity encouraged  Taken 9/27/2024 0000 by Leyla Rivera RN  Activity Management: activity encouraged  Taken 9/26/2024 2200 by Leyla Rivera RN  Activity Management: activity encouraged  Taken 9/26/2024 2026 by Leyla Rivera RN  Activity Management: activity encouraged  VTE Prevention/Management:   bilateral   sequential compression devices off   patient refused intervention  Range of Motion: active ROM (range of motion) encouraged  Intervention: Prevent Infection  Recent Flowsheet Documentation  Taken 9/27/2024 0200 by Leyla Rivera RN  Infection Prevention:   cohorting utilized   environmental surveillance performed   equipment surfaces disinfected   hand hygiene promoted   rest/sleep promoted  Taken 9/27/2024 0000 by Leyla Rivera RN  Infection Prevention:   cohorting utilized   environmental surveillance performed   equipment surfaces disinfected   hand hygiene promoted   rest/sleep promoted  Taken 9/26/2024 2200 by Leyla Rivera RN  Infection Prevention:   cohorting utilized   environmental  surveillance performed   equipment surfaces disinfected   hand hygiene promoted   rest/sleep promoted  Taken 9/26/2024 2026 by Leyla Rivera RN  Infection Prevention:   cohorting utilized   environmental surveillance performed   equipment surfaces disinfected   hand hygiene promoted   rest/sleep promoted  Goal: Optimal Comfort and Wellbeing  Outcome: Progressing  Intervention: Monitor Pain and Promote Comfort  Recent Flowsheet Documentation  Taken 9/26/2024 2216 by Leyla Rivera RN  Pain Management Interventions: see MAR  Intervention: Provide Person-Centered Care  Recent Flowsheet Documentation  Taken 9/26/2024 2026 by Leyla Rivera RN  Trust Relationship/Rapport:   care explained   choices provided   emotional support provided   empathic listening provided   questions answered   questions encouraged   thoughts/feelings acknowledged   reassurance provided  Goal: Readiness for Transition of Care  Outcome: Progressing     Problem: Pain Acute  Goal: Acceptable Pain Control and Functional Ability  Outcome: Progressing  Intervention: Prevent or Manage Pain  Recent Flowsheet Documentation  Taken 9/27/2024 0200 by Leyla Rivera RN  Sensory Stimulation Regulation:   lighting decreased   care clustered  Taken 9/27/2024 0000 by Leyla Rivera RN  Sensory Stimulation Regulation:   lighting decreased   care clustered  Taken 9/26/2024 2200 by Leyla Rivera RN  Sensory Stimulation Regulation:   lighting decreased   care clustered  Taken 9/26/2024 2026 by Leyla Rivera RN  Sensory Stimulation Regulation:   lighting decreased   care clustered  Sleep/Rest Enhancement:   awakenings minimized   consistent schedule promoted  Medication Review/Management: medications reviewed  Intervention: Develop Pain Management Plan  Recent Flowsheet Documentation  Taken 9/26/2024 2216 by Leyla Rivera RN  Pain Management Interventions: see MAR  Intervention: Optimize Psychosocial Wellbeing  Recent Flowsheet  Documentation  Taken 9/26/2024 2026 by Leyla Rivera, RN  Supportive Measures:   active listening utilized   self-care encouraged  Diversional Activities: television     Problem: Infection  Goal: Absence of Infection Signs and Symptoms  Outcome: Progressing  Intervention: Prevent or Manage Infection  Recent Flowsheet Documentation  Taken 9/26/2024 2026 by Leyla Rivera, RN  Infection Management: aseptic technique maintained   Goal Outcome Evaluation:               9/26/24- Pt VSS. RA. Afib paced. Up standby. I placed pt NPO after midnight (only sips with meds), dayshift said he's possibly having a heart cath today so placed NPO just incase. Pt feels short of breath when walking to toilet, but regains strength with rest. Plan of care ongoing.

## 2024-09-27 NOTE — PROGRESS NOTES
[facility] Medicine Services  PROGRESS NOTE    Patient Name: Howard Owusu  : 1960  MRN: [id number]    Date of Admission: 2024  Primary Care Physician: Shanelle Barrow APRN    Subjective   Subjective     CC: Follow-up endocarditis    HPI: Patient complaining of back pain and some dyspnea this morning    Objective   Objective     Vital Signs:   Temp:  [97.5 °F (36.4 °C)-98 °F (36.7 °C)] 97.7 °F (36.5 °C)  Heart Rate:  [70-77] 71  Resp:  [16-18] 16  BP: (141-150)/(87-92) 141/90     Physical Exam:  Constitutional: No acute distress, awake, alert  HENT: NCAT, mucous membranes moist  Respiratory: Clear to auscultation bilaterally, respiratory effort normal   Cardiovascular: RRR, no murmurs, rubs, or gallops  Gastrointestinal: Positive bowel sounds, soft, nontender, nondistended  Musculoskeletal: No bilateral ankle edema  Psychiatric: Appropriate affect, cooperative  Neurologic: Oriented x 3, nonfocal  Skin: No rashes      Results Reviewed:  LAB RESULTS:      Lab 24  0020 24   WBC 4.65  --  4.43   HEMOGLOBIN 7.8*  --  7.8*   HEMATOCRIT 26.4*  --  26.3*   PLATELETS 164  --  160   NEUTROS ABS 2.82  --  2.62   IMMATURE GRANS (ABS) 0.01  --  0.01   LYMPHS ABS 0.79  --  0.65*   MONOS ABS 0.48  --  0.65   EOS ABS 0.53*  --  0.46*   MCV 88.3  --  87.7   PROCALCITONIN  --   --  0.08   LACTATE 1.8 2.3* 3.1*   LDH  --   --  292*   PROTIME  --   --  21.7*         Lab 24  0333 24   SODIUM 135* 133*   POTASSIUM 4.2 4.3   CHLORIDE 104 103   CO2 19.0* 18.0*   ANION GAP 12.0 12.0   BUN 15 15   CREATININE 0.70* 0.82   EGFR 101.6 96.9   GLUCOSE 79 107*   CALCIUM 8.7 8.8   MAGNESIUM 1.7 1.7   PHOSPHORUS 2.7 2.7         Lab 24   TOTAL PROTEIN 7.3   ALBUMIN 3.2*   GLOBULIN 4.1   ALT (SGPT) 147*   AST (SGOT) 265*   BILIRUBIN 0.6   ALK PHOS 127*         Lab 24  0020 24   HSTROP T 13 14   PROTIME  --  21.7*   INR  --  1.88*              Lab 09/26/24  0020 09/25/24 2152   IRON  --  14*   IRON SATURATION (TSAT)  --  3*   TIBC  --  405   TRANSFERRIN  --  272   FERRITIN  --  51.02   FOLATE 15.50  --    VITAMIN B 12 1,719*  --          Brief Urine Lab Results  (Last result in the past 365 days)        Color   Clarity   Blood   Leuk Est   Nitrite   Protein   CREAT   Urine HCG        02/16/24 0104 Dark Yellow   Clear     Negative                       Microbiology Results Abnormal       Procedure Component Value - Date/Time    Blood Culture - Blood, Arm, Left [[id number]]  (Normal) Collected: 09/25/24 2151    Lab Status: Preliminary result Specimen: Blood from Arm, Left Updated: 09/26/24 2300     Blood Culture No growth at 24 hours    Narrative:      Aerobic Bottle Only      Blood Culture - Blood, Hand, Left [[id number]]  (Normal) Collected: 09/25/24 2151    Lab Status: Preliminary result Specimen: Blood from Hand, Left Updated: 09/26/24 2300     Blood Culture No growth at 24 hours            No radiology results from the last 24 hrs    Results for orders placed during the hospital encounter of 09/24/24    Adult Transesophageal Echo (MICHAEL) W/ Cont if Necessary Per Protocol    Interpretation Summary    Left ventricular systolic function is moderately decreased. Estimated left ventricular EF = 25%    Moderate mitral valve regurgitation is present.    2 electronic lead is present in the right atrium. Large spherical mass/vegetation on right ventricular lead near tricuspid annulus. A second larger 1 cm mobile vegetation on lead seen proximally. Third vegetation small seen on right atrial lead..    There is a small mobile mass on the tricuspid valve affecting the septal leaflet.    Severe tricuspid valve regurgitation is present.    Estimated right ventricular systolic pressure from tricuspid regurgitation is moderately elevated (45-55 mmHg).      Current medications:  Scheduled Meds:ampicillin, 2 g, Intravenous, Q4H  cefTRIAXone, 2,000 mg,  Intravenous, Q12H  enoxaparin, 30 mg, Subcutaneous, Daily  sodium chloride, 10 mL, Intravenous, Q12H      Continuous Infusions:   PRN Meds:.  acetaminophen **OR** acetaminophen **OR** acetaminophen    senna-docusate sodium **AND** polyethylene glycol **AND** bisacodyl **AND** bisacodyl    Calcium Replacement - Follow Nurse / BPA Driven Protocol    Magnesium Cardiology Dose Replacement - Follow Nurse / BPA Driven Protocol    nitroglycerin    Phosphorus Replacement - Follow Nurse / BPA Driven Protocol    Potassium Replacement - Follow Nurse / BPA Driven Protocol    sodium chloride    sodium chloride    traMADol    Assessment & Plan   Assessment & Plan     Active Hospital Problems    Diagnosis  POA    **Endocarditis [I38]  Yes    Heart valve disease [I38]  Yes    Severe malnutrition [E43]  Yes    Persistent atrial fibrillation [I48.19]  Yes    Chronic systolic heart failure [I50.22]  Yes    Subclavian vein stenosis [I87.1]  Yes    ICD (implantable cardioverter-defibrillator), biventricular, in situ [Z95.810]  Yes    V-tach [I47.20]  Yes    Hypertension [I10]  Yes    Hypothyroidism [E03.9]  Yes    Obstructive sleep apnea [G47.33]  Yes    Ischemic cardiomyopathy [I25.5]  Yes    Coronary artery disease [I25.10]  Yes      Resolved Hospital Problems   No resolved problems to display.        Brief Hospital Course to date:  Howard Owusu is a 64 y.o. male with chronic HFrEF, hypertension, hyperlipidemia, ischemic cardiomyopathy, hypothyroidism patient with recent dyspnea with exertion generalized weakness.  Echo done 9/24 revealed multiple vegetations on leads.     ICD leads endocarditis  History of prior endocarditis  -MICHAEL findings as above, cardiology plans for [facility] today  -Blood cultures from 9/24 growing Corynebacterium, follow-up repeat cultures, ID consulted, antibiotics switched to Rocephin and ampicillin  -Cardiology/EP following, patient will need pacemaker extraction in addition to valve surgery.  They also  recommend placement of epicardial leads for future right-sided ICD placement, CTS consulted awaiting their evaluation     Anemia  -H&H lower than previous, etiology remains unknown, iron profile  -Continue to closely monitor, transfuse when hg<7     CAD  Hyperlipidemia  Hypertension  Ischemic cardiomyopathy/HFrEF EF 25%  Hypertension  -BP currently stable, he seems compensated  -Resume home Pradaxa, aspirin, statin and Bumex     Hypothyroidism  -Continue Synthroid     Back pain  -Continue pain control    GILL    Expected Discharge Location and Transportation: TBD  Expected Discharge   Expected Discharge Date: 9/30/2024; Expected Discharge Time:      VTE Prophylaxis:  Pharmacologic VTE prophylaxis orders are present.      AM-PAC 6 Clicks Score (PT): 20 (09/26/24 2026)    CODE STATUS:   Code Status and Medical Interventions: CPR (Attempt to Resuscitate); Full Support   Ordered at: 09/25/24 2139     Level Of Support Discussed With:    Patient     Code Status (Patient has no pulse and is not breathing):    CPR (Attempt to Resuscitate)     Medical Interventions (Patient has pulse or is breathing):    Full Support       Talya Lr MD  09/27/24

## 2024-09-27 NOTE — PROGRESS NOTES
[facility]  Progress Note       LOS: 2 days   Patient Care Team:  Shanelle Barrow APRN as PCP - General (Nurse Practitioner)  Zeina Moya APRN as Nurse Practitioner (Cardiology)  Shanelle Barrow APRN (Nurse Practitioner)  Jose Jones MD as Consulting Physician (Cardiology)    Chief Complaint:  follow up endocarditis     Subjective       Patient without new complaints today. Going for [facility] later today.       Review of Systems:   Pertinent positives in HPI, all others reviewed and negative.      Objective       Current Facility-Administered Medications:     acetaminophen (TYLENOL) tablet 650 mg, 650 mg, Oral, Q4H PRN **OR** acetaminophen (TYLENOL) 160 MG/5ML oral solution 650 mg, 650 mg, Oral, Q4H PRN **OR** acetaminophen (TYLENOL) suppository 650 mg, 650 mg, Rectal, Q4H PRN, Primitivo Trevizo DO    ampicillin 2000 mg IVPB in 100 mL NS (MBP), 2 g, Intravenous, Q4H, Flo Marcial MD, Last Rate: 200 mL/hr at 09/27/24 1110, 2 g at 09/27/24 1110    sennosides-docusate (PERICOLACE) 8.6-50 MG per tablet 2 tablet, 2 tablet, Oral, BID PRN **AND** polyethylene glycol (MIRALAX) packet 17 g, 17 g, Oral, Daily PRN **AND** bisacodyl (DULCOLAX) EC tablet 5 mg, 5 mg, Oral, Daily PRN **AND** bisacodyl (DULCOLAX) suppository 10 mg, 10 mg, Rectal, Daily PRN, Primitivo Trevizo DO    Calcium Replacement - Follow Nurse / BPA Driven Protocol, , Does not apply, PRN, Primitivo Trevizo DO    cefTRIAXone (ROCEPHIN) 2,000 mg in sodium chloride 0.9 % 100 mL MBP, 2,000 mg, Intravenous, Q12H, Flo Marcial MD, Last Rate: 200 mL/hr at 09/27/24 0925, 2,000 mg at 09/27/24 0925    Enoxaparin Sodium (LOVENOX) syringe 30 mg, 30 mg, Subcutaneous, Daily, Primitivo Trevizo DO, 30 mg at 09/26/24 0821    ketorolac (TORADOL) injection 15 mg, 15 mg, Intravenous, TID PRN, Talya Lr MD, 15 mg at 09/27/24 0955    Magnesium Cardiology Dose Replacement - Follow Nurse / BPA Driven Protocol, , Does not apply,  "PRN, Primitivo Trevizo DO    nitroglycerin (NITROSTAT) SL tablet 0.4 mg, 0.4 mg, Sublingual, Q5 Min PRN, Primitivo Trevizo DO    Phosphorus Replacement - Follow Nurse / BPA Driven Protocol, , Does not apply, PRNSanthosh Robert, DO    Potassium Replacement - Follow Nurse / BPA Driven Protocol, , Does not apply, PRNSanthosh Robert, DO    sodium chloride 0.9 % flush 10 mL, 10 mL, Intravenous, Q12H, Primitvio Trevizo DO, 10 mL at 09/27/24 0926    sodium chloride 0.9 % flush 10 mL, 10 mL, Intravenous, PRN, Primitivo Trevizo DO    sodium chloride 0.9 % infusion 40 mL, 40 mL, Intravenous, PRNSanthosh Robert, DO    traMADol (ULTRAM) tablet 50 mg, 50 mg, Oral, Q6H PRN, Talya Lr MD, 50 mg at 09/27/24 0826    Vital Sign Min/Max for last 24 hours  Temp  Min: 97.5 °F (36.4 °C)  Max: 97.9 °F (36.6 °C)   BP  Min: 137/95  Max: 150/87   Pulse  Min: 70  Max: 77   Resp  Min: 16  Max: 16   SpO2  Min: 90 %  Max: 99 %   No data recorded   Weight  Min: 72.2 kg (159 lb 1.6 oz)  Max: 72.2 kg (159 lb 1.6 oz)     Flowsheet Rows      Flowsheet Row First Filed Value   Admission Height 167.6 cm (66\") Documented at 09/25/2024 2130   Admission Weight 72.1 kg (159 lb) Documented at 09/25/2024 2130              Intake/Output Summary (Last 24 hours) at 9/27/2024 1221  Last data filed at 9/27/2024 0000  Gross per 24 hour   Intake 150 ml   Output 350 ml   Net -200 ml       Physical Exam:     General Appearance:    Alert, cooperative, in no acute distress   Lungs:     Clear to auscultation,respirations regular, even and                unlabored    Heart:    Regular rhythm and normal rate, normal S1 and S2, +2/6 murmur, no gallop, no rub, no click   Chest Wall:    No abnormalities observed   Abdomen:     Normal bowel sounds, no masses, no organomegaly, soft      nontender, nondistended, no guarding, no rebound                tenderness   Extremities:  Moves all extremities well, no edema, no cyanosis, no            redness              Pulses:   Pulses " "palpable and equal bilaterally   Skin:   No bleeding, bruising or rash        Results Review:   Results from last 7 days   Lab Units 09/27/24  0753 09/26/24  0333 09/25/24 2152   WBC 10*3/mm3 3.79 4.65 4.43   HEMOGLOBIN g/dL 9.0* 7.8* 7.8*   HEMATOCRIT % 31.1* 26.4* 26.3*   PLATELETS 10*3/mm3 148 164 160     Results from last 7 days   Lab Units 09/27/24  0753 09/26/24  0333 09/25/24 2152   SODIUM mmol/L 133* 135* 133*   POTASSIUM mmol/L 5.1 4.2 4.3   CHLORIDE mmol/L 104 104 103   CO2 mmol/L 14.0* 19.0* 18.0*   BUN mg/dL 12 15 15   CREATININE mg/dL 0.71* 0.70* 0.82   GLUCOSE mg/dL 74 79 107*                      Results from last 7 days   Lab Units 09/25/24 2152   PROTIME Seconds 21.7*   INR  1.88*     Results from last 7 days   Lab Units 09/26/24  0020 09/25/24 2152   HSTROP T ng/L 13 14       Intake/Output Summary (Last 24 hours) at 9/27/2024 1221  Last data filed at 9/27/2024 0000  Gross per 24 hour   Intake 150 ml   Output 350 ml   Net -200 ml       I personally viewed and interpreted the patient's EKG/Telemetry data    EKG: none for review today     Telemetry: V paced     Ejection Fraction  No results found for: \"EF\"    Echo EF Estimated  Lab Results   Component Value Date    ECHOEFEST 25.0 09/24/2024         Present on Admission:   Endocarditis   Chronic systolic heart failure   Coronary artery disease   Hypertension   Hypothyroidism   ICD (implantable cardioverter-defibrillator), biventricular, in situ   Ischemic cardiomyopathy   Obstructive sleep apnea   Persistent atrial fibrillation   Subclavian vein stenosis   V-tach   Heart valve disease   Severe malnutrition    Assessment & Plan   Ischemic cardiomyopathy/VT  Normal functioning resynchronization ICD with multiple lead vegetations  Permanent atrial fibrillation  AV node ablation/complete heart block  Pacemaker dependent  Endocarditis  Moderate MR and severe TR  Multiple vegetations  Antibiotics per ID  Will need full system extraction and concomitant " valve surgery.  CT surgery consulted  Will ask surgery to place epicardial leads for future right sided ICD placement  After surgery he will need a leadless permanent pacemaker as bridge to future right sided resynchronization ICD.  Preoperative work up in progress, [facility] today.         Electronically signed by DANIEL Jfef, 09/27/24, 12:23 PM EDT.

## 2024-09-27 NOTE — SIGNIFICANT NOTE
09/27/24 1416   SLP Deferred Reason   SLP Deferred Reason Patient unavailable for evaluation  (Pt NPO for heart cath today, tentatively scheduled for 1600 per RN. SLP will f/u 9/28)

## 2024-09-28 LAB
ANION GAP SERPL CALCULATED.3IONS-SCNC: 13 MMOL/L (ref 5–15)
BASOPHILS # BLD AUTO: 0.04 10*3/MM3 (ref 0–0.2)
BASOPHILS NFR BLD AUTO: 1 % (ref 0–1.5)
BUN SERPL-MCNC: 15 MG/DL (ref 8–23)
BUN/CREAT SERPL: 18.5 (ref 7–25)
CALCIUM SPEC-SCNC: 9.6 MG/DL (ref 8.6–10.5)
CHLORIDE SERPL-SCNC: 104 MMOL/L (ref 98–107)
CHOLEST SERPL-MCNC: 74 MG/DL (ref 0–200)
CO2 SERPL-SCNC: 19 MMOL/L (ref 22–29)
CREAT SERPL-MCNC: 0.81 MG/DL (ref 0.76–1.27)
DEPRECATED RDW RBC AUTO: 59 FL (ref 37–54)
EGFRCR SERPLBLD CKD-EPI 2021: 97.2 ML/MIN/1.73
EOSINOPHIL # BLD AUTO: 0.3 10*3/MM3 (ref 0–0.4)
EOSINOPHIL NFR BLD AUTO: 7.6 % (ref 0.3–6.2)
ERYTHROCYTE [DISTWIDTH] IN BLOOD BY AUTOMATED COUNT: 18.6 % (ref 12.3–15.4)
GLUCOSE SERPL-MCNC: 57 MG/DL (ref 65–99)
HBA1C MFR BLD: 5.6 % (ref 4.8–5.6)
HCT VFR BLD AUTO: 29.3 % (ref 37.5–51)
HDLC SERPL-MCNC: 17 MG/DL (ref 40–60)
HGB BLD-MCNC: 8.6 G/DL (ref 13–17.7)
IMM GRANULOCYTES # BLD AUTO: 0.01 10*3/MM3 (ref 0–0.05)
IMM GRANULOCYTES NFR BLD AUTO: 0.3 % (ref 0–0.5)
LDLC SERPL CALC-MCNC: 43 MG/DL (ref 0–100)
LDLC/HDLC SERPL: 2.68 {RATIO}
LYMPHOCYTES # BLD AUTO: 0.86 10*3/MM3 (ref 0.7–3.1)
LYMPHOCYTES NFR BLD AUTO: 21.7 % (ref 19.6–45.3)
MAGNESIUM SERPL-MCNC: 1.9 MG/DL (ref 1.6–2.4)
MCH RBC QN AUTO: 25.4 PG (ref 26.6–33)
MCHC RBC AUTO-ENTMCNC: 29.4 G/DL (ref 31.5–35.7)
MCV RBC AUTO: 86.7 FL (ref 79–97)
MONOCYTES # BLD AUTO: 0.62 10*3/MM3 (ref 0.1–0.9)
MONOCYTES NFR BLD AUTO: 15.7 % (ref 5–12)
NEUTROPHILS NFR BLD AUTO: 2.13 10*3/MM3 (ref 1.7–7)
NEUTROPHILS NFR BLD AUTO: 53.7 % (ref 42.7–76)
NRBC BLD AUTO-RTO: 0.5 /100 WBC (ref 0–0.2)
PHOSPHATE SERPL-MCNC: 3.1 MG/DL (ref 2.5–4.5)
PLATELET # BLD AUTO: 194 10*3/MM3 (ref 140–450)
PMV BLD AUTO: 11.4 FL (ref 6–12)
POTASSIUM SERPL-SCNC: 5.5 MMOL/L (ref 3.5–5.2)
RBC # BLD AUTO: 3.38 10*6/MM3 (ref 4.14–5.8)
SODIUM SERPL-SCNC: 136 MMOL/L (ref 136–145)
TRIGL SERPL-MCNC: 57 MG/DL (ref 0–150)
VLDLC SERPL-MCNC: 14 MG/DL (ref 5–40)
WBC NRBC COR # BLD AUTO: 3.96 10*3/MM3 (ref 3.4–10.8)

## 2024-09-28 PROCEDURE — 83735 ASSAY OF MAGNESIUM: CPT | Performed by: INTERNAL MEDICINE

## 2024-09-28 PROCEDURE — 80048 BASIC METABOLIC PNL TOTAL CA: CPT | Performed by: FAMILY MEDICINE

## 2024-09-28 PROCEDURE — 25010000002 ENOXAPARIN PER 10 MG: Performed by: INTERNAL MEDICINE

## 2024-09-28 PROCEDURE — 99232 SBSQ HOSP IP/OBS MODERATE 35: CPT

## 2024-09-28 PROCEDURE — 99232 SBSQ HOSP IP/OBS MODERATE 35: CPT | Performed by: PHYSICIAN ASSISTANT

## 2024-09-28 PROCEDURE — 25010000002 CEFTRIAXONE PER 250 MG: Performed by: INTERNAL MEDICINE

## 2024-09-28 PROCEDURE — 80061 LIPID PANEL: CPT | Performed by: INTERNAL MEDICINE

## 2024-09-28 PROCEDURE — 25010000002 AMPICILLIN PER 500 MG: Performed by: INTERNAL MEDICINE

## 2024-09-28 PROCEDURE — 25010000002 KETOROLAC TROMETHAMINE PER 15 MG: Performed by: INTERNAL MEDICINE

## 2024-09-28 PROCEDURE — 83036 HEMOGLOBIN GLYCOSYLATED A1C: CPT | Performed by: INTERNAL MEDICINE

## 2024-09-28 PROCEDURE — 25810000003 SODIUM CHLORIDE 0.9 % SOLUTION 250 ML FLEX CONT: Performed by: INTERNAL MEDICINE

## 2024-09-28 PROCEDURE — 92610 EVALUATE SWALLOWING FUNCTION: CPT

## 2024-09-28 PROCEDURE — 99232 SBSQ HOSP IP/OBS MODERATE 35: CPT | Performed by: INTERNAL MEDICINE

## 2024-09-28 PROCEDURE — 85025 COMPLETE CBC W/AUTO DIFF WBC: CPT | Performed by: FAMILY MEDICINE

## 2024-09-28 PROCEDURE — 25010000002 NA FERRIC GLUC CPLX PER 12.5 MG: Performed by: INTERNAL MEDICINE

## 2024-09-28 PROCEDURE — 25010000002 MAGNESIUM SULFATE 4 GM/100ML SOLUTION: Performed by: INTERNAL MEDICINE

## 2024-09-28 PROCEDURE — 84100 ASSAY OF PHOSPHORUS: CPT | Performed by: INTERNAL MEDICINE

## 2024-09-28 RX ORDER — OXYCODONE AND ACETAMINOPHEN 5; 325 MG/1; MG/1
1 TABLET ORAL EVERY 4 HOURS PRN
Status: DISCONTINUED | OUTPATIENT
Start: 2024-09-28 | End: 2024-10-02 | Stop reason: HOSPADM

## 2024-09-28 RX ORDER — MAGNESIUM SULFATE HEPTAHYDRATE 40 MG/ML
4 INJECTION, SOLUTION INTRAVENOUS ONCE
Status: COMPLETED | OUTPATIENT
Start: 2024-09-28 | End: 2024-09-28

## 2024-09-28 RX ADMIN — MAGNESIUM SULFATE IN WATER FOR 4 G: 40 INJECTION INTRAVENOUS at 07:58

## 2024-09-28 RX ADMIN — AMPICILLIN SODIUM 2 G: 2 INJECTION, POWDER, FOR SOLUTION INTRAMUSCULAR; INTRAVENOUS at 22:08

## 2024-09-28 RX ADMIN — FUROSEMIDE 40 MG: 40 TABLET ORAL at 08:00

## 2024-09-28 RX ADMIN — TRAMADOL HYDROCHLORIDE 50 MG: 50 TABLET ORAL at 07:58

## 2024-09-28 RX ADMIN — SODIUM CHLORIDE 2000 MG: 900 INJECTION INTRAVENOUS at 07:22

## 2024-09-28 RX ADMIN — OXYCODONE HYDROCHLORIDE AND ACETAMINOPHEN 1 TABLET: 5; 325 TABLET ORAL at 22:09

## 2024-09-28 RX ADMIN — ALPRAZOLAM 0.25 MG: 0.25 TABLET ORAL at 09:05

## 2024-09-28 RX ADMIN — AMPICILLIN SODIUM 2 G: 2 INJECTION, POWDER, FOR SOLUTION INTRAMUSCULAR; INTRAVENOUS at 15:02

## 2024-09-28 RX ADMIN — AMPICILLIN SODIUM 2 G: 2 INJECTION, POWDER, FOR SOLUTION INTRAMUSCULAR; INTRAVENOUS at 17:57

## 2024-09-28 RX ADMIN — ENOXAPARIN SODIUM 30 MG: 30 INJECTION SUBCUTANEOUS at 08:00

## 2024-09-28 RX ADMIN — AMPICILLIN SODIUM 2 G: 2 INJECTION, POWDER, FOR SOLUTION INTRAMUSCULAR; INTRAVENOUS at 02:52

## 2024-09-28 RX ADMIN — ALPRAZOLAM 0.25 MG: 0.25 TABLET ORAL at 22:09

## 2024-09-28 RX ADMIN — AMPICILLIN SODIUM 2 G: 2 INJECTION, POWDER, FOR SOLUTION INTRAMUSCULAR; INTRAVENOUS at 06:27

## 2024-09-28 RX ADMIN — Medication 10 ML: at 09:05

## 2024-09-28 RX ADMIN — Medication 10 ML: at 17:56

## 2024-09-28 RX ADMIN — OXYCODONE HYDROCHLORIDE AND ACETAMINOPHEN 1 TABLET: 5; 325 TABLET ORAL at 15:06

## 2024-09-28 RX ADMIN — TRAMADOL HYDROCHLORIDE 50 MG: 50 TABLET ORAL at 18:53

## 2024-09-28 RX ADMIN — AMPICILLIN SODIUM 2 G: 2 INJECTION, POWDER, FOR SOLUTION INTRAMUSCULAR; INTRAVENOUS at 13:00

## 2024-09-28 RX ADMIN — Medication 10 ML: at 22:09

## 2024-09-28 RX ADMIN — KETOROLAC TROMETHAMINE 15 MG: 15 INJECTION, SOLUTION INTRAMUSCULAR; INTRAVENOUS at 02:56

## 2024-09-28 RX ADMIN — SODIUM CHLORIDE 2000 MG: 900 INJECTION INTRAVENOUS at 18:38

## 2024-09-28 RX ADMIN — SODIUM CHLORIDE 250 MG: 9 INJECTION, SOLUTION INTRAVENOUS at 15:51

## 2024-09-28 NOTE — PLAN OF CARE
Goal Outcome Evaluation:  Plan of Care Reviewed With: patient        Progress: improving         Anticipated Discharge Disposition (SLP): home          SLP Swallowing Diagnosis: swallow WFL/no suspected pharyngeal impairment (09/28/24 1015)

## 2024-09-28 NOTE — PROGRESS NOTES
1 Day Post-Op       LOS: 3 days   Patient Care Team:  Shanelle Barrow APRN as PCP - General (Nurse Practitioner)  Zeina Moya APRN as Nurse Practitioner (Cardiology)  Shanelle Barrow APRN (Nurse Practitioner)  Jose Jones MD as Consulting Physician (Cardiology)    Chief complaint: No new complaints    Subjective   Denies chest pain, denies shortness of breath  Follow-up endocarditis  Objective    Vital Signs  Temp:  [97.5 °F (36.4 °C)-98 °F (36.7 °C)] 97.8 °F (36.6 °C)  Heart Rate:  [71-76] 74  Resp:  [18] 18  BP: (138-166)/() 142/102    Physical Exam:   General Appearance: alert, appears stated age and cooperative   Lungs: clear bilaterally   Heart: Regular rate and rhythm        Results     Results from last 7 days   Lab Units 09/28/24  0416   WBC 10*3/mm3 3.96   HEMOGLOBIN g/dL 8.6*   HEMATOCRIT % 29.3*   PLATELETS 10*3/mm3 194     Results from last 7 days   Lab Units 09/28/24  0416   SODIUM mmol/L 136   POTASSIUM mmol/L 5.5*   CHLORIDE mmol/L 104   CO2 mmol/L 19.0*   BUN mg/dL 15   CREATININE mg/dL 0.81   GLUCOSE mg/dL 57*   CALCIUM mg/dL 9.6               Assessment      Endocarditis    Ischemic cardiomyopathy    Coronary artery disease    Hypertension    Hypothyroidism    Obstructive sleep apnea    V-tach    ICD (implantable cardioverter-defibrillator), biventricular, in situ    Subclavian vein stenosis    Chronic systolic heart failure    Persistent atrial fibrillation    Heart valve disease    Severe malnutrition  Positive blood cultures Corynebacterium species  Chronic low back pain  EF 20%  Plan   Continue antibiotics per ID  Orthopedics evaluating  the patient's history right knee septic arthritis  Continue evaluation for mitral valve repair versus replacement with tricuspid valve replacement and lead extractions    Tyler Rivera PA-C  09/28/24  14:37 EDT

## 2024-09-28 NOTE — PROGRESS NOTES
[facility] Medicine Services  PROGRESS NOTE    Patient Name: Howard Owusu  : 1960  MRN: [id number]    Date of Admission: 2024  Primary Care Physician: Shanelle Barrow APRN    Subjective   Subjective     CC:   Follow-up endocarditis    HPI:   Patient was seen and examined this morning.  Does not have any acute complaints other than his chronic back pain acting up.    Objective   Objective     Vital Signs:   Temp:  [97.5 °F (36.4 °C)-98 °F (36.7 °C)] 97.8 °F (36.6 °C)  Heart Rate:  [] 74  Resp:  [16-18] 18  BP: (131-166)/() 142/102  Flow (L/min):  [2] 2     Physical Exam:  General: Comfortable, not in distress, conversant and cooperative  Head: Atraumatic and normocephalic  Eyes: No Icterus. No pallor  Ears:  Ears appear intact with no abnormalities noted  Throat: No oral lesions, no thrush  Neck: Supple, trachea midline  Lungs: Clear to auscultation bilaterally, equal air entry, no wheezing or crackles  Heart:  Normal S1 and S2, no murmur, no gallop, No JVD, no lower extremity swelling  Abdomen:  Soft, no tenderness, no organomegaly, normal bowel sounds, no organomegaly  Extremities: pulses equal bilaterally  Skin: No bleeding, bruising or rash, normal skin turgor and elasticity  Neurologic: Cranial nerves appear intact with no evidence of facial asymmetry, normal motor and sensory functions in all 4 extremities  Psych: Alert and oriented x 3, normal mood      Results Reviewed:  LAB RESULTS:      Lab 24  0416 24  0753 24  0333 24  0020 24  2152   WBC 3.96 3.79 4.65  --  4.43   HEMOGLOBIN 8.6* 9.0* 7.8*  --  7.8*   HEMATOCRIT 29.3* 31.1* 26.4*  --  26.3*   PLATELETS 194 148 164  --  160   NEUTROS ABS 2.13 1.90 2.82  --  2.62   IMMATURE GRANS (ABS) 0.01 0.01 0.01  --  0.01   LYMPHS ABS 0.86 0.78 0.79  --  0.65*   MONOS ABS 0.62 0.50 0.48  --  0.65   EOS ABS 0.30 0.55* 0.53*  --  0.46*   MCV 86.7 87.6 88.3  --  87.7   PROCALCITONIN  --    --   --   --  0.08   LACTATE  --   --  1.8 2.3* 3.1*   LDH  --   --   --   --  292*   PROTIME  --   --   --   --  21.7*         Lab 09/28/24  0416 09/27/24  0753 09/26/24  0333 09/25/24 2152   SODIUM 136 133* 135* 133*   POTASSIUM 5.5* 5.1 4.2 4.3   CHLORIDE 104 104 104 103   CO2 19.0* 14.0* 19.0* 18.0*   ANION GAP 13.0 15.0 12.0 12.0   BUN 15 12 15 15   CREATININE 0.81 0.71* 0.70* 0.82   EGFR 97.2 101.2 101.6 96.9   GLUCOSE 57* 74 79 107*   CALCIUM 9.6 9.0 8.7 8.8   MAGNESIUM 1.9 2.0 1.7 1.7   PHOSPHORUS 3.1 2.6 2.7 2.7   HEMOGLOBIN A1C 5.60  --   --   --          Lab 09/25/24 2152   TOTAL PROTEIN 7.3   ALBUMIN 3.2*   GLOBULIN 4.1   ALT (SGPT) 147*   AST (SGOT) 265*   BILIRUBIN 0.6   ALK PHOS 127*         Lab 09/26/24  0020 09/25/24 2152   HSTROP T 13 14   PROTIME  --  21.7*   INR  --  1.88*         Lab 09/28/24 0416   CHOLESTEROL 74   LDL CHOL 43   HDL CHOL 17*   TRIGLYCERIDES 57         Lab 09/26/24  0020 09/25/24 2152   IRON  --  14*   IRON SATURATION (TSAT)  --  3*   TIBC  --  405   TRANSFERRIN  --  272   FERRITIN  --  51.02   FOLATE 15.50  --    VITAMIN B 12 1,719*  --          Brief Urine Lab Results  (Last result in the past 365 days)        Color   Clarity   Blood   Leuk Est   Nitrite   Protein   CREAT   Urine HCG        02/16/24 0104 Dark Yellow   Clear     Negative                       Microbiology Results Abnormal       Procedure Component Value - Date/Time    Blood Culture - Blood, Arm, Left [[id number]]  (Normal) Collected: 09/25/24 2151    Lab Status: Preliminary result Specimen: Blood from Arm, Left Updated: 09/27/24 2300     Blood Culture No growth at 2 days    Narrative:      Aerobic Bottle Only      Blood Culture - Blood, Hand, Left [[id number]]  (Normal) Collected: 09/25/24 2151    Lab Status: Preliminary result Specimen: Blood from Hand, Left Updated: 09/27/24 2300     Blood Culture No growth at 2 days            Cardiac Catheterization/Vascular Study    Result Date: 9/27/2024    No  flow-limiting coronary artery disease   Widely patent LAD stents   LVEF 20%   Moderate to severe mitral regurgitation      Results for orders placed during the hospital encounter of 09/24/24    Adult Transesophageal Echo (MICHAEL) W/ Cont if Necessary Per Protocol    Interpretation Summary    Left ventricular systolic function is moderately decreased. Estimated left ventricular EF = 25%    Moderate mitral valve regurgitation is present.    2 electronic lead is present in the right atrium. Large spherical mass/vegetation on right ventricular lead near tricuspid annulus. A second larger 1 cm mobile vegetation on lead seen proximally. Third vegetation small seen on right atrial lead..    There is a small mobile mass on the tricuspid valve affecting the septal leaflet.    Severe tricuspid valve regurgitation is present.    Estimated right ventricular systolic pressure from tricuspid regurgitation is moderately elevated (45-55 mmHg).      Current medications:  Scheduled Meds:ampicillin, 2 g, Intravenous, Q4H  cefTRIAXone, 2,000 mg, Intravenous, Q12H  enoxaparin, 30 mg, Subcutaneous, Daily  furosemide, 40 mg, Oral, Daily  magnesium sulfate, 4 g, Intravenous, Once  sodium chloride, 10 mL, Intravenous, Q12H      Continuous Infusions:   PRN Meds:.  acetaminophen **OR** acetaminophen **OR** acetaminophen    acetaminophen    ALPRAZolam    senna-docusate sodium **AND** polyethylene glycol **AND** bisacodyl **AND** bisacodyl    Calcium Replacement - Follow Nurse / BPA Driven Protocol    diphenhydrAMINE    ketorolac    Magnesium Cardiology Dose Replacement - Follow Nurse / BPA Driven Protocol    nitroglycerin    Phosphorus Replacement - Follow Nurse / BPA Driven Protocol    Potassium Replacement - Follow Nurse / BPA Driven Protocol    sodium chloride    sodium chloride    traMADol    Assessment & Plan   Assessment & Plan     Active Hospital Problems    Diagnosis  POA    **Endocarditis [I38]  Yes    Heart valve disease [I38]  Yes     Severe malnutrition [E43]  Yes    Persistent atrial fibrillation [I48.19]  Yes    Chronic systolic heart failure [I50.22]  Yes    Subclavian vein stenosis [I87.1]  Yes    ICD (implantable cardioverter-defibrillator), biventricular, in situ [Z95.810]  Yes    V-tach [I47.20]  Yes    Hypertension [I10]  Yes    Hypothyroidism [E03.9]  Yes    Obstructive sleep apnea [G47.33]  Yes    Ischemic cardiomyopathy [I25.5]  Yes    Coronary artery disease [I25.10]  Yes      Resolved Hospital Problems   No resolved problems to display.        Brief Hospital Course to date:  Howard Owusu is a 64 y.o. male with chronic HFrEF, hypertension, hyperlipidemia, ischemic cardiomyopathy, hypothyroidism patient with recent dyspnea with exertion generalized weakness.  Echo done 9/24 revealed multiple vegetations on leads.     ICD leads endocarditis  History of prior endocarditis  MICHAEL with multiple vegetations and possible lead involvement  Left heart cath 9/27/2024 with nonobstructive coronary artery disease and patent stent to LAD.  EF 20%  Blood cultures from 9/24 growing Corynebacterium.  Currently on Rocephin and ampicillin per ID  Cardiology/EP following, patient will need pacemaker extraction in addition to valve surgery.  They also recommend placement of epicardial leads for future right-sided ICD placemen  CTS consulted awaiting their evaluation     Chronic anemia  Severe iron deficiency  Hemoglobin stable around 8.  No active bleeding  Given severe iron deficiency anemia, will start IV iron infusion x 3 days    CAD  Hyperlipidemia  Hypertension  Ischemic cardiomyopathy/HFrEF EF 25%  Hypertension  Continue Pradaxa, aspirin, statin and Bumex     Hypothyroidism  Continue Synthroid     Back pain  Continue pain control    GILL    Expected Discharge Location and Transportation: TBD  Expected Discharge   Expected Discharge Date: 9/30/2024; Expected Discharge Time:      VTE Prophylaxis:  Pharmacologic VTE prophylaxis orders are  present.      AM-PAC 6 Clicks Score (PT): 24 (09/28/24 0800)    CODE STATUS:   Code Status and Medical Interventions: CPR (Attempt to Resuscitate); Full Support   Ordered at: 09/25/24 2139     Level Of Support Discussed With:    Patient     Code Status (Patient has no pulse and is not breathing):    CPR (Attempt to Resuscitate)     Medical Interventions (Patient has pulse or is breathing):    Full Support       Barbie Shah MD  09/28/24

## 2024-09-28 NOTE — PLAN OF CARE
Problem: Fall Injury Risk  Goal: Absence of Fall and Fall-Related Injury  Intervention: Identify and Manage Contributors  Recent Flowsheet Documentation  Taken 9/27/2024 2000 by Iqra Sánchez RN  Medication Review/Management: medications reviewed  Intervention: Promote Injury-Free Environment  Recent Flowsheet Documentation  Taken 9/28/2024 0600 by Iqra Sánchez RN  Safety Promotion/Fall Prevention:   activity supervised   assistive device/personal items within reach   clutter free environment maintained   lighting adjusted   mobility aid in reach   nonskid shoes/slippers when out of bed   room organization consistent   safety round/check completed  Taken 9/28/2024 0400 by Iqra Sánchez RN  Safety Promotion/Fall Prevention:   activity supervised   assistive device/personal items within reach   clutter free environment maintained   lighting adjusted   mobility aid in reach   nonskid shoes/slippers when out of bed   room organization consistent   safety round/check completed  Taken 9/28/2024 0200 by Iqra Sánchez RN  Safety Promotion/Fall Prevention:   activity supervised   assistive device/personal items within reach   clutter free environment maintained   lighting adjusted   mobility aid in reach   nonskid shoes/slippers when out of bed   room organization consistent   safety round/check completed  Taken 9/28/2024 0000 by Iqra Sánchez RN  Safety Promotion/Fall Prevention:   activity supervised   assistive device/personal items within reach   clutter free environment maintained   lighting adjusted   mobility aid in reach   nonskid shoes/slippers when out of bed   room organization consistent   safety round/check completed  Taken 9/27/2024 2200 by Iqra Sánchez, RN  Safety Promotion/Fall Prevention:   activity supervised   assistive device/personal items within reach   clutter free environment maintained   lighting adjusted   mobility aid in reach   nonskid shoes/slippers when out of bed   room  organization consistent   safety round/check completed  Taken 9/27/2024 2000 by Iqra Sánchez RN  Safety Promotion/Fall Prevention:   activity supervised   assistive device/personal items within reach   clutter free environment maintained   lighting adjusted   mobility aid in reach   nonskid shoes/slippers when out of bed   room organization consistent   safety round/check completed     Problem: Adult Inpatient Plan of Care  Goal: Absence of Hospital-Acquired Illness or Injury  Intervention: Identify and Manage Fall Risk  Recent Flowsheet Documentation  Taken 9/28/2024 0600 by Iqra Sánchez RN  Safety Promotion/Fall Prevention:   activity supervised   assistive device/personal items within reach   clutter free environment maintained   lighting adjusted   mobility aid in reach   nonskid shoes/slippers when out of bed   room organization consistent   safety round/check completed  Taken 9/28/2024 0400 by Iqra Sánchez RN  Safety Promotion/Fall Prevention:   activity supervised   assistive device/personal items within reach   clutter free environment maintained   lighting adjusted   mobility aid in reach   nonskid shoes/slippers when out of bed   room organization consistent   safety round/check completed  Taken 9/28/2024 0200 by Iqra Sánchez RN  Safety Promotion/Fall Prevention:   activity supervised   assistive device/personal items within reach   clutter free environment maintained   lighting adjusted   mobility aid in reach   nonskid shoes/slippers when out of bed   room organization consistent   safety round/check completed  Taken 9/28/2024 0000 by Iqra Sánchez RN  Safety Promotion/Fall Prevention:   activity supervised   assistive device/personal items within reach   clutter free environment maintained   lighting adjusted   mobility aid in reach   nonskid shoes/slippers when out of bed   room organization consistent   safety round/check completed  Taken 9/27/2024 2200 by Iqra Sánchez RN  Safety  Promotion/Fall Prevention:   activity supervised   assistive device/personal items within reach   clutter free environment maintained   lighting adjusted   mobility aid in reach   nonskid shoes/slippers when out of bed   room organization consistent   safety round/check completed  Taken 9/27/2024 2000 by Iqra Sánchez RN  Safety Promotion/Fall Prevention:   activity supervised   assistive device/personal items within reach   clutter free environment maintained   lighting adjusted   mobility aid in reach   nonskid shoes/slippers when out of bed   room organization consistent   safety round/check completed  Intervention: Prevent Skin Injury  Recent Flowsheet Documentation  Taken 9/28/2024 0600 by Iqra Sánchez RN  Body Position: position changed independently  Taken 9/28/2024 0400 by Iqra Sánchez RN  Body Position: position changed independently  Taken 9/28/2024 0200 by Iqra Sánchez RN  Body Position: position changed independently  Taken 9/28/2024 0000 by Iqra Sánchez RN  Body Position: position changed independently  Taken 9/27/2024 2200 by Iqra Sánchez RN  Body Position: position changed independently  Taken 9/27/2024 2000 by Iqra Sánchez RN  Body Position: position changed independently  Skin Protection:   adhesive use limited   incontinence pads utilized   tubing/devices free from skin contact   transparent dressing maintained  Intervention: Prevent and Manage VTE (Venous Thromboembolism) Risk  Recent Flowsheet Documentation  Taken 9/28/2024 0600 by Iqra Sánchez RN  Activity Management: activity encouraged  Taken 9/28/2024 0400 by Iqra Sánchez RN  Activity Management: activity encouraged  Taken 9/28/2024 0200 by Iqra Sánchez RN  Activity Management: activity encouraged  Taken 9/28/2024 0000 by Iqra Sánchez RN  Activity Management: activity encouraged  Taken 9/27/2024 2200 by Iqra Sánchez RN  Activity Management: activity encouraged  Taken 9/27/2024 2000 by Iqra Sánchez  RN  Activity Management: activity encouraged  Range of Motion: active ROM (range of motion) encouraged  Intervention: Prevent Infection  Recent Flowsheet Documentation  Taken 9/28/2024 0600 by Iqra Sánchez RN  Infection Prevention:   environmental surveillance performed   hand hygiene promoted   rest/sleep promoted   single patient room provided  Taken 9/28/2024 0400 by Iqra Sánchez RN  Infection Prevention:   environmental surveillance performed   hand hygiene promoted   rest/sleep promoted   single patient room provided  Taken 9/28/2024 0200 by Iqra Sánchez RN  Infection Prevention:   environmental surveillance performed   hand hygiene promoted   rest/sleep promoted   single patient room provided  Taken 9/28/2024 0000 by Iqra Sánchez RN  Infection Prevention:   environmental surveillance performed   hand hygiene promoted   rest/sleep promoted   single patient room provided  Taken 9/27/2024 2200 by Iqra Sánchez RN  Infection Prevention:   environmental surveillance performed   hand hygiene promoted   rest/sleep promoted   single patient room provided  Taken 9/27/2024 2000 by Iqra Sánchez RN  Infection Prevention:   environmental surveillance performed   hand hygiene promoted   rest/sleep promoted   single patient room provided  Goal: Optimal Comfort and Wellbeing  Intervention: Provide Person-Centered Care  Recent Flowsheet Documentation  Taken 9/27/2024 2200 by Iqra Sánchez RN  Trust Relationship/Rapport: thoughts/feelings acknowledged  Taken 9/27/2024 2000 by Iqra Sánchez RN  Trust Relationship/Rapport:   care explained   questions answered   thoughts/feelings acknowledged     Problem: Pain Acute  Goal: Acceptable Pain Control and Functional Ability  Intervention: Prevent or Manage Pain  Recent Flowsheet Documentation  Taken 9/27/2024 2000 by Iqra Sánchez RN  Medication Review/Management: medications reviewed  Intervention: Optimize Psychosocial Wellbeing  Recent Flowsheet  Documentation  Taken 9/28/2024 0000 by Iqra Sánchez RN  Supportive Measures: active listening utilized  Taken 9/27/2024 2200 by Iqra Sánchez RN  Supportive Measures: active listening utilized  Diversional Activities: television  Taken 9/27/2024 2000 by Iqra Sánchez RN  Supportive Measures: active listening utilized  Diversional Activities: television     Problem: Adjustment to Illness (Heart Failure)  Goal: Optimal Coping  Intervention: Support Psychosocial Response  Recent Flowsheet Documentation  Taken 9/28/2024 0000 by Iqra Sánchez RN  Supportive Measures: active listening utilized  Taken 9/27/2024 2200 by Iqra Sánchez RN  Supportive Measures: active listening utilized  Family/Support System Care: self-care encouraged  Taken 9/27/2024 2000 by Iqra Sánchez RN  Supportive Measures: active listening utilized  Family/Support System Care: self-care encouraged     Problem: Cardiac Output Decreased (Heart Failure)  Goal: Optimal Cardiac Output  Intervention: Optimize Cardiac Output  Recent Flowsheet Documentation  Taken 9/28/2024 0000 by Iqra Sánchez RN  Environmental Support: calm environment promoted  Taken 9/27/2024 2200 by Iqra Sánchez RN  Environmental Support: calm environment promoted  Taken 9/27/2024 2000 by Iqra Sánchez RN  Environmental Support: calm environment promoted     Problem: Functional Ability Impaired (Heart Failure)  Goal: Optimal Functional Ability  Intervention: Optimize Functional Ability  Recent Flowsheet Documentation  Taken 9/28/2024 0600 by Iqra Sánchez RN  Activity Management: activity encouraged  Taken 9/28/2024 0400 by Iqra Sánchez RN  Activity Management: activity encouraged  Taken 9/28/2024 0200 by Iqra Sánchez RN  Activity Management: activity encouraged  Taken 9/28/2024 0000 by Iqra Sánchez RN  Activity Management: activity encouraged  Taken 9/27/2024 2200 by Iqra Sánchez RN  Activity Management: activity encouraged  Taken 9/27/2024 2000 by  Walker, Iqra, RN  Activity Management: activity encouraged     Problem: Respiratory Compromise (Heart Failure)  Goal: Effective Oxygenation and Ventilation  Intervention: Promote Airway Secretion Clearance  Recent Flowsheet Documentation  Taken 9/27/2024 2000 by Iqra Sánchez RN  Cough And Deep Breathing: done independently per patient     Problem: Ongoing Anesthesia/Sedation Effects (Cardiac Catheterization)  Goal: Anesthesia/Sedation Recovery  Intervention: Optimize Anesthesia Recovery  Recent Flowsheet Documentation  Taken 9/28/2024 0600 by Iqra Sánchez RN  Safety Promotion/Fall Prevention:   activity supervised   assistive device/personal items within reach   clutter free environment maintained   lighting adjusted   mobility aid in reach   nonskid shoes/slippers when out of bed   room organization consistent   safety round/check completed  Taken 9/28/2024 0400 by Iqra Sánchez RN  Safety Promotion/Fall Prevention:   activity supervised   assistive device/personal items within reach   clutter free environment maintained   lighting adjusted   mobility aid in reach   nonskid shoes/slippers when out of bed   room organization consistent   safety round/check completed  Taken 9/28/2024 0200 by Iqra Sánchez RN  Safety Promotion/Fall Prevention:   activity supervised   assistive device/personal items within reach   clutter free environment maintained   lighting adjusted   mobility aid in reach   nonskid shoes/slippers when out of bed   room organization consistent   safety round/check completed  Taken 9/28/2024 0000 by Iqra Sánchez RN  Safety Promotion/Fall Prevention:   activity supervised   assistive device/personal items within reach   clutter free environment maintained   lighting adjusted   mobility aid in reach   nonskid shoes/slippers when out of bed   room organization consistent   safety round/check completed  Taken 9/27/2024 2200 by Iqra Sánchez RN  Safety Promotion/Fall Prevention:    activity supervised   assistive device/personal items within reach   clutter free environment maintained   lighting adjusted   mobility aid in reach   nonskid shoes/slippers when out of bed   room organization consistent   safety round/check completed  Taken 9/27/2024 2000 by Iqra Sánchez RN  Safety Promotion/Fall Prevention:   activity supervised   assistive device/personal items within reach   clutter free environment maintained   lighting adjusted   mobility aid in reach   nonskid shoes/slippers when out of bed   room organization consistent   safety round/check completed     Problem: Pain (Cardiac Catheterization)  Goal: Acceptable Pain Control  Intervention: Prevent or Manage Pain  Recent Flowsheet Documentation  Taken 9/27/2024 2200 by Iqra Sánchez RN  Diversional Activities: television  Taken 9/27/2024 2000 by Iqra Sánchez RN  Diversional Activities: television     Problem: Vascular Access Protection (Cardiac Catheterization)  Goal: Absence of Vascular Access Complication  Intervention: Prevent Access Site Complications  Recent Flowsheet Documentation  Taken 9/28/2024 0600 by Iqra Sánchez RN  Activity Management: activity encouraged  Head of Bed (HOB) Positioning: HOB elevated  Taken 9/28/2024 0400 by Iqra Sánchez RN  Activity Management: activity encouraged  Head of Bed (HOB) Positioning: HOB elevated  Taken 9/28/2024 0200 by Iqra Sánchez RN  Activity Management: activity encouraged  Head of Bed (HOB) Positioning: HOB elevated  Taken 9/28/2024 0000 by Iqra Sánchez RN  Activity Management: activity encouraged  Head of Bed (HOB) Positioning: HOB elevated  Taken 9/27/2024 2200 by Iqra Sánchez RN  Activity Management: activity encouraged  Head of Bed (HOB) Positioning: HOB elevated  Taken 9/27/2024 2000 by Iqra Sánchez RN  Activity Management: activity encouraged  Head of Bed (HOB) Positioning: HOB elevated   Goal Outcome Evaluation:

## 2024-09-28 NOTE — THERAPY DISCHARGE NOTE
Acute Care - Speech Language Pathology   Swallow Initial Evaluation/Discharge [facility]     Patient Name: Howard Owusu  : 1960  MRN: [id number]  Today's Date: 2024               Admit Date: 2024    Visit Dx:    ICD-10-CM ICD-9-CM   1. Heart valve disease  I38 424.90     Patient Active Problem List   Diagnosis    Ischemic cardiomyopathy    Coronary artery disease    Hyperlipidemia    Hypertension    GERD (gastroesophageal reflux disease)    Hypothyroidism    Obstructive sleep apnea    V-tach    ICD (implantable cardioverter-defibrillator), biventricular, in situ    Subclavian vein stenosis    Chronic systolic heart failure    Persistent atrial fibrillation    Endocarditis    Heart valve disease    Severe malnutrition     Past Medical History:   Diagnosis Date    Arthritis     Atrial fibrillation     single episode during PCI with conversion to NSR witin 24 hrs    Blood clot in vein 2024    right groin    Coronary artery disease     S/P Taxus RICHARD prox LAD, Cath 2011 EF 20%, S/P distal RCA 3.0x12 and 3.0x8 Cypher, PLB 2.5x8 Cypher, and mid ALD 3.0x 13 Cypher    GERD (gastroesophageal reflux disease)     History of kidney stones         Hyperlipidemia     Hypertension     Hypothyroidism     Ischemic cardiomyopathy     Hx of Inducible VT per EP study 2004, S/P [employer] El[employer]zer ICD implant, upgrade to Bi-V iCD 2008 Dr. Mtz 2010 appropriate ICD shocks, Gen change 13 MGR    Obstructive sleep apnea     Old MI (myocardial infarction)      Past Surgical History:   Procedure Laterality Date    CARDIAC CATHETERIZATION      CARDIAC CATHETERIZATION N/A 2023    Procedure: Left Heart Cath;  Surgeon: Jose Jones MD;  Location: [facility] INVASIVE LOCATION;  Service: Cardiovascular;  Laterality: N/A;    CARDIAC ELECTROPHYSIOLOGY PROCEDURE N/A 2020    Procedure: ICD DC generator change Generator change at patient's earliest convenience.  Given the atrial lead  noise, will reevaluate at that time to see if atrial lead revision is also necessary. ;  Surgeon: Maurizio Cruz DO;  Location: [facility] INVASIVE LOCATION;  Service: Cardiology;  Laterality: N/A;    CARDIAC ELECTROPHYSIOLOGY PROCEDURE N/A 04/09/2021    Procedure: [employer]zer BiV ICD RV lead, new;  Surgeon: Maurizio Cruz DO;  Location: [facility] INVASIVE LOCATION;  Service: Cardiology;  Laterality: N/A;    CARDIAC ELECTROPHYSIOLOGY PROCEDURE N/A 05/25/2021    Procedure: RV ICD lead extraction (SJM) new RV lead insertion, no meds to hold, Hybrid OR, CT/OR back up;  Surgeon: Maurizio Cruz DO;  Location: [facility]VASIVE LOCATION;  Service: Cardiovascular;  Laterality: N/A;    CARDIAC ELECTROPHYSIOLOGY PROCEDURE N/A 05/25/2021    Procedure: EP/CRM Study;  Surgeon: Marlon Bartlett MD;  Location: [facility]VASIVE LOCATION;  Service: Cardiovascular;  Laterality: N/A;    CARDIAC ELECTROPHYSIOLOGY PROCEDURE N/A 03/06/2024    Procedure: AV node ablation;  Surgeon: Maurizio Cruz DO;  Location: [facility]VASIVE LOCATION;  Service: Cardiovascular;  Laterality: N/A;  please schedule after echo, ct of head and carotid    CARPAL TUNNEL RELEASE Bilateral     KIDNEY STONE SURGERY         SLP Recommendation and Plan  SLP Swallowing Diagnosis: swallow WFL/no suspected pharyngeal impairment (09/28/24 1015)  SLP Diet Recommendation: regular textures, thin liquids (09/28/24 1015)     Monitor for Signs of Aspiration: yes, notify SLP if any concerns (09/28/24 1015)     Swallow Criteria for Skilled Therapeutic Interventions Met: no problems identified which require skilled intervention (09/28/24 1015)  Anticipated Discharge Disposition (SLP): home (09/28/24 1015)     Therapy Frequency (Swallow): evaluation only (09/28/24 1015)              Anticipated Discharge Disposition (SLP): home (09/28/24 1015)                            Plan of Care Reviewed With: patient (09/28/24 1112)  Progress: improving (09/28/24 1112)    SWALLOW  EVALUATION (Last 72 Hours)       SLP Adult Swallow Evaluation       Row Name 09/28/24 1015                   Rehab Evaluation    Document Type evaluation  -HG        Subjective Information no complaints  -HG        Patient Observations alert;cooperative  -HG        Patient/Family/Caregiver Comments/Observations no family present  -HG        Patient Effort excellent  -HG        Symptoms Noted During/After Treatment none  -HG        Oral Care other (see comments)  oral care already completed  -HG           General Information    Patient Profile Reviewed yes  -HG        Pertinent History Of Current Problem Pt is a 64 year old male  directly admitted from his home at the direction of his cardiologist, Dr. Jones.  Patient had a transesophageal echocardiogram performed on September 24 that showed large spherical mass/vegetation on the right ventricular lead near the annulus, once more on the proximal lead, and third of the right atrial lead. Pt referred for swallowing eval.  -HG        Current Method of Nutrition regular textures;thin liquids  -HG        Precautions/Limitations, Vision WFL;for purposes of eval  -HG        Precautions/Limitations, Hearing WFL;for purposes of eval  -HG        Prior Level of Function-Communication unknown  -HG        Prior Level of Function-Swallowing no diet consistency restrictions  -HG        Plans/Goals Discussed with patient  -HG        Barriers to Rehab none identified  -HG           Pain Scale: Numbers Pre/Post-Treatment    Pretreatment Pain Rating 0/10 - no pain  -HG           Oral Motor Structure and Function    Dentition Assessment natural, present and adequate  -HG        Secretion Management WNL/WFL  -HG        Mucosal Quality moist, healthy  -HG        Volitional Swallow WFL  -HG        Volitional Cough WFL  -HG           Oral Musculature and Cranial Nerve Assessment    Oral Motor General Assessment WFL  -HG           General Eating/Swallowing Observations    Respiratory  Support Currently in Use room air  -        Eating/Swallowing Skills self-fed  -        Positioning During Eating upright in chair  -        Utensils Used spoon;cup;straw  -        Consistencies Trialed ice chips;thin liquids;mixed consistency;regular textures  -           Respiratory    Respiratory Status WFL;room air;during swallowing/eating  -           Clinical Swallow Eval    Oral Prep Phase WFL  -HG        Oral Transit WFL  -HG        Oral Residue WFL  -HG        Pharyngeal Phase no overt signs/symptoms of pharyngeal impairment  -        Esophageal Phase unremarkable  -        Clinical Swallow Evaluation Summary CSE completed this date. Pt tolerated all consistencies trialed with no s/sx of aspiration: ice chips, water via tsp, cup and straw, mixed fruit and julia cracker. SLP RECs continue with current diet and liquids and meds whole with thin.  -           SLP Evaluation Clinical Impression    SLP Swallowing Diagnosis swallow WFL/no suspected pharyngeal impairment  -        Functional Impact risk of aspiration/pneumonia  -        Swallow Criteria for Skilled Therapeutic Interventions Met no problems identified which require skilled intervention  -           SLP Treatment Clinical Impressions    Care Plan Review evaluation/treatment results reviewed  -           Recommendations    Therapy Frequency (Swallow) evaluation only  -        SLP Diet Recommendation regular textures;thin liquids  -        Oral Care Recommendations Oral Care BID/PRN  -        SLP Rec. for Method of Medication Administration meds whole;with thin liquids  -        Monitor for Signs of Aspiration yes;notify SLP if any concerns  -        Anticipated Discharge Disposition (SLP) home  -                  User Key  (r) = Recorded By, (t) = Taken By, (c) = Cosigned By      Initials Name Effective Dates     Jennifer West MS CCC-SLP 06/16/21 -                     EDUCATION  The patient has been  educated in the following areas:   Dysphagia (Swallowing Impairment).                   Time Calculation:    Time Calculation- SLP       Row Name 09/28/24 1112             Time Calculation- SLP    SLP Start Time 1015  -HG      SLP Received On 09/28/24  -HG         Untimed Charges    40506-[id number] Eval Oral Pharyng Swallow Minutes 30  -HG         Total Minutes    Untimed Charges Total Minutes 30  -HG       Total Minutes 30  -HG                User Key  (r) = Recorded By, (t) = Taken By, (c) = Cosigned By      Initials Name Provider Type    Jennifer Murphy MS CCC-SLP Speech and Language Pathologist                             SLP Discharge Summary  Anticipated Discharge Disposition (SLP): home    Jennifer West MS CCC-SLP  9/28/2024

## 2024-09-28 NOTE — PROGRESS NOTES
[facility]  Progress Note       LOS: 3 days   Patient Care Team:  Shanelle Barrow APRN as PCP - General (Nurse Practitioner)  Zeina Moya APRN as Nurse Practitioner (Cardiology)  Shanelle Barrow APRN (Nurse Practitioner)  Jose Jones MD as Consulting Physician (Cardiology)    Chief Complaint:  follow up endocarditis     Subjective   Doing ok. Denies any CP, worsening SOB, dizziness, or LH. He does have lower back pain that has been chronic.      Review of Systems:   Pertinent positives in HPI, all others reviewed and negative.      Objective       Current Facility-Administered Medications:     acetaminophen (TYLENOL) tablet 650 mg, 650 mg, Oral, Q4H PRN **OR** acetaminophen (TYLENOL) 160 MG/5ML oral solution 650 mg, 650 mg, Oral, Q4H PRN **OR** acetaminophen (TYLENOL) suppository 650 mg, 650 mg, Rectal, Q4H PRN, Jose Jones MD    acetaminophen (TYLENOL) tablet 650 mg, 650 mg, Oral, Q4H PRN, Jose Jones MD    ALPRAZolam (XANAX) tablet 0.25 mg, 0.25 mg, Oral, TID PRN, Jose Jones MD, 0.25 mg at 09/28/24 0905    ampicillin 2000 mg IVPB in 100 mL NS (MBP), 2 g, Intravenous, Q4H, Jose Jones MD, Last Rate: 200 mL/hr at 09/28/24 0627, 2 g at 09/28/24 0627    sennosides-docusate (PERICOLACE) 8.6-50 MG per tablet 2 tablet, 2 tablet, Oral, BID PRN **AND** polyethylene glycol (MIRALAX) packet 17 g, 17 g, Oral, Daily PRN **AND** bisacodyl (DULCOLAX) EC tablet 5 mg, 5 mg, Oral, Daily PRN **AND** bisacodyl (DULCOLAX) suppository 10 mg, 10 mg, Rectal, Daily PRN, Jose Jones MD    Calcium Replacement - Follow Nurse / BPA Driven Protocol, , Does not apply, PRN, Jose Jones MD    cefTRIAXone (ROCEPHIN) 2,000 mg in sodium chloride 0.9 % 100 mL MBP, 2,000 mg, Intravenous, Q12H, Jose Jones MD, Last Rate: 200 mL/hr at 09/28/24 0722, 2,000 mg at 09/28/24 0722    diphenhydrAMINE (BENADRYL) injection 25 mg, 25 mg, Intravenous, Q6H PRN, Jose Jones,  "MD    Enoxaparin Sodium (LOVENOX) syringe 30 mg, 30 mg, Subcutaneous, Daily, Jose Jones MD, 30 mg at 09/28/24 0800    furosemide (LASIX) tablet 40 mg, 40 mg, Oral, Daily, Zeina Moya APRN, 40 mg at 09/28/24 0800    Magnesium Cardiology Dose Replacement - Follow Nurse / BPA Driven Protocol, , Does not apply, Robert FLORES Anthony, MD    nitroglycerin (NITROSTAT) SL tablet 0.4 mg, 0.4 mg, Sublingual, Q5 Min PRN, Jose Jones MD    Phosphorus Replacement - Follow Nurse / BPA Driven Protocol, , Does not apply, Robert FLORES Anthony, MD    Potassium Replacement - Follow Nurse / BPA Driven Protocol, , Does not apply, Robert FLORES Anthony, MD    sodium chloride 0.9 % flush 10 mL, 10 mL, Intravenous, Q12H, Jose Jones MD, 10 mL at 09/28/24 0905    sodium chloride 0.9 % flush 10 mL, 10 mL, Intravenous, PRNRobert Anthony, MD    sodium chloride 0.9 % infusion 40 mL, 40 mL, Intravenous, PRN, Jose Jones MD    traMADol (ULTRAM) tablet 50 mg, 50 mg, Oral, Q6H PRN, Jose Jones MD, 50 mg at 09/28/24 0758    Vital Sign Min/Max for last 24 hours  Temp  Min: 97.5 °F (36.4 °C)  Max: 98 °F (36.7 °C)   BP  Min: 131/93  Max: 166/108   Pulse  Min: 71  Max: 102   Resp  Min: 16  Max: 18   SpO2  Min: 93 %  Max: 99 %   Flow (L/min)  Min: 2  Max: 2   Weight  Min: 72.2 kg (159 lb 2.8 oz)  Max: 72.2 kg (159 lb 2.8 oz)     Flowsheet Rows      Flowsheet Row First Filed Value   Admission Height 167.6 cm (66\") Documented at 09/25/2024 2130   Admission Weight 72.1 kg (159 lb) Documented at 09/25/2024 2130            No intake or output data in the 24 hours ending 09/28/24 1222      Physical Exam:     General Appearance:    Alert, cooperative, in no acute distress   Lungs:     Clear to auscultation,respirations regular, even and                unlabored    Heart:    Regular rhythm and normal rate, normal S1 and S2, +2/6 murmur, no gallop, no rub, no click   Chest Wall:    No abnormalities observed   Abdomen:     Normal " "bowel sounds, no masses, no organomegaly, soft      nontender, nondistended, no guarding, no rebound                tenderness   Extremities:  Moves all extremities well, no edema, no cyanosis, no            redness              Pulses:   Pulses palpable and equal bilaterally   Skin:   No bleeding, bruising or rash        Results Review:   Results from last 7 days   Lab Units 09/28/24  0416 09/27/24  0753 09/26/24  0333   WBC 10*3/mm3 3.96 3.79 4.65   HEMOGLOBIN g/dL 8.6* 9.0* 7.8*   HEMATOCRIT % 29.3* 31.1* 26.4*   PLATELETS 10*3/mm3 194 148 164     Results from last 7 days   Lab Units 09/28/24  0416 09/27/24  0753 09/26/24  0333   SODIUM mmol/L 136 133* 135*   POTASSIUM mmol/L 5.5* 5.1 4.2   CHLORIDE mmol/L 104 104 104   CO2 mmol/L 19.0* 14.0* 19.0*   BUN mg/dL 15 12 15   CREATININE mg/dL 0.81 0.71* 0.70*   GLUCOSE mg/dL 57* 74 79      Results from last 7 days   Lab Units 09/28/24  0416   HEMOGLOBIN A1C % 5.60     Results from last 7 days   Lab Units 09/28/24  0416   CHOLESTEROL mg/dL 74   TRIGLYCERIDES mg/dL 57   HDL CHOL mg/dL 17*             Results from last 7 days   Lab Units 09/25/24  2152   PROTIME Seconds 21.7*   INR  1.88*     Results from last 7 days   Lab Units 09/26/24  0020 09/25/24  2152   HSTROP T ng/L 13 14     No intake or output data in the 24 hours ending 09/28/24 1222      I personally viewed and interpreted the patient's EKG/Telemetry data    EKG: none for review today     Telemetry: V paced, HR     Ejection Fraction  No results found for: \"EF\"    Echo EF Estimated  Lab Results   Component Value Date    ECHOEFEST 25.0 09/24/2024         Present on Admission:   Endocarditis   Chronic systolic heart failure   Coronary artery disease   Hypertension   Hypothyroidism   ICD (implantable cardioverter-defibrillator), biventricular, in situ   Ischemic cardiomyopathy   Obstructive sleep apnea   Persistent atrial fibrillation   Subclavian vein stenosis   V-tach   Heart valve disease   Severe " malnutrition    Assessment & Plan   Ischemic cardiomyopathy/VT  Normal functioning resynchronization ICD with multiple lead vegetations  Permanent atrial fibrillation  AV node ablation/complete heart block  Pacemaker dependent  Endocarditis  Moderate MR and severe TR  Multiple vegetations  Antibiotics per ID, adjusting abx per cultures  Will ask surgery to place epicardial leads for future right sided ICD placement  After surgery he will need a leadless permanent pacemaker as bridge to future right sided resynchronization ICD.  LHC demonstrated no flow limiting CAD, widely patent LAD stents, LVEF 20%, moderate to severe MR   Will need full system extraction and concomitant valve surgery.  CT surgery following and planning procedure timing       Electronically signed by FRANKIE Ortega, 09/28/24, 12:40 PM EDT.

## 2024-09-28 NOTE — PLAN OF CARE
Goal Outcome Evaluation:      Patient on room air. Complaints of pain somewhat addressed with PRN medication. Patient up in chair with assistance. PO intake encouraged, no bowel movement this shift.

## 2024-09-28 NOTE — PROGRESS NOTES
INFECTIOUS DISEASE Progress Note    Howard Owusu  1960  [id number]    Admission Date: 9/25/2024      Requesting Provider: No Known Provider  Evaluating Physician: Flo Marcial MD    Reason for Consultation: ICD lead endocarditis    History of present illness:      9/26/2024: Patient is a 64 y.o. 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  he underwent lead extraction of a failed RV lead and revision of a new RA/RV lead  with generator change on 5/25/2021 by Dr. Cruz.  he underwent a right knee arthroscopy and suffered a fall in June 2024 after which she developed sepsis with enterococcal bacteremia.  A MICHAEL at [facility] suggested possible lead thrombus and he was transferred to [facility].  Blood cultures grew Enterococcus and he received 6 weeks of intravenous antibiotic therapy which were completed on 8/5/2024.   He underwent a repeat MICHAEL here on 9/24/2024 revealing moderate mitral regurgitation, a large spherical mass/vegetation on the right ventricular lead near the tricuspid ambulance, and a second large 1 cm mobile vegetation on the lead proximally and a third vegetation on the right atrial lead.  There is a single mobile mass on the tricuspid valve leaflet affecting the septal leaflet.  He has severe tricuspid regurgitation.   Dr. Jones contacted me yesterday about this complex situation.  He informed me that the patient was hospitalized this summer at [facility] with multiple blood cultures positive for enterococcus and associated septic arthritis.  He had received 6 weeks of intravenous antibiotic therapy but did not undergo removal of his ICD leads which were noted to have vegetations versus thrombus.   We were concerned  that this likely represented ICD lead endocarditis and that he should undergo  ICD lead extraction.   Blood cultures were obtained on 9/24 with 1 out of 2 sets growing gram-positive  bacilli in the anaerobic bottle.  Repeat blood cultures were performed on 9/25.  He is now on intravenous vancomycin and Zosyn.  He has been afebrile since his admission.     I can visualize some of the records from [facility].  He was seen by Dr. 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   Infectious disease thought he would have a high risk for persistent infection/relapse without pacemaker lead extraction.  There was apparently some disagreement with cardiology who thought that he just had a thrombus  on the ICD lead and that the right knee septic arthritis was the primary focus of his enterococcal bacteremia.  He is listed as having a history of allergy to penicillin but has tolerated ampicillin.  He is currently tolerating Zosyn.   He complains of some chronic back pain after a fall.  He denies increased right knee pain.  He denies recurrent fevers and shaking chills.    9/27/2024:  He remains afebrile.  Blood cultures from 9/24 are growing corynebacterium  In 1 out of 2 sets.  Blood cultures from 9/25 are no growth so far.  He denies nausea and vomiting.  He has decreased malaise.    9/28/2024:   He remains afebrile.  White blood cell count is 4 and hemoglobin is 8.6.   Repeat blood cultures from 9/25 remain negative.   He denies nausea and vomiting.  Denies increased dyspnea.    Past Medical History:   Diagnosis Date    Arthritis     Atrial fibrillation     single episode during PCI with conversion to NSR witin 24 hrs    Blood clot in vein 05/07/2024    right groin    Coronary artery disease 2004    S/P Taxus RICHARD prox LAD, Cath June 2011 EF 20%, S/P distal RCA 3.0x12 and 3.0x8 Cypher, PLB 2.5x8 Cypher, and mid ALD  3.0x 13 Cypher    GERD (gastroesophageal reflux disease)     History of kidney stones     2000    Hyperlipidemia     Hypertension     Hypothyroidism     Ischemic cardiomyopathy     Hx of Inducible VT per EP study Nov 2004, S/P [employer] Eliezer ICD implant, upgrade to Bi-V iCD July 2008 Dr. Mtz June 2010 appropriate ICD shocks, Gen change 12/11/13 MGR    Obstructive sleep apnea     Old MI (myocardial infarction)        Past Surgical History:   Procedure Laterality Date    CARDIAC CATHETERIZATION      CARDIAC CATHETERIZATION N/A 03/14/2023    Procedure: Left Heart Cath;  Surgeon: Jose Jones MD;  Location:  [facility] CATH INVASIVE LOCATION;  Service: Cardiovascular;  Laterality: N/A;    CARDIAC ELECTROPHYSIOLOGY PROCEDURE N/A 11/17/2020    Procedure: ICD DC generator change Generator change at patient's earliest convenience.  Given the atrial lead noise, will reevaluate at that time to see if atrial lead revision is also necessary. ;  Surgeon: Maurizio Cruz DO;  Location: [place] [facility] EP INVASIVE LOCATION;  Service: Cardiology;  Laterality: N/A;    CARDIAC ELECTROPHYSIOLOGY PROCEDURE N/A 04/09/2021    Procedure: [employer] El[employer]zer BiV ICD RV lead, new;  Surgeon: Maurizio Cruz DO;  Location: [facility] EP INVASIVE LOCATION;  Service: Cardiology;  Laterality: N/A;    CARDIAC ELECTROPHYSIOLOGY PROCEDURE N/A 05/25/2021    Procedure: RV ICD lead extraction (SJM) new RV lead insertion, no meds to hold, Hybrid OR, CT/OR back up;  Surgeon: Maurizio Cruz DO;  Location:  [facility] EP INVASIVE LOCATION;  Service: Cardiovascular;  Laterality: N/A;    CARDIAC ELECTROPHYSIOLOGY PROCEDURE N/A 05/25/2021    Procedure: EP/CRM Study;  Surgeon: Marlon Bartlett MD;  Location:  [facility] EP INVASIVE LOCATION;  Service: Cardiovascular;  Laterality: N/A;    CARDIAC ELECTROPHYSIOLOGY PROCEDURE N/A 03/06/2024    Procedure: AV node ablation;  Surgeon: Maurizio Cruz DO;  Location:  [facility] EP INVASIVE LOCATION;  Service: Cardiovascular;  Laterality: N/A;  please  schedule after echo, ct of head and carotid    CARPAL TUNNEL RELEASE Bilateral     KIDNEY STONE SURGERY         Family History   Problem Relation Age of Onset    Pulmonary fibrosis Mother     Alzheimer's disease Mother     Heart attack Father     No Known Problems Sister     No Known Problems Brother     No Known Problems Brother        Social History     Socioeconomic History    Marital status:    Tobacco Use    Smoking status: Never     Passive exposure: Current    Smokeless tobacco: Never   Vaping Use    Vaping status: Never Used   Substance and Sexual Activity    Alcohol use: No    Drug use: No    Sexual activity: Defer       Allergies   Allergen Reactions    Morphine And Codeine Hives and Rash     Other Reaction(s): PAINFUL RASH    Penicillins Other (See Comments), Mental Status Change and Unknown - Low Severity     Patient reports tolerating amoxicillin.    Other Reaction(s): LOC      Patient reports tolerating amoxicillin. Patient has tolerated Zosyn    Sulfa Antibiotics Unknown - Low Severity, Other (See Comments) and Unknown (See Comments)     Was told by MD that he is allergic but does not know the reaction         Medication:    Current Facility-Administered Medications:     acetaminophen (TYLENOL) tablet 650 mg, 650 mg, Oral, Q4H PRN **OR** acetaminophen (TYLENOL) 160 MG/5ML oral solution 650 mg, 650 mg, Oral, Q4H PRN **OR** acetaminophen (TYLENOL) suppository 650 mg, 650 mg, Rectal, Q4H PRN, Jose Jones MD    acetaminophen (TYLENOL) tablet 650 mg, 650 mg, Oral, Q4H PRN, Jose Jones MD    ALPRAZolam (XANAX) tablet 0.25 mg, 0.25 mg, Oral, TID PRN, Jose Jones MD    ampicillin 2000 mg IVPB in 100 mL NS (MBP), 2 g, Intravenous, Q4H, Jose Jones MD, Last Rate: 200 mL/hr at 09/28/24 0627, 2 g at 09/28/24 0627    sennosides-docusate (PERICOLACE) 8.6-50 MG per tablet 2 tablet, 2 tablet, Oral, BID PRN **AND** polyethylene glycol (MIRALAX) packet 17 g, 17 g, Oral, Daily PRN **AND**  bisacodyl (DULCOLAX) EC tablet 5 mg, 5 mg, Oral, Daily PRN **AND** bisacodyl (DULCOLAX) suppository 10 mg, 10 mg, Rectal, Daily PRN, Jose Jones MD    Calcium Replacement - Follow Nurse / BPA Driven Protocol, , Does not apply, PRNRobert Anthony, MD    cefTRIAXone (ROCEPHIN) 2,000 mg in sodium chloride 0.9 % 100 mL MBP, 2,000 mg, Intravenous, Q12H, Jose Jones MD, Last Rate: 200 mL/hr at 09/28/24 0722, 2,000 mg at 09/28/24 0722    diphenhydrAMINE (BENADRYL) injection 25 mg, 25 mg, Intravenous, Q6H PRN, Jose Jones MD    Enoxaparin Sodium (LOVENOX) syringe 30 mg, 30 mg, Subcutaneous, Daily, Jose Jones MD, 30 mg at 09/28/24 0800    furosemide (LASIX) tablet 40 mg, 40 mg, Oral, Daily, Zeina Moya, APRN, 40 mg at 09/28/24 0800    ketorolac (TORADOL) injection 15 mg, 15 mg, Intravenous, TID PRN, Jose Jones MD, 15 mg at 09/28/24 0256    Magnesium Cardiology Dose Replacement - Follow Nurse / BPA Driven Protocol, , Does not apply, PRRobert BUSTILLO Anthony, MD    magnesium sulfate 4g/100mL (PREMIX) infusion, 4 g, Intravenous, Once, Barbie Shah MD, 4 g at 09/28/24 0758    nitroglycerin (NITROSTAT) SL tablet 0.4 mg, 0.4 mg, Sublingual, Q5 Min PRN, Jose Jones MD    Phosphorus Replacement - Follow Nurse / BPA Driven Protocol, , Does not apply, PRRobert BUSTILLO Anthony, MD    Potassium Replacement - Follow Nurse / BPA Driven Protocol, , Does not apply, PRRobert BUSTILLO Anthony, MD    sodium chloride 0.9 % flush 10 mL, 10 mL, Intravenous, Q12H, Jose Jones MD, 10 mL at 09/27/24 2037    sodium chloride 0.9 % flush 10 mL, 10 mL, Intravenous, PRN, Jose Jones MD    sodium chloride 0.9 % infusion 40 mL, 40 mL, Intravenous, PRN, Jose Jones MD    traMADol (ULTRAM) tablet 50 mg, 50 mg, Oral, Q6H PRN, Jose Jones MD, 50 mg at 09/28/24 8366    Antibiotics:  Anti-Infectives (From admission, onward)      Ordered     Dose/Rate Route Frequency Start Stop    09/26/24 1959  ampicillin  2000 mg IVPB in 100 mL NS (MBP)        Ordering Provider: Jose Jones MD    2 g  200 mL/hr over 30 Minutes Intravenous Every 4 Hours 24 2300 24 2259    24 1824  cefTRIAXone (ROCEPHIN) 2,000 mg in sodium chloride 0.9 % 100 mL MBP        Ordering Provider: Jose Jones MD    2,000 mg  200 mL/hr over 30 Minutes Intravenous Every 12 Hours 24 1900 24 1859    24 2245  vancomycin IVPB 1750 mg in 0.9% Sodium Chloride (premix) 500 mL        Ordering Provider: Carlos Weaver PALMIRA    1,750 mg  285.7 mL/hr over 105 Minutes Intravenous Once 24 0000 24 0303    24 2258  piperacillin-tazobactam (ZOSYN) 3.375 g IVPB in 100 mL NS MBP (CD)        Ordering Provider: Carlos Weaver PALMIRA    3.375 g  over 30 Minutes Intravenous Once 24 2345 24 0101              Review of Systems:    See HPI    Physical Exam:   Vital Signs  Temp (24hrs), Av.7 °F (36.5 °C), Min:97.5 °F (36.4 °C), Max:98 °F (36.7 °C)    Temp  Min: 97.5 °F (36.4 °C)  Max: 98 °F (36.7 °C)  BP  Min: 131/93  Max: 166/108  Pulse  Min: 71  Max: 102  Resp  Min: 16  Max: 18  SpO2  Min: 90 %  Max: 99 %    GENERAL: Awake and alert, in no acute distress.   HEENT: Normocephalic, atraumatic.  PERRL. EOMI. No conjunctival injection. No icterus. Oropharynx clear without evidence of thrush or exudate.  NECK: Supple  HEART:  3/6 systolic murmur.  No pericardial rub.  LUNGS: Clear to auscultation bilaterally without wheezing, rales, rhonchi. Normal respiratory effort.   ABDOMEN: Soft, nontender, nondistended. No rebound or guarding. NO mass or HSM.  EXT:  No cyanosis, clubbing or edema.   :  Without Tamez catheter.  MSK: No joint effusions or erythema  SKIN: Warm and dry without cutaneous eruptions on Inspection/palpation.    NEURO: Oriented to PPT.  Motor 5/5 strength  PSYCHIATRIC: Normal insight and judgment. Cooperative with PE    Laboratory Data    Results from last 7 days   Lab Units 24  6467  "09/27/24  0753 09/26/24  0333   WBC 10*3/mm3 3.96 3.79 4.65   HEMOGLOBIN g/dL 8.6* 9.0* 7.8*   HEMATOCRIT % 29.3* 31.1* 26.4*   PLATELETS 10*3/mm3 194 148 164     Results from last 7 days   Lab Units 09/28/24  0416   SODIUM mmol/L 136   POTASSIUM mmol/L 5.5*   CHLORIDE mmol/L 104   CO2 mmol/L 19.0*   BUN mg/dL 15   CREATININE mg/dL 0.81   GLUCOSE mg/dL 57*   CALCIUM mg/dL 9.6     Results from last 7 days   Lab Units 09/25/24  2152   ALK PHOS U/L 127*   BILIRUBIN mg/dL 0.6   ALT (SGPT) U/L 147*   AST (SGOT) U/L 265*             Results from last 7 days   Lab Units 09/26/24  0333   LACTATE mmol/L 1.8         Results from last 7 days   Lab Units 09/26/24  0333   VANCOMYCIN RM mcg/mL 34.50     Estimated Creatinine Clearance: 91.6 mL/min (by C-G formula based on SCr of 0.81 mg/dL).      Microbiology:  Blood Culture   Date Value Ref Range Status   09/24/2024 No growth at 24 hours  Preliminary     No results found for: \"BCIDPCR\", \"CXREFLEX\", \"CSFCX\", \"CULTURETIS\"  No results found for: \"CULTURES\", \"HSVCX\", \"URCX\"  No results found for: \"EYECULTURE\", \"GCCX\", \"HSVCULTURE\", \"LABHSV\"  No results found for: \"LEGIONELLA\", \"MRSACX\", \"MUMPSCX\", \"MYCOPLASCX\"  No results found for: \"NOCARDIACX\", \"STOOLCX\"  No results found for: \"THROATCX\", \"UNSTIMCULT\", \"URINECX\", \"CULTURE\", \"VZVCULTUR\"  No results found for: \"VIRALCULTU\", \"WOUNDCX\"        Radiology:  Imaging Results (Last 72 Hours)       ** No results found for the last 72 hours. **              Impression:   1.  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  He is at high risk for persistent enterococcal infection.  He is now on high-dose intravenous ampicillin and ceftriaxone.  2.  Enterococcal bacteremia-6/24  3.  Corynebacterium bacteremia-this is likely a skin contaminant  4. History of right knee septic arthritis-6/24  5. Valvular heart disease-he may require mitral valve and tricuspid valve surgical intervention  6. "  Ischemic cardiomyopathy  7.  HFrEF-his EF is 25%  8.  Transaminitis-this may be secondary to congestive hepatopathy related to his severe TR.  9.  Anemia  10.  Atrial fibrillation  11.  History of penicillin allergy- he is tolerating ampicillin   12.  Coronary artery disease    PLAN/RECOMMENDATIONS:   1.  Blood cultures-pending  2.  ICD lead extraction  3.  Rocephin 2 g IV every 12 hours  4.  Ampicillin 2 g IV every 4 hours  5.  CT surgery evaluation for possible mitral valve repair/replacement and tricuspid valve replacement.       Flo Marcial MD  9/28/2024  08:15 EDT

## 2024-09-29 LAB
ANION GAP SERPL CALCULATED.3IONS-SCNC: 13 MMOL/L (ref 5–15)
BACTERIA SPEC AEROBE CULT: NORMAL
BASOPHILS # BLD AUTO: 0.05 10*3/MM3 (ref 0–0.2)
BASOPHILS NFR BLD AUTO: 1.1 % (ref 0–1.5)
BUN SERPL-MCNC: 18 MG/DL (ref 8–23)
BUN/CREAT SERPL: 21.4 (ref 7–25)
CALCIUM SPEC-SCNC: 8.8 MG/DL (ref 8.6–10.5)
CHLORIDE SERPL-SCNC: 101 MMOL/L (ref 98–107)
CO2 SERPL-SCNC: 21 MMOL/L (ref 22–29)
CREAT SERPL-MCNC: 0.84 MG/DL (ref 0.76–1.27)
CRP SERPL-MCNC: 4.21 MG/DL (ref 0–0.5)
DEPRECATED RDW RBC AUTO: 58.4 FL (ref 37–54)
EGFRCR SERPLBLD CKD-EPI 2021: 96.2 ML/MIN/1.73
EOSINOPHIL # BLD AUTO: 0.71 10*3/MM3 (ref 0–0.4)
EOSINOPHIL NFR BLD AUTO: 16.3 % (ref 0.3–6.2)
ERYTHROCYTE [DISTWIDTH] IN BLOOD BY AUTOMATED COUNT: 18.8 % (ref 12.3–15.4)
ERYTHROCYTE [SEDIMENTATION RATE] IN BLOOD: 55 MM/HR (ref 0–20)
GLUCOSE SERPL-MCNC: 75 MG/DL (ref 65–99)
HCT VFR BLD AUTO: 28.6 % (ref 37.5–51)
HGB BLD-MCNC: 8.7 G/DL (ref 13–17.7)
IMM GRANULOCYTES # BLD AUTO: 0.02 10*3/MM3 (ref 0–0.05)
IMM GRANULOCYTES NFR BLD AUTO: 0.5 % (ref 0–0.5)
LYMPHOCYTES # BLD AUTO: 1.11 10*3/MM3 (ref 0.7–3.1)
LYMPHOCYTES NFR BLD AUTO: 25.5 % (ref 19.6–45.3)
MAGNESIUM SERPL-MCNC: 1.7 MG/DL (ref 1.6–2.4)
MCH RBC QN AUTO: 25.7 PG (ref 26.6–33)
MCHC RBC AUTO-ENTMCNC: 30.4 G/DL (ref 31.5–35.7)
MCV RBC AUTO: 84.4 FL (ref 79–97)
MONOCYTES # BLD AUTO: 0.5 10*3/MM3 (ref 0.1–0.9)
MONOCYTES NFR BLD AUTO: 11.5 % (ref 5–12)
NEUTROPHILS NFR BLD AUTO: 1.97 10*3/MM3 (ref 1.7–7)
NEUTROPHILS NFR BLD AUTO: 45.1 % (ref 42.7–76)
NRBC BLD AUTO-RTO: 0 /100 WBC (ref 0–0.2)
PHOSPHATE SERPL-MCNC: 2.7 MG/DL (ref 2.5–4.5)
PLATELET # BLD AUTO: 205 10*3/MM3 (ref 140–450)
PMV BLD AUTO: 10.8 FL (ref 6–12)
POTASSIUM SERPL-SCNC: 3.8 MMOL/L (ref 3.5–5.2)
RBC # BLD AUTO: 3.39 10*6/MM3 (ref 4.14–5.8)
SODIUM SERPL-SCNC: 135 MMOL/L (ref 136–145)
WBC NRBC COR # BLD AUTO: 4.36 10*3/MM3 (ref 3.4–10.8)

## 2024-09-29 PROCEDURE — 84100 ASSAY OF PHOSPHORUS: CPT | Performed by: INTERNAL MEDICINE

## 2024-09-29 PROCEDURE — 25810000003 SODIUM CHLORIDE 0.9 % SOLUTION 250 ML FLEX CONT: Performed by: INTERNAL MEDICINE

## 2024-09-29 PROCEDURE — 25010000002 MAGNESIUM SULFATE 4 GM/100ML SOLUTION: Performed by: INTERNAL MEDICINE

## 2024-09-29 PROCEDURE — 99232 SBSQ HOSP IP/OBS MODERATE 35: CPT | Performed by: INTERNAL MEDICINE

## 2024-09-29 PROCEDURE — 99232 SBSQ HOSP IP/OBS MODERATE 35: CPT

## 2024-09-29 PROCEDURE — 25010000002 ENOXAPARIN PER 10 MG: Performed by: INTERNAL MEDICINE

## 2024-09-29 PROCEDURE — 83735 ASSAY OF MAGNESIUM: CPT | Performed by: INTERNAL MEDICINE

## 2024-09-29 PROCEDURE — 25010000002 NA FERRIC GLUC CPLX PER 12.5 MG: Performed by: INTERNAL MEDICINE

## 2024-09-29 PROCEDURE — 80048 BASIC METABOLIC PNL TOTAL CA: CPT | Performed by: INTERNAL MEDICINE

## 2024-09-29 PROCEDURE — 25010000002 AMPICILLIN PER 500 MG: Performed by: INTERNAL MEDICINE

## 2024-09-29 PROCEDURE — 25010000002 CEFTRIAXONE PER 250 MG: Performed by: INTERNAL MEDICINE

## 2024-09-29 PROCEDURE — 86140 C-REACTIVE PROTEIN: CPT | Performed by: INTERNAL MEDICINE

## 2024-09-29 PROCEDURE — 99232 SBSQ HOSP IP/OBS MODERATE 35: CPT | Performed by: PHYSICIAN ASSISTANT

## 2024-09-29 PROCEDURE — 85652 RBC SED RATE AUTOMATED: CPT | Performed by: INTERNAL MEDICINE

## 2024-09-29 PROCEDURE — 85025 COMPLETE CBC W/AUTO DIFF WBC: CPT | Performed by: INTERNAL MEDICINE

## 2024-09-29 RX ORDER — MAGNESIUM SULFATE HEPTAHYDRATE 40 MG/ML
4 INJECTION, SOLUTION INTRAVENOUS ONCE
Status: COMPLETED | OUTPATIENT
Start: 2024-09-29 | End: 2024-09-29

## 2024-09-29 RX ADMIN — SODIUM CHLORIDE 250 MG: 9 INJECTION, SOLUTION INTRAVENOUS at 13:50

## 2024-09-29 RX ADMIN — AMPICILLIN SODIUM 2 G: 2 INJECTION, POWDER, FOR SOLUTION INTRAMUSCULAR; INTRAVENOUS at 02:20

## 2024-09-29 RX ADMIN — TRAMADOL HYDROCHLORIDE 50 MG: 50 TABLET ORAL at 08:46

## 2024-09-29 RX ADMIN — AMPICILLIN SODIUM 2 G: 2 INJECTION, POWDER, FOR SOLUTION INTRAMUSCULAR; INTRAVENOUS at 13:45

## 2024-09-29 RX ADMIN — TRAMADOL HYDROCHLORIDE 50 MG: 50 TABLET ORAL at 18:24

## 2024-09-29 RX ADMIN — SODIUM CHLORIDE 2000 MG: 900 INJECTION INTRAVENOUS at 22:18

## 2024-09-29 RX ADMIN — ENOXAPARIN SODIUM 30 MG: 30 INJECTION SUBCUTANEOUS at 08:47

## 2024-09-29 RX ADMIN — FUROSEMIDE 40 MG: 40 TABLET ORAL at 08:46

## 2024-09-29 RX ADMIN — OXYCODONE HYDROCHLORIDE AND ACETAMINOPHEN 1 TABLET: 5; 325 TABLET ORAL at 22:35

## 2024-09-29 RX ADMIN — SODIUM CHLORIDE 2000 MG: 900 INJECTION INTRAVENOUS at 06:58

## 2024-09-29 RX ADMIN — AMPICILLIN SODIUM 2 G: 2 INJECTION, POWDER, FOR SOLUTION INTRAMUSCULAR; INTRAVENOUS at 06:20

## 2024-09-29 RX ADMIN — MAGNESIUM SULFATE IN WATER FOR 4 G: 40 INJECTION INTRAVENOUS at 08:22

## 2024-09-29 RX ADMIN — Medication 10 ML: at 22:14

## 2024-09-29 RX ADMIN — AMPICILLIN SODIUM 2 G: 2 INJECTION, POWDER, FOR SOLUTION INTRAMUSCULAR; INTRAVENOUS at 18:13

## 2024-09-29 RX ADMIN — OXYCODONE HYDROCHLORIDE AND ACETAMINOPHEN 1 TABLET: 5; 325 TABLET ORAL at 11:09

## 2024-09-29 RX ADMIN — ALPRAZOLAM 0.25 MG: 0.25 TABLET ORAL at 11:43

## 2024-09-29 RX ADMIN — ALPRAZOLAM 0.25 MG: 0.25 TABLET ORAL at 22:35

## 2024-09-29 RX ADMIN — AMPICILLIN SODIUM 2 G: 2 INJECTION, POWDER, FOR SOLUTION INTRAMUSCULAR; INTRAVENOUS at 22:14

## 2024-09-29 RX ADMIN — Medication 10 ML: at 08:47

## 2024-09-29 NOTE — PROGRESS NOTES
[facility]  Progress Note       LOS: 4 days   Patient Care Team:  Shanelle Barrow APRN as PCP - General (Nurse Practitioner)  Zeina Moya APRN as Nurse Practitioner (Cardiology)  Shanelle Barrow APRN (Nurse Practitioner)  Jose Jones MD as Consulting Physician (Cardiology)    Chief Complaint:  follow up endocarditis     Subjective   Reports back pain is improved with pain medicine. Denies any CP, SOB, palpitations.       Review of Systems:   Pertinent positives in HPI, all others reviewed and negative.      Objective       Current Facility-Administered Medications:     acetaminophen (TYLENOL) tablet 650 mg, 650 mg, Oral, Q4H PRN **OR** acetaminophen (TYLENOL) 160 MG/5ML oral solution 650 mg, 650 mg, Oral, Q4H PRN **OR** acetaminophen (TYLENOL) suppository 650 mg, 650 mg, Rectal, Q4H PRN, Jose Jones MD    acetaminophen (TYLENOL) tablet 650 mg, 650 mg, Oral, Q4H PRN, Jose Jones MD    ALPRAZolam (XANAX) tablet 0.25 mg, 0.25 mg, Oral, TID PRN, Jose Jones MD, 0.25 mg at 09/29/24 1143    ampicillin 2000 mg IVPB in 100 mL NS (MBP), 2 g, Intravenous, Q4H, Jose Jones MD, Last Rate: 200 mL/hr at 09/29/24 1345, 2 g at 09/29/24 1345    sennosides-docusate (PERICOLACE) 8.6-50 MG per tablet 2 tablet, 2 tablet, Oral, BID PRN **AND** polyethylene glycol (MIRALAX) packet 17 g, 17 g, Oral, Daily PRN **AND** bisacodyl (DULCOLAX) EC tablet 5 mg, 5 mg, Oral, Daily PRN **AND** bisacodyl (DULCOLAX) suppository 10 mg, 10 mg, Rectal, Daily PRN, Jose Jones MD    Calcium Replacement - Follow Nurse / BPA Driven Protocol, , Does not apply, PRN, Jose Jones MD    cefTRIAXone (ROCEPHIN) 2,000 mg in sodium chloride 0.9 % 100 mL MBP, 2,000 mg, Intravenous, Q12H, Jose Jones MD, Last Rate: 200 mL/hr at 09/29/24 0658, 2,000 mg at 09/29/24 0658    diphenhydrAMINE (BENADRYL) injection 25 mg, 25 mg, Intravenous, Q6H PRN, Jose Jones MD    Enoxaparin Sodium  "(LOVENOX) syringe 30 mg, 30 mg, Subcutaneous, Daily, Jose Jones MD, 30 mg at 09/29/24 0847    ferric gluconate (FERRLECIT) 250 mg in sodium chloride 0.9 % 270 mL IVPB, 250 mg, Intravenous, Daily, Barbie Shah MD, Last Rate: 135 mL/hr at 09/29/24 1350, 250 mg at 09/29/24 1350    furosemide (LASIX) tablet 40 mg, 40 mg, Oral, Daily, Zeina Moya, APRN, 40 mg at 09/29/24 0846    Magnesium Cardiology Dose Replacement - Follow Nurse / BPA Driven Protocol, , Does not apply, PRRobert BUSTILLO Anthony, MD    nitroglycerin (NITROSTAT) SL tablet 0.4 mg, 0.4 mg, Sublingual, Q5 Min PRN, Jose Jones MD    oxyCODONE-acetaminophen (PERCOCET) 5-325 MG per tablet 1 tablet, 1 tablet, Oral, Q4H PRN, Barbie Shah MD, 1 tablet at 09/29/24 1109    Phosphorus Replacement - Follow Nurse / BPA Driven Protocol, , Does not apply, PRRobert BUSTILLO Anthony, MD    Potassium Replacement - Follow Nurse / BPA Driven Protocol, , Does not apply, Robert FLORES Anthony, MD    sodium chloride 0.9 % flush 10 mL, 10 mL, Intravenous, Q12H, Jose Jones MD, 10 mL at 09/29/24 0847    sodium chloride 0.9 % flush 10 mL, 10 mL, Intravenous, PRN, Jose Jones MD, 10 mL at 09/28/24 1756    sodium chloride 0.9 % infusion 40 mL, 40 mL, Intravenous, PRN, Jose Jones MD    traMADol (ULTRAM) tablet 50 mg, 50 mg, Oral, Q6H PRN, Barbie Shah MD, 50 mg at 09/29/24 0846    Vital Sign Min/Max for last 24 hours  Temp  Min: 96.5 °F (35.8 °C)  Max: 97.7 °F (36.5 °C)   BP  Min: 114/66  Max: 152/103   Pulse  Min: 70  Max: 73   Resp  Min: 18  Max: 20   SpO2  Min: 87 %  Max: 99 %   Flow (L/min)  Min: 2  Max: 2   Weight  Min: 76.3 kg (168 lb 3.4 oz)  Max: 76.3 kg (168 lb 3.4 oz)     Flowsheet Rows      Flowsheet Row First Filed Value   Admission Height 167.6 cm (66\") Documented at 09/25/2024 2130   Admission Weight 72.1 kg (159 lb) Documented at 09/25/2024 2130              Intake/Output Summary (Last 24 hours) at 9/29/2024 " 1432  Last data filed at 9/28/2024 1840  Gross per 24 hour   Intake 1390.96 ml   Output 250 ml   Net 1140.96 ml         Physical Exam:     General Appearance:    Alert, cooperative, in no acute distress   Lungs:     Clear to auscultation,respirations regular, even and                unlabored    Heart:    Regular rhythm and normal rate, normal S1 and S2, +2/6 murmur, no gallop, no rub, no click   Chest Wall:    No abnormalities observed   Abdomen:     Normal bowel sounds, no masses, no organomegaly, soft      nontender, nondistended, no guarding, no rebound                tenderness   Extremities:  Moves all extremities well, no edema, no cyanosis, no            redness              Pulses:   Pulses palpable and equal bilaterally   Skin:   No bleeding, bruising or rash        Results Review:   Results from last 7 days   Lab Units 09/29/24  0502 09/28/24  0416 09/27/24  0753   WBC 10*3/mm3 4.36 3.96 3.79   HEMOGLOBIN g/dL 8.7* 8.6* 9.0*   HEMATOCRIT % 28.6* 29.3* 31.1*   PLATELETS 10*3/mm3 205 194 148     Results from last 7 days   Lab Units 09/29/24  0502 09/28/24  0416 09/27/24  0753   SODIUM mmol/L 135* 136 133*   POTASSIUM mmol/L 3.8 5.5* 5.1   CHLORIDE mmol/L 101 104 104   CO2 mmol/L 21.0* 19.0* 14.0*   BUN mg/dL 18 15 12   CREATININE mg/dL 0.84 0.81 0.71*   GLUCOSE mg/dL 75 57* 74      Results from last 7 days   Lab Units 09/28/24  0416   HEMOGLOBIN A1C % 5.60     Results from last 7 days   Lab Units 09/28/24  0416   CHOLESTEROL mg/dL 74   TRIGLYCERIDES mg/dL 57   HDL CHOL mg/dL 17*             Results from last 7 days   Lab Units 09/25/24  2152   PROTIME Seconds 21.7*   INR  1.88*     Results from last 7 days   Lab Units 09/26/24  0020 09/25/24  2152   HSTROP T ng/L 13 14       Intake/Output Summary (Last 24 hours) at 9/29/2024 1432  Last data filed at 9/28/2024 1840  Gross per 24 hour   Intake 1390.96 ml   Output 250 ml   Net 1140.96 ml         I personally viewed and interpreted the patient's EKG/Telemetry  "data    EKG: none for review today     Telemetry: V paced, HR     Ejection Fraction  No results found for: \"EF\"    Echo EF Estimated  Lab Results   Component Value Date    ECHOEFEST 25.0 09/24/2024         Present on Admission:   Endocarditis   Chronic systolic heart failure   Coronary artery disease   Hypertension   Hypothyroidism   ICD (implantable cardioverter-defibrillator), biventricular, in situ   Ischemic cardiomyopathy   Obstructive sleep apnea   Persistent atrial fibrillation   Subclavian vein stenosis   V-tach   Heart valve disease   Severe malnutrition    Assessment & Plan   Ischemic cardiomyopathy/VT  Normal functioning resynchronization ICD with multiple lead vegetations  Permanent atrial fibrillation  AV node ablation/complete heart block  Pacemaker dependent  Endocarditis  Moderate MR and severe TR  Multiple vegetations  Antibiotics per ID, adjusting abx per cultures  Will ask surgery to place epicardial leads for future right sided ICD placement  After surgery he will need a leadless permanent pacemaker as bridge to future right sided resynchronization ICD.  LHC demonstrated no flow limiting CAD, widely patent LAD stents, LVEF 20%, moderate to severe MR   Will need full system extraction and concomitant valve surgery.  CT surgery following and planning procedure timing   Orthopedics consulted for history of right knee septic arthritis     Dr. Jones/Dr. Cruz to resume care tomorrow.         Electronically signed by FRANKIE Ortega, 09/29/24, 2:56 PM EDT.         "

## 2024-09-29 NOTE — PROGRESS NOTES
[facility] Medicine Services  PROGRESS NOTE    Patient Name: Howard Owusu  : 1960  MRN: [id number]    Date of Admission: 2024  Primary Care Physician: Shanelle Barrow APRN    Subjective   Subjective     CC:   Follow-up endocarditis    HPI:   Patient was seen and examined this morning.  Complaining of back pain but Percocet is helping.  Otherwise no acute issues    Objective   Objective     Vital Signs:   Temp:  [96.5 °F (35.8 °C)-97.7 °F (36.5 °C)] 97.5 °F (36.4 °C)  Heart Rate:  [70-73] 71  Resp:  [18-20] 18  BP: (114-152)/() 137/80  Flow (L/min):  [2] 2     Physical Exam:  General: Comfortable, not in distress, conversant and cooperative  Head: Atraumatic and normocephalic  Eyes: No Icterus. No pallor  Ears:  Ears appear intact with no abnormalities noted  Throat: No oral lesions, no thrush  Neck: Supple, trachea midline  Lungs: Clear to auscultation bilaterally, equal air entry, no wheezing or crackles  Heart:  Normal S1 and S2, no murmur, no gallop, No JVD, no lower extremity swelling  Abdomen:  Soft, no tenderness, no organomegaly, normal bowel sounds, no organomegaly  Extremities: pulses equal bilaterally  Skin: No bleeding, bruising or rash, normal skin turgor and elasticity  Neurologic: Cranial nerves appear intact with no evidence of facial asymmetry, normal motor and sensory functions in all 4 extremities  Psych: Alert and oriented x 3, normal mood      Results Reviewed:  LAB RESULTS:      Lab 24  0502 24  0416 24  0753 24  0333 24  0020 24  2152   WBC 4.36 3.96 3.79 4.65  --  4.43   HEMOGLOBIN 8.7* 8.6* 9.0* 7.8*  --  7.8*   HEMATOCRIT 28.6* 29.3* 31.1* 26.4*  --  26.3*   PLATELETS 205 194 148 164  --  160   NEUTROS ABS 1.97 2.13 1.90 2.82  --  2.62   IMMATURE GRANS (ABS) 0.02 0.01 0.01 0.01  --  0.01   LYMPHS ABS 1.11 0.86 0.78 0.79  --  0.65*   MONOS ABS 0.50 0.62 0.50 0.48  --  0.65   EOS ABS 0.71* 0.30 0.55* 0.53*  --   0.46*   MCV 84.4 86.7 87.6 88.3  --  87.7   PROCALCITONIN  --   --   --   --   --  0.08   LACTATE  --   --   --  1.8 2.3* 3.1*   LDH  --   --   --   --   --  292*   PROTIME  --   --   --   --   --  21.7*         Lab 09/29/24  0502 09/28/24  0416 09/27/24  0753 09/26/24  0333 09/25/24 2152   SODIUM 135* 136 133* 135* 133*   POTASSIUM 3.8 5.5* 5.1 4.2 4.3   CHLORIDE 101 104 104 104 103   CO2 21.0* 19.0* 14.0* 19.0* 18.0*   ANION GAP 13.0 13.0 15.0 12.0 12.0   BUN 18 15 12 15 15   CREATININE 0.84 0.81 0.71* 0.70* 0.82   EGFR 96.2 97.2 101.2 101.6 96.9   GLUCOSE 75 57* 74 79 107*   CALCIUM 8.8 9.6 9.0 8.7 8.8   MAGNESIUM 1.7 1.9 2.0 1.7 1.7   PHOSPHORUS 2.7 3.1 2.6 2.7 2.7   HEMOGLOBIN A1C  --  5.60  --   --   --          Lab 09/25/24 2152   TOTAL PROTEIN 7.3   ALBUMIN 3.2*   GLOBULIN 4.1   ALT (SGPT) 147*   AST (SGOT) 265*   BILIRUBIN 0.6   ALK PHOS 127*         Lab 09/26/24  0020 09/25/24 2152   HSTROP T 13 14   PROTIME  --  21.7*   INR  --  1.88*         Lab 09/28/24 0416   CHOLESTEROL 74   LDL CHOL 43   HDL CHOL 17*   TRIGLYCERIDES 57         Lab 09/26/24  0020 09/25/24 2152   IRON  --  14*   IRON SATURATION (TSAT)  --  3*   TIBC  --  405   TRANSFERRIN  --  272   FERRITIN  --  51.02   FOLATE 15.50  --    VITAMIN B 12 1,719*  --          Brief Urine Lab Results  (Last result in the past 365 days)        Color   Clarity   Blood   Leuk Est   Nitrite   Protein   CREAT   Urine HCG        02/16/24 0104 Dark Yellow   Clear     Negative                       Microbiology Results Abnormal       Procedure Component Value - Date/Time    Blood Culture - Blood, Arm, Left [[id number]]  (Normal) Collected: 09/25/24 2151    Lab Status: Preliminary result Specimen: Blood from Arm, Left Updated: 09/28/24 2300     Blood Culture No growth at 3 days    Narrative:      Aerobic Bottle Only      Blood Culture - Blood, Hand, Left [[id number]]  (Normal) Collected: 09/25/24 2151    Lab Status: Preliminary result Specimen: Blood from Hand,  Left Updated: 09/28/24 2300     Blood Culture No growth at 3 days            Cardiac Catheterization/Vascular Study    Result Date: 9/27/2024    No flow-limiting coronary artery disease   Widely patent LAD stents   LVEF 20%   Moderate to severe mitral regurgitation      Results for orders placed during the hospital encounter of 09/24/24    Adult Transesophageal Echo (MICHAEL) W/ Cont if Necessary Per Protocol    Interpretation Summary    Left ventricular systolic function is moderately decreased. Estimated left ventricular EF = 25%    Moderate mitral valve regurgitation is present.    2 electronic lead is present in the right atrium. Large spherical mass/vegetation on right ventricular lead near tricuspid annulus. A second larger 1 cm mobile vegetation on lead seen proximally. Third vegetation small seen on right atrial lead..    There is a small mobile mass on the tricuspid valve affecting the septal leaflet.    Severe tricuspid valve regurgitation is present.    Estimated right ventricular systolic pressure from tricuspid regurgitation is moderately elevated (45-55 mmHg).      Current medications:  Scheduled Meds:ampicillin, 2 g, Intravenous, Q4H  cefTRIAXone, 2,000 mg, Intravenous, Q12H  enoxaparin, 30 mg, Subcutaneous, Daily  ferric gluconate, 250 mg, Intravenous, Daily  furosemide, 40 mg, Oral, Daily  sodium chloride, 10 mL, Intravenous, Q12H      Continuous Infusions:   PRN Meds:.  acetaminophen **OR** acetaminophen **OR** acetaminophen    acetaminophen    ALPRAZolam    senna-docusate sodium **AND** polyethylene glycol **AND** bisacodyl **AND** bisacodyl    Calcium Replacement - Follow Nurse / BPA Driven Protocol    diphenhydrAMINE    Magnesium Cardiology Dose Replacement - Follow Nurse / BPA Driven Protocol    nitroglycerin    oxyCODONE-acetaminophen    Phosphorus Replacement - Follow Nurse / BPA Driven Protocol    Potassium Replacement - Follow Nurse / BPA Driven Protocol    sodium chloride    sodium chloride     traMADol    Assessment & Plan   Assessment & Plan     Active Hospital Problems    Diagnosis  POA    **Endocarditis [I38]  Yes    Heart valve disease [I38]  Yes    Severe malnutrition [E43]  Yes    Persistent atrial fibrillation [I48.19]  Yes    Chronic systolic heart failure [I50.22]  Yes    Subclavian vein stenosis [I87.1]  Yes    ICD (implantable cardioverter-defibrillator), biventricular, in situ [Z95.810]  Yes    V-tach [I47.20]  Yes    Hypertension [I10]  Yes    Hypothyroidism [E03.9]  Yes    Obstructive sleep apnea [G47.33]  Yes    Ischemic cardiomyopathy [I25.5]  Yes    Coronary artery disease [I25.10]  Yes      Resolved Hospital Problems   No resolved problems to display.        Brief Hospital Course to date:  Howard Owusu is a 64 y.o. male with chronic HFrEF, hypertension, hyperlipidemia, ischemic cardiomyopathy, hypothyroidism patient with recent dyspnea with exertion generalized weakness.  Echo done 9/24 revealed multiple vegetations on leads.     ICD leads endocarditis  History of prior endocarditis  MICHAEL with multiple vegetations and possible lead involvement  Left heart cath 9/27/2024 with nonobstructive coronary artery disease and patent stent to LAD.  EF 20%  Blood cultures from 9/24 growing Corynebacterium.  Currently on Rocephin and ampicillin per ID  Cardiology/EP following, patient will need pacemaker extraction in addition to valve surgery.  They also recommend placement of epicardial leads for future right-sided ICD placemen  CTS consulted to evaluate the patient for mitral valve repair versus replacement with tricuspid valve replacement and lead extractions     Chronic anemia  Severe iron deficiency  Hemoglobin stable around 8.  No active bleeding  Given severe iron deficiency anemia, will start IV iron infusion x 3 days    CAD  Hyperlipidemia  Hypertension  Ischemic cardiomyopathy/HFrEF EF 25%  Hypertension  Continue Pradaxa, aspirin, statin and Bumex     Hypothyroidism  Continue  Synthroid     Back pain  Continue pain control    GILL    Expected Discharge Location and Transportation: TBD  Expected Discharge   Expected Discharge Date: 10/4/2024; Expected Discharge Time:      VTE Prophylaxis:  Pharmacologic VTE prophylaxis orders are present.      AM-PAC 6 Clicks Score (PT): 24 (09/28/24 2000)    CODE STATUS:   Code Status and Medical Interventions: CPR (Attempt to Resuscitate); Full Support   Ordered at: 09/25/24 6569     Level Of Support Discussed With:    Patient     Code Status (Patient has no pulse and is not breathing):    CPR (Attempt to Resuscitate)     Medical Interventions (Patient has pulse or is breathing):    Full Support       Barbie Shah MD  09/29/24

## 2024-09-29 NOTE — PROGRESS NOTES
2 Days Post-Op       LOS: 4 days   Patient Care Team:  Shanelle Barrow APRN as PCP - General (Nurse Practitioner)  Zeina Moya APRN as Nurse Practitioner (Cardiology)  Shanelle Barrow APRN (Nurse Practitioner)  Jose Jones MD as Consulting Physician (Cardiology)    Chief complaint: No new complaints    Subjective   Denies chest pain, denies shortness of breath  Follow-up endocarditis  Objective    Vital Signs  Temp:  [96.5 °F (35.8 °C)-97.7 °F (36.5 °C)] 97.5 °F (36.4 °C)  Heart Rate:  [70-73] 71  Resp:  [18-20] 18  BP: (114-152)/() 137/80    Physical Exam:   General Appearance: alert, appears stated age and cooperative   Lungs: clear bilaterally   Heart: Regular rate and rhythm   M unchanged     Results     Results from last 7 days   Lab Units 09/29/24  0502   WBC 10*3/mm3 4.36   HEMOGLOBIN g/dL 8.7*   HEMATOCRIT % 28.6*   PLATELETS 10*3/mm3 205     Results from last 7 days   Lab Units 09/29/24  0502   SODIUM mmol/L 135*   POTASSIUM mmol/L 3.8   CHLORIDE mmol/L 101   CO2 mmol/L 21.0*   BUN mg/dL 18   CREATININE mg/dL 0.84   GLUCOSE mg/dL 75   CALCIUM mg/dL 8.8               Assessment      Endocarditis    Ischemic cardiomyopathy    Coronary artery disease    Hypertension    Hypothyroidism    Obstructive sleep apnea    V-tach    ICD (implantable cardioverter-defibrillator), biventricular, in situ    Subclavian vein stenosis    Chronic systolic heart failure    Persistent atrial fibrillation    Heart valve disease    Severe malnutrition  Positive blood cultures Corynebacterium species  Chronic low back pain  EF 20%  Plan   Continue antibiotics per ID  Orthopedics evaluating  the patient's history right knee septic arthritis  Continue evaluation for mitral valve repair versus replacement with tricuspid valve replacement and lead extractions    Tyler Rivera PA-C  09/29/24  09:13 EDT

## 2024-09-29 NOTE — PROGRESS NOTES
INFECTIOUS DISEASE Progress Note    Howard Owusu  1960  [id number]    Admission Date: 9/25/2024      Requesting Provider: No Known Provider  Evaluating Physician: Flo Marcial MD    Reason for Consultation: ICD lead endocarditis    History of present illness:      9/26/2024: Patient is a 64 y.o. 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  he underwent lead extraction of a failed RV lead and revision of a new RA/RV lead  with generator change on 5/25/2021 by Dr. Cruz.  he underwent a right knee arthroscopy and suffered a fall in June 2024 after which she developed sepsis with enterococcal bacteremia.  A MICHAEL at [facility] suggested possible lead thrombus and he was transferred to [facility].  Blood cultures grew Enterococcus and he received 6 weeks of intravenous antibiotic therapy which were completed on 8/5/2024.   He underwent a repeat MICHAEL here on 9/24/2024 revealing moderate mitral regurgitation, a large spherical mass/vegetation on the right ventricular lead near the tricuspid ambulance, and a second large 1 cm mobile vegetation on the lead proximally and a third vegetation on the right atrial lead.  There is a single mobile mass on the tricuspid valve leaflet affecting the septal leaflet.  He has severe tricuspid regurgitation.   Dr. Jones contacted me yesterday about this complex situation.  He informed me that the patient was hospitalized this summer at [facility] with multiple blood cultures positive for enterococcus and associated septic arthritis.  He had received 6 weeks of intravenous antibiotic therapy but did not undergo removal of his ICD leads which were noted to have vegetations versus thrombus.   We were concerned  that this likely represented ICD lead endocarditis and that he should undergo  ICD lead extraction.   Blood cultures were obtained on 9/24 with 1 out of 2 sets growing gram-positive  bacilli in the anaerobic bottle.  Repeat blood cultures were performed on 9/25.  He is now on intravenous vancomycin and Zosyn.  He has been afebrile since his admission.     I can visualize some of the records from [facility].  He was seen by Dr. 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   Infectious disease thought he would have a high risk for persistent infection/relapse without pacemaker lead extraction.  There was apparently some disagreement with cardiology who thought that he just had a thrombus  on the ICD lead and that the right knee septic arthritis was the primary focus of his enterococcal bacteremia.  He is listed as having a history of allergy to penicillin but has tolerated ampicillin.  He is currently tolerating Zosyn.   He complains of some chronic back pain after a fall.  He denies increased right knee pain.  He denies recurrent fevers and shaking chills.    9/27/2024:  He remains afebrile.  Blood cultures from 9/24 are growing corynebacterium  In 1 out of 2 sets.  Blood cultures from 9/25 are no growth so far.  He denies nausea and vomiting.  He has decreased malaise.    9/28/2024:   He remains afebrile.  White blood cell count is 4 and hemoglobin is 8.6.   Repeat blood cultures from 9/25 remain negative.   He denies nausea and vomiting.  Denies increased dyspnea.    9/29/2024:  He has remained afebrile.  O2 saturation is 99% on 2 L.     Creatinine is 0.84.   White blood cell count is 4.4.   Blood cultures from 9/25 remain no growth so far.      Past Medical History:   Diagnosis Date    Arthritis     Atrial fibrillation     single episode during PCI with conversion to NSR witin 24 hrs    Blood clot in  vein 05/07/2024    right groin    Coronary artery disease 2004    S/P Taxus RICHARD prox LAD, Cath June 2011 EF 20%, S/P distal RCA 3.0x12 and 3.0x8 Cypher, PLB 2.5x8 Cypher, and mid ALD 3.0x 13 Cypher    GERD (gastroesophageal reflux disease)     History of kidney stones     2000    Hyperlipidemia     Hypertension     Hypothyroidism     Ischemic cardiomyopathy     Hx of Inducible VT per EP study Nov 2004, S/P [employer] Eliezer ICD implant, upgrade to Bi-V iCD July 2008 Dr. Mtz June 2010 appropriate ICD shocks, Gen change 12/11/13 MGR    Obstructive sleep apnea     Old MI (myocardial infarction)        Past Surgical History:   Procedure Laterality Date    CARDIAC CATHETERIZATION      CARDIAC CATHETERIZATION N/A 03/14/2023    Procedure: Left Heart Cath;  Surgeon: Jose Jones MD;  Location:  [facility] LOCATION;  Service: Cardiovascular;  Laterality: N/A;    CARDIAC ELECTROPHYSIOLOGY PROCEDURE N/A 11/17/2020    Procedure: ICD DC generator change Generator change at patient's earliest convenience.  Given the atrial lead noise, will reevaluate at that time to see if atrial lead revision is also necessary. ;  Surgeon: Maurizio Cruz DO;  Location: [facility] INVASIVE LOCATION;  Service: Cardiology;  Laterality: N/A;    CARDIAC ELECTROPHYSIOLOGY PROCEDURE N/A 04/09/2021    Procedure: [employer]zer BiV ICD RV lead, new;  Surgeon: Maurizio Cruz DO;  Location: [facility] INVASIVE LOCATION;  Service: Cardiology;  Laterality: N/A;    CARDIAC ELECTROPHYSIOLOGY PROCEDURE N/A 05/25/2021    Procedure: RV ICD lead extraction (SJM) new RV lead insertion, no meds to hold, Hybrid OR, CT/OR back up;  Surgeon: Maurizio Cruz DO;  Location: [facility] INVASIVE LOCATION;  Service: Cardiovascular;  Laterality: N/A;    CARDIAC ELECTROPHYSIOLOGY PROCEDURE N/A 05/25/2021    Procedure: EP/CRM Study;  Surgeon: Marlon Bartlett MD;  Location: [facility] INVASIVE LOCATION;  Service: Cardiovascular;  Laterality: N/A;    CARDIAC ELECTROPHYSIOLOGY  PROCEDURE N/A 03/06/2024    Procedure: AV node ablation;  Surgeon: Maurizio Cruz DO;  Location: [facility] INV[facility] LOCATION;  Service: Cardiovascular;  Laterality: N/A;  please schedule after echo, ct of head and carotid    CARPAL TUNNEL RELEASE Bilateral     KIDNEY STONE SURGERY         Family History   Problem Relation Age of Onset    Pulmonary fibrosis Mother     Alzheimer's disease Mother     Heart attack Father     No Known Problems Sister     No Known Problems Brother     No Known Problems Brother        Social History     Socioeconomic History    Marital status:    Tobacco Use    Smoking status: Never     Passive exposure: Current    Smokeless tobacco: Never   Vaping Use    Vaping status: Never Used   Substance and Sexual Activity    Alcohol use: No    Drug use: No    Sexual activity: Defer       Allergies   Allergen Reactions    Morphine And Codeine Hives and Rash     Other Reaction(s): PAINFUL RASH    Penicillins Other (See Comments), Mental Status Change and Unknown - Low Severity     Patient reports tolerating amoxicillin.    Other Reaction(s): LOC      Patient reports tolerating amoxicillin. Patient has tolerated Zosyn    Sulfa Antibiotics Unknown - Low Severity, Other (See Comments) and Unknown (See Comments)     Was told by MD that he is allergic but does not know the reaction         Medication:    Current Facility-Administered Medications:     acetaminophen (TYLENOL) tablet 650 mg, 650 mg, Oral, Q4H PRN **OR** acetaminophen (TYLENOL) 160 MG/5ML oral solution 650 mg, 650 mg, Oral, Q4H PRN **OR** acetaminophen (TYLENOL) suppository 650 mg, 650 mg, Rectal, Q4H PRN, Jose Jones MD    acetaminophen (TYLENOL) tablet 650 mg, 650 mg, Oral, Q4H PRN, Jose Jones MD    ALPRAZolam (XANAX) tablet 0.25 mg, 0.25 mg, Oral, TID PRN, Jose Jones MD, 0.25 mg at 09/28/24 2209    ampicillin 2000 mg IVPB in 100 mL NS (MBP), 2 g, Intravenous, Q4H, Jose Jones MD, Last Rate: 200 mL/hr at  09/29/24 0620, 2 g at 09/29/24 0620    sennosides-docusate (PERICOLACE) 8.6-50 MG per tablet 2 tablet, 2 tablet, Oral, BID PRN **AND** polyethylene glycol (MIRALAX) packet 17 g, 17 g, Oral, Daily PRN **AND** bisacodyl (DULCOLAX) EC tablet 5 mg, 5 mg, Oral, Daily PRN **AND** bisacodyl (DULCOLAX) suppository 10 mg, 10 mg, Rectal, Daily PRN, Jose Jones MD    Calcium Replacement - Follow Nurse / BPA Driven Protocol, , Does not apply, PRN, Jose Jones MD    cefTRIAXone (ROCEPHIN) 2,000 mg in sodium chloride 0.9 % 100 mL MBP, 2,000 mg, Intravenous, Q12H, Jose Jones MD, Last Rate: 200 mL/hr at 09/29/24 0658, 2,000 mg at 09/29/24 0658    diphenhydrAMINE (BENADRYL) injection 25 mg, 25 mg, Intravenous, Q6H PRN, Jose Jones MD    Enoxaparin Sodium (LOVENOX) syringe 30 mg, 30 mg, Subcutaneous, Daily, Jose Jones MD, 30 mg at 09/28/24 0800    ferric gluconate (FERRLECIT) 250 mg in sodium chloride 0.9 % 270 mL IVPB, 250 mg, Intravenous, Daily, Barbie Shah MD, Last Rate: 135 mL/hr at 09/28/24 1551, 250 mg at 09/28/24 1551    furosemide (LASIX) tablet 40 mg, 40 mg, Oral, Daily, Zeina Moya, APRN, 40 mg at 09/28/24 0800    Magnesium Cardiology Dose Replacement - Follow Nurse / BPA Driven Protocol, , Does not apply, PRN, Jose Jones MD    magnesium sulfate 4g/100mL (PREMIX) infusion, 4 g, Intravenous, Once, Barbie Shah MD    nitroglycerin (NITROSTAT) SL tablet 0.4 mg, 0.4 mg, Sublingual, Q5 Min PRN, Jose Jones MD    oxyCODONE-acetaminophen (PERCOCET) 5-325 MG per tablet 1 tablet, 1 tablet, Oral, Q4H PRN, Barbie Shah MD, 1 tablet at 09/28/24 4025    Phosphorus Replacement - Follow Nurse / BPA Driven Protocol, , Does not apply, Robert FLORES Anthony, MD    Potassium Replacement - Follow Nurse / BPA Driven Protocol, , Does not apply, PRN, Jose Jones MD    sodium chloride 0.9 % flush 10 mL, 10 mL, Intravenous, Q12H, Jose Jones MD, 10 mL at 09/28/24  2209    sodium chloride 0.9 % flush 10 mL, 10 mL, Intravenous, PRN, Jose Jones MD, 10 mL at 24 1756    sodium chloride 0.9 % infusion 40 mL, 40 mL, Intravenous, PRN, Jose Jones MD    traMADol (ULTRAM) tablet 50 mg, 50 mg, Oral, Q6H PRN, Jose Jones MD, 50 mg at 24 1853    Antibiotics:  Anti-Infectives (From admission, onward)      Ordered     Dose/Rate Route Frequency Start Stop    24  ampicillin 2000 mg IVPB in 100 mL NS (MBP)        Ordering Provider: Jose Jones MD    2 g  200 mL/hr over 30 Minutes Intravenous Every 4 Hours 24 2300 24 2259    24 1824  cefTRIAXone (ROCEPHIN) 2,000 mg in sodium chloride 0.9 % 100 mL MBP        Ordering Provider: Jose Jones MD    2,000 mg  200 mL/hr over 30 Minutes Intravenous Every 12 Hours 24 1900 24 1859    24 2245  vancomycin IVPB 1750 mg in 0.9% Sodium Chloride (premix) 500 mL        Ordering Provider: Carlos Weaver RPH    1,750 mg  285.7 mL/hr over 105 Minutes Intravenous Once 24 0000 24 0303    24 2258  piperacillin-tazobactam (ZOSYN) 3.375 g IVPB in 100 mL NS MBP (CD)        Ordering Provider: Carlos Weaver RPH    3.375 g  over 30 Minutes Intravenous Once 24 2345 24 0101              Review of Systems:    See HPI    Physical Exam:   Vital Signs  Temp (24hrs), Av.4 °F (36.3 °C), Min:96.5 °F (35.8 °C), Max:97.7 °F (36.5 °C)    Temp  Min: 96.5 °F (35.8 °C)  Max: 97.7 °F (36.5 °C)  BP  Min: 114/66  Max: 152/103  Pulse  Min: 70  Max: 83  Resp  Min: 18  Max: 20  SpO2  Min: 87 %  Max: 99 %    GENERAL: Awake and alert, in no acute distress.   HEENT: Normocephalic, atraumatic.  PERRL. EOMI. No conjunctival injection. No icterus. Oropharynx clear without evidence of thrush or exudate.  NECK: Supple  HEART:  3/6 systolic murmur.  No pericardial rub.  LUNGS: Clear to auscultation bilaterally without wheezing, rales, rhonchi. Normal respiratory effort.   ABDOMEN:  "Soft, nontender, nondistended. No rebound or guarding. NO mass or HSM.  EXT: No right knee erythema.  :  Without Tamez catheter.  MSK: No joint effusions or erythema  SKIN: Warm and dry without cutaneous eruptions on Inspection/palpation.    NEURO: Oriented to PPT.  Motor 5/5 strength  PSYCHIATRIC: Normal insight and judgment. Cooperative with PE    Laboratory Data    Results from last 7 days   Lab Units 09/29/24  0502 09/28/24  0416 09/27/24  0753   WBC 10*3/mm3 4.36 3.96 3.79   HEMOGLOBIN g/dL 8.7* 8.6* 9.0*   HEMATOCRIT % 28.6* 29.3* 31.1*   PLATELETS 10*3/mm3 205 194 148     Results from last 7 days   Lab Units 09/29/24  0502   SODIUM mmol/L 135*   POTASSIUM mmol/L 3.8   CHLORIDE mmol/L 101   CO2 mmol/L 21.0*   BUN mg/dL 18   CREATININE mg/dL 0.84   GLUCOSE mg/dL 75   CALCIUM mg/dL 8.8     Results from last 7 days   Lab Units 09/25/24  2152   ALK PHOS U/L 127*   BILIRUBIN mg/dL 0.6   ALT (SGPT) U/L 147*   AST (SGOT) U/L 265*             Results from last 7 days   Lab Units 09/26/24  0333   LACTATE mmol/L 1.8         Results from last 7 days   Lab Units 09/26/24  0333   VANCOMYCIN RM mcg/mL 34.50     Estimated Creatinine Clearance: 93.4 mL/min (by C-G formula based on SCr of 0.84 mg/dL).      Microbiology:  Blood Culture   Date Value Ref Range Status   09/24/2024 No growth at 24 hours  Preliminary     No results found for: \"BCIDPCR\", \"CXREFLEX\", \"CSFCX\", \"CULTURETIS\"  No results found for: \"CULTURES\", \"HSVCX\", \"URCX\"  No results found for: \"EYECULTURE\", \"GCCX\", \"HSVCULTURE\", \"LABHSV\"  No results found for: \"LEGIONELLA\", \"MRSACX\", \"MUMPSCX\", \"MYCOPLASCX\"  No results found for: \"NOCARDIACX\", \"STOOLCX\"  No results found for: \"THROATCX\", \"UNSTIMCULT\", \"URINECX\", \"CULTURE\", \"VZVCULTUR\"  No results found for: \"VIRALCULTU\", \"WOUNDCX\"        Radiology:  Imaging Results (Last 72 Hours)       ** No results found for the last 72 hours. **              Impression:   1.  ICD lead endocarditis-with multiple vegetations on " the ICD leads and bicuspid valve along with severe TR.  He is at high risk for persistent enterococcal infection.  He is now on high-dose intravenous ampicillin and ceftriaxone.  2.  Enterococcal bacteremia-6/24  3.  Corynebacterium bacteremia-this is likely a skin contaminant  4. History of right knee septic arthritis-6/24.  This was secondary to his enterococcal pacemaker lead endocarditis.  5. Valvular heart disease-he may require mitral valve and tricuspid valve surgical intervention  6.  Ischemic cardiomyopathy  7.  HFrEF-his EF is 25%  8.  Transaminitis-this may be secondary to congestive hepatopathy related to his severe TR.  9.  Anemia  10.  Atrial fibrillation  11.  History of penicillin allergy- he is tolerating ampicillin   12.  Coronary artery disease    PLAN/RECOMMENDATIONS:   1.  Blood cultures-pending  2.  ICD lead extraction  3.  Rocephin 2 g IV every 12 hours  4.  Ampicillin 2 g IV every 4 hours  5.  CT surgery evaluation for possible mitral valve repair/replacement and tricuspid valve replacement.       Flo Marcial MD  9/29/2024  07:16 EDT

## 2024-09-30 ENCOUNTER — APPOINTMENT (OUTPATIENT)
Dept: CT IMAGING | Facility: HOSPITAL | Age: 64
DRG: 286 | End: 2024-09-30
Payer: MEDICARE

## 2024-09-30 ENCOUNTER — APPOINTMENT (OUTPATIENT)
Dept: GENERAL RADIOLOGY | Facility: HOSPITAL | Age: 64
DRG: 286 | End: 2024-09-30
Payer: MEDICARE

## 2024-09-30 LAB
ALBUMIN SERPL-MCNC: 3.1 G/DL (ref 3.5–5.2)
ALBUMIN/GLOB SERPL: 0.9 G/DL
ALP SERPL-CCNC: 110 U/L (ref 39–117)
ALT SERPL W P-5'-P-CCNC: 63 U/L (ref 1–41)
ANION GAP SERPL CALCULATED.3IONS-SCNC: 13 MMOL/L (ref 5–15)
AST SERPL-CCNC: 61 U/L (ref 1–40)
ATMOSPHERIC PRESS: ABNORMAL MM[HG]
BACTERIA SPEC AEROBE CULT: NORMAL
BACTERIA SPEC AEROBE CULT: NORMAL
BASE EXCESS BLDV CALC-SCNC: 1.7 MMOL/L (ref -2–2)
BASOPHILS # BLD AUTO: 0.04 10*3/MM3 (ref 0–0.2)
BASOPHILS NFR BLD AUTO: 0.7 % (ref 0–1.5)
BDY SITE: ABNORMAL
BILIRUB SERPL-MCNC: 0.7 MG/DL (ref 0–1.2)
BODY TEMPERATURE: 37
BUN SERPL-MCNC: 12 MG/DL (ref 8–23)
BUN/CREAT SERPL: 17.4 (ref 7–25)
CALCIUM SPEC-SCNC: 8.4 MG/DL (ref 8.6–10.5)
CHLORIDE SERPL-SCNC: 101 MMOL/L (ref 98–107)
CO2 BLDA-SCNC: 29.4 MMOL/L (ref 22–33)
CO2 SERPL-SCNC: 24 MMOL/L (ref 22–29)
COHGB MFR BLD: 1.7 %
CREAT SERPL-MCNC: 0.69 MG/DL (ref 0.76–1.27)
CRP SERPL-MCNC: 3.63 MG/DL (ref 0–0.5)
DEPRECATED RDW RBC AUTO: 58.4 FL (ref 37–54)
EGFRCR SERPLBLD CKD-EPI 2021: 102.1 ML/MIN/1.73
EOSINOPHIL # BLD AUTO: 0.94 10*3/MM3 (ref 0–0.4)
EOSINOPHIL NFR BLD AUTO: 17.5 % (ref 0.3–6.2)
EPAP: 0
ERYTHROCYTE [DISTWIDTH] IN BLOOD BY AUTOMATED COUNT: 19.3 % (ref 12.3–15.4)
GLOBULIN UR ELPH-MCNC: 3.6 GM/DL
GLUCOSE BLDC GLUCOMTR-MCNC: 106 MG/DL (ref 70–130)
GLUCOSE BLDC GLUCOMTR-MCNC: 118 MG/DL (ref 70–130)
GLUCOSE BLDC GLUCOMTR-MCNC: 159 MG/DL (ref 70–130)
GLUCOSE SERPL-MCNC: 80 MG/DL (ref 65–99)
HCO3 BLDV-SCNC: 27.8 MMOL/L (ref 22–28)
HCT VFR BLD AUTO: 27.3 % (ref 37.5–51)
HGB BLD-MCNC: 8.4 G/DL (ref 13–17.7)
HGB BLDA-MCNC: 8.9 G/DL (ref 13.5–17.5)
IMM GRANULOCYTES # BLD AUTO: 0.02 10*3/MM3 (ref 0–0.05)
IMM GRANULOCYTES NFR BLD AUTO: 0.4 % (ref 0–0.5)
INHALED O2 CONCENTRATION: 21 %
IPAP: 0
LYMPHOCYTES # BLD AUTO: 0.88 10*3/MM3 (ref 0.7–3.1)
LYMPHOCYTES NFR BLD AUTO: 16.4 % (ref 19.6–45.3)
MAGNESIUM SERPL-MCNC: 2.3 MG/DL (ref 1.6–2.4)
MCH RBC QN AUTO: 25.7 PG (ref 26.6–33)
MCHC RBC AUTO-ENTMCNC: 30.8 G/DL (ref 31.5–35.7)
MCV RBC AUTO: 83.5 FL (ref 79–97)
METHGB BLD QL: 0.2 %
MODALITY: ABNORMAL
MONOCYTES # BLD AUTO: 0.74 10*3/MM3 (ref 0.1–0.9)
MONOCYTES NFR BLD AUTO: 13.8 % (ref 5–12)
NEUTROPHILS NFR BLD AUTO: 2.74 10*3/MM3 (ref 1.7–7)
NEUTROPHILS NFR BLD AUTO: 51.2 % (ref 42.7–76)
NRBC BLD AUTO-RTO: 0.4 /100 WBC (ref 0–0.2)
OXYHGB MFR BLDV: 41.5 %
PAW @ PEAK INSP FLOW SETTING VENT: 0 CMH2O
PCO2 BLDV: 50.8 MM HG (ref 41–51)
PH BLDV: 7.35 PH UNITS (ref 7.31–7.41)
PHOSPHATE SERPL-MCNC: 2.6 MG/DL (ref 2.5–4.5)
PLATELET # BLD AUTO: 167 10*3/MM3 (ref 140–450)
PMV BLD AUTO: 10.6 FL (ref 6–12)
PO2 BLDV: 28.2 MM HG (ref 27–53)
POTASSIUM SERPL-SCNC: 3.3 MMOL/L (ref 3.5–5.2)
POTASSIUM SERPL-SCNC: 3.3 MMOL/L (ref 3.5–5.2)
PROT SERPL-MCNC: 6.7 G/DL (ref 6–8.5)
RBC # BLD AUTO: 3.27 10*6/MM3 (ref 4.14–5.8)
SODIUM SERPL-SCNC: 138 MMOL/L (ref 136–145)
TOTAL RATE: 0 BREATHS/MINUTE
WBC NRBC COR # BLD AUTO: 5.36 10*3/MM3 (ref 3.4–10.8)

## 2024-09-30 PROCEDURE — 83735 ASSAY OF MAGNESIUM: CPT | Performed by: INTERNAL MEDICINE

## 2024-09-30 PROCEDURE — 84100 ASSAY OF PHOSPHORUS: CPT | Performed by: INTERNAL MEDICINE

## 2024-09-30 PROCEDURE — 99232 SBSQ HOSP IP/OBS MODERATE 35: CPT

## 2024-09-30 PROCEDURE — 80053 COMPREHEN METABOLIC PANEL: CPT | Performed by: INTERNAL MEDICINE

## 2024-09-30 PROCEDURE — 86140 C-REACTIVE PROTEIN: CPT | Performed by: INTERNAL MEDICINE

## 2024-09-30 PROCEDURE — 73560 X-RAY EXAM OF KNEE 1 OR 2: CPT

## 2024-09-30 PROCEDURE — 82948 REAGENT STRIP/BLOOD GLUCOSE: CPT

## 2024-09-30 PROCEDURE — 25010000002 CEFTRIAXONE PER 250 MG: Performed by: INTERNAL MEDICINE

## 2024-09-30 PROCEDURE — 25810000003 SODIUM CHLORIDE 0.9 % SOLUTION 250 ML FLEX CONT: Performed by: INTERNAL MEDICINE

## 2024-09-30 PROCEDURE — 99231 SBSQ HOSP IP/OBS SF/LOW 25: CPT | Performed by: NURSE PRACTITIONER

## 2024-09-30 PROCEDURE — 70450 CT HEAD/BRAIN W/O DYE: CPT

## 2024-09-30 PROCEDURE — 99233 SBSQ HOSP IP/OBS HIGH 50: CPT | Performed by: HOSPITALIST

## 2024-09-30 PROCEDURE — 84132 ASSAY OF SERUM POTASSIUM: CPT | Performed by: HOSPITALIST

## 2024-09-30 PROCEDURE — 25010000002 NA FERRIC GLUC CPLX PER 12.5 MG: Performed by: INTERNAL MEDICINE

## 2024-09-30 PROCEDURE — 25010000002 ENOXAPARIN PER 10 MG: Performed by: INTERNAL MEDICINE

## 2024-09-30 PROCEDURE — 82805 BLOOD GASES W/O2 SATURATION: CPT

## 2024-09-30 PROCEDURE — 85025 COMPLETE CBC W/AUTO DIFF WBC: CPT | Performed by: INTERNAL MEDICINE

## 2024-09-30 PROCEDURE — 25010000002 AMPICILLIN PER 500 MG: Performed by: INTERNAL MEDICINE

## 2024-09-30 RX ORDER — POTASSIUM CHLORIDE 1500 MG/1
40 TABLET, EXTENDED RELEASE ORAL EVERY 4 HOURS
Status: COMPLETED | OUTPATIENT
Start: 2024-09-30 | End: 2024-10-01

## 2024-09-30 RX ORDER — POTASSIUM CHLORIDE 1500 MG/1
40 TABLET, EXTENDED RELEASE ORAL EVERY 4 HOURS
Status: COMPLETED | OUTPATIENT
Start: 2024-09-30 | End: 2024-09-30

## 2024-09-30 RX ADMIN — AMPICILLIN SODIUM 2 G: 2 INJECTION, POWDER, FOR SOLUTION INTRAMUSCULAR; INTRAVENOUS at 04:59

## 2024-09-30 RX ADMIN — TRAMADOL HYDROCHLORIDE 50 MG: 50 TABLET ORAL at 08:00

## 2024-09-30 RX ADMIN — AMPICILLIN SODIUM 2 G: 2 INJECTION, POWDER, FOR SOLUTION INTRAMUSCULAR; INTRAVENOUS at 01:17

## 2024-09-30 RX ADMIN — FUROSEMIDE 40 MG: 40 TABLET ORAL at 08:00

## 2024-09-30 RX ADMIN — AMPICILLIN SODIUM 2 G: 2 INJECTION, POWDER, FOR SOLUTION INTRAMUSCULAR; INTRAVENOUS at 17:31

## 2024-09-30 RX ADMIN — TRAMADOL HYDROCHLORIDE 50 MG: 50 TABLET ORAL at 17:31

## 2024-09-30 RX ADMIN — OXYCODONE HYDROCHLORIDE AND ACETAMINOPHEN 1 TABLET: 5; 325 TABLET ORAL at 14:24

## 2024-09-30 RX ADMIN — AMPICILLIN SODIUM 2 G: 2 INJECTION, POWDER, FOR SOLUTION INTRAMUSCULAR; INTRAVENOUS at 20:53

## 2024-09-30 RX ADMIN — ENOXAPARIN SODIUM 30 MG: 30 INJECTION SUBCUTANEOUS at 08:00

## 2024-09-30 RX ADMIN — POTASSIUM CHLORIDE 40 MEQ: 1500 TABLET, EXTENDED RELEASE ORAL at 20:53

## 2024-09-30 RX ADMIN — POTASSIUM CHLORIDE 40 MEQ: 1500 TABLET, EXTENDED RELEASE ORAL at 14:12

## 2024-09-30 RX ADMIN — AMPICILLIN SODIUM 2 G: 2 INJECTION, POWDER, FOR SOLUTION INTRAMUSCULAR; INTRAVENOUS at 08:51

## 2024-09-30 RX ADMIN — Medication 10 ML: at 21:34

## 2024-09-30 RX ADMIN — AMPICILLIN SODIUM 2 G: 2 INJECTION, POWDER, FOR SOLUTION INTRAMUSCULAR; INTRAVENOUS at 14:12

## 2024-09-30 RX ADMIN — POTASSIUM CHLORIDE 40 MEQ: 1500 TABLET, EXTENDED RELEASE ORAL at 07:47

## 2024-09-30 RX ADMIN — Medication 10 ML: at 08:01

## 2024-09-30 RX ADMIN — SODIUM CHLORIDE 2000 MG: 900 INJECTION INTRAVENOUS at 07:46

## 2024-09-30 RX ADMIN — SODIUM CHLORIDE 250 MG: 9 INJECTION, SOLUTION INTRAVENOUS at 10:59

## 2024-09-30 RX ADMIN — SODIUM CHLORIDE 2000 MG: 900 INJECTION INTRAVENOUS at 19:50

## 2024-09-30 NOTE — PROGRESS NOTES
[facility] Medicine Services  PROGRESS NOTE    Patient Name: Howard Owusu  : 1960  MRN: [id number]    Date of Admission: 2024  Primary Care Physician: Shanelle Barrow APRN    Subjective   Subjective     CC:   Follow-up endocarditis    HPI:   Patient was seen and examined this morning.  Complaining of back pain but Percocet is helping.  Otherwise no acute issues    Objective   Objective     Vital Signs:   Temp:  [97.5 °F (36.4 °C)-98 °F (36.7 °C)] 97.5 °F (36.4 °C)  Heart Rate:  [70-73] 73  Resp:  [16-20] 16  BP: (126-155)/(73-96) 126/87  Flow (L/min):  [1-2] 1     Physical Exam:  General: Comfortable, not in distress, conversant and cooperative  Head: Atraumatic and normocephalic  Eyes: No Icterus. No pallor  Ears:  Ears appear intact with no abnormalities noted  Throat: No oral lesions, no thrush  Neck: Supple, trachea midline  Lungs: Clear to auscultation bilaterally, equal air entry, no wheezing or crackles  Heart:  Normal S1 and S2, no murmur, no gallop, No JVD, no lower extremity swelling  Abdomen:  Soft, no tenderness, no organomegaly, normal bowel sounds, no organomegaly  Extremities: pulses equal bilaterally  Skin: No bleeding, bruising or rash, normal skin turgor and elasticity  Neurologic: Cranial nerves appear intact with no evidence of facial asymmetry, normal motor and sensory functions in all 4 extremities  Psych: Alert and oriented x 3, normal mood      Results Reviewed:  LAB RESULTS:      Lab 24  0509 24  0502 24  0416 24  0753 24  0333 24  0020 24  2152   WBC 5.36 4.36 3.96 3.79 4.65  --  4.43   HEMOGLOBIN 8.4* 8.7* 8.6* 9.0* 7.8*  --  7.8*   HEMATOCRIT 27.3* 28.6* 29.3* 31.1* 26.4*  --  26.3*   PLATELETS 167 205 194 148 164  --  160   NEUTROS ABS 2.74 1.97 2.13 1.90 2.82  --  2.62   IMMATURE GRANS (ABS) 0.02 0.02 0.01 0.01 0.01  --  0.01   LYMPHS ABS 0.88 1.11 0.86 0.78 0.79  --  0.65*   MONOS ABS 0.74 0.50 0.62  0.50 0.48  --  0.65   EOS ABS 0.94* 0.71* 0.30 0.55* 0.53*  --  0.46*   MCV 83.5 84.4 86.7 87.6 88.3  --  87.7   SED RATE  --  55*  --   --   --   --   --    CRP 3.63* 4.21*  --   --   --   --   --    PROCALCITONIN  --   --   --   --   --   --  0.08   LACTATE  --   --   --   --  1.8 2.3* 3.1*   LDH  --   --   --   --   --   --  292*   PROTIME  --   --   --   --   --   --  21.7*         Lab 09/30/24  0509 09/29/24  0502 09/28/24  0416 09/27/24  0753 09/26/24  0333   SODIUM 138 135* 136 133* 135*   POTASSIUM 3.3* 3.8 5.5* 5.1 4.2   CHLORIDE 101 101 104 104 104   CO2 24.0 21.0* 19.0* 14.0* 19.0*   ANION GAP 13.0 13.0 13.0 15.0 12.0   BUN 12 18 15 12 15   CREATININE 0.69* 0.84 0.81 0.71* 0.70*   EGFR 102.1 96.2 97.2 101.2 101.6   GLUCOSE 80 75 57* 74 79   CALCIUM 8.4* 8.8 9.6 9.0 8.7   MAGNESIUM 2.3 1.7 1.9 2.0 1.7   PHOSPHORUS 2.6 2.7 3.1 2.6 2.7   HEMOGLOBIN A1C  --   --  5.60  --   --          Lab 09/30/24  0509 09/25/24  2152   TOTAL PROTEIN 6.7 7.3   ALBUMIN 3.1* 3.2*   GLOBULIN 3.6 4.1   ALT (SGPT) 63* 147*   AST (SGOT) 61* 265*   BILIRUBIN 0.7 0.6   ALK PHOS 110 127*         Lab 09/26/24  0020 09/25/24  2152   HSTROP T 13 14   PROTIME  --  21.7*   INR  --  1.88*         Lab 09/28/24  0416   CHOLESTEROL 74   LDL CHOL 43   HDL CHOL 17*   TRIGLYCERIDES 57         Lab 09/26/24  0020 09/25/24 2152   IRON  --  14*   IRON SATURATION (TSAT)  --  3*   TIBC  --  405   TRANSFERRIN  --  272   FERRITIN  --  51.02   FOLATE 15.50  --    VITAMIN B 12 1,719*  --          Brief Urine Lab Results  (Last result in the past 365 days)        Color   Clarity   Blood   Leuk Est   Nitrite   Protein   CREAT   Urine HCG        02/16/24 0104 Dark Yellow   Clear     Negative                       Microbiology Results Abnormal       Procedure Component Value - Date/Time    Blood Culture - Blood, Arm, Left [[id number]]  (Normal) Collected: 09/25/24 2151    Lab Status: Preliminary result Specimen: Blood from Arm, Left Updated: 09/29/24 2300      Blood Culture No growth at 4 days    Narrative:      Aerobic Bottle Only      Blood Culture - Blood, Hand, Left [[id number]]  (Normal) Collected: 09/25/24 2151    Lab Status: Preliminary result Specimen: Blood from Hand, Left Updated: 09/29/24 2300     Blood Culture No growth at 4 days            No radiology results from the last 24 hrs    Results for orders placed during the hospital encounter of 09/24/24    Adult Transesophageal Echo (MICHAEL) W/ Cont if Necessary Per Protocol    Interpretation Summary    Left ventricular systolic function is moderately decreased. Estimated left ventricular EF = 25%    Moderate mitral valve regurgitation is present.    2 electronic lead is present in the right atrium. Large spherical mass/vegetation on right ventricular lead near tricuspid annulus. A second larger 1 cm mobile vegetation on lead seen proximally. Third vegetation small seen on right atrial lead..    There is a small mobile mass on the tricuspid valve affecting the septal leaflet.    Severe tricuspid valve regurgitation is present.    Estimated right ventricular systolic pressure from tricuspid regurgitation is moderately elevated (45-55 mmHg).      Current medications:  Scheduled Meds:ampicillin, 2 g, Intravenous, Q4H  cefTRIAXone, 2,000 mg, Intravenous, Q12H  enoxaparin, 30 mg, Subcutaneous, Daily  ferric gluconate, 250 mg, Intravenous, Daily  furosemide, 40 mg, Oral, Daily  potassium chloride ER, 40 mEq, Oral, Q4H  sodium chloride, 10 mL, Intravenous, Q12H      Continuous Infusions:   PRN Meds:.  acetaminophen **OR** acetaminophen **OR** acetaminophen    acetaminophen    ALPRAZolam    senna-docusate sodium **AND** polyethylene glycol **AND** bisacodyl **AND** bisacodyl    Calcium Replacement - Follow Nurse / BPA Driven Protocol    diphenhydrAMINE    Magnesium Cardiology Dose Replacement - Follow Nurse / BPA Driven Protocol    nitroglycerin    oxyCODONE-acetaminophen    Phosphorus Replacement - Follow Nurse / BPA  Driven Protocol    Potassium Replacement - Follow Nurse / BPA Driven Protocol    sodium chloride    sodium chloride    traMADol    Assessment & Plan   Assessment & Plan     Active Hospital Problems    Diagnosis  POA    **Endocarditis [I38]  Yes    Heart valve disease [I38]  Yes    Severe malnutrition [E43]  Yes    Persistent atrial fibrillation [I48.19]  Yes    Chronic systolic heart failure [I50.22]  Yes    Subclavian vein stenosis [I87.1]  Yes    ICD (implantable cardioverter-defibrillator), biventricular, in situ [Z95.810]  Yes    V-tach [I47.20]  Yes    Hypertension [I10]  Yes    Hypothyroidism [E03.9]  Yes    Obstructive sleep apnea [G47.33]  Yes    Ischemic cardiomyopathy [I25.5]  Yes    Coronary artery disease [I25.10]  Yes      Resolved Hospital Problems   No resolved problems to display.        Brief Hospital Course to date:  Howard Owusu is a 64 y.o. male with chronic HFrEF, hypertension, hyperlipidemia, ischemic cardiomyopathy, hypothyroidism patient with recent dyspnea with exertion generalized weakness.  Echo done 9/24 revealed multiple vegetations on leads.    Copied text in this note has been reviewed and is accurate as of 09/30/2024       ICD leads endocarditis  History of prior endocarditis  MICHAEL with multiple vegetations and possible lead involvement  Left heart cath 9/27/2024 with nonobstructive coronary artery disease and patent stent to LAD.  EF 20%  Blood cultures from 9/24 growing Corynebacterium.  Currently on Rocephin and ampicillin per ID  Cardiology/EP following, patient will need pacemaker extraction in addition to valve surgery.  They also recommend placement of epicardial leads for future right-sided ICD placemen  CTS consulted to evaluate the patient for mitral valve repair versus replacement with tricuspid valve replacement and lead extractions  After surgery he will need a leadless permanent pacemaker as bridge to future right sided resynchronization ICD per cardiology  note.    Concern for recent right septic knee joint.    -ID on board.  -Ortho eval per CTS request.  -Continue IV antibiotics per ID recommendations.      Chronic anemia  Severe iron deficiency  Hemoglobin stable around 8.  No active bleeding  Given severe iron deficiency anemia, will start IV iron infusion x 3 days    CAD  Hyperlipidemia  Hypertension  Ischemic cardiomyopathy/HFrEF EF 25%  Hypertension  Continue Pradaxa, aspirin, statin and Bumex     Hypothyroidism  Continue Synthroid     Back pain  Continue pain control    GILL    Mild confusion likely secondary to sepsis.  -Patient is moving his 4 extremities.  -No neurological deficit on my exam today.  -CT head ordered for further eval.    Expected Discharge Location and Transportation: D  Expected Discharge   Expected Discharge Date: 10/4/2024; Expected Discharge Time:      VTE Prophylaxis:  Pharmacologic VTE prophylaxis orders are present.      AM-PAC 6 Clicks Score (PT): 23 (09/29/24 2000)    CODE STATUS:   Code Status and Medical Interventions: CPR (Attempt to Resuscitate); Full Support   Ordered at: 09/25/24 7449     Level Of Support Discussed With:    Patient     Code Status (Patient has no pulse and is not breathing):    CPR (Attempt to Resuscitate)     Medical Interventions (Patient has pulse or is breathing):    Full Support       Sonia Sierra MD  09/30/24

## 2024-09-30 NOTE — PLAN OF CARE
Problem: Fall Injury Risk  Goal: Absence of Fall and Fall-Related Injury  Intervention: Identify and Manage Contributors  Recent Flowsheet Documentation  Taken 9/29/2024 2000 by Iqra Sánchez RN  Medication Review/Management: medications reviewed  Intervention: Promote Injury-Free Environment  Recent Flowsheet Documentation  Taken 9/30/2024 0600 by Iqra Sánchez RN  Safety Promotion/Fall Prevention:   activity supervised   assistive device/personal items within reach   clutter free environment maintained   lighting adjusted   mobility aid in reach   nonskid shoes/slippers when out of bed   room organization consistent   safety round/check completed  Taken 9/30/2024 0400 by Iqra Sánchez RN  Safety Promotion/Fall Prevention:   activity supervised   assistive device/personal items within reach   clutter free environment maintained   lighting adjusted   mobility aid in reach   nonskid shoes/slippers when out of bed   room organization consistent   safety round/check completed  Taken 9/30/2024 0200 by Iqra Sánchez RN  Safety Promotion/Fall Prevention:   activity supervised   assistive device/personal items within reach   clutter free environment maintained   lighting adjusted   mobility aid in reach   nonskid shoes/slippers when out of bed   room organization consistent   safety round/check completed  Taken 9/30/2024 0000 by Iqra Sánchez RN  Safety Promotion/Fall Prevention:   activity supervised   assistive device/personal items within reach   clutter free environment maintained   lighting adjusted   mobility aid in reach   nonskid shoes/slippers when out of bed   room organization consistent   safety round/check completed  Taken 9/29/2024 2200 by Iqra Sánchez, RN  Safety Promotion/Fall Prevention:   activity supervised   assistive device/personal items within reach   clutter free environment maintained   lighting adjusted   mobility aid in reach   nonskid shoes/slippers when out of bed   room  organization consistent   safety round/check completed  Taken 9/29/2024 2000 by Iqra Sánchez RN  Safety Promotion/Fall Prevention:   activity supervised   assistive device/personal items within reach   clutter free environment maintained   lighting adjusted   mobility aid in reach   nonskid shoes/slippers when out of bed   room organization consistent   safety round/check completed     Problem: Adult Inpatient Plan of Care  Goal: Absence of Hospital-Acquired Illness or Injury  Intervention: Identify and Manage Fall Risk  Recent Flowsheet Documentation  Taken 9/30/2024 0600 by Iqra Sánchez RN  Safety Promotion/Fall Prevention:   activity supervised   assistive device/personal items within reach   clutter free environment maintained   lighting adjusted   mobility aid in reach   nonskid shoes/slippers when out of bed   room organization consistent   safety round/check completed  Taken 9/30/2024 0400 by Iqra Sánchez RN  Safety Promotion/Fall Prevention:   activity supervised   assistive device/personal items within reach   clutter free environment maintained   lighting adjusted   mobility aid in reach   nonskid shoes/slippers when out of bed   room organization consistent   safety round/check completed  Taken 9/30/2024 0200 by Iqra Sánchez RN  Safety Promotion/Fall Prevention:   activity supervised   assistive device/personal items within reach   clutter free environment maintained   lighting adjusted   mobility aid in reach   nonskid shoes/slippers when out of bed   room organization consistent   safety round/check completed  Taken 9/30/2024 0000 by Iqra Sánchez RN  Safety Promotion/Fall Prevention:   activity supervised   assistive device/personal items within reach   clutter free environment maintained   lighting adjusted   mobility aid in reach   nonskid shoes/slippers when out of bed   room organization consistent   safety round/check completed  Taken 9/29/2024 2200 by Iqra Sánchez RN  Safety  Promotion/Fall Prevention:   activity supervised   assistive device/personal items within reach   clutter free environment maintained   lighting adjusted   mobility aid in reach   nonskid shoes/slippers when out of bed   room organization consistent   safety round/check completed  Taken 9/29/2024 2000 by Iqra Sánchez RN  Safety Promotion/Fall Prevention:   activity supervised   assistive device/personal items within reach   clutter free environment maintained   lighting adjusted   mobility aid in reach   nonskid shoes/slippers when out of bed   room organization consistent   safety round/check completed  Intervention: Prevent Skin Injury  Recent Flowsheet Documentation  Taken 9/30/2024 0600 by Iqra Sánchez RN  Body Position: position changed independently  Taken 9/30/2024 0400 by Iqra Sánchez RN  Body Position: position changed independently  Taken 9/30/2024 0200 by Iqra Sánchez RN  Body Position: position changed independently  Taken 9/30/2024 0000 by Iqra Sánchez RN  Body Position: position changed independently  Taken 9/29/2024 2200 by Iqra Sánchez RN  Body Position: position changed independently  Taken 9/29/2024 2000 by Iqra Sánchez RN  Body Position: position changed independently  Skin Protection:   adhesive use limited   incontinence pads utilized   transparent dressing maintained   tubing/devices free from skin contact  Intervention: Prevent and Manage VTE (Venous Thromboembolism) Risk  Recent Flowsheet Documentation  Taken 9/30/2024 0600 by Iqra Sánchez RN  Activity Management:   activity encouraged   up ad lo  Taken 9/30/2024 0400 by Iqra Sánchez RN  Activity Management: activity minimized  Taken 9/30/2024 0200 by Iqra Sánchez RN  Activity Management: activity minimized  Taken 9/30/2024 0000 by Iqra Sánchez RN  Activity Management: activity minimized  Taken 9/29/2024 2200 by Iqra Sánchez RN  Activity Management:   activity encouraged   up ad lo  Taken 9/29/2024 2000  by Walker, Iqra, RN  Activity Management:   activity encouraged   up ad lo  Range of Motion: active ROM (range of motion) encouraged  Intervention: Prevent Infection  Recent Flowsheet Documentation  Taken 9/30/2024 0600 by Iqra Sánchez RN  Infection Prevention:   single patient room provided   rest/sleep promoted   hand hygiene promoted   environmental surveillance performed  Taken 9/30/2024 0400 by Iqra Sánchez RN  Infection Prevention:   single patient room provided   rest/sleep promoted   hand hygiene promoted   environmental surveillance performed  Taken 9/30/2024 0200 by Iqra Sánchez RN  Infection Prevention:   single patient room provided   rest/sleep promoted   hand hygiene promoted   environmental surveillance performed  Taken 9/30/2024 0000 by Iqra Sánchez RN  Infection Prevention:   single patient room provided   rest/sleep promoted   hand hygiene promoted   environmental surveillance performed  Taken 9/29/2024 2200 by Iqra Sánchez RN  Infection Prevention:   single patient room provided   rest/sleep promoted   hand hygiene promoted   environmental surveillance performed  Taken 9/29/2024 2000 by Iqra Sánchez RN  Infection Prevention:   environmental surveillance performed   hand hygiene promoted   rest/sleep promoted   single patient room provided  Goal: Optimal Comfort and Wellbeing  Intervention: Provide Person-Centered Care  Recent Flowsheet Documentation  Taken 9/29/2024 2200 by Iqra Sánchez RN  Trust Relationship/Rapport: thoughts/feelings acknowledged  Taken 9/29/2024 2000 by Iqra Sánchez RN  Trust Relationship/Rapport: thoughts/feelings acknowledged     Problem: Pain Acute  Goal: Acceptable Pain Control and Functional Ability  Intervention: Prevent or Manage Pain  Recent Flowsheet Documentation  Taken 9/29/2024 2000 by Iqra Sánchez RN  Medication Review/Management: medications reviewed  Intervention: Optimize Psychosocial Wellbeing  Recent Flowsheet Documentation  Taken  9/29/2024 2200 by Iqra Sánchez RN  Diversional Activities: television  Taken 9/29/2024 2000 by Iqra Sánchez RN  Supportive Measures: active listening utilized  Diversional Activities: television     Problem: Adjustment to Illness (Heart Failure)  Goal: Optimal Coping  Intervention: Support Psychosocial Response  Recent Flowsheet Documentation  Taken 9/29/2024 2200 by Iqra Sánchez RN  Family/Support System Care: self-care encouraged  Taken 9/29/2024 2000 by Iqra Sánchez RN  Supportive Measures: active listening utilized  Family/Support System Care: self-care encouraged     Problem: Cardiac Output Decreased (Heart Failure)  Goal: Optimal Cardiac Output  Intervention: Optimize Cardiac Output  Recent Flowsheet Documentation  Taken 9/29/2024 2000 by Iqra Sánchez RN  Environmental Support: calm environment promoted     Problem: Fluid Imbalance (Heart Failure)  Goal: Fluid Balance  Intervention: Monitor and Manage Fluid Balance  Recent Flowsheet Documentation  Taken 9/29/2024 2000 by Iqra Sánchez RN  Fluid/Electrolyte Management: fluids provided     Problem: Functional Ability Impaired (Heart Failure)  Goal: Optimal Functional Ability  Intervention: Optimize Functional Ability  Recent Flowsheet Documentation  Taken 9/30/2024 0600 by Iqra Sánchez RN  Activity Management:   activity encouraged   up ad lo  Taken 9/30/2024 0400 by Iqra Sánchez RN  Activity Management: activity minimized  Taken 9/30/2024 0200 by Iqra Sánchez RN  Activity Management: activity minimized  Taken 9/30/2024 0000 by Iqra Sánchez RN  Activity Management: activity minimized  Taken 9/29/2024 2200 by Iqra Sánchez RN  Activity Management:   activity encouraged   up ad lo  Taken 9/29/2024 2000 by Iqra Sánchez RN  Activity Management:   activity encouraged   up ad lo     Problem: Arrhythmia/Dysrhythmia (Cardiac Catheterization)  Goal: Stable Heart Rate and Rhythm  Intervention: Monitor and Manage Cardiac Rhythm  Effect  Recent Flowsheet Documentation  Taken 9/29/2024 2000 by Iqra Sánchez RN  Fluid/Electrolyte Management: fluids provided     Problem: Ongoing Anesthesia/Sedation Effects (Cardiac Catheterization)  Goal: Anesthesia/Sedation Recovery  Intervention: Optimize Anesthesia Recovery  Recent Flowsheet Documentation  Taken 9/30/2024 0600 by Iqra Sánchez RN  Safety Promotion/Fall Prevention:   activity supervised   assistive device/personal items within reach   clutter free environment maintained   lighting adjusted   mobility aid in reach   nonskid shoes/slippers when out of bed   room organization consistent   safety round/check completed  Taken 9/30/2024 0400 by Iqra Sánchez RN  Safety Promotion/Fall Prevention:   activity supervised   assistive device/personal items within reach   clutter free environment maintained   lighting adjusted   mobility aid in reach   nonskid shoes/slippers when out of bed   room organization consistent   safety round/check completed  Taken 9/30/2024 0200 by Iqra Sánchez RN  Safety Promotion/Fall Prevention:   activity supervised   assistive device/personal items within reach   clutter free environment maintained   lighting adjusted   mobility aid in reach   nonskid shoes/slippers when out of bed   room organization consistent   safety round/check completed  Taken 9/30/2024 0000 by Iqra Sánchez RN  Safety Promotion/Fall Prevention:   activity supervised   assistive device/personal items within reach   clutter free environment maintained   lighting adjusted   mobility aid in reach   nonskid shoes/slippers when out of bed   room organization consistent   safety round/check completed  Taken 9/29/2024 2200 by Iqra Sánchez RN  Safety Promotion/Fall Prevention:   activity supervised   assistive device/personal items within reach   clutter free environment maintained   lighting adjusted   mobility aid in reach   nonskid shoes/slippers when out of bed   room organization  consistent   safety round/check completed  Taken 9/29/2024 2000 by Iqra Sánchez RN  Safety Promotion/Fall Prevention:   activity supervised   assistive device/personal items within reach   clutter free environment maintained   lighting adjusted   mobility aid in reach   nonskid shoes/slippers when out of bed   room organization consistent   safety round/check completed     Problem: Pain (Cardiac Catheterization)  Goal: Acceptable Pain Control  Intervention: Prevent or Manage Pain  Recent Flowsheet Documentation  Taken 9/29/2024 2200 by Iqra Sánchez RN  Diversional Activities: television  Taken 9/29/2024 2000 by Iqra Sánchez RN  Diversional Activities: television     Problem: Vascular Access Protection (Cardiac Catheterization)  Goal: Absence of Vascular Access Complication  Intervention: Prevent Access Site Complications  Recent Flowsheet Documentation  Taken 9/30/2024 0600 by Iqra Sánchez RN  Activity Management:   activity encouraged   up ad lo  Head of Bed (HOB) Positioning: HOB elevated  Taken 9/30/2024 0400 by Iqra Sánchez RN  Activity Management: activity minimized  Head of Bed (HOB) Positioning: HOB elevated  Taken 9/30/2024 0200 by Iqra Sánchez RN  Activity Management: activity minimized  Head of Bed (HOB) Positioning: HOB elevated  Taken 9/30/2024 0000 by Iqra Sánchez RN  Activity Management: activity minimized  Head of Bed (HOB) Positioning: HOB elevated  Taken 9/29/2024 2200 by Iqra Sánchez RN  Activity Management:   activity encouraged   up ad lo  Head of Bed (HOB) Positioning: HOB elevated  Taken 9/29/2024 2000 by Iqra Sánchez RN  Activity Management:   activity encouraged   up ad lo  Head of Bed (HOB) Positioning: HOB elevated   Goal Outcome Evaluation:

## 2024-09-30 NOTE — PROGRESS NOTES
"  [facility] at [facility]   Inpatient Progress Note       LOS: 5 days   Patient Care Team:  Shanelle Barrow APRN as PCP - General (Nurse Practitioner)  Zeina Moya APRN as Nurse Practitioner (Cardiology)  Shanelle Barrow APRN (Nurse Practitioner)  Jose Jones MD as Consulting Physician (Cardiology)    Chief Complaint: Follow-up for endocarditis and HFrEF    Subjective     Interval History:     Patient in bed.  No current CV complaints.    Review of Systems:   Pertinent positives noted in history, exam, and assessment. Otherwise reviewed and negative.      Objective     Vitals:  Blood pressure 126/87, pulse 73, temperature 97.5 °F (36.4 °C), temperature source Oral, resp. rate 16, height 167.6 cm (66\"), weight 76.3 kg (168 lb 3.4 oz), SpO2 100%.     Intake/Output Summary (Last 24 hours) at 9/30/2024 0823  Last data filed at 9/29/2024 1824  Gross per 24 hour   Intake 675 ml   Output --   Net 675 ml     Vitals reviewed.   Constitutional:       Appearance: Well-developed and not in distress.   Neck:      Vascular: No JVD.      Trachea: No tracheal deviation.   Pulmonary:      Effort: Pulmonary effort is normal.      Breath sounds: Normal breath sounds.   Cardiovascular:      Normal rate. Regular rhythm.      Murmurs: There is no murmur.   Edema:     Peripheral edema absent.   Musculoskeletal:         General: No deformity. Skin:     General: Skin is warm and dry.   Neurological:      Mental Status: Alert and oriented to person, place, and time.            Results Review:     I reviewed the patient's new clinical results.    Results from last 7 days   Lab Units 09/30/24  0509   WBC 10*3/mm3 5.36   HEMOGLOBIN g/dL 8.4*   HEMATOCRIT % 27.3*   PLATELETS 10*3/mm3 167     Results from last 7 days   Lab Units 09/30/24  0509   SODIUM mmol/L 138   POTASSIUM mmol/L 3.3*   CHLORIDE mmol/L 101   CO2 mmol/L 24.0   BUN mg/dL 12   CREATININE mg/dL 0.69*   CALCIUM mg/dL 8.4*   BILIRUBIN mg/dL 0.7   ALK PHOS " U/L 110   ALT (SGPT) U/L 63*   AST (SGOT) U/L 61*   GLUCOSE mg/dL 80     Results from last 7 days   Lab Units 09/30/24  0509   SODIUM mmol/L 138   POTASSIUM mmol/L 3.3*   CHLORIDE mmol/L 101   CO2 mmol/L 24.0   BUN mg/dL 12   CREATININE mg/dL 0.69*   GLUCOSE mg/dL 80   CALCIUM mg/dL 8.4*     Results from last 7 days   Lab Units 09/25/24  2152   INR  1.88*     Lab Results   Lab Value Date/Time    TROPONINT 13 09/26/2024 0020    TROPONINT 14 09/25/2024 2152    TROPONINT <0.010 04/08/2021 1802         Results from last 7 days   Lab Units 09/28/24  0416   CHOLESTEROL mg/dL 74   TRIGLYCERIDES mg/dL 57   HDL CHOL mg/dL 17*   LDL CHOL mg/dL 43         Results from last 7 days   Lab Units 09/26/24  0020 09/25/24  2152   HSTROP T ng/L 13 14     MICHAEL 9/24/2024    Left ventricular systolic function is moderately decreased. Estimated left ventricular EF = 25%    Moderate mitral valve regurgitation is present.    2 electronic lead is present in the right atrium. Large spherical mass/vegetation on right ventricular lead near tricuspid annulus. A second larger 1 cm mobile vegetation on lead seen proximally. Third vegetation small seen on right atrial lead..    There is a small mobile mass on the tricuspid valve affecting the septal leaflet.    Severe tricuspid valve regurgitation is present.    Estimated right ventricular systolic pressure from tricuspid regurgitation is moderately elevated (45-55 mmHg).    [facility] 9/27/24    No flow-limiting coronary artery disease    Widely patent LAD stents    LVEF 20%    Moderate to severe mitral regurgitation        Tele: Ventricular paced    Assessment:      Endocarditis    Ischemic cardiomyopathy    Coronary artery disease    Hypertension    Hypothyroidism    Obstructive sleep apnea    V-tach    ICD (implantable cardioverter-defibrillator), biventricular, in situ    Subclavian vein stenosis    Chronic systolic heart failure    Persistent atrial fibrillation    Heart valve disease    Severe  malnutrition      Chronic HFrEF  EF 25%.  GDMT had been recently discontinued secondary to hypotension during hospitalization at [facility].  Midodrine recently discontinued.  Endocarditis  Large spherical mass/vegetation on RV lead near tricuspid annulus.  1 cm mobile vegetation on lead seen proximally, third vegetation seen on right atrial lead.  Small mobile mass on the tricuspid valve affecting the septal leaflet.  Severe tricuspid regurgitation, with vegetation  Moderate mitral regurgitation, central, appears to be due to LV dilatation/dysfunction.  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  Have discussed with EP, and ID.  Will treat for endocarditis for now completely given the positive blood cultures preliminarily.  Given his multiple masses failed medical therapy, severe/wide-open TR, may be best served with operative intervention for tricuspid valve replacement, extraction of leads, and placement of epicardial leads.  Patient is pacemaker dependent will need transient away of LV pacing pending final implant of new BiV ICD.  Continue GDMT as tolerated by blood pressure for ischemic cardiomyopathy  Interventional cardiology will see again on Thursday.      FRANKIE Sung   Dictated utilizing Dragon dictation

## 2024-09-30 NOTE — PROGRESS NOTES
[facility] Cardiothoracic Surgery In-Patient Progress Note     LOS: 5 days     Chief Complaint: Endocarditis    Subjective  No acute events. Eating breakfast. Denies chest pain or dyspnea.      Objective  Vital Signs  Temp:  [97.5 °F (36.4 °C)-98 °F (36.7 °C)] 97.7 °F (36.5 °C)  Heart Rate:  [70-73] 71  Resp:  [18-20] 20  BP: (137-155)/(73-96) 149/87        Physical Exam:   General Appearance: alert, appears stated age and cooperative   Lungs: clear to auscultation, respirations regular, respirations even, and respirations unlabored   Heart: RRR; systolic murmur noted       Results     Results from last 7 days   Lab Units 09/30/24  0509   WBC 10*3/mm3 5.36   HEMOGLOBIN g/dL 8.4*   HEMATOCRIT % 27.3*   PLATELETS 10*3/mm3 167     Results from last 7 days   Lab Units 09/30/24  0509   SODIUM mmol/L 138   POTASSIUM mmol/L 3.3*   CHLORIDE mmol/L 101   CO2 mmol/L 24.0   BUN mg/dL 12   CREATININE mg/dL 0.69*   GLUCOSE mg/dL 80   CALCIUM mg/dL 8.4*     Assessment    Endocarditis    Ischemic cardiomyopathy    Coronary artery disease    Hypertension    Hypothyroidism    Obstructive sleep apnea    V-tach    ICD (implantable cardioverter-defibrillator), biventricular, in situ    Subclavian vein stenosis    Chronic systolic heart failure    Persistent atrial fibrillation    Heart valve disease    Severe malnutrition      Plan   Continue with preoperative workup and medical optimization  Awaiting ortho evaluation for recent right septic knee joint  Antibiotics per PONCHO Cardozo, APRN  09/30/24  07:47 EDT

## 2024-09-30 NOTE — PROGRESS NOTES
INFECTIOUS DISEASE Progress Note    Howard Owusu  1960  [id number]    Admission Date: 9/25/2024      Requesting Provider: No Known Provider  Evaluating Physician: Flo Marcial MD    Reason for Consultation: ICD lead endocarditis    History of present illness:      9/26/2024: Patient is a 64 y.o. 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  he underwent lead extraction of a failed RV lead and revision of a new RA/RV lead  with generator change on 5/25/2021 by Dr. Cruz.  he underwent a right knee arthroscopy and suffered a fall in June 2024 after which she developed sepsis with enterococcal bacteremia.  A MICHAEL at [facility] suggested possible lead thrombus and he was transferred to [facility].  Blood cultures grew Enterococcus and he received 6 weeks of intravenous antibiotic therapy which were completed on 8/5/2024.   He underwent a repeat MICHAEL here on 9/24/2024 revealing moderate mitral regurgitation, a large spherical mass/vegetation on the right ventricular lead near the tricuspid ambulance, and a second large 1 cm mobile vegetation on the lead proximally and a third vegetation on the right atrial lead.  There is a single mobile mass on the tricuspid valve leaflet affecting the septal leaflet.  He has severe tricuspid regurgitation.   Dr. Jones contacted me yesterday about this complex situation.  He informed me that the patient was hospitalized this summer at [facility] with multiple blood cultures positive for enterococcus and associated septic arthritis.  He had received 6 weeks of intravenous antibiotic therapy but did not undergo removal of his ICD leads which were noted to have vegetations versus thrombus.   We were concerned  that this likely represented ICD lead endocarditis and that he should undergo  ICD lead extraction.   Blood cultures were obtained on 9/24 with 1 out of 2 sets growing gram-positive  bacilli in the anaerobic bottle.  Repeat blood cultures were performed on 9/25.  He is now on intravenous vancomycin and Zosyn.  He has been afebrile since his admission.     I can visualize some of the records from [facility].  He was seen by Dr. 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   Infectious disease thought he would have a high risk for persistent infection/relapse without pacemaker lead extraction.  There was apparently some disagreement with cardiology who thought that he just had a thrombus  on the ICD lead and that the right knee septic arthritis was the primary focus of his enterococcal bacteremia.  He is listed as having a history of allergy to penicillin but has tolerated ampicillin.  He is currently tolerating Zosyn.   He complains of some chronic back pain after a fall.  He denies increased right knee pain.  He denies recurrent fevers and shaking chills.    9/27/2024:  He remains afebrile.  Blood cultures from 9/24 are growing corynebacterium  In 1 out of 2 sets.  Blood cultures from 9/25 are no growth so far.  He denies nausea and vomiting.  He has decreased malaise.    9/28/2024:   He remains afebrile.  White blood cell count is 4 and hemoglobin is 8.6.   Repeat blood cultures from 9/25 remain negative.   He denies nausea and vomiting.  Denies increased dyspnea.    9/29/2024:  He has remained afebrile.  O2 saturation is 99% on 2 L.     Creatinine is 0.84.   White blood cell count is 4.4.   Blood cultures from 9/25 remain no growth so far.    9/30/2029:  He has remained afebrile.  White blood cell count is 5.4.  C-reactive protein is 3.6.  Follow-up blood cultures from 9/25 remain negative.  His  appetite is improved today.  He denies abdominal pain.  He denies nausea and vomiting.  He denies increased dyspnea.    Past Medical History:   Diagnosis Date    Arthritis     Atrial fibrillation     single episode during PCI with conversion to NSR witin 24 hrs    Blood clot in vein 05/07/2024    right groin    Coronary artery disease 2004    S/P Taxus RICHARD prox LAD, Cath June 2011 EF 20%, S/P distal RCA 3.0x12 and 3.0x8 Cypher, PLB 2.5x8 Cypher, and mid ALD 3.0x 13 Cypher    GERD (gastroesophageal reflux disease)     History of kidney stones     2000    Hyperlipidemia     Hypertension     Hypothyroidism     Ischemic cardiomyopathy     Hx of Inducible VT per EP study Nov 2004, S/P [employer] El[employer]zer ICD implant, upgrade to Bi-V iCD July 2008 Dr. Mtz June 2010 appropriate ICD shocks, Gen change 12/11/13 MGR    Obstructive sleep apnea     Old MI (myocardial infarction)        Past Surgical History:   Procedure Laterality Date    CARDIAC CATHETERIZATION      CARDIAC CATHETERIZATION N/A 03/14/2023    Procedure: Left Heart Cath;  Surgeon: Jose Jones MD;  Location:  [facility];  Service: Cardiovascular;  Laterality: N/A;    CARDIAC ELECTROPHYSIOLOGY PROCEDURE N/A 11/17/2020    Procedure: ICD DC generator change Generator change at patient's earliest convenience.  Given the atrial lead noise, will reevaluate at that time to see if atrial lead revision is also necessary. ;  Surgeon: Maurizio Cruz DO;  Location:  [facility] EP INVASIVE LOCATION;  Service: Cardiology;  Laterality: N/A;    CARDIAC ELECTROPHYSIOLOGY PROCEDURE N/A 04/09/2021    Procedure: [employer] BiV ICD RV lead, new;  Surgeon: Maurizio Cruz DO;  Location:  [facility] EP INVASIVE LOCATION;  Service: Cardiology;  Laterality: N/A;    CARDIAC ELECTROPHYSIOLOGY PROCEDURE N/A 05/25/2021    Procedure: RV ICD lead extraction (SJM) new RV lead insertion, no meds to hold, Hybrid OR, CT/OR back up;  Surgeon: Maurizio Cruz DO;  Location:  [facility] EP INVASIVE  LOCATION;  Service: Cardiovascular;  Laterality: N/A;    CARDIAC ELECTROPHYSIOLOGY PROCEDURE N/A 05/25/2021    Procedure: EP/CRM Study;  Surgeon: Marlon Bartlett MD;  Location:  [facility] EP INVASIVE LOC[facility];  Service: Cardiovascular;  Laterality: N/A;    CARDIAC ELECTROPHYSIOLOGY PROCEDURE N/A 03/06/2024    Procedure: AV node ablation;  Surgeon: Maurizio Cruz DO;  Location:  [facility] EP INVASIVE LOCATION;  Service: Cardiovascular;  Laterality: N/A;  please schedule after echo, ct of head and carotid    CARPAL TUNNEL RELEASE Bilateral     KIDNEY STONE SURGERY         Family History   Problem Relation Age of Onset    Pulmonary fibrosis Mother     Alzheimer's disease Mother     Heart attack Father     No Known Problems Sister     No Known Problems Brother     No Known Problems Brother        Social History     Socioeconomic History    Marital status:    Tobacco Use    Smoking status: Never     Passive exposure: Current    Smokeless tobacco: Never   Vaping Use    Vaping status: Never Used   Substance and Sexual Activity    Alcohol use: No    Drug use: No    Sexual activity: Defer       Allergies   Allergen Reactions    Morphine And Codeine Hives and Rash     Other Reaction(s): PAINFUL RASH    Penicillins Other (See Comments), Mental Status Change and Unknown - Low Severity     Patient reports tolerating amoxicillin.    Other Reaction(s): LOC      Patient reports tolerating amoxicillin. Patient has tolerated Zosyn    Sulfa Antibiotics Unknown - Low Severity, Other (See Comments) and Unknown (See Comments)     Was told by MD that he is allergic but does not know the reaction         Medication:    Current Facility-Administered Medications:     acetaminophen (TYLENOL) tablet 650 mg, 650 mg, Oral, Q4H PRN **OR** acetaminophen (TYLENOL) 160 MG/5ML oral solution 650 mg, 650 mg, Oral, Q4H PRN **OR** acetaminophen (TYLENOL) suppository 650 mg, 650 mg, Rectal, Q4H PRN, Jose Jones MD    acetaminophen (TYLENOL)  tablet 650 mg, 650 mg, Oral, Q4H PRN, Jose Jones MD    ALPRAZolam (XANAX) tablet 0.25 mg, 0.25 mg, Oral, TID PRN, Jose Jones MD, 0.25 mg at 09/29/24 2235    ampicillin 2000 mg IVPB in 100 mL NS (MBP), 2 g, Intravenous, Q4H, Jose Jones MD, Last Rate: 200 mL/hr at 09/30/24 0459, 2 g at 09/30/24 0459    sennosides-docusate (PERICOLACE) 8.6-50 MG per tablet 2 tablet, 2 tablet, Oral, BID PRN **AND** polyethylene glycol (MIRALAX) packet 17 g, 17 g, Oral, Daily PRN **AND** bisacodyl (DULCOLAX) EC tablet 5 mg, 5 mg, Oral, Daily PRN **AND** bisacodyl (DULCOLAX) suppository 10 mg, 10 mg, Rectal, Daily PRN, Jose Jones MD    Calcium Replacement - Follow Nurse / BPA Driven Protocol, , Does not apply, PRN, Jose Jones MD    cefTRIAXone (ROCEPHIN) 2,000 mg in sodium chloride 0.9 % 100 mL MBP, 2,000 mg, Intravenous, Q12H, Jose Jones MD, Last Rate: 200 mL/hr at 09/29/24 2218, 2,000 mg at 09/29/24 2218    diphenhydrAMINE (BENADRYL) injection 25 mg, 25 mg, Intravenous, Q6H PRN, Jose Jones MD    Enoxaparin Sodium (LOVENOX) syringe 30 mg, 30 mg, Subcutaneous, Daily, Jose Jones MD, 30 mg at 09/29/24 0847    ferric gluconate (FERRLECIT) 250 mg in sodium chloride 0.9 % 270 mL IVPB, 250 mg, Intravenous, Daily, Barbie Shah MD, Last Rate: 135 mL/hr at 09/29/24 1350, 250 mg at 09/29/24 1350    furosemide (LASIX) tablet 40 mg, 40 mg, Oral, Daily, Zeina Moya APRN, 40 mg at 09/29/24 0846    Magnesium Cardiology Dose Replacement - Follow Nurse / BPA Driven Protocol, , Does not apply, Robert FLORES Anthony, MD    nitroglycerin (NITROSTAT) SL tablet 0.4 mg, 0.4 mg, Sublingual, Q5 Min PRN, Jose Jones MD    oxyCODONE-acetaminophen (PERCOCET) 5-325 MG per tablet 1 tablet, 1 tablet, Oral, Q4H PRN, Barbie Shah MD, 1 tablet at 09/29/24 2235    Phosphorus Replacement - Follow Nurse / BPA Driven Protocol, , Does not apply, PRN, Jose Jones MD    Potassium Replacement -  Follow Nurse / BPA Driven Protocol, , Does not apply, PRN, Jose Jones MD    sodium chloride 0.9 % flush 10 mL, 10 mL, Intravenous, Q12H, Jose Jones MD, 10 mL at 24 2214    sodium chloride 0.9 % flush 10 mL, 10 mL, Intravenous, PRN, Jose Jones MD, 10 mL at 24 1756    sodium chloride 0.9 % infusion 40 mL, 40 mL, Intravenous, PRN, Jose Jones MD    traMADol (ULTRAM) tablet 50 mg, 50 mg, Oral, Q6H PRN, Barbie Shah MD, 50 mg at 24 1824    Antibiotics:  Anti-Infectives (From admission, onward)      Ordered     Dose/Rate Route Frequency Start Stop    24  ampicillin 2000 mg IVPB in 100 mL NS (MBP)        Ordering Provider: Jose Jones MD    2 g  200 mL/hr over 30 Minutes Intravenous Every 4 Hours 24 2300 24 [id number] 182  cefTRIAXone (ROCEPHIN) 2,000 mg in sodium chloride 0.9 % 100 mL MBP        Ordering Provider: Jose Jones MD    2,000 mg  200 mL/hr over 30 Minutes Intravenous Every 12 Hours 24 1900 24 1859    24 2245  vancomycin IVPB 1750 mg in 0.9% Sodium Chloride (premix) 500 mL        Ordering Provider: Carlos Weaver RPH    1,750 mg  285.7 mL/hr over 105 Minutes Intravenous Once 24 0000 24 0303    24 2258  piperacillin-tazobactam (ZOSYN) 3.375 g IVPB in 100 mL NS MBP (CD)        Ordering Provider: Carlos Weaver RPH    3.375 g  over 30 Minutes Intravenous Once 24 2345 24 0101              Review of Systems:    See HPI    Physical Exam:   Vital Signs  Temp (24hrs), Av.8 °F (36.6 °C), Min:97.5 °F (36.4 °C), Max:98 °F (36.7 °C)    Temp  Min: 97.5 °F (36.4 °C)  Max: 98 °F (36.7 °C)  BP  Min: 137/80  Max: 155/96  Pulse  Min: 70  Max: 73  Resp  Min: 18  Max: 20  SpO2  Min: 90 %  Max: 90 %    GENERAL: Awake and alert, in no acute distress.   HEENT: Normocephalic, atraumatic.  PERRL. EOMI. No conjunctival injection. No icterus. Oropharynx clear without evidence of thrush or  "exudate.  NECK: Supple  HEART:  3/6 systolic murmur.  No pericardial rub.  LUNGS: Clear to auscultation bilaterally without wheezing, rales, rhonchi. Normal respiratory effort.   ABDOMEN: Soft, nontender, nondistended. No rebound or guarding. NO mass or HSM.  EXT: No right knee erythema.  :  Without Tamez catheter.  MSK: No joint effusions or erythema  SKIN: Warm and dry without cutaneous eruptions on Inspection/palpation.    NEURO: Oriented to PPT.  Motor 5/5 strength  PSYCHIATRIC: Normal insight and judgment. Cooperative with PE    Laboratory Data    Results from last 7 days   Lab Units 09/30/24  0509 09/29/24  0502 09/28/24  0416   WBC 10*3/mm3 5.36 4.36 3.96   HEMOGLOBIN g/dL 8.4* 8.7* 8.6*   HEMATOCRIT % 27.3* 28.6* 29.3*   PLATELETS 10*3/mm3 167 205 194     Results from last 7 days   Lab Units 09/30/24  0509   SODIUM mmol/L 138   POTASSIUM mmol/L 3.3*   CHLORIDE mmol/L 101   CO2 mmol/L 24.0   BUN mg/dL 12   CREATININE mg/dL 0.69*   GLUCOSE mg/dL 80   CALCIUM mg/dL 8.4*     Results from last 7 days   Lab Units 09/30/24  0509   ALK PHOS U/L 110   BILIRUBIN mg/dL 0.7   ALT (SGPT) U/L 63*   AST (SGOT) U/L 61*     Results from last 7 days   Lab Units 09/29/24  0502   SED RATE mm/hr 55*     Results from last 7 days   Lab Units 09/30/24  0509   CRP mg/dL 3.63*     Results from last 7 days   Lab Units 09/26/24  0333   LACTATE mmol/L 1.8         Results from last 7 days   Lab Units 09/26/24  0333   VANCOMYCIN RM mcg/mL 34.50     Estimated Creatinine Clearance: 113.7 mL/min (A) (by C-G formula based on SCr of 0.69 mg/dL (L)).      Microbiology:  Blood Culture   Date Value Ref Range Status   09/24/2024 No growth at 24 hours  Preliminary     No results found for: \"BCIDPCR\", \"CXREFLEX\", \"CSFCX\", \"CULTURETIS\"  No results found for: \"CULTURES\", \"HSVCX\", \"URCX\"  No results found for: \"EYECULTURE\", \"GCCX\", \"HSVCULTURE\", \"LABHSV\"  No results found for: \"LEGIONELLA\", \"MRSACX\", \"MUMPSCX\", \"MYCOPLASCX\"  No results found for: " "\"NOCARDIACX\", \"STOOLCX\"  No results found for: \"THROATCX\", \"UNSTIMCULT\", \"URINECX\", \"CULTURE\", \"VZVCULTUR\"  No results found for: \"VIRALCULTU\", \"WOUNDCX\"        Radiology:  Imaging Results (Last 72 Hours)       ** No results found for the last 72 hours. **              Impression:   1.  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  He is at high risk for persistent enterococcal infection.  He is now on high-dose intravenous ampicillin and ceftriaxone.  2.  Enterococcal bacteremia-6/24  3.  Corynebacterium bacteremia-this is likely a skin contaminant  4. History of right knee septic arthritis-6/24.  This was secondary to his enterococcal pacemaker lead endocarditis.  5. Valvular heart disease-he may require mitral valve and tricuspid valve surgical intervention  6.  Ischemic cardiomyopathy  7.  HFrEF-his EF is 25%  8.  Transaminitis-this may be secondary to congestive hepatopathy related to his severe TR.  9.  Anemia  10.  Atrial fibrillation  11.  History of penicillin allergy- he is tolerating ampicillin   12.  Coronary artery disease    PLAN/RECOMMENDATIONS:   1.  ICD lead extraction  2.  Rocephin 2 g IV every 12 hours  3.  Ampicillin 2 g IV every 4 hours  4.  Possible mitral valve repair/replacement and tricuspid valve replacement.       Flo Marcial MD  9/30/2024  07:19 EDT                "

## 2024-09-30 NOTE — CONSULTS
Patient: Howard Owusu    Date of Admission: 9/25/2024  7:33 PM    YOB: 1960    Medical Record Number: [id number]    Attending Physician: Sonia Sierra MD    Primary care physician: Shanelle Barrow NP    Consulting Physician: Jeffrey Hua MD      Chief Complaints: Endocarditis [I38], right knee swelling      History of Present Illness: Howard is a very pleasant 64-year-old male admitted to [facility] a few days ago for ICD lead endocarditis with enterococcal bacteremia.  Orthopedic surgery consulted for recent history of right knee septic arthritis requiring arthroscopic I&D in June.  This was performed in [place].  He reports he had a couple of right knee aspirations after this procedure but nothing recent.  Reports his right knee does not bother him at this point.  Able to ambulate without assist with no right knee pain.  Currently on IV Rocephin and ampicillin per infectious disease.       Allergies   Allergen Reactions    Morphine And Codeine Hives and Rash     Other Reaction(s): PAINFUL RASH    Penicillins Other (See Comments), Mental Status Change and Unknown - Low Severity     Patient reports tolerating amoxicillin.    Other Reaction(s): LOC      Patient reports tolerating amoxicillin. Patient has tolerated Zosyn    Sulfa Antibiotics Unknown - Low Severity, Other (See Comments) and Unknown (See Comments)     Was told by MD that he is allergic but does not know the reaction        Home Medications:  Medications Prior to Admission   Medication Sig Dispense Refill Last Dose    aspirin 81 MG chewable tablet Chew 1 tablet Daily.   9/25/2024 at 0900    atorvastatin (LIPITOR) 80 MG tablet Take 1 tablet by mouth Every Night. 90 tablet 3 9/25/2024 at 1800    B Complex Vitamins (VITAMIN B COMPLEX PO) Take  by mouth.   9/25/2024 at 0900    bumetanide (BUMEX) 2 MG tablet Take 1 tablet by mouth Daily.   9/25/2024 at 0900    dabigatran etexilate (PRADAXA) 150 MG capsu Take 1 capsule by mouth 2  (Two) Times a Day.       levothyroxine (SYNTHROID, LEVOTHROID) 150 MCG tablet Take 1 tablet by mouth Daily.   9/25/2024 at 0600    methocarbamol (ROBAXIN) 500 MG tablet Take 1.5 tablets by mouth 3 (Three) Times a Day As Needed for Muscle Spasms.   9/25/2024 at 0900    Multiple Vitamins-Minerals (ONE DAILY MENS PO) Take 1 tablet by mouth Daily.   9/25/2024 at 0900    omeprazole (priLOSEC) 40 MG capsule Take 1 capsule by mouth Daily.   9/25/2024 at 0600    potassium chloride ER (K-TAB) 20 MEQ tablet controlled-release ER tablet Take 2 tablets by mouth Daily. Pt takes 1 tab BID rather than 2 tabs daily   9/25/2024 at 0900    traZODone (DESYREL) 50 MG tablet Take 1 tablet by mouth Every Night.   9/24/2024 at 2100    Xigduo XR 5-1000 MG tablet Take 1 tablet by mouth Daily.   Past Week at 0900    coenzyme Q10 100 MG capsule Take 1 capsule by mouth Daily. (Patient not taking: Reported on 6/10/2024)          Current Medications:  Scheduled Meds:ampicillin, 2 g, Intravenous, Q4H  cefTRIAXone, 2,000 mg, Intravenous, Q12H  enoxaparin, 30 mg, Subcutaneous, Daily  furosemide, 40 mg, Oral, Daily  sodium chloride, 10 mL, Intravenous, Q12H      Continuous Infusions:   PRN Meds:.  acetaminophen **OR** acetaminophen **OR** acetaminophen    acetaminophen    ALPRAZolam    senna-docusate sodium **AND** polyethylene glycol **AND** bisacodyl **AND** bisacodyl    Calcium Replacement - Follow Nurse / BPA Driven Protocol    diphenhydrAMINE    Magnesium Cardiology Dose Replacement - Follow Nurse / BPA Driven Protocol    nitroglycerin    oxyCODONE-acetaminophen    Phosphorus Replacement - Follow Nurse / BPA Driven Protocol    Potassium Replacement - Follow Nurse / BPA Driven Protocol    sodium chloride    sodium chloride    traMADol    Past Medical History:   Diagnosis Date    Arthritis     Atrial fibrillation     single episode during PCI with conversion to NSR witin 24 hrs    Blood clot in vein 05/07/2024    right groin    Coronary artery  disease 2004    S/P Taxus RICHARD prox LAD, Cath June 2011 EF 20%, S/P distal RCA 3.0x12 and 3.0x8 Cypher, PLB 2.5x8 Cypher, and mid ALD 3.0x 13 Cypher    GERD (gastroesophageal reflux disease)     History of kidney stones     2000    Hyperlipidemia     Hypertension     Hypothyroidism     Ischemic cardiomyopathy     Hx of Inducible VT per EP study Nov 2004, S/P St Eliezer ICD implant, upgrade to Bi-V iCD July 2008 Dr. Mtz June 2010 appropriate ICD shocks, Gen change 12/11/13 MGR    Obstructive sleep apnea     Old MI (myocardial infarction)         Past Surgical History:   Procedure Laterality Date    CARDIAC CATHETERIZATION      CARDIAC CATHETERIZATION N/A 03/14/2023    Procedure: Left Heart Cath;  Surgeon: Jose Jones MD;  Location:  [facility] CATH INVASIVE LOCATION;  Service: Cardiovascular;  Laterality: N/A;    CARDIAC CATHETERIZATION N/A 9/27/2024    Procedure: Left Heart Cath;  Surgeon: Jose Jones MD;  Location: [facility] CATH INVASIVE LOCATION;  Service: Cardiovascular;  Laterality: N/A;    CARDIAC ELECTROPHYSIOLOGY PROCEDURE N/A 11/17/2020    Procedure: ICD DC generator change Generator change at patient's earliest convenience.  Given the atrial lead noise, will reevaluate at that time to see if atrial lead revision is also necessary. ;  Surgeon: Maurizio Cruz DO;  Location: [facility] EP INVASIVE LOCATION;  Service: Cardiology;  Laterality: N/A;    CARDIAC ELECTROPHYSIOLOGY PROCEDURE N/A 04/09/2021    Procedure: [employer] Eliezer BiV ICD RV lead, new;  Surgeon: Maurizio Cruz DO;  Location: [facility] EP INVASIVE LOCATION;  Service: Cardiology;  Laterality: N/A;    CARDIAC ELECTROPHYSIOLOGY PROCEDURE N/A 05/25/2021    Procedure: RV ICD lead extraction (SJM) new RV lead insertion, no meds to hold, Hybrid OR, CT/OR back up;  Surgeon: Maurizio Cruz DO;  Location: [facility] EP INVASIVE LOCATION;  Service: Cardiovascular;  Laterality: N/A;    CARDIAC ELECTROPHYSIOLOGY PROCEDURE N/A 05/25/2021    Procedure: EP/CRM Study;  Surgeon:  Marlon Bartlett MD;  Location:  [facility] EP INVASIVE LOCATION;  Service: Cardiovascular;  Laterality: N/A;    CARDIAC ELECTROPHYSIOLOGY PROCEDURE N/A 03/06/2024    Procedure: AV node ablation;  Surgeon: Maurizio Cruz DO;  Location:  [facility] EP INVASIVE LOCATION;  Service: Cardiovascular;  Laterality: N/A;  please schedule after echo, ct of head and carotid    CARPAL TUNNEL RELEASE Bilateral     KIDNEY STONE SURGERY          Social History     Occupational History    Not on file   Tobacco Use    Smoking status: Never     Passive exposure: Current    Smokeless tobacco: Never   Vaping Use    Vaping status: Never Used   Substance and Sexual Activity    Alcohol use: No    Drug use: No    Sexual activity: Defer      Social History     Social History Narrative    Caffeine 2.5 coke, decaf tea         Family History   Problem Relation Age of Onset    Pulmonary fibrosis Mother     Alzheimer's disease Mother     Heart attack Father     No Known Problems Sister     No Known Problems Brother     No Known Problems Brother          Review of Systems:     Musculoskeletal: Right knee swelling.  Denies right knee pain.  Otherwise negative or noncontributory to the orthopedic exam.    Physical Exam: 64 y.o. male  General Appearance:    Alert, cooperative, in no acute distress                   Vitals:    09/30/24 0350 09/30/24 0720 09/30/24 1045 09/30/24 1515   BP: 149/87 126/87 135/91 137/88   BP Location: Right arm Right arm Right arm Left arm   Patient Position: Lying Lying Lying Sitting   Pulse: 71 73 72 71   Resp: 20 16 16 18   Temp: 97.7 °F (36.5 °C) 97.5 °F (36.4 °C) 97.7 °F (36.5 °C) 97.8 °F (36.6 °C)   TempSrc: Oral Oral Oral Oral   SpO2:  100%  97%   Weight:       Height:            Extremities:  Right knee skin is intact with well-healed arthroscopy scars  Mild to moderate effusion with no significant warmth or erythema  Able to perform a straight leg raise  No pain with passive or active range of motion of his right knee  from 0-130 degrees  No right knee tenderness  Ligament exam stable  Thigh and calf soft and nontender  Neurovascular intact distally      Diagnostic Tests:    No results displayed because visit has over 200 results.        Right knee x-rays taken today reveal mild to moderate tricompartmental degenerative changes with a moderate effusion.  There is loss of lateral joint space with mild valgus deformity.      Assessment: 64-year-old male with endocarditis with enterococcal bacteremia.  Recent right knee septic arthritis status post arthroscopic I&D in June.  No concern for persistent septic knee.  Patient Active Problem List   Diagnosis    Ischemic cardiomyopathy    Coronary artery disease    Hyperlipidemia    Hypertension    GERD (gastroesophageal reflux disease)    Hypothyroidism    Obstructive sleep apnea    V-tach    ICD (implantable cardioverter-defibrillator), biventricular, in situ    Subclavian vein stenosis    Chronic systolic heart failure    Persistent atrial fibrillation    Endocarditis    Heart valve disease    Severe malnutrition           Plan:  The patient voiced understanding of the risks, benefits, and alternative forms of treatment that were discussed and the patient consents to proceed with conservative and symptomatic treatment of his right knee arthritis.   -I discussed with Mr. Owusu that there is no concern for persistent right septic knee joint.  Benign physical exam.  He reports his right knee has not been bothering him lately.  Would not recommend right knee aspiration at this time as no concern for persistent infection.  -Okay from orthopedic surgery standpoint to proceed with endocarditis and valvular heart disease treatment as deemed necessary by cardiology and CT surgery teams.  -I will sign off for now, please call with any questions or concerns.  -Thank you very much for this consult, this patient was a pleasure to evaluate and treat.      Discharge Plan: TBD      Date:  9/30/2024    Jeffrey Hua MD

## 2024-09-30 NOTE — CASE MANAGEMENT/SOCIAL WORK
Continued Stay Note  [facility]     Patient Name: Howard Owusu  MRN: [id number]  Today's Date: 9/30/2024    Admit Date: 9/25/2024    Plan: Home   Discharge Plan       Row Name 09/30/24 1219       Plan    Plan Home    Plan Comments Per discussion in MDR, ortho is to see Pt today. R knee xray is ordered. He is receiving IV Abx and on 1L NC (does not wear home O2). K+ was replaced. Per cardiology, plan is tentative mitral valve repair vs replacement with tricuspid replacement and lead extraction. Pt walked 25ft with contact guard and quad cane 9/26. I spoke with Pt, in room. Plan is home with outpatient PT. CM will continue to follow.    Final Discharge Disposition Code 01 - home or self-care                   Discharge Codes    No documentation.                 Expected Discharge Date and Time       Expected Discharge Date Expected Discharge Time    Oct 4, 2024               May Henry RN

## 2024-09-30 NOTE — CONSULTS
Diabetes Education    Patient Name:  Howard Owusu  YOB: 1960  MRN: [id number]  Admit Date:  9/25/2024    Order criteria not met for diabetes education consult. Current A1c is 5.6, noted during chart review. Pt has no history of DM and no home meds for DM. Thank you.          Electronically signed by:  Minal Walden RN  09/30/24 09:26 EDT

## 2024-09-30 NOTE — PROGRESS NOTES
Order received for Phase II Cardiac Rehab for CHFrEF diagnosis. Work up for TVR. Staff visited patient this morning and discussed the program briefly and gave patient a [facility]-brochure. Staff will follow up with patient post-op.

## 2024-10-01 VITALS
HEIGHT: 66 IN | WEIGHT: 163.36 LBS | DIASTOLIC BLOOD PRESSURE: 100 MMHG | BODY MASS INDEX: 26.25 KG/M2 | HEART RATE: 73 BPM | TEMPERATURE: 97.9 F | RESPIRATION RATE: 18 BRPM | OXYGEN SATURATION: 96 % | SYSTOLIC BLOOD PRESSURE: 139 MMHG

## 2024-10-01 LAB
ALBUMIN SERPL-MCNC: 3 G/DL (ref 3.5–5.2)
ALBUMIN/GLOB SERPL: 0.7 G/DL
ALP SERPL-CCNC: 111 U/L (ref 39–117)
ALT SERPL W P-5'-P-CCNC: 52 U/L (ref 1–41)
ANION GAP SERPL CALCULATED.3IONS-SCNC: 9 MMOL/L (ref 5–15)
AST SERPL-CCNC: 48 U/L (ref 1–40)
BASOPHILS # BLD AUTO: 0.05 10*3/MM3 (ref 0–0.2)
BASOPHILS NFR BLD AUTO: 1 % (ref 0–1.5)
BILIRUB SERPL-MCNC: 0.6 MG/DL (ref 0–1.2)
BUN SERPL-MCNC: 7 MG/DL (ref 8–23)
BUN/CREAT SERPL: 11.5 (ref 7–25)
CALCIUM SPEC-SCNC: 8.6 MG/DL (ref 8.6–10.5)
CHLORIDE SERPL-SCNC: 104 MMOL/L (ref 98–107)
CO2 SERPL-SCNC: 26 MMOL/L (ref 22–29)
CREAT SERPL-MCNC: 0.61 MG/DL (ref 0.76–1.27)
DEPRECATED RDW RBC AUTO: 58.3 FL (ref 37–54)
EGFRCR SERPLBLD CKD-EPI 2021: 105.9 ML/MIN/1.73
EOSINOPHIL # BLD AUTO: 0.69 10*3/MM3 (ref 0–0.4)
EOSINOPHIL NFR BLD AUTO: 14.1 % (ref 0.3–6.2)
ERYTHROCYTE [DISTWIDTH] IN BLOOD BY AUTOMATED COUNT: 19 % (ref 12.3–15.4)
GLOBULIN UR ELPH-MCNC: 4.1 GM/DL
GLUCOSE BLDC GLUCOMTR-MCNC: 78 MG/DL (ref 70–130)
GLUCOSE BLDC GLUCOMTR-MCNC: 89 MG/DL (ref 70–130)
GLUCOSE BLDC GLUCOMTR-MCNC: 94 MG/DL (ref 70–130)
GLUCOSE BLDC GLUCOMTR-MCNC: 96 MG/DL (ref 70–130)
GLUCOSE SERPL-MCNC: 83 MG/DL (ref 65–99)
HCT VFR BLD AUTO: 28.3 % (ref 37.5–51)
HGB BLD-MCNC: 8.3 G/DL (ref 13–17.7)
IMM GRANULOCYTES # BLD AUTO: 0.01 10*3/MM3 (ref 0–0.05)
IMM GRANULOCYTES NFR BLD AUTO: 0.2 % (ref 0–0.5)
LYMPHOCYTES # BLD AUTO: 1.03 10*3/MM3 (ref 0.7–3.1)
LYMPHOCYTES NFR BLD AUTO: 21.1 % (ref 19.6–45.3)
MCH RBC QN AUTO: 25.2 PG (ref 26.6–33)
MCHC RBC AUTO-ENTMCNC: 29.3 G/DL (ref 31.5–35.7)
MCV RBC AUTO: 85.8 FL (ref 79–97)
MONOCYTES # BLD AUTO: 0.62 10*3/MM3 (ref 0.1–0.9)
MONOCYTES NFR BLD AUTO: 12.7 % (ref 5–12)
NEUTROPHILS NFR BLD AUTO: 2.49 10*3/MM3 (ref 1.7–7)
NEUTROPHILS NFR BLD AUTO: 50.9 % (ref 42.7–76)
NRBC BLD AUTO-RTO: 0.6 /100 WBC (ref 0–0.2)
PLATELET # BLD AUTO: 149 10*3/MM3 (ref 140–450)
PMV BLD AUTO: 11 FL (ref 6–12)
POTASSIUM SERPL-SCNC: 4.6 MMOL/L (ref 3.5–5.2)
PROT SERPL-MCNC: 7.1 G/DL (ref 6–8.5)
RBC # BLD AUTO: 3.3 10*6/MM3 (ref 4.14–5.8)
SODIUM SERPL-SCNC: 139 MMOL/L (ref 136–145)
WBC NRBC COR # BLD AUTO: 4.89 10*3/MM3 (ref 3.4–10.8)

## 2024-10-01 PROCEDURE — 82948 REAGENT STRIP/BLOOD GLUCOSE: CPT

## 2024-10-01 PROCEDURE — 25010000002 AMPICILLIN PER 500 MG: Performed by: INTERNAL MEDICINE

## 2024-10-01 PROCEDURE — 99239 HOSP IP/OBS DSCHRG MGMT >30: CPT | Performed by: HOSPITALIST

## 2024-10-01 PROCEDURE — 25010000002 CEFTRIAXONE PER 250 MG: Performed by: INTERNAL MEDICINE

## 2024-10-01 PROCEDURE — 25010000002 ENOXAPARIN PER 10 MG: Performed by: INTERNAL MEDICINE

## 2024-10-01 PROCEDURE — 80053 COMPREHEN METABOLIC PANEL: CPT | Performed by: HOSPITALIST

## 2024-10-01 PROCEDURE — 99232 SBSQ HOSP IP/OBS MODERATE 35: CPT

## 2024-10-01 PROCEDURE — 85025 COMPLETE CBC W/AUTO DIFF WBC: CPT | Performed by: HOSPITALIST

## 2024-10-01 PROCEDURE — 86682 HELMINTH ANTIBODY: CPT | Performed by: INTERNAL MEDICINE

## 2024-10-01 RX ORDER — FUROSEMIDE 40 MG
40 TABLET ORAL DAILY
Start: 2024-10-02

## 2024-10-01 RX ORDER — ENOXAPARIN SODIUM 100 MG/ML
30 INJECTION SUBCUTANEOUS DAILY
Start: 2024-10-02

## 2024-10-01 RX ADMIN — SODIUM CHLORIDE 2000 MG: 900 INJECTION INTRAVENOUS at 06:43

## 2024-10-01 RX ADMIN — ALPRAZOLAM 0.25 MG: 0.25 TABLET ORAL at 17:29

## 2024-10-01 RX ADMIN — TRAMADOL HYDROCHLORIDE 50 MG: 50 TABLET ORAL at 17:29

## 2024-10-01 RX ADMIN — TRAMADOL HYDROCHLORIDE 50 MG: 50 TABLET ORAL at 09:56

## 2024-10-01 RX ADMIN — Medication 10 ML: at 09:57

## 2024-10-01 RX ADMIN — ENOXAPARIN SODIUM 30 MG: 30 INJECTION SUBCUTANEOUS at 09:56

## 2024-10-01 RX ADMIN — OXYCODONE HYDROCHLORIDE AND ACETAMINOPHEN 1 TABLET: 5; 325 TABLET ORAL at 12:56

## 2024-10-01 RX ADMIN — SODIUM CHLORIDE 2000 MG: 900 INJECTION INTRAVENOUS at 19:51

## 2024-10-01 RX ADMIN — AMPICILLIN SODIUM 2 G: 2 INJECTION, POWDER, FOR SOLUTION INTRAMUSCULAR; INTRAVENOUS at 16:10

## 2024-10-01 RX ADMIN — ACETAMINOPHEN 650 MG: 325 TABLET ORAL at 09:57

## 2024-10-01 RX ADMIN — FUROSEMIDE 40 MG: 40 TABLET ORAL at 09:56

## 2024-10-01 RX ADMIN — AMPICILLIN SODIUM 2 G: 2 INJECTION, POWDER, FOR SOLUTION INTRAMUSCULAR; INTRAVENOUS at 12:45

## 2024-10-01 RX ADMIN — TRAMADOL HYDROCHLORIDE 50 MG: 50 TABLET ORAL at 01:10

## 2024-10-01 RX ADMIN — Medication 10 ML: at 20:02

## 2024-10-01 RX ADMIN — POTASSIUM CHLORIDE 40 MEQ: 1500 TABLET, EXTENDED RELEASE ORAL at 00:08

## 2024-10-01 RX ADMIN — AMPICILLIN SODIUM 2 G: 2 INJECTION, POWDER, FOR SOLUTION INTRAMUSCULAR; INTRAVENOUS at 04:35

## 2024-10-01 RX ADMIN — AMPICILLIN SODIUM 2 G: 2 INJECTION, POWDER, FOR SOLUTION INTRAMUSCULAR; INTRAVENOUS at 01:07

## 2024-10-01 NOTE — PROGRESS NOTES
[facility] Medicine Services  PROGRESS NOTE    Patient Name: Howard Owusu  : 1960  MRN: [id number]    Date of Admission: 2024  Primary Care Physician: Shanelle Barrow APRN    Subjective   Subjective     CC:   Follow-up endocarditis    HPI:   Patient was seen and examined this morning.  Complaining of back pain but Percocet is helping.  Otherwise no acute issues    Objective   Objective     Vital Signs:   Temp:  [97.6 °F (36.4 °C)-97.8 °F (36.6 °C)] 97.6 °F (36.4 °C)  Heart Rate:  [71-74] 72  Resp:  [16-20] 18  BP: (126-138)/(72-91) 126/72  Flow (L/min):  [2] 2     Physical Exam:  General: Comfortable, not in distress, conversant and cooperative  Head: Atraumatic and normocephalic  Eyes: No Icterus. No pallor  Ears:  Ears appear intact with no abnormalities noted  Throat: No oral lesions, no thrush  Neck: Supple, trachea midline  Lungs: Clear to auscultation bilaterally, equal air entry, no wheezing or crackles  Heart:  Normal S1 and S2, no murmur, no gallop, No JVD, no lower extremity swelling  Abdomen:  Soft, no tenderness, no organomegaly, normal bowel sounds, no organomegaly  Extremities: pulses equal bilaterally  Skin: No bleeding, bruising or rash, normal skin turgor and elasticity  Neurologic: Cranial nerves appear intact with no evidence of facial asymmetry, normal motor and sensory functions in all 4 extremities  Psych: Alert and oriented x 3, normal mood      Results Reviewed:  LAB RESULTS:      Lab 10/01/24  0418 24  0509 24  0502 24  0416 24  0753 24  0333 24  0020 24  2152   WBC 4.89 5.36 4.36 3.96 3.79 4.65  --  4.43   HEMOGLOBIN 8.3* 8.4* 8.7* 8.6* 9.0* 7.8*  --  7.8*   HEMATOCRIT 28.3* 27.3* 28.6* 29.3* 31.1* 26.4*  --  26.3*   PLATELETS 149 167 205 194 148 164  --  160   NEUTROS ABS 2.49 2.74 1.97 2.13 1.90 2.82  --  2.62   IMMATURE GRANS (ABS) 0.01 0.02 0.02 0.01 0.01 0.01  --  0.01   LYMPHS ABS 1.03 0.88 1.11 0.86  0.78 0.79  --  0.65*   MONOS ABS 0.62 0.74 0.50 0.62 0.50 0.48  --  0.65   EOS ABS 0.69* 0.94* 0.71* 0.30 0.55* 0.53*  --  0.46*   MCV 85.8 83.5 84.4 86.7 87.6 88.3  --  87.7   SED RATE  --   --  55*  --   --   --   --   --    CRP  --  3.63* 4.21*  --   --   --   --   --    PROCALCITONIN  --   --   --   --   --   --   --  0.08   LACTATE  --   --   --   --   --  1.8 2.3* 3.1*   LDH  --   --   --   --   --   --   --  292*   PROTIME  --   --   --   --   --   --   --  21.7*         Lab 10/01/24  0418 09/30/24  1609 09/30/24  0509 09/29/24  0502 09/28/24  0416 09/27/24  0753 09/26/24  0333   SODIUM 139  --  138 135* 136 133* 135*   POTASSIUM 4.6 3.3* 3.3* 3.8 5.5* 5.1 4.2   CHLORIDE 104  --  101 101 104 104 104   CO2 26.0  --  24.0 21.0* 19.0* 14.0* 19.0*   ANION GAP 9.0  --  13.0 13.0 13.0 15.0 12.0   BUN 7*  --  12 18 15 12 15   CREATININE 0.61*  --  0.69* 0.84 0.81 0.71* 0.70*   EGFR 105.9  --  102.1 96.2 97.2 101.2 101.6   GLUCOSE 83  --  80 75 57* 74 79   CALCIUM 8.6  --  8.4* 8.8 9.6 9.0 8.7   MAGNESIUM  --   --  2.3 1.7 1.9 2.0 1.7   PHOSPHORUS  --   --  2.6 2.7 3.1 2.6 2.7   HEMOGLOBIN A1C  --   --   --   --  5.60  --   --          Lab 10/01/24  0418 09/30/24  0509 09/25/24 2152   TOTAL PROTEIN 7.1 6.7 7.3   ALBUMIN 3.0* 3.1* 3.2*   GLOBULIN 4.1 3.6 4.1   ALT (SGPT) 52* 63* 147*   AST (SGOT) 48* 61* 265*   BILIRUBIN 0.6 0.7 0.6   ALK PHOS 111 110 127*         Lab 09/26/24  0020 09/25/24 2152   HSTROP T 13 14   PROTIME  --  21.7*   INR  --  1.88*         Lab 09/28/24 0416   CHOLESTEROL 74   LDL CHOL 43   HDL CHOL 17*   TRIGLYCERIDES 57         Lab 09/26/24  0020 09/25/24 2152   IRON  --  14*   IRON SATURATION (TSAT)  --  3*   TIBC  --  405   TRANSFERRIN  --  272   FERRITIN  --  51.02   FOLATE 15.50  --    VITAMIN B 12 1,719*  --          Lab 09/30/24  1113   FIO2 21   CARBOXYHEMOGLOBIN (VENOUS) 1.7     Brief Urine Lab Results  (Last result in the past 365 days)        Color   Clarity   Blood   Leuk Est    Nitrite   Protein   CREAT   Urine HCG        02/16/24 0104 Dark Yellow   Clear     Negative                       Microbiology Results Abnormal       Procedure Component Value - Date/Time    Blood Culture - Blood, Arm, Left [[id number]]  (Normal) Collected: 09/25/24 2151    Lab Status: Final result Specimen: Blood from Arm, Left Updated: 09/30/24 2300     Blood Culture No growth at 5 days    Narrative:      Aerobic Bottle Only      Blood Culture - Blood, Hand, Left [[id number]]  (Normal) Collected: 09/25/24 2151    Lab Status: Final result Specimen: Blood from Hand, Left Updated: 09/30/24 2300     Blood Culture No growth at 5 days            CT Head Without Contrast    Result Date: 9/30/2024  CT HEAD WO CONTRAST Date of Exam: 9/30/2024 12:40 PM EDT Indication: Confusion. Comparison: None available. Technique: Axial CT images were obtained of the head without contrast administration.  Automated exposure control and iterative construction methods were used. Findings: There is no evidence of acute infarction. There there is no acute intracranial hemorrhage. There are no extra-axial collections. Ventricles and CSF spaces are symmetric. No mass effect nor hydrocephalus. Brain parenchyma appears normal for age.  Paranasal sinuses and mastoid air cells are adequately aerated.  Osseous structures and orbits appear intact.     Impression: Impression: 1. No acute intracranial process. Electronically Signed: Yarelis Gavin MD  9/30/2024 1:01 PM EDT  Workstation ID: [id number]    XR Knee 1 or 2 View Right    Result Date: 9/30/2024  XR KNEE 1 OR 2 VW RIGHT Date of Exam: 9/30/2024 11:28 AM EDT Indication: right knee pain Comparison: None available. Findings: Osteopenia. Alignment anatomic. Large joint effusion. Prepatellar and infrapatellar soft tissue swelling. No fracture by radiograph. Moderate lateral and mild patellofemoral compartment joint space narrowing.     Impression: Impression: Large joint effusion. Mild patellofemoral  compartment and moderate lateral compartment arthritis with osteopenia. No fractures or dislocations on radiograph. Electronically Signed: Azra Ortiz MD  9/30/2024 11:48 AM EDT  Workstation ID: [id number]     Results for orders placed during the hospital encounter of 09/24/24    Adult Transesophageal Echo (MICHAEL) W/ Cont if Necessary Per Protocol    Interpretation Summary    Left ventricular systolic function is moderately decreased. Estimated left ventricular EF = 25%    Moderate mitral valve regurgitation is present.    2 electronic lead is present in the right atrium. Large spherical mass/vegetation on right ventricular lead near tricuspid annulus. A second larger 1 cm mobile vegetation on lead seen proximally. Third vegetation small seen on right atrial lead..    There is a small mobile mass on the tricuspid valve affecting the septal leaflet.    Severe tricuspid valve regurgitation is present.    Estimated right ventricular systolic pressure from tricuspid regurgitation is moderately elevated (45-55 mmHg).      Current medications:  Scheduled Meds:ampicillin, 2 g, Intravenous, Q4H  cefTRIAXone, 2,000 mg, Intravenous, Q12H  enoxaparin, 30 mg, Subcutaneous, Daily  furosemide, 40 mg, Oral, Daily  sodium chloride, 10 mL, Intravenous, Q12H      Continuous Infusions:   PRN Meds:.  acetaminophen **OR** acetaminophen **OR** acetaminophen    acetaminophen    ALPRAZolam    senna-docusate sodium **AND** polyethylene glycol **AND** bisacodyl **AND** bisacodyl    Calcium Replacement - Follow Nurse / BPA Driven Protocol    diphenhydrAMINE    Magnesium Cardiology Dose Replacement - Follow Nurse / BPA Driven Protocol    nitroglycerin    oxyCODONE-acetaminophen    Phosphorus Replacement - Follow Nurse / BPA Driven Protocol    Potassium Replacement - Follow Nurse / BPA Driven Protocol    sodium chloride    sodium chloride    traMADol    Assessment & Plan   Assessment & Plan     Active Hospital Problems    Diagnosis  POA     "**Endocarditis [I38]  Yes    Heart valve disease [I38]  Yes    Severe malnutrition [E43]  Yes    Persistent atrial fibrillation [I48.19]  Yes    Chronic systolic heart failure [I50.22]  Yes    Subclavian vein stenosis [I87.1]  Yes    ICD (implantable cardioverter-defibrillator), biventricular, in situ [Z95.810]  Yes    V-tach [I47.20]  Yes    Hypertension [I10]  Yes    Hypothyroidism [E03.9]  Yes    Obstructive sleep apnea [G47.33]  Yes    Ischemic cardiomyopathy [I25.5]  Yes    Coronary artery disease [I25.10]  Yes      Resolved Hospital Problems   No resolved problems to display.        Brief Hospital Course to date:  Howard Owusu is a 64 y.o. male with chronic HFrEF, hypertension, hyperlipidemia, ischemic cardiomyopathy, hypothyroidism patient with recent dyspnea with exertion generalized weakness.  Echo done 9/24 revealed multiple vegetations on leads.    Copied text in this note has been reviewed and is accurate as of 09/30/2024       ICD leads endocarditis  History of prior endocarditis  MICHAEL with multiple vegetations and possible lead involvement  Left heart cath 9/27/2024 with nonobstructive coronary artery disease and patent stent to LAD.  EF 20%  Blood cultures from 9/24 growing Corynebacterium.  Currently on Rocephin and ampicillin per ID  Cardiology/EP following, patient will need pacemaker extraction in addition to valve surgery.  They also recommend placement of epicardial leads for future right-sided ICD placemen  CTS consulted to evaluate the patient for mitral valve repair versus replacement with tricuspid valve replacement and lead extractions  Per CTS's note \"Ongoing discussion with cardiology/EP about transferring to  or similar for LVAD at time of double valve surgery if needed     Recent right knee septic arthritis status post arthroscopic I&D in June.   Concern for recent right septic knee joint.    -ID on board.  -Ortho eval per CTS request.  -No concern for septic joint per " Ortho.  -Continue IV antibiotics per ID recommendations.      Chronic anemia  Severe iron deficiency  Hemoglobin stable around 8.  No active bleeding  Given severe iron deficiency anemia, will start IV iron infusion x 3 days    CAD  Hyperlipidemia  Hypertension  Ischemic cardiomyopathy/HFrEF EF 25%  Hypertension  Continue Pradaxa, aspirin, statin and Bumex     Hypothyroidism  Continue Synthroid     Back pain  Continue pain control    GILL    Mild confusion likely secondary to sepsis and hospital delirium  -Patient is moving his 4 extremities.  -No neurological deficit on my exam today.  -CT head negative for acute process    Expected Discharge Location and Transportation: TBD  Expected Discharge   Expected Discharge Date: 10/4/2024; Expected Discharge Time:      VTE Prophylaxis:  Pharmacologic VTE prophylaxis orders are present.      AM-PAC 6 Clicks Score (PT): 23 (09/30/24 0800)    CODE STATUS:   Code Status and Medical Interventions: CPR (Attempt to Resuscitate); Full Support   Ordered at: 09/25/24 1969     Level Of Support Discussed With:    Patient     Code Status (Patient has no pulse and is not breathing):    CPR (Attempt to Resuscitate)     Medical Interventions (Patient has pulse or is breathing):    Full Support       Sonia Sierra MD  10/01/24

## 2024-10-01 NOTE — PLAN OF CARE
Goal Outcome Evaluation:      Pt AO x4, VSS/V paced on monitor/95% on RA. Pt pain addressed with PRN meds, see MAR. Pt here with endocarditis, antibiotics given, see MAR. Pt denies SOA/N/V. Transferring to [facility] tonight.   Problem: Fall Injury Risk  Goal: Absence of Fall and Fall-Related Injury  Intervention: Identify and Manage Contributors  Description: Develop a fall prevention plan with the patient and caregiver/family.  Provide reorientation, appropriate sensory stimulation and routines with changes in mental status to decrease risk of fall.  Promote use of personal vision and auditory aids.  Assess assistance level required for safe and effective self-care; provide support as needed, such as toileting, mobilization. For age 65 and older, implement timed toileting with assistance.  Encourage physical activity, such as performance of mobility and self-care at highest level of patient ability, multicomponent exercise program and provision of appropriate assistive devices.  If fall occurs, assess the severity of injury; implement fall injury protocol. Determine the cause and revise fall injury prevention plan.  Regularly review medication contribution to fall risk; adjust medication administration times to minimize risk of falling.  Consider risk related to polypharmacy and age.  Balance adequate pain management with potential for oversedation.  Recent Flowsheet Documentation  Taken 10/1/2024 1610 by Stephanie Connell RN  Medication Review/Management: medications reviewed  Self-Care Promotion: independence encouraged  Taken 10/1/2024 1445 by Stephanie Connell RN  Medication Review/Management: medications reviewed  Self-Care Promotion: independence encouraged  Taken 10/1/2024 1256 by Stephanie Connell RN  Medication Review/Management: medications reviewed  Self-Care Promotion: independence encouraged  Taken 10/1/2024 1000 by Stephanie Connell RN  Medication Review/Management: medications reviewed  Self-Care Promotion:  independence encouraged  Taken 10/1/2024 0800 by Stephanie Connell RN  Medication Review/Management: medications reviewed  Self-Care Promotion: independence encouraged

## 2024-10-01 NOTE — CASE MANAGEMENT/SOCIAL WORK
Continued Stay Note  [facility]     Patient Name: Howard Owusu  MRN: [id number]  Today's Date: 10/1/2024    Admit Date: 9/25/2024    Plan: Ongoing   Discharge Plan       Row Name 10/01/24 1302       Plan    Plan Ongoing    Plan Comments Discussed in MDR. There's ongoing discussion with cardiology/EP about transferring to  or similar for LVAD at time of double valve surgery if needed,could possibly be today or tomorrow. CM will continue to follow.    Final Discharge Disposition Code 30 - still a patient                   Discharge Codes    No documentation.                 Expected Discharge Date and Time       Expected Discharge Date Expected Discharge Time    Oct 4, 2024               Quita Henderson RN

## 2024-10-01 NOTE — PROGRESS NOTES
INFECTIOUS DISEASE Progress Note    Howard Owusu  1960  [id number]    Admission Date: 9/25/2024      Requesting Provider: No Known Provider  Evaluating Physician: Flo Marcial MD    Reason for Consultation: ICD lead endocarditis    History of present illness:      9/26/2024: Patient is a 64 y.o. 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  he underwent lead extraction of a failed RV lead and revision of a new RA/RV lead  with generator change on 5/25/2021 by Dr. Cruz.  he underwent a right knee arthroscopy and suffered a fall in June 2024 after which she developed sepsis with enterococcal bacteremia.  A MICHAEL at [facility] suggested possible lead thrombus and he was transferred to [facility].  Blood cultures grew Enterococcus and he received 6 weeks of intravenous antibiotic therapy which were completed on 8/5/2024.   He underwent a repeat MICHAEL here on 9/24/2024 revealing moderate mitral regurgitation, a large spherical mass/vegetation on the right ventricular lead near the tricuspid ambulance, and a second large 1 cm mobile vegetation on the lead proximally and a third vegetation on the right atrial lead.  There is a single mobile mass on the tricuspid valve leaflet affecting the septal leaflet.  He has severe tricuspid regurgitation.   Dr. Jones contacted me yesterday about this complex situation.  He informed me that the patient was hospitalized this summer at [facility] with multiple blood cultures positive for enterococcus and associated septic arthritis.  He had received 6 weeks of intravenous antibiotic therapy but did not undergo removal of his ICD leads which were noted to have vegetations versus thrombus.   We were concerned  that this likely represented ICD lead endocarditis and that he should undergo  ICD lead extraction.   Blood cultures were obtained on 9/24 with 1 out of 2 sets growing gram-positive  bacilli in the anaerobic bottle.  Repeat blood cultures were performed on 9/25.  He is now on intravenous vancomycin and Zosyn.  He has been afebrile since his admission.     I can visualize some of the records from [facility].  He was seen by Dr. 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   Infectious disease thought he would have a high risk for persistent infection/relapse without pacemaker lead extraction.  There was apparently some disagreement with cardiology who thought that he just had a thrombus  on the ICD lead and that the right knee septic arthritis was the primary focus of his enterococcal bacteremia.  He is listed as having a history of allergy to penicillin but has tolerated ampicillin.  He is currently tolerating Zosyn.   He complains of some chronic back pain after a fall.  He denies increased right knee pain.  He denies recurrent fevers and shaking chills.    9/27/2024:  He remains afebrile.  Blood cultures from 9/24 are growing corynebacterium  In 1 out of 2 sets.  Blood cultures from 9/25 are no growth so far.  He denies nausea and vomiting.  He has decreased malaise.    9/28/2024:   He remains afebrile.  White blood cell count is 4 and hemoglobin is 8.6.   Repeat blood cultures from 9/25 remain negative.   He denies nausea and vomiting.  Denies increased dyspnea.    9/29/2024:  He has remained afebrile.  O2 saturation is 99% on 2 L.     Creatinine is 0.84.   White blood cell count is 4.4.   Blood cultures from 9/25 remain no growth so far.    9/30/2029:  He has remained afebrile.  White blood cell count is 5.4.  C-reactive protein is 3.6.  Follow-up blood cultures from 9/25 remain negative.  His  appetite is improved today.  He denies abdominal pain.  He denies nausea and vomiting.  He denies increased dyspnea.    10/1/2029:  He remains afebrile.  His white blood cell count is 4.9.    He has eosinophilia.  He denies increased cough and sputum production.  He denies increased right knee pain.  Plans are in place for him to transfer to .    Past Surgical History:   Procedure Laterality Date    CARDIAC CATHETERIZATION      CARDIAC CATHETERIZATION N/A 03/14/2023    Procedure: Left Heart Cath;  Surgeon: Jose Jones MD;  Location:  [facility];  Service: Cardiovascular;  Laterality: N/A;    CARDIAC CATHETERIZATION N/A 9/27/2024    Procedure: Left Heart Cath;  Surgeon: Jose Jones MD;  Location:  [facility];  Service: Cardiovascular;  Laterality: N/A;    CARDIAC ELECTROPHYSIOLOGY PROCEDURE N/A 11/17/2020    Procedure: ICD DC generator change Generator change at patient's earliest convenience.  Given the atrial lead noise, will reevaluate at that time to see if atrial lead revision is also necessary. ;  Surgeon: Maurizio Cruz DO;  Location:  [facility] EP INVASIVE LOCATION;  Service: Cardiology;  Laterality: N/A;    CARDIAC ELECTROPHYSIOLOGY PROCEDURE N/A 04/09/2021    Procedure: [employer] BiV ICD RV lead, new;  Surgeon: Maurizio Cruz DO;  Location:  [facility] EP INVASIVE LOCATION;  Service: Cardiology;  Laterality: N/A;    CARDIAC ELECTROPHYSIOLOGY PROCEDURE N/A 05/25/2021    Procedure: RV ICD lead extraction (SJM) new RV lead insertion, no meds to hold, Hybrid OR, CT/OR back up;  Surgeon: Maurizio Cruz DO;  Location:  [facility] EP INVASIVE LOCATION;  Service: Cardiovascular;  Laterality: N/A;    CARDIAC ELECTROPHYSIOLOGY PROCEDURE N/A 05/25/2021    Procedure: EP/CRM Study;  Surgeon: Marlon Bartlett MD;  Location:  [facility] EP INVASIVE LOCATION;  Service: Cardiovascular;  Laterality: N/A;    CARDIAC ELECTROPHYSIOLOGY PROCEDURE N/A 03/06/2024    Procedure: AV node ablation;   Surgeon: Maurziio Cruz DO;  Location: [facility] INVASIVE LOCATION;  Service: Cardiovascular;  Laterality: N/A;  please schedule after echo, ct of head and carotid    CARPAL TUNNEL RELEASE Bilateral     KIDNEY STONE SURGERY         Family History   Problem Relation Age of Onset    Pulmonary fibrosis Mother     Alzheimer's disease Mother     Heart attack Father     No Known Problems Sister     No Known Problems Brother     No Known Problems Brother        Social History     Socioeconomic History    Marital status:    Tobacco Use    Smoking status: Never     Passive exposure: Current    Smokeless tobacco: Never   Vaping Use    Vaping status: Never Used   Substance and Sexual Activity    Alcohol use: No    Drug use: No    Sexual activity: Defer       Allergies   Allergen Reactions    Morphine And Codeine Hives and Rash     Other Reaction(s): PAINFUL RASH    Penicillins Other (See Comments), Mental Status Change and Unknown - Low Severity     Patient reports tolerating amoxicillin.    Other Reaction(s): LOC      Patient reports tolerating amoxicillin. Patient has tolerated Zosyn    Sulfa Antibiotics Unknown - Low Severity, Other (See Comments) and Unknown (See Comments)     Was told by MD that he is allergic but does not know the reaction         Medication:    Current Facility-Administered Medications:     acetaminophen (TYLENOL) tablet 650 mg, 650 mg, Oral, Q4H PRN **OR** acetaminophen (TYLENOL) 160 MG/5ML oral solution 650 mg, 650 mg, Oral, Q4H PRN **OR** acetaminophen (TYLENOL) suppository 650 mg, 650 mg, Rectal, Q4H PRN, Jose Jones MD    acetaminophen (TYLENOL) tablet 650 mg, 650 mg, Oral, Q4H PRN, Jose Jones MD    ALPRAZolam (XANAX) tablet 0.25 mg, 0.25 mg, Oral, TID PRN, Jose Jones MD, 0.25 mg at 09/29/24 2235    ampicillin 2000 mg IVPB in 100 mL NS (MBP), 2 g, Intravenous, Q4H, Jose Jones MD, Last Rate: 200 mL/hr at 10/01/24 0435, 2 g at 10/01/24 0435    sennosides-docusate  (PERICOLACE) 8.6-50 MG per tablet 2 tablet, 2 tablet, Oral, BID PRN **AND** polyethylene glycol (MIRALAX) packet 17 g, 17 g, Oral, Daily PRN **AND** bisacodyl (DULCOLAX) EC tablet 5 mg, 5 mg, Oral, Daily PRN **AND** bisacodyl (DULCOLAX) suppository 10 mg, 10 mg, Rectal, Daily PRN, Jose Jones MD    Calcium Replacement - Follow Nurse / BPA Driven Protocol, , Does not apply, PRNRobert Anthony, MD    cefTRIAXone (ROCEPHIN) 2,000 mg in sodium chloride 0.9 % 100 mL MBP, 2,000 mg, Intravenous, Q12H, Jose Jones MD, Last Rate: 200 mL/hr at 10/01/24 0643, 2,000 mg at 10/01/24 0643    diphenhydrAMINE (BENADRYL) injection 25 mg, 25 mg, Intravenous, Q6H PRN, Jose Jones MD    Enoxaparin Sodium (LOVENOX) syringe 30 mg, 30 mg, Subcutaneous, Daily, Jose Jones MD, 30 mg at 09/30/24 0800    furosemide (LASIX) tablet 40 mg, 40 mg, Oral, Daily, Zeina Moya APRN, 40 mg at 09/30/24 0800    Magnesium Cardiology Dose Replacement - Follow Nurse / BPA Driven Protocol, , Does not apply, PRRobert BUSTILLO Anthony, MD    nitroglycerin (NITROSTAT) SL tablet 0.4 mg, 0.4 mg, Sublingual, Q5 Min PRNRobert Anthony, MD    oxyCODONE-acetaminophen (PERCOCET) 5-325 MG per tablet 1 tablet, 1 tablet, Oral, Q4H PRN, Barbie Shah MD, 1 tablet at 09/30/24 1424    Phosphorus Replacement - Follow Nurse / BPA Driven Protocol, , Does not apply, PRRobert BUSTILLO Anthony, MD    Potassium Replacement - Follow Nurse / BPA Driven Protocol, , Does not apply, PRNRobert Anthony, MD    sodium chloride 0.9 % flush 10 mL, 10 mL, Intravenous, Q12H, Jose Jones MD, 10 mL at 09/30/24 2134    sodium chloride 0.9 % flush 10 mL, 10 mL, Intravenous, PRN, Jose Jones MD, 10 mL at 09/28/24 1756    sodium chloride 0.9 % infusion 40 mL, 40 mL, Intravenous, PRN, Jose Jones MD    traMADol (ULTRAM) tablet 50 mg, 50 mg, Oral, Q6H PRN, Barbie Shah MD, 50 mg at 10/01/24 0110    Antibiotics:  Anti-Infectives (From admission,  onward)      Ordered     Dose/Rate Route Frequency Start Stop    24  ampicillin 2000 mg IVPB in 100 mL NS (MBP)        Ordering Provider: Jose Jones MD    2 g  200 mL/hr over 30 Minutes Intravenous Every 4 Hours 24 2300 24 20524 1824  cefTRIAXone (ROCEPHIN) 2,000 mg in sodium chloride 0.9 % 100 mL MBP        Ordering Provider: Jose Jones MD    2,000 mg  200 mL/hr over 30 Minutes Intravenous Every 12 Hours 24 1900 24 1859    24 2245  vancomycin IVPB 1750 mg in 0.9% Sodium Chloride (premix) 500 mL        Ordering Provider: Carlos Weaver RPH    1,750 mg  285.7 mL/hr over 105 Minutes Intravenous Once 24 0000 24 0303    24 2258  piperacillin-tazobactam (ZOSYN) 3.375 g IVPB in 100 mL NS MBP (CD)        Ordering Provider: Carlos Weaver RPH    3.375 g  over 30 Minutes Intravenous Once 24 2345 24 0101              Review of Systems:    See HPI    Physical Exam:   Vital Signs  Temp (24hrs), Av.7 °F (36.5 °C), Min:97.5 °F (36.4 °C), Max:97.8 °F (36.6 °C)    Temp  Min: 97.5 °F (36.4 °C)  Max: 97.8 °F (36.6 °C)  BP  Min: 126/87  Max: 138/83  Pulse  Min: 71  Max: 74  Resp  Min: 16  Max: 20  SpO2  Min: 92 %  Max: 100 %    GENERAL: Awake and alert, in no acute distress.   HEENT: Normocephalic, atraumatic.  PERRL. EOMI. No conjunctival injection. No icterus. Oropharynx clear without evidence of thrush or exudate.  NECK: Supple  HEART:  3/6 systolic murmur.  No pericardial rub.  LUNGS: Clear to auscultation bilaterally without wheezing, rales, rhonchi. Normal respiratory effort.   ABDOMEN: Soft, nontender, nondistended. No rebound or guarding. NO mass or HSM.  EXT: No right knee erythema.  :  Without Tamez catheter.  MSK: No joint effusions or erythema  SKIN: Warm and dry without cutaneous eruptions on Inspection/palpation.    NEURO: Oriented to PPT.  Motor 5/5 strength  PSYCHIATRIC: Normal insight and judgment. Cooperative with  "PE    Laboratory Data    Results from last 7 days   Lab Units 10/01/24  0418 09/30/24  0509 09/29/24  0502   WBC 10*3/mm3 4.89 5.36 4.36   HEMOGLOBIN g/dL 8.3* 8.4* 8.7*   HEMATOCRIT % 28.3* 27.3* 28.6*   PLATELETS 10*3/mm3 149 167 205     Results from last 7 days   Lab Units 10/01/24  0418   SODIUM mmol/L 139   POTASSIUM mmol/L 4.6   CHLORIDE mmol/L 104   CO2 mmol/L 26.0   BUN mg/dL 7*   CREATININE mg/dL 0.61*   GLUCOSE mg/dL 83   CALCIUM mg/dL 8.6     Results from last 7 days   Lab Units 10/01/24  0418   ALK PHOS U/L 111   BILIRUBIN mg/dL 0.6   ALT (SGPT) U/L 52*   AST (SGOT) U/L 48*     Results from last 7 days   Lab Units 09/29/24  0502   SED RATE mm/hr 55*     Results from last 7 days   Lab Units 09/30/24  0509   CRP mg/dL 3.63*     Results from last 7 days   Lab Units 09/26/24  0333   LACTATE mmol/L 1.8         Results from last 7 days   Lab Units 09/26/24  0333   VANCOMYCIN RM mcg/mL 34.50     Estimated Creatinine Clearance: 124.8 mL/min (A) (by C-G formula based on SCr of 0.61 mg/dL (L)).      Microbiology:  Blood Culture   Date Value Ref Range Status   09/24/2024 No growth at 24 hours  Preliminary     No results found for: \"BCIDPCR\", \"CXREFLEX\", \"CSFCX\", \"CULTURETIS\"  No results found for: \"CULTURES\", \"HSVCX\", \"URCX\"  No results found for: \"EYECULTURE\", \"GCCX\", \"HSVCULTURE\", \"LABHSV\"  No results found for: \"LEGIONELLA\", \"MRSACX\", \"MUMPSCX\", \"MYCOPLASCX\"  No results found for: \"NOCARDIACX\", \"STOOLCX\"  No results found for: \"THROATCX\", \"UNSTIMCULT\", \"URINECX\", \"CULTURE\", \"VZVCULTUR\"  No results found for: \"VIRALCULTU\", \"WOUNDCX\"        Radiology:  Imaging Results (Last 72 Hours)       Procedure Component Value Units Date/Time    CT Head Without Contrast [[id number]] Collected: 09/30/24 1259     Updated: 09/30/24 1304    Narrative:      CT HEAD WO CONTRAST    Date of Exam: 9/30/2024 12:40 PM EDT    Indication: Confusion.    Comparison: None available.    Technique: Axial CT images were obtained of the head " without contrast administration.  Automated exposure control and iterative construction methods were used.      Findings:  There is no evidence of acute infarction.    There there is no acute intracranial hemorrhage. There are no extra-axial collections.    Ventricles and CSF spaces are symmetric. No mass effect nor hydrocephalus.    Brain parenchyma appears normal for age.     Paranasal sinuses and mastoid air cells are adequately aerated.     Osseous structures and orbits appear intact.      Impression:      Impression:    1. No acute intracranial process.        Electronically Signed: Yarelis Gavin MD    9/30/2024 1:01 PM EDT    Workstation ID: [id number]    XR Knee 1 or 2 View Right [[id number]] Collected: 09/30/24 1146     Updated: 09/30/24 1151    Narrative:      XR KNEE 1 OR 2 VW RIGHT    Date of Exam: 9/30/2024 11:28 AM EDT    Indication: right knee pain    Comparison: None available.    Findings:  Osteopenia. Alignment anatomic. Large joint effusion. Prepatellar and infrapatellar soft tissue swelling. No fracture by radiograph. Moderate lateral and mild patellofemoral compartment joint space narrowing.      Impression:      Impression:  Large joint effusion. Mild patellofemoral compartment and moderate lateral compartment arthritis with osteopenia. No fractures or dislocations on radiograph.      Electronically Signed: Azra Ortiz MD    9/30/2024 11:48 AM EDT    Workstation ID: [id number]              Impression:   1.  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  He is at high risk for persistent enterococcal infection.  He is now on high-dose intravenous ampicillin and ceftriaxone.  2.  Enterococcal bacteremia-6/24  3.  Corynebacterium bacteremia-this is likely a skin contaminant  4. History of right knee septic arthritis-6/24.  This was secondary to his enterococcal pacemaker lead endocarditis.  5. Valvular heart disease-he may require mitral  valve and tricuspid valve surgical intervention  6.  Ischemic cardiomyopathy  7.  HFrEF-his EF is 25%  8.  Transaminitis-this may be secondary to congestive hepatopathy related to his severe TR.  9.  Anemia  10.  Atrial fibrillation  11.  History of penicillin allergy- he is tolerating ampicillin  12.  Coronary artery disease  13.  Eosinophilia-I will order  strongyloides serology.    PLAN/RECOMMENDATIONS:   1.  ICD lead extraction  2.  Rocephin 2 g IV every 12 hours  3.  Ampicillin 2 g IV every 4 hours  4.  Possible mitral valve repair/replacement and tricuspid valve replacement.  5.  Strongyloides serology.    Flo Marcial MD  10/1/2024  07:14 EDT

## 2024-10-01 NOTE — PAYOR COMM NOTE
"Howard Spears (64 y.o. Male)     [id number]     Janene Sykes, RN  Utilization Review  [phone]  [phone]        Date of Birth   1960    Social Security Number       Address   [address] [place] [id number]    Home Phone   [phone]    MRN   [id number]       Worship   [employer]    Marital Status                               Admission Date   24    Admission Type   Urgent    Admitting Provider   Sonia Sierra MD    Attending Provider   Sonia Sierra MD    Department, Room/Bed   [facility] 6A, N621/1       Discharge Date       Discharge Disposition   Rehab Facility or Unit (DC - External)    Discharge Destination                                 Attending Provider: Sonia Sierra MD    Allergies: Morphine And Codeine, Penicillins, Sulfa Antibiotics    Isolation: None   Infection: None   Code Status: CPR    Ht: 167.6 cm (66\")   Wt: 74.1 kg (163 lb 5.8 oz)    Admission Cmt: None   Principal Problem: Endocarditis [I38]                   Active Insurance as of 2024       Primary Coverage       Payor Plan Insurance Group Employer/Plan Group    ANTHEM MEDICARE REPLACEMENT ANTHEM MEDICARE ADVANTAGE [id number]       Payor Plan Address Payor Plan Phone Number Payor Plan Fax Number Effective Dates    [address] [phone]  2024 - None Entered    [facility] [phone]         Subscriber Name Subscriber Birth Date Member ID       HOWARD SPEARS 1960 [id number]                     Emergency Contacts        (Rel.) Home Phone Work Phone Mobile Phone    Kincaid,Phoenix (Brother) [phone] -- [phone]    Slim Blanchard (Friend) [phone] -- [phone]                 Discharge Summary        Sonia Sierra MD at 10/01/24 [address] [place] [address] Medicine Services  DISCHARGE SUMMARY    Patient Name: Howard Spears  : 1960  MRN: [id number]    Date of Admission: 2024  7:33 " PM  Date of Discharge:  10/01/2024  Primary Care Physician: Shanelle Barrow APRN    Consults       Date and Time Order Name Status Description    9/30/2024  8:53 AM Inpatient Orthopedic Surgery Consult Completed     9/26/2024  1:13 PM Inpatient Cardiothoracic Surgery Consult Completed     9/25/2024 10:28 PM Inpatient Cardiology Consult Completed     9/25/2024 10:28 PM Inpatient Infectious Diseases Consult Completed             Hospital Course     Presenting Problem: Endocarditis.     Active Hospital Problems    Diagnosis  POA    **Endocarditis [I38]  Yes    Heart valve disease [I38]  Yes    Severe malnutrition [E43]  Yes    Persistent atrial fibrillation [I48.19]  Yes    Chronic systolic heart failure [I50.22]  Yes    Subclavian vein stenosis [I87.1]  Yes    ICD (implantable cardioverter-defibrillator), biventricular, in situ [Z95.810]  Yes    V-tach [I47.20]  Yes    Hypertension [I10]  Yes    Hypothyroidism [E03.9]  Yes    Obstructive sleep apnea [G47.33]  Yes    Ischemic cardiomyopathy [I25.5]  Yes    Coronary artery disease [I25.10]  Yes      Resolved Hospital Problems   No resolved problems to display.          Hospital Course:  Howard Owusu is a 64 y.o. male with chronic HFrEF, hypertension, hyperlipidemia, ischemic cardiomyopathy, hypothyroidism patient with recent dyspnea with exertion generalized weakness.  Echo done 9/24 revealed multiple vegetations on leads.     Copied text in this note has been reviewed and is accurate as of 10/01/2024        ICD leads endocarditis  History of prior endocarditis  Non ischemic cardiomyopathy:  MICHAEL with multiple vegetations and possible lead involvement  Left heart cath 9/27/2024 with nonobstructive coronary artery disease and patent stent to LAD.  EF 20%  Blood cultures from 9/24 growing Corynebacterium.  Currently on Rocephin and ampicillin per ID  Cardiology/EP following, patient will need pacemaker extraction in addition to valve surgery.    CTS consulted to  evaluate the patient for mitral valve repair versus replacement with tricuspid valve replacement and lead extractions  CTS's recommend transferring to  or similar for LVAD at time of double valve surgery if needed      Recent right knee septic arthritis status post arthroscopic I&D in June.   Concern for recent right septic knee joint.    -ID on board.  -Ortho eval per CTS request.  -No concern for septic joint per Ortho.  -Continue IV antibiotics per ID recommendations.        Chronic anemia  Severe iron deficiency  Hemoglobin stable around 8.  No active bleeding  Given severe iron deficiency anemia, Pt received IV iron infusion x 3 days     CAD  Hyperlipidemia  Hypertension  Ischemic cardiomyopathy/HFrEF EF 25%  Hypertension  - Pradaxa was on hold for possible incoming procedure.  - Pt was receiving  Lovenox.  - Continue aspirin, statin and Bumex     Hypothyroidism  Continue Synthroid     Back pain  Continue pain control     GILL     Mild confusion likely secondary to sepsis and hospital delirium. resolved  -Patient is moving his 4 extremities.  -No neurological deficit on my exam today.  -CT head negative for acute process  - Delirium precautions.     Addendum:  Dr. Jones has discussed the case with Dr. Abrams who will evaluate the patient for high risk mitral and tricuspid valve surgery along with lead extraction in the setting of active infection and low ejection fraction. Patient is at risk of postoperative cardiogenic shock that could require ECMO or ventricular assist device. [facility].   is the accepting MD.    Day of Discharge     HPI:   Pt is HD stable. Aware of the plan.    Review of Systems  No fever, chills, CP or abd pain.    Vital Signs:   Temp:  [97.6 °F (36.4 °C)-97.8 °F (36.6 °C)] 97.8 °F (36.6 °C)  Heart Rate:  [71-74] 73  Resp:  [18-20] 20  BP: (126-144)/(72-95) 131/90  Flow (L/min):  [2] 2      Physical Exam:  General: Comfortable, not in distress,  conversant and cooperative  Head: Atraumatic and normocephalic  Eyes: No Icterus. No pallor  Ears:  Ears appear intact d  Neck: Supple, trachea midline  Lungs: Clear to auscultation bilaterally, equal air entry, no wheezing or crackles  Heart, no gallop, No JVD, no lower extremity swelling  Abdomen:  Soft, no tenderness, no organomegaly, normal bowel sounds, no organomegaly  Extremities: pulses equal bilaterally  Skin: No bleeding, bruising or rash, normal skin turgor and elasticity    Pertinent  and/or Most Recent Results     LAB RESULTS:      Lab 10/01/24  0418 09/30/24  0509 09/29/24  0502 09/28/24  0416 09/27/24  0753 09/26/24  0333 09/26/24  0020 09/25/24  2152   WBC 4.89 5.36 4.36 3.96 3.79 4.65  --  4.43   HEMOGLOBIN 8.3* 8.4* 8.7* 8.6* 9.0* 7.8*  --  7.8*   HEMATOCRIT 28.3* 27.3* 28.6* 29.3* 31.1* 26.4*  --  26.3*   PLATELETS 149 167 205 194 148 164  --  160   NEUTROS ABS 2.49 2.74 1.97 2.13 1.90 2.82  --  2.62   IMMATURE GRANS (ABS) 0.01 0.02 0.02 0.01 0.01 0.01  --  0.01   LYMPHS ABS 1.03 0.88 1.11 0.86 0.78 0.79  --  0.65*   MONOS ABS 0.62 0.74 0.50 0.62 0.50 0.48  --  0.65   EOS ABS 0.69* 0.94* 0.71* 0.30 0.55* 0.53*  --  0.46*   MCV 85.8 83.5 84.4 86.7 87.6 88.3  --  87.7   SED RATE  --   --  55*  --   --   --   --   --    CRP  --  3.63* 4.21*  --   --   --   --   --    PROCALCITONIN  --   --   --   --   --   --   --  0.08   LACTATE  --   --   --   --   --  1.8 2.3* 3.1*   LDH  --   --   --   --   --   --   --  292*   PROTIME  --   --   --   --   --   --   --  21.7*         Lab 10/01/24  0418 09/30/24  1609 09/30/24  0509 09/29/24  0502 09/28/24  0416 09/27/24  0753 09/26/24  0333   SODIUM 139  --  138 135* 136 133* 135*   POTASSIUM 4.6 3.3* 3.3* 3.8 5.5* 5.1 4.2   CHLORIDE 104  --  101 101 104 104 104   CO2 26.0  --  24.0 21.0* 19.0* 14.0* 19.0*   ANION GAP 9.0  --  13.0 13.0 13.0 15.0 12.0   BUN 7*  --  12 18 15 12 15   CREATININE 0.61*  --  0.69* 0.84 0.81 0.71* 0.70*   EGFR 105.9  --  102.1 96.2  97.2 101.2 101.6   GLUCOSE 83  --  80 75 57* 74 79   CALCIUM 8.6  --  8.4* 8.8 9.6 9.0 8.7   MAGNESIUM  --   --  2.3 1.7 1.9 2.0 1.7   PHOSPHORUS  --   --  2.6 2.7 3.1 2.6 2.7   HEMOGLOBIN A1C  --   --   --   --  5.60  --   --          Lab 10/01/24  0418 09/30/24  0509 09/25/24 2152   TOTAL PROTEIN 7.1 6.7 7.3   ALBUMIN 3.0* 3.1* 3.2*   GLOBULIN 4.1 3.6 4.1   ALT (SGPT) 52* 63* 147*   AST (SGOT) 48* 61* 265*   BILIRUBIN 0.6 0.7 0.6   ALK PHOS 111 110 127*         Lab 09/26/24  0020 09/25/24 2152   HSTROP T 13 14   PROTIME  --  21.7*   INR  --  1.88*         Lab 09/28/24 0416   CHOLESTEROL 74   LDL CHOL 43   HDL CHOL 17*   TRIGLYCERIDES 57         Lab 09/26/24  0020 09/25/24 2152   IRON  --  14*   IRON SATURATION (TSAT)  --  3*   TIBC  --  405   TRANSFERRIN  --  272   FERRITIN  --  51.02   FOLATE 15.50  --    VITAMIN B 12 1,719*  --          Lab 09/30/24  1113   FIO2 21   CARBOXYHEMOGLOBIN (VENOUS) 1.7     Brief Urine Lab Results  (Last result in the past 365 days)        Color   Clarity   Blood   Leuk Est   Nitrite   Protein   CREAT   Urine HCG        02/16/24 0104 Dark Yellow   Clear     Negative                     Microbiology Results (last 10 days)       Procedure Component Value - Date/Time    Blood Culture - Blood, Arm, Left [[id number]]  (Normal) Collected: 09/25/24 2151    Lab Status: Final result Specimen: Blood from Arm, Left Updated: 09/30/24 2300     Blood Culture No growth at 5 days    Narrative:      Aerobic Bottle Only      Blood Culture - Blood, Hand, Left [[id number]]  (Normal) Collected: 09/25/24 2151    Lab Status: Final result Specimen: Blood from Hand, Left Updated: 09/30/24 2300     Blood Culture No growth at 5 days    Blood Culture - Blood, Arm, Right [[id number]]  (Normal) Collected: 09/24/24 1012    Lab Status: Final result Specimen: Blood from Arm, Right Updated: 09/29/24 1030     Blood Culture No growth at 5 days    Narrative:      Less than seven (7) mL's of blood was collected.   Insufficient quantity may yield false negative results.    Blood Culture - Blood, Hand, Left [[id number]]  (Abnormal) Collected: 09/24/24 1000    Lab Status: Final result Specimen: Blood from Hand, Left Updated: 09/27/24 0733     Blood Culture Corynebacterium species     Isolated from Anaerobic Bottle     Gram Stain Anaerobic Bottle Gram positive bacilli    Narrative:      Less than seven (7) mL's of blood was collected.  Insufficient quantity may yield false negative results.  Blood culture does not meet the specified criteria for PCR identification.  If pregnant, immunocompromised, or clinical concern for meningitis, call lab to run BCID for Listeria monocytogenes.  Probable contaminant requires clinical correlation, susceptibility not performed unless requested by physician.            CT Head Without Contrast    Result Date: 9/30/2024  CT HEAD WO CONTRAST Date of Exam: 9/30/2024 12:40 PM EDT Indication: Confusion. Comparison: None available. Technique: Axial CT images were obtained of the head without contrast administration.  Automated exposure control and iterative construction methods were used. Findings: There is no evidence of acute infarction. There there is no acute intracranial hemorrhage. There are no extra-axial collections. Ventricles and CSF spaces are symmetric. No mass effect nor hydrocephalus. Brain parenchyma appears normal for age.  Paranasal sinuses and mastoid air cells are adequately aerated.  Osseous structures and orbits appear intact.     Impression: 1. No acute intracranial process. Electronically Signed: Yarelis Gavin MD  9/30/2024 1:01 PM EDT  Workstation ID: [id number]    XR Knee 1 or 2 View Right    Result Date: 9/30/2024  XR KNEE 1 OR 2 VW RIGHT Date of Exam: 9/30/2024 11:28 AM EDT Indication: right knee pain Comparison: None available. Findings: Osteopenia. Alignment anatomic. Large joint effusion. Prepatellar and infrapatellar soft tissue swelling. No fracture by radiograph.  Moderate lateral and mild patellofemoral compartment joint space narrowing.     Impression: Large joint effusion. Mild patellofemoral compartment and moderate lateral compartment arthritis with osteopenia. No fractures or dislocations on radiograph. Electronically Signed: Azra Ortiz MD  9/30/2024 11:48 AM EDT  Workstation ID: [id number]    Cardiac Catheterization/Vascular Study    Result Date: 9/27/2024    No flow-limiting coronary artery disease   Widely patent LAD stents   LVEF 20%   Moderate to severe mitral regurgitation     Adult Transesophageal Echo (MICHAEL) W/ Cont if Necessary Per Protocol    Result Date: 9/24/2024    Left ventricular systolic function is moderately decreased. Estimated left ventricular EF = 25%   Moderate mitral valve regurgitation is present.   2 electronic lead is present in the right atrium. Large spherical mass/vegetation on right ventricular lead near tricuspid annulus. A second larger 1 cm mobile vegetation on lead seen proximally. Third vegetation small seen on right atrial lead..   There is a small mobile mass on the tricuspid valve affecting the septal leaflet.   Severe tricuspid valve regurgitation is present.   Estimated right ventricular systolic pressure from tricuspid regurgitation is moderately elevated (45-55 mmHg).      Results for orders placed during the hospital encounter of 02/13/24    Duplex Carotid Ultrasound CAR    Interpretation Summary    Right internal carotid artery demonstrates a less than 50% stenosis.    Left internal carotid artery demonstrates normal flow without evidence of hemodynamically significant stenosis.      Results for orders placed during the hospital encounter of 02/13/24    Duplex Carotid Ultrasound CAR    Interpretation Summary    Right internal carotid artery demonstrates a less than 50% stenosis.    Left internal carotid artery demonstrates normal flow without evidence of hemodynamically significant stenosis.      Results for orders placed during  the hospital encounter of 09/24/24    Adult Transesophageal Echo (MICHAEL) W/ Cont if Necessary Per Protocol    Interpretation Summary    Left ventricular systolic function is moderately decreased. Estimated left ventricular EF = 25%    Moderate mitral valve regurgitation is present.    2 electronic lead is present in the right atrium. Large spherical mass/vegetation on right ventricular lead near tricuspid annulus. A second larger 1 cm mobile vegetation on lead seen proximally. Third vegetation small seen on right atrial lead..    There is a small mobile mass on the tricuspid valve affecting the septal leaflet.    Severe tricuspid valve regurgitation is present.    Estimated right ventricular systolic pressure from tricuspid regurgitation is moderately elevated (45-55 mmHg).      Plan for Follow-up of Pending Labs/Results:   Pending Labs       Order Current Status    Strongyloides Antibody IgG, LINDA In process          Discharge Details        Discharge Medications        New Medications        Instructions Start Date   ampicillin 2 g in sodium chloride 0.9 % 100 mL IVPB   2 g, Intravenous, Every 4 Hours      cefTRIAXone 2,000 mg in sodium chloride 0.9 % 100 mL IVPB   2,000 mg, Intravenous, Every 12 Hours      Enoxaparin Sodium 30 MG/0.3ML solution prefilled syringe syringe  Commonly known as: LOVENOX   30 mg, Subcutaneous, Daily   Start Date: October 2, 2024     furosemide 40 MG tablet  Commonly known as: LASIX   40 mg, Oral, Daily   Start Date: October 2, 2024            Continue These Medications        Instructions Start Date   aspirin 81 MG chewable tablet   81 mg, Oral, Daily      atorvastatin 80 MG tablet  Commonly known as: LIPITOR   80 mg, Oral, Nightly      levothyroxine 150 MCG tablet  Commonly known as: SYNTHROID, LEVOTHROID   150 mcg, Oral, Daily      methocarbamol 500 MG tablet  Commonly known as: ROBAXIN   750 mg, Oral, 3 Times Daily PRN      multivitamin with minerals tablet tablet   1 tablet, Oral,  Daily      omeprazole 40 MG capsule  Commonly known as: priLOSEC   40 mg, Oral, Daily      traZODone 50 MG tablet  Commonly known as: DESYREL   50 mg, Oral, Nightly      VITAMIN B COMPLEX PO   Oral             Stop These Medications      bumetanide 2 MG tablet  Commonly known as: BUMEX     coenzyme Q10 100 MG capsule     dabigatran etexilate 150 MG capsu  Commonly known as: PRADAXA     potassium chloride ER 20 MEQ tablet controlled-release ER tablet  Commonly known as: K-TAB     Xigduo XR 5-1000 MG tablet  Generic drug: dapagliflozin-metformin HCl ER              Allergies   Allergen Reactions    Morphine And Codeine Hives and Rash     Other Reaction(s): PAINFUL RASH    Penicillins Other (See Comments), Mental Status Change and Unknown - Low Severity     Patient reports tolerating amoxicillin.    Other Reaction(s): LOC      Patient reports tolerating amoxicillin. Patient has tolerated Zosyn    Sulfa Antibiotics Unknown - Low Severity, Other (See Comments) and Unknown (See Comments)     Was told by MD that he is allergic but does not know the reaction         Discharge Disposition:  Rehab Facility or Unit (DC - External)    Diet:  Hospital:  Diet Order   Procedures    Diet: Cardiac; Healthy Heart (2-3 Na+); Fluid Consistency: Thin (IDDSI 0)                CODE STATUS:    Code Status and Medical Interventions: CPR (Attempt to Resuscitate); Full Support   Ordered at: 09/25/24 2139     Level Of Support Discussed With:    Patient     Code Status (Patient has no pulse and is not breathing):    CPR (Attempt to Resuscitate)     Medical Interventions (Patient has pulse or is breathing):    Full Support       Future Appointments   Date Time Provider Department Center   10/2/2024 11:15 AM Zeina Moya APRN [facility]   6/16/2025  3:30 PM Maurizio Cruz DO [facility]                 Sonia Sierra MD  10/01/24      Time Spent on Discharge:  I spent  45  minutes on this discharge activity which included:  face-to-face encounter with the patient, reviewing the data in the system, coordination of the care with the nursing staff as well as consultants, documentation, and entering orders.           Electronically signed by Sonia Sierra MD at 10/01/24 3169

## 2024-10-01 NOTE — PROGRESS NOTES
[facility] Cardiothoracic Surgery In-Patient Progress Note     LOS: 6 days     Chief Complaint: Endocarditis    Subjective  No acute events. No complaints.      Objective  Vital Signs  Temp:  [97.5 °F (36.4 °C)-97.8 °F (36.6 °C)] 97.8 °F (36.6 °C)  Heart Rate:  [71-74] 71  Resp:  [16-20] 20  BP: (126-138)/(81-91) 138/83        Physical Exam:   General Appearance: alert, appears stated age and cooperative   Lungs: clear to auscultation, respirations regular, respirations even, and respirations unlabored   Heart: RRR; systolic murmur noted       Results     Results from last 7 days   Lab Units 10/01/24  0418   WBC 10*3/mm3 4.89   HEMOGLOBIN g/dL 8.3*   HEMATOCRIT % 28.3*   PLATELETS 10*3/mm3 149     Results from last 7 days   Lab Units 10/01/24  0418   SODIUM mmol/L 139   POTASSIUM mmol/L 4.6   CHLORIDE mmol/L 104   CO2 mmol/L 26.0   BUN mg/dL 7*   CREATININE mg/dL 0.61*   GLUCOSE mg/dL 83   CALCIUM mg/dL 8.6     Assessment    Endocarditis    Ischemic cardiomyopathy    Coronary artery disease    Hypertension    Hypothyroidism    Obstructive sleep apnea    V-tach    ICD (implantable cardioverter-defibrillator), biventricular, in situ    Subclavian vein stenosis    Chronic systolic heart failure    Persistent atrial fibrillation    Heart valve disease    Severe malnutrition      Plan   Continue with medical optimization  Antibiotics per ID  Ongoing discussion with cardiology/EP about transferring to  or similar for LVAD at time of double valve surgery if needed    Jeni Cardozo, FRANKIE  10/01/24  06:59 EDT

## 2024-10-01 NOTE — DISCHARGE SUMMARY
[facility] Medicine Services  DISCHARGE SUMMARY    Patient Name: Howard Owusu  : 1960  MRN: [id number]    Date of Admission: 2024  7:33 PM  Date of Discharge:  10/01/2024  Primary Care Physician: Shanelle Barrow APRN    Consults       Date and Time Order Name Status Description    2024  8:53 AM Inpatient Orthopedic Surgery Consult Completed     2024  1:13 PM Inpatient Cardiothoracic Surgery Consult Completed     2024 10:28 PM Inpatient Cardiology Consult Completed     2024 10:28 PM Inpatient Infectious Diseases Consult Completed             Hospital Course     Presenting Problem: Endocarditis.     Active Hospital Problems    Diagnosis  POA    **Endocarditis [I38]  Yes    Heart valve disease [I38]  Yes    Severe malnutrition [E43]  Yes    Persistent atrial fibrillation [I48.19]  Yes    Chronic systolic heart failure [I50.22]  Yes    Subclavian vein stenosis [I87.1]  Yes    ICD (implantable cardioverter-defibrillator), biventricular, in situ [Z95.810]  Yes    V-tach [I47.20]  Yes    Hypertension [I10]  Yes    Hypothyroidism [E03.9]  Yes    Obstructive sleep apnea [G47.33]  Yes    Ischemic cardiomyopathy [I25.5]  Yes    Coronary artery disease [I25.10]  Yes      Resolved Hospital Problems   No resolved problems to display.          Hospital Course:  Howard Owusu is a 64 y.o. male with chronic HFrEF, hypertension, hyperlipidemia, ischemic cardiomyopathy, hypothyroidism patient with recent dyspnea with exertion generalized weakness.  Echo done  revealed multiple vegetations on leads.     Copied text in this note has been reviewed and is accurate as of 10/01/2024        ICD leads endocarditis  History of prior endocarditis  Non ischemic cardiomyopathy:  MICHAEL with multiple vegetations and possible lead involvement  Left heart cath 2024 with nonobstructive coronary artery disease and patent stent to LAD.  EF 20%  Blood cultures from  growing  Corynebacterium.  Currently on Rocephin and ampicillin per ID  Cardiology/EP following, patient will need pacemaker extraction in addition to valve surgery.    CTS consulted to evaluate the patient for mitral valve repair versus replacement with tricuspid valve replacement and lead extractions  CTS's recommend transferring to  or [facility] for LVAD at time of double valve surgery if needed      Recent right knee septic arthritis status post arthroscopic I&D in June.   Concern for recent right septic knee joint.    -ID on board.  -Ortho eval per CTS request.  -No concern for septic joint per Ortho.  -Continue IV antibiotics per ID recommendations.        Chronic anemia  Severe iron deficiency  Hemoglobin stable around 8.  No active bleeding  Given severe iron deficiency anemia, Pt received IV iron infusion x 3 days     CAD  Hyperlipidemia  Hypertension  Ischemic cardiomyopathy/HFrEF EF 25%  Hypertension  - Pradaxa was on hold for possible incoming procedure.  - Pt was receiving  Lovenox.  - Continue aspirin, statin and Bumex     Hypothyroidism  Continue Synthroid     Back pain  Continue pain control     GILL     Mild confusion likely secondary to sepsis and hospital delirium. resolved  -Patient is moving his 4 extremities.  -No neurological deficit on my exam today.  -CT head negative for acute process  - Delirium precautions.     Addendum:  Dr. Jones has discussed the case with Dr. Abrams who will evaluate the patient for high risk mitral and tricuspid valve surgery along with lead extraction in the setting of active infection and low ejection fraction. Patient is at risk of postoperative cardiogenic shock that could require ECMO or ventricular assist device. Transfer to [facility] for surgical evaluation.   is the accepting MD.    Day of Discharge     HPI:   Pt is HD stable. Aware of the plan.    Review of Systems  No fever, chills, CP or abd pain.    Vital Signs:   Temp:  [97.6 °F (36.4 °C)-97.8 °F (36.6 °C)]  97.8 °F (36.6 °C)  Heart Rate:  [71-74] 73  Resp:  [18-20] 20  BP: (126-144)/(72-95) 131/90  Flow (L/min):  [2] 2      Physical Exam:  General: Comfortable, not in distress, conversant and cooperative  Head: Atraumatic and normocephalic  Eyes: No Icterus. No pallor  Ears:  Ears appear intact d  Neck: Supple, trachea midline  Lungs: Clear to auscultation bilaterally, equal air entry, no wheezing or crackles  Heart, no gallop, No JVD, no lower extremity swelling  Abdomen:  Soft, no tenderness, no organomegaly, normal bowel sounds, no organomegaly  Extremities: pulses equal bilaterally  Skin: No bleeding, bruising or rash, normal skin turgor and elasticity    Pertinent  and/or Most Recent Results     LAB RESULTS:      Lab 10/01/24  0418 09/30/24  0509 09/29/24  0502 09/28/24  0416 09/27/24  0753 09/26/24  0333 09/26/24  0020 09/25/24  2152   WBC 4.89 5.36 4.36 3.96 3.79 4.65  --  4.43   HEMOGLOBIN 8.3* 8.4* 8.7* 8.6* 9.0* 7.8*  --  7.8*   HEMATOCRIT 28.3* 27.3* 28.6* 29.3* 31.1* 26.4*  --  26.3*   PLATELETS 149 167 205 194 148 164  --  160   NEUTROS ABS 2.49 2.74 1.97 2.13 1.90 2.82  --  2.62   IMMATURE GRANS (ABS) 0.01 0.02 0.02 0.01 0.01 0.01  --  0.01   LYMPHS ABS 1.03 0.88 1.11 0.86 0.78 0.79  --  0.65*   MONOS ABS 0.62 0.74 0.50 0.62 0.50 0.48  --  0.65   EOS ABS 0.69* 0.94* 0.71* 0.30 0.55* 0.53*  --  0.46*   MCV 85.8 83.5 84.4 86.7 87.6 88.3  --  87.7   SED RATE  --   --  55*  --   --   --   --   --    CRP  --  3.63* 4.21*  --   --   --   --   --    PROCALCITONIN  --   --   --   --   --   --   --  0.08   LACTATE  --   --   --   --   --  1.8 2.3* 3.1*   LDH  --   --   --   --   --   --   --  292*   PROTIME  --   --   --   --   --   --   --  21.7*         Lab 10/01/24  0418 09/30/24  1609 09/30/24  0509 09/29/24  0502 09/28/24  0416 09/27/24  0753 09/26/24  0333   SODIUM 139  --  138 135* 136 133* 135*   POTASSIUM 4.6 3.3* 3.3* 3.8 5.5* 5.1 4.2   CHLORIDE 104  --  101 101 104 104 104   CO2 26.0  --  24.0 21.0*  19.0* 14.0* 19.0*   ANION GAP 9.0  --  13.0 13.0 13.0 15.0 12.0   BUN 7*  --  12 18 15 12 15   CREATININE 0.61*  --  0.69* 0.84 0.81 0.71* 0.70*   EGFR 105.9  --  102.1 96.2 97.2 101.2 101.6   GLUCOSE 83  --  80 75 57* 74 79   CALCIUM 8.6  --  8.4* 8.8 9.6 9.0 8.7   MAGNESIUM  --   --  2.3 1.7 1.9 2.0 1.7   PHOSPHORUS  --   --  2.6 2.7 3.1 2.6 2.7   HEMOGLOBIN A1C  --   --   --   --  5.60  --   --          Lab 10/01/24  0418 09/30/24  0509 09/25/24  2152   TOTAL PROTEIN 7.1 6.7 7.3   ALBUMIN 3.0* 3.1* 3.2*   GLOBULIN 4.1 3.6 4.1   ALT (SGPT) 52* 63* 147*   AST (SGOT) 48* 61* 265*   BILIRUBIN 0.6 0.7 0.6   ALK PHOS 111 110 127*         Lab 09/26/24  0020 09/25/24  2152   HSTROP T 13 14   PROTIME  --  21.7*   INR  --  1.88*         Lab 09/28/24  0416   CHOLESTEROL 74   LDL CHOL 43   HDL CHOL 17*   TRIGLYCERIDES 57         Lab 09/26/24  0020 09/25/24  2152   IRON  --  14*   IRON SATURATION (TSAT)  --  3*   TIBC  --  405   TRANSFERRIN  --  272   FERRITIN  --  51.02   FOLATE 15.50  --    VITAMIN B 12 1,719*  --          Lab 09/30/24  1113   FIO2 21   CARBOXYHEMOGLOBIN (VENOUS) 1.7     Brief Urine Lab Results  (Last result in the past 365 days)        Color   Clarity   Blood   Leuk Est   Nitrite   Protein   CREAT   Urine HCG        02/16/24 0104 Dark Yellow   Clear     Negative                     Microbiology Results (last 10 days)       Procedure Component Value - Date/Time    Blood Culture - Blood, Arm, Left [[id number]]  (Normal) Collected: 09/25/24 2151    Lab Status: Final result Specimen: Blood from Arm, Left Updated: 09/30/24 2300     Blood Culture No growth at 5 days    Narrative:      Aerobic Bottle Only      Blood Culture - Blood, Hand, Left [[id number]]  (Normal) Collected: 09/25/24 2151    Lab Status: Final result Specimen: Blood from Hand, Left Updated: 09/30/24 2300     Blood Culture No growth at 5 days    Blood Culture - Blood, Arm, Right [[id number]]  (Normal) Collected: 09/24/24 1012    Lab Status: Final  result Specimen: Blood from Arm, Right Updated: 09/29/24 1030     Blood Culture No growth at 5 days    Narrative:      Less than seven (7) mL's of blood was collected.  Insufficient quantity may yield false negative results.    Blood Culture - Blood, Hand, Left [[id number]]  (Abnormal) Collected: 09/24/24 1000    Lab Status: Final result Specimen: Blood from Hand, Left Updated: 09/27/24 0733     Blood Culture Corynebacterium species     Isolated from Anaerobic Bottle     Gram Stain Anaerobic Bottle Gram positive bacilli    Narrative:      Less than seven (7) mL's of blood was collected.  Insufficient quantity may yield false negative results.  Blood culture does not meet the specified criteria for PCR identification.  If pregnant, immunocompromised, or clinical concern for meningitis, call lab to run BCID for Listeria monocytogenes.  Probable contaminant requires clinical correlation, susceptibility not performed unless requested by physician.            CT Head Without Contrast    Result Date: 9/30/2024  CT HEAD WO CONTRAST Date of Exam: 9/30/2024 12:40 PM EDT Indication: Confusion. Comparison: None available. Technique: Axial CT images were obtained of the head without contrast administration.  Automated exposure control and iterative construction methods were used. Findings: There is no evidence of acute infarction. There there is no acute intracranial hemorrhage. There are no extra-axial collections. Ventricles and CSF spaces are symmetric. No mass effect nor hydrocephalus. Brain parenchyma appears normal for age.  Paranasal sinuses and mastoid air cells are adequately aerated.  Osseous structures and orbits appear intact.     Impression: 1. No acute intracranial process. Electronically Signed: Yarelis Gavin MD  9/30/2024 1:01 PM EDT  Workstation ID: [id number]    XR Knee 1 or 2 View Right    Result Date: 9/30/2024  XR KNEE 1 OR 2 VW RIGHT Date of Exam: 9/30/2024 11:28 AM EDT Indication: right knee pain  Comparison: None available. Findings: Osteopenia. Alignment anatomic. Large joint effusion. Prepatellar and infrapatellar soft tissue swelling. No fracture by radiograph. Moderate lateral and mild patellofemoral compartment joint space narrowing.     Impression: Large joint effusion. Mild patellofemoral compartment and moderate lateral compartment arthritis with osteopenia. No fractures or dislocations on radiograph. Electronically Signed: Azra Ortiz MD  9/30/2024 11:48 AM EDT  Workstation ID: [id number]    Cardiac Catheterization/Vascular Study    Result Date: 9/27/2024    No flow-limiting coronary artery disease   Widely patent LAD stents   LVEF 20%   Moderate to severe mitral regurgitation     Adult Transesophageal Echo (MICHAEL) W/ Cont if Necessary Per Protocol    Result Date: 9/24/2024    Left ventricular systolic function is moderately decreased. Estimated left ventricular EF = 25%   Moderate mitral valve regurgitation is present.   2 electronic lead is present in the right atrium. Large spherical mass/vegetation on right ventricular lead near tricuspid annulus. A second larger 1 cm mobile vegetation on lead seen proximally. Third vegetation small seen on right atrial lead..   There is a small mobile mass on the tricuspid valve affecting the septal leaflet.   Severe tricuspid valve regurgitation is present.   Estimated right ventricular systolic pressure from tricuspid regurgitation is moderately elevated (45-55 mmHg).      Results for orders placed during the hospital encounter of 02/13/24    Duplex Carotid Ultrasound CAR    Interpretation Summary    Right internal carotid artery demonstrates a less than 50% stenosis.    Left internal carotid artery demonstrates normal flow without evidence of hemodynamically significant stenosis.      Results for orders placed during the hospital encounter of 02/13/24    Duplex Carotid Ultrasound CAR    Interpretation Summary    Right internal carotid artery demonstrates a less  than 50% stenosis.    Left internal carotid artery demonstrates normal flow without evidence of hemodynamically significant stenosis.      Results for orders placed during the hospital encounter of 09/24/24    Adult Transesophageal Echo (MICHAEL) W/ Cont if Necessary Per Protocol    Interpretation Summary    Left ventricular systolic function is moderately decreased. Estimated left ventricular EF = 25%    Moderate mitral valve regurgitation is present.    2 electronic lead is present in the right atrium. Large spherical mass/vegetation on right ventricular lead near tricuspid annulus. A second larger 1 cm mobile vegetation on lead seen proximally. Third vegetation small seen on right atrial lead..    There is a small mobile mass on the tricuspid valve affecting the septal leaflet.    Severe tricuspid valve regurgitation is present.    Estimated right ventricular systolic pressure from tricuspid regurgitation is moderately elevated (45-55 mmHg).      Plan for Follow-up of Pending Labs/Results:   Pending Labs       Order Current Status    Strongyloides Antibody IgG, LINDA In process          Discharge Details        Discharge Medications        New Medications        Instructions Start Date   ampicillin 2 g in sodium chloride 0.9 % 100 mL IVPB   2 g, Intravenous, Every 4 Hours      cefTRIAXone 2,000 mg in sodium chloride 0.9 % 100 mL IVPB   2,000 mg, Intravenous, Every 12 Hours      Enoxaparin Sodium 30 MG/0.3ML solution prefilled syringe syringe  Commonly known as: LOVENOX   30 mg, Subcutaneous, Daily   Start Date: October 2, 2024     furosemide 40 MG tablet  Commonly known as: LASIX   40 mg, Oral, Daily   Start Date: October 2, 2024            Continue These Medications        Instructions Start Date   aspirin 81 MG chewable tablet   81 mg, Oral, Daily      atorvastatin 80 MG tablet  Commonly known as: LIPITOR   80 mg, Oral, Nightly      levothyroxine 150 MCG tablet  Commonly known as: SYNTHROID, LEVOTHROID   150 mcg,  Oral, Daily      methocarbamol 500 MG tablet  Commonly known as: ROBAXIN   750 mg, Oral, 3 Times Daily PRN      multivitamin with minerals tablet tablet   1 tablet, Oral, Daily      omeprazole 40 MG capsule  Commonly known as: priLOSEC   40 mg, Oral, Daily      traZODone 50 MG tablet  Commonly known as: DESYREL   50 mg, Oral, Nightly      VITAMIN B COMPLEX PO   Oral             Stop These Medications      bumetanide 2 MG tablet  Commonly known as: BUMEX     coenzyme Q10 100 MG capsule     dabigatran etexilate 150 MG capsu  Commonly known as: PRADAXA     potassium chloride ER 20 MEQ tablet controlled-release ER tablet  Commonly known as: K-TAB     Xigduo XR 5-1000 MG tablet  Generic drug: dapagliflozin-metformin HCl ER              Allergies   Allergen Reactions    Morphine And Codeine Hives and Rash     Other Reaction(s): PAINFUL RASH    Penicillins Other (See Comments), Mental Status Change and Unknown - Low Severity     Patient reports tolerating amoxicillin.    Other Reaction(s): LOC      Patient reports tolerating amoxicillin. Patient has tolerated Zosyn    Sulfa Antibiotics Unknown - Low Severity, Other (See Comments) and Unknown (See Comments)     Was told by MD that he is allergic but does not know the reaction         Discharge Disposition:  Rehab Facility or Unit (DC - External)    Diet:  Hospital:  Diet Order   Procedures    Diet: Cardiac; Healthy Heart (2-3 Na+); Fluid Consistency: Thin (IDDSI 0)                CODE STATUS:    Code Status and Medical Interventions: CPR (Attempt to Resuscitate); Full Support   Ordered at: 09/25/24 8084     Level Of Support Discussed With:    Patient     Code Status (Patient has no pulse and is not breathing):    CPR (Attempt to Resuscitate)     Medical Interventions (Patient has pulse or is breathing):    Full Support       Future Appointments   Date Time Provider Department Center   10/2/2024 11:15 AM Zeina Moya APRN [facility]   6/16/2025  3:30 PM Anthony  Maurizio OCONNOR DO [facility]                 Sonia Sierra MD  10/01/24      Time Spent on Discharge:  I spent  45  minutes on this discharge activity which included: face-to-face encounter with the patient, reviewing the data in the system, coordination of the care with the nursing staff as well as consultants, documentation, and entering orders.

## 2024-10-01 NOTE — CASE MANAGEMENT/SOCIAL WORK
Case Management Discharge Note      Final Note: Plan is for Mr. Owusu to transfer to [facility] at 1730 tonight, via ambulance. No further discharge needs noted.         Selected Continued Care - Admitted Since 9/25/2024       Destination    No services have been selected for the patient.                Durable Medical Equipment    No services have been selected for the patient.                Dialysis/Infusion    No services have been selected for the patient.                Home Medical Care    No services have been selected for the patient.                Therapy    No services have been selected for the patient.                Community Resources    No services have been selected for the patient.                Community & DME    No services have been selected for the patient.                         Final Discharge Disposition Code: 02 - short [facility] for [facility]

## 2024-10-01 NOTE — SIGNIFICANT NOTE
Received a call from GWEN Howell stating  called and wanted demographics on patient to arrange a transfer, when a bed becomes available. BLAIR called  transfer admissions line and spoke with Flo. He said they have received the referral at 1500, but Mr. Owusu is not on the admission list yet. The physician is reviewing the case and they will call back when they have a bed. The RN will need to call [facility] at [phone] to let them know that Mr. Owusu has a bed at . [facility] will assist in arranging ambulance transportation. [facility] is in the EMS dropbox.

## 2024-10-03 LAB — STRONGYLOIDES IGG SER QL IA: NEGATIVE

## 2024-10-09 ENCOUNTER — TELEPHONE (OUTPATIENT)
Dept: CARDIOLOGY | Facility: CLINIC | Age: 64
End: 2024-10-09

## 2024-10-09 ENCOUNTER — HOSPITAL ENCOUNTER (EMERGENCY)
Facility: HOSPITAL | Age: 64
Discharge: HOME | End: 2024-10-10
Attending: EMERGENCY MEDICINE
Payer: MEDICARE

## 2024-10-09 DIAGNOSIS — J90 PLEURAL EFFUSION: Primary | ICD-10-CM

## 2024-10-09 LAB
ALBUMIN SERPL-MCNC: 3.6 G/DL (ref 3.5–5.2)
ALBUMIN/GLOB SERPL: 0.8 G/DL
ALP SERPL-CCNC: 132 U/L (ref 39–117)
ALT SERPL W P-5'-P-CCNC: 29 U/L (ref 1–41)
ANION GAP SERPL CALCULATED.3IONS-SCNC: 12 MMOL/L (ref 5–15)
ANISOCYTOSIS BLD QL: NORMAL
AST SERPL-CCNC: 60 U/L (ref 1–40)
BACTERIA UR QL AUTO: ABNORMAL /HPF
BASOPHILS # BLD AUTO: 0.09 10*3/MM3 (ref 0–0.2)
BASOPHILS NFR BLD AUTO: 1.7 % (ref 0–1.5)
BILIRUB SERPL-MCNC: 1.1 MG/DL (ref 0–1.2)
BILIRUB UR QL STRIP: ABNORMAL
BUN SERPL-MCNC: 18 MG/DL (ref 8–23)
BUN/CREAT SERPL: 18.6 (ref 7–25)
CALCIUM SPEC-SCNC: 9.2 MG/DL (ref 8.6–10.5)
CHLORIDE SERPL-SCNC: 104 MMOL/L (ref 98–107)
CLARITY UR: ABNORMAL
CO2 SERPL-SCNC: 21 MMOL/L (ref 22–29)
COLOR UR: ABNORMAL
CREAT SERPL-MCNC: 0.97 MG/DL (ref 0.76–1.27)
CRP SERPL-MCNC: 2.17 MG/DL (ref 0–0.5)
D-LACTATE SERPL-SCNC: 2.7 MMOL/L (ref 0.5–2)
DEPRECATED RDW RBC AUTO: 72.2 FL (ref 37–54)
EGFRCR SERPLBLD CKD-EPI 2021: 86.1 ML/MIN/1.73
ELLIPTOCYTES BLD QL SMEAR: NORMAL
EOSINOPHIL # BLD AUTO: 0.59 10*3/MM3 (ref 0–0.4)
EOSINOPHIL NFR BLD AUTO: 11.1 % (ref 0.3–6.2)
ERYTHROCYTE [DISTWIDTH] IN BLOOD BY AUTOMATED COUNT: 23.9 % (ref 12.3–15.4)
ERYTHROCYTE [SEDIMENTATION RATE] IN BLOOD: 95 MM/HR (ref 0–20)
GLOBULIN UR ELPH-MCNC: 4.6 GM/DL
GLUCOSE SERPL-MCNC: 97 MG/DL (ref 65–99)
GLUCOSE UR STRIP-MCNC: NEGATIVE MG/DL
HCT VFR BLD AUTO: 39 % (ref 37.5–51)
HGB BLD-MCNC: 11.5 G/DL (ref 13–17.7)
HGB UR QL STRIP.AUTO: NEGATIVE
IMM GRANULOCYTES # BLD AUTO: 0.02 10*3/MM3 (ref 0–0.05)
IMM GRANULOCYTES NFR BLD AUTO: 0.4 % (ref 0–0.5)
KETONES UR QL STRIP: NEGATIVE
LEUKOCYTE ESTERASE UR QL STRIP.AUTO: NEGATIVE
LYMPHOCYTES # BLD AUTO: 1.83 10*3/MM3 (ref 0.7–3.1)
LYMPHOCYTES NFR BLD AUTO: 34.5 % (ref 19.6–45.3)
MAGNESIUM SERPL-MCNC: 1.8 MG/DL (ref 1.6–2.4)
MCH RBC QN AUTO: 26 PG (ref 26.6–33)
MCHC RBC AUTO-ENTMCNC: 29.5 G/DL (ref 31.5–35.7)
MCV RBC AUTO: 88.2 FL (ref 79–97)
MONOCYTES # BLD AUTO: 0.57 10*3/MM3 (ref 0.1–0.9)
MONOCYTES NFR BLD AUTO: 10.8 % (ref 5–12)
NEUTROPHILS NFR BLD AUTO: 2.2 10*3/MM3 (ref 1.7–7)
NEUTROPHILS NFR BLD AUTO: 41.5 % (ref 42.7–76)
NITRITE UR QL STRIP: NEGATIVE
NRBC BLD AUTO-RTO: 0 /100 WBC (ref 0–0.2)
PH UR STRIP.AUTO: 6.5 [PH] (ref 5–8)
PLAT MORPH BLD: NORMAL
PLATELET # BLD AUTO: 149 10*3/MM3 (ref 140–450)
PMV BLD AUTO: 11.1 FL (ref 6–12)
POTASSIUM SERPL-SCNC: 4.4 MMOL/L (ref 3.5–5.2)
PROCALCITONIN SERPL-MCNC: 0.06 NG/ML (ref 0–0.25)
PROT SERPL-MCNC: 8.2 G/DL (ref 6–8.5)
PROT UR QL STRIP: ABNORMAL
RBC # BLD AUTO: 4.42 10*6/MM3 (ref 4.14–5.8)
RBC # UR STRIP: ABNORMAL /HPF
REF LAB TEST METHOD: ABNORMAL
SODIUM SERPL-SCNC: 137 MMOL/L (ref 136–145)
SP GR UR STRIP: 1.02 (ref 1–1.03)
SQUAMOUS #/AREA URNS HPF: ABNORMAL /HPF
TROPONIN T SERPL HS-MCNC: 15 NG/L
UROBILINOGEN UR QL STRIP: ABNORMAL
WBC # UR STRIP: ABNORMAL /HPF
WBC MORPH BLD: NORMAL
WBC NRBC COR # BLD AUTO: 5.3 10*3/MM3 (ref 3.4–10.8)

## 2024-10-09 PROCEDURE — 80053 COMPREHEN METABOLIC PANEL: CPT | Performed by: PHYSICIAN ASSISTANT

## 2024-10-09 PROCEDURE — 85007 BL SMEAR W/DIFF WBC COUNT: CPT | Performed by: PHYSICIAN ASSISTANT

## 2024-10-09 PROCEDURE — 84484 ASSAY OF TROPONIN QUANT: CPT | Performed by: PHYSICIAN ASSISTANT

## 2024-10-09 PROCEDURE — 83735 ASSAY OF MAGNESIUM: CPT | Performed by: PHYSICIAN ASSISTANT

## 2024-10-09 PROCEDURE — 85025 COMPLETE CBC W/AUTO DIFF WBC: CPT | Performed by: PHYSICIAN ASSISTANT

## 2024-10-09 PROCEDURE — 99285 EMERGENCY DEPT VISIT HI MDM: CPT

## 2024-10-09 PROCEDURE — 85652 RBC SED RATE AUTOMATED: CPT | Performed by: PHYSICIAN ASSISTANT

## 2024-10-09 PROCEDURE — 93005 ELECTROCARDIOGRAM TRACING: CPT | Performed by: PHYSICIAN ASSISTANT

## 2024-10-09 PROCEDURE — 96360 HYDRATION IV INFUSION INIT: CPT

## 2024-10-09 PROCEDURE — 83605 ASSAY OF LACTIC ACID: CPT | Performed by: PHYSICIAN ASSISTANT

## 2024-10-09 PROCEDURE — 81001 URINALYSIS AUTO W/SCOPE: CPT | Performed by: PHYSICIAN ASSISTANT

## 2024-10-09 PROCEDURE — 86140 C-REACTIVE PROTEIN: CPT | Performed by: PHYSICIAN ASSISTANT

## 2024-10-09 PROCEDURE — 84145 PROCALCITONIN (PCT): CPT | Performed by: PHYSICIAN ASSISTANT

## 2024-10-09 PROCEDURE — 25810000003 SODIUM CHLORIDE 0.9 % SOLUTION: Performed by: PHYSICIAN ASSISTANT

## 2024-10-09 RX ADMIN — SODIUM CHLORIDE 1000 ML: 9 INJECTION, SOLUTION INTRAVENOUS at 23:23

## 2024-10-09 NOTE — TELEPHONE ENCOUNTER
Caller: [place]    Relationship to patient: Emergency Contact    Best call back number: [phone]    Chief complaint: LETHARGIC, DISORIENTED, CONFUSED, UNABLE TO WALK. PT HAS NOT HAD SOLID FOODS, HE HAS ONLY BEEN DRINKING ENSURES        Additional notes: PT WAS IN THE HOSPITAL FOR OVER 2 MONTHS IN [place], WAS SENT TO A  NURSING HOME FOR 2 WEEKS. TREATED WITH IV ANTIBIOTICS. INSURANCE COVERED FOR HOME HEALTH CARE. HE WENT BACK TO THE HOSPITAL AND HE WAS SENT HOME FROM [facility] ON 10/01/24 WITH NO ANTIBIOTICS AND NO HOME HEALTH COVERAGE. BROTHER SAYS WHEN HE GOT HOME, PT WAS HAVING THESE SYMPTOMS. BROTHER IS WANTING THE DOCTOR TO KNOW ABOUT THIS SITUATION. HE IS BRINGING HIM TO ER, AND WANTS TO KNOW WHICH CARDIOLOGIST IS ON CALL TONIGHT.     ADVISED PT'S BROTHER TO TAKE PT TO THE ER.     PT'S BROTHER IS TAKING PT TO THE ER [facility] ([facility]). PLEASE CALL PT'S BROTHER TO DISCUSS THIS. THANK YOU!

## 2024-10-10 ENCOUNTER — APPOINTMENT (OUTPATIENT)
Dept: CT IMAGING | Facility: HOSPITAL | Age: 64
End: 2024-10-10
Payer: MEDICARE

## 2024-10-10 VITALS
TEMPERATURE: 98.1 F | DIASTOLIC BLOOD PRESSURE: 110 MMHG | BODY MASS INDEX: 24.11 KG/M2 | HEART RATE: 71 BPM | OXYGEN SATURATION: 100 % | WEIGHT: 150 LBS | SYSTOLIC BLOOD PRESSURE: 148 MMHG | HEIGHT: 66 IN | RESPIRATION RATE: 16 BRPM

## 2024-10-10 LAB
D-LACTATE SERPL-SCNC: 2.3 MMOL/L (ref 0.5–2)
QT INTERVAL: 424 MS
QTC INTERVAL: 473 MS

## 2024-10-10 PROCEDURE — 36415 COLL VENOUS BLD VENIPUNCTURE: CPT

## 2024-10-10 PROCEDURE — 25510000001 IOPAMIDOL PER 1 ML: Performed by: EMERGENCY MEDICINE

## 2024-10-10 PROCEDURE — 71275 CT ANGIOGRAPHY CHEST: CPT

## 2024-10-10 PROCEDURE — 83605 ASSAY OF LACTIC ACID: CPT | Performed by: PHYSICIAN ASSISTANT

## 2024-10-10 RX ORDER — IOPAMIDOL 755 MG/ML
80 INJECTION, SOLUTION INTRAVASCULAR
Status: COMPLETED | OUTPATIENT
Start: 2024-10-10 | End: 2024-10-10

## 2024-10-10 RX ADMIN — IOPAMIDOL 80 ML: 755 INJECTION, SOLUTION INTRAVENOUS at 00:20

## 2024-10-10 NOTE — ED PROVIDER NOTES
Subjective  History of Present Illness:    Chief Complaint: Weakness, decreased appetite, concerned about infection  History of Present Illness: 64-year-old male who was recently discharged from .  I reviewed the notes from the infectious disease progress note as well as cardiology from recent hospitalization.  The most recent note from  dated 10/4/2024 reports that the patient has a past medical history of heart failure, he has a pacemaker, A-fib, and coronary disease.  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  At that time he also had a right knee septic arthritis.  He was treated with daptomycin for 6 weeks, with a end date of 8/5/2024.  The note also indicates that in September 2024 he was seen at [facility] by cardiology outpatient ordered a repeat MICHAEL to follow-up on the lead vegetation which was done on 9/24 that showed moderate mitral regurgitation, a large spherical mass, vegetation on the right ventricular lead near the tricuspid and a second large 1 cm mobile vegetation on the lead proximally and a third vegetation on the right atrial lead as well as a vegetation on the TV with severe TR.  He was admitted at [facility], seen by infectious disease, blood cultures collected 924 were positive for Corynebacterium on 1 of 4 bottles.  Suspected contaminant.  Repeat blood cultures on 925 showed no growth.  He was initially on Vanco and Zosyn and switched to ceftriaxone\ampicillin per ID.  Transfer request was placed to  for evaluation by Dr. Carlos for high risk surgical intervention.  He was continued on ceftriaxone and ampicillin after transfer.  Regarding his right knee [facility] saw the patient on 9/30/2024 reported no concern for septic arthritis.  Infectious disease also indicates that per  cardiology note at the time of his admission at  there was no plan to remove the ICD, pending plans for CTS management of TV vegetation\TR.  From ID perspective the patient already received appropriate treatment for E. Faecalis ICD infection for 6 weeks of daptomycin.  He was stable from an infectious disease standpoint off of antibiotics and there was no plan to remove the device.  Patient reports that he has continued weakness, has not been eating or drinking well, he is concerned about sepsis.  Onset: Gradual  Duration: Several months  Exacerbating / Alleviating factors: Recently treated for endocarditis, and infection of his pacemaker  Associated symptoms: Weakness, decreased appetite      Nurses Notes reviewed and agree, including vitals, allergies, social history and prior medical history.     REVIEW OF SYSTEMS: All systems reviewed and not pertinent unless noted.    Review of Systems   Constitutional:  Positive for chills and fatigue.   Neurological:  Positive for weakness.   All other systems reviewed and are negative.      Past Medical History:   Diagnosis Date    Arthritis     Atrial fibrillation     single episode during PCI with conversion to NSR witin 24 hrs    Blood clot in vein 05/07/2024    right groin    Coronary artery disease 2004    S/P Taxus RICHARD prox LAD, Cath June 2011 EF 20%, S/P distal RCA 3.0x12 and 3.0x8 Cypher, PLB 2.5x8 Cypher, and mid ALD 3.0x 13 Cypher    GERD (gastroesophageal reflux disease)     History of kidney stones     2000    Hyperlipidemia     Hypertension     Hypothyroidism     Ischemic cardiomyopathy     Hx of Inducible VT per EP study Nov 2004, S/P [employer] Eliezer ICD implant, upgrade to Bi-V iCD July 2008 Dr. Mtz June 2010 appropriate ICD shocks, Gen change 12/11/13 MGR    Obstructive sleep apnea     Old MI (myocardial infarction)        Allergies:    Morphine and codeine, Penicillins, and Sulfa antibiotics      Past Surgical History:   Procedure Laterality Date    CARDIAC  CATHETERIZATION      CARDIAC CATHETERIZATION N/A 03/14/2023    Procedure: Left Heart Cath;  Surgeon: Jose Jones MD;  Location:  [facility];  Service: Cardiovascular;  Laterality: N/A;    CARDIAC CATHETERIZATION N/A 9/27/2024    Procedure: Left Heart Cath;  Surgeon: Jose Jones MD;  Location:  [facility];  Service: Cardiovascular;  Laterality: N/A;    CARDIAC ELECTROPHYSIOLOGY PROCEDURE N/A 11/17/2020    Procedure: ICD DC generator change Generator change at patient's earliest convenience.  Given the atrial lead noise, will reevaluate at that time to see if atrial lead revision is also necessary. ;  Surgeon: Maurizio Cruz DO;  Location:  [facility] EP INVASIVE LOC[facility];  Service: Cardiology;  Laterality: N/A;    CARDIAC ELECTROPHYSIOLOGY PROCEDURE N/A 04/09/2021    Procedure: [employer] Eliezer BiV ICD RV lead, new;  Surgeon: Maurizio Cruz DO;  Location:  [facility] EP INVASIVE LOC[facility];  Service: Cardiology;  Laterality: N/A;    CARDIAC ELECTROPHYSIOLOGY PROCEDURE N/A 05/25/2021    Procedure: RV ICD lead extraction (SJM) new RV lead insertion, no meds to hold, Hybrid OR, CT/OR back up;  Surgeon: Maurizio Cruz DO;  Location:  [facility] EP INVASIVE LOC[facility];  Service: Cardiovascular;  Laterality: N/A;    CARDIAC ELECTROPHYSIOLOGY PROCEDURE N/A 05/25/2021    Procedure: EP/CRM Study;  Surgeon: Marlon Bartlett MD;  Location:  [facility] EP INVASIVE LOC[facility];  Service: Cardiovascular;  Laterality: N/A;    CARDIAC ELECTROPHYSIOLOGY PROCEDURE N/A 03/06/2024    Procedure: AV node ablation;  Surgeon: Maurizio Cruz DO;  Location:  [facility] EP INVASIVE LOCATION;  Service: Cardiovascular;  Laterality: N/A;  please schedule after echo, ct of head and carotid    CARPAL TUNNEL RELEASE Bilateral     KIDNEY STONE SURGERY           Social History     Socioeconomic History    Marital status:    Tobacco Use    Smoking status: Never     Passive exposure: Current    Smokeless tobacco: Never   Vaping Use     "Vaping status: Never Used   Substance and Sexual Activity    Alcohol use: No    Drug use: No    Sexual activity: Defer         Family History   Problem Relation Age of Onset    Pulmonary fibrosis Mother     Alzheimer's disease Mother     Heart attack Father     No Known Problems Sister     No Known Problems Brother     No Known Problems Brother        Objective  Physical Exam:  BP (!) 148/110   Pulse 71   Temp 98.1 °F (36.7 °C) (Oral)   Resp 16   Ht 167.6 cm (66\")   Wt 68 kg (150 lb)   SpO2 100%   BMI 24.21 kg/m²      Physical Exam  Vitals and nursing note reviewed.   Constitutional:       Appearance: He is well-developed. He is ill-appearing.   HENT:      Head: Normocephalic and atraumatic.      Mouth/Throat:      Mouth: Mucous membranes are moist.   Eyes:      Extraocular Movements: Extraocular movements intact.   Cardiovascular:      Rate and Rhythm: Normal rate and regular rhythm.   Pulmonary:      Effort: Pulmonary effort is normal.      Breath sounds: Normal breath sounds.   Abdominal:      Palpations: Abdomen is soft.   Musculoskeletal:         General: Normal range of motion.      Cervical back: Normal range of motion.   Skin:     General: Skin is dry.      Coloration: Skin is jaundiced and pale.   Neurological:      Mental Status: He is oriented to person, place, and time.   Psychiatric:         Behavior: Behavior normal.         Thought Content: Thought content normal.         Judgment: Judgment normal.           Procedures    ED Course:    CT scan interpretation by me: Right greater than left pleural effusion    ED Course as of 10/10/24 0256   Wed Oct 09, 2024   2028 Review of previous  non ED visits, prior labs, prior imaging, available notes from prior evaluations or visits with specialists, medication list, allergies, past medical history, past surgical history     [CS]   2327 I Personally reviewed all laboratory studies performed in the emergency department  [CS]   2327 I updated the " patient/family  on all pending labs and lab results, and all pending radiology and  results and answered all questions.   [CS]   Thu Oct 10, 2024   0107 Discussed all findings with the patient at the bedside, he does have multiple effusion on his CT scan, chest x-ray showed pleural effusion in the past, he does not have a previous recent CT scan.  He has not been hypoxic, not requiring oxygen, no signs of sepsis. [CS]      ED Course User Index  [CS] Alfredo Damian Jr., LILLY       Lab Results (last 24 hours)       Procedure Component Value Units Date/Time    Urinalysis With Culture If Indicated - Urine, Clean Catch [[id number]]  (Abnormal) Collected: 10/09/24 2132    Specimen: Urine, Clean Catch Updated: 10/09/24 2230     Color, UA Dark Yellow     Appearance, UA Cloudy     pH, UA 6.5     Specific Gravity, UA 1.023     Glucose, UA Negative     Ketones, UA Negative     Bilirubin, UA Small (1+)     Blood, UA Negative     Protein, UA 30 mg/dL (1+)     Leuk Esterase, UA Negative     Nitrite, UA Negative     Urobilinogen, UA 2.0 E.U./dL    Narrative:      In absence of clinical symptoms, the presence of pyuria, bacteria, and/or nitrites on the urinalysis result does not correlate with infection.    Urinalysis, Microscopic Only - Urine, Clean Catch [[id number]]  (Abnormal) Collected: 10/09/24 2132    Specimen: Urine, Clean Catch Updated: 10/09/24 2235     RBC, UA 0-2 /HPF      WBC, UA 0-2 /HPF      Comment: Urine culture not indicated.        Bacteria, UA None Seen /HPF      Squamous Epithelial Cells, UA 3-6 /HPF      Methodology Manual Light Microscopy    CBC Auto Differential [[id number]]  (Abnormal) Collected: 10/09/24 2157    Specimen: Blood Updated: 10/09/24 2310     WBC 5.30 10*3/mm3      RBC 4.42 10*6/mm3      Hemoglobin 11.5 g/dL      Hematocrit 39.0 %      MCV 88.2 fL      MCH 26.0 pg      MCHC 29.5 g/dL      RDW 23.9 %      RDW-SD 72.2 fl      MPV 11.1 fL      Platelets 149 10*3/mm3      Neutrophil % 41.5 %       Lymphocyte % 34.5 %      Monocyte % 10.8 %      Eosinophil % 11.1 %      Basophil % 1.7 %      Immature Grans % 0.4 %      Neutrophils, Absolute 2.20 10*3/mm3      Lymphocytes, Absolute 1.83 10*3/mm3      Monocytes, Absolute 0.57 10*3/mm3      Eosinophils, Absolute 0.59 10*3/mm3      Basophils, Absolute 0.09 10*3/mm3      Immature Grans, Absolute 0.02 10*3/mm3      nRBC 0.0 /100 WBC     Narrative:      Appended report. These results have been appended to a previously verified report.    Comprehensive Metabolic Panel [[id number]]  (Abnormal) Collected: 10/09/24 2157    Specimen: Blood Updated: 10/09/24 2228     Glucose 97 mg/dL      BUN 18 mg/dL      Creatinine 0.97 mg/dL      Sodium 137 mmol/L      Potassium 4.4 mmol/L      Comment: Slight hemolysis detected by analyzer. Result may be falsely elevated.        Chloride 104 mmol/L      CO2 21.0 mmol/L      Calcium 9.2 mg/dL      Total Protein 8.2 g/dL      Albumin 3.6 g/dL      ALT (SGPT) 29 U/L      AST (SGOT) 60 U/L      Alkaline Phosphatase 132 U/L      Total Bilirubin 1.1 mg/dL      Globulin 4.6 gm/dL      Comment: Calculated Result        A/G Ratio 0.8 g/dL      BUN/Creatinine Ratio 18.6     Anion Gap 12.0 mmol/L      eGFR 86.1 mL/min/1.73     Narrative:      GFR Normal >60  Chronic Kidney Disease <60  Kidney Failure <15      Lactic Acid, Plasma [[id number]]  (Abnormal) Collected: 10/09/24 2157    Specimen: Blood Updated: 10/09/24 2219     Lactate 2.7 mmol/L      Comment: Falsely depressed results may occur on samples drawn from patients receiving N-Acetylcysteine (NAC) or Metamizole.       Procalcitonin [[id number]]  (Normal) Collected: 10/09/24 2157    Specimen: Blood Updated: 10/09/24 2230     Procalcitonin 0.06 ng/mL     Narrative:      As a Marker for Sepsis (Non-Neonates):    1. <0.5 ng/mL represents a low risk of severe sepsis and/or septic shock.  2. >2 ng/mL represents a high risk of severe sepsis and/or septic shock.    As a Marker for Lower Respiratory  "Tract Infections that require antibiotic therapy:    PCT on Admission    Antibiotic Therapy       6-12 Hrs later    >0.5                Strongly Recommended  >0.25 - <0.5        Recommended   0.1 - 0.25          Discouraged              Remeasure/reassess PCT  <0.1                Strongly Discouraged     Remeasure/reassess PCT    As 28 day mortality risk marker: \"Change in Procalcitonin Result\" (>80% or <=80%) if Day 0 (or Day 1) and Day 4 values are available. Refer to http://[email or web address]-pct-[email or web address]    Change in PCT <=80%  A decrease of PCT levels below or equal to 80% defines a positive change in PCT test result representing a higher risk for 28-day all-cause mortality of patients diagnosed with severe sepsis for septic shock.    Change in PCT >80%  A decrease of PCT levels of more than 80% defines a negative change in PCT result representing a lower risk for 28-day all-cause mortality of patients diagnosed with severe sepsis or septic shock.       Sedimentation Rate [[id number]]  (Abnormal) Collected: 10/09/24 2157    Specimen: Blood Updated: 10/09/24 2221     Sed Rate 95 mm/hr     C-reactive Protein [[id number]]  (Abnormal) Collected: 10/09/24 2157    Specimen: Blood Updated: 10/09/24 2226     C-Reactive Protein 2.17 mg/dL     Magnesium [[id number]]  (Normal) Collected: 10/09/24 2157    Specimen: Blood Updated: 10/09/24 2227     Magnesium 1.8 mg/dL     Single High Sensitivity Troponin T [[id number]]  (Normal) Collected: 10/09/24 2157    Specimen: Blood Updated: 10/09/24 2225     HS Troponin T 15 ng/L     Narrative:      High Sensitive Troponin T Reference Range:  <14.0 ng/L- Negative Female for AMI  <22.0 ng/L- Negative Male for AMI  >=14 - Abnormal Female indicating possible myocardial injury.  >=22 - Abnormal Male indicating possible myocardial injury.   Clinicians would have to utilize clinical acumen, EKG, Troponin, and serial changes to determine if it is an Acute Myocardial Infarction or myocardial " injury due to an underlying chronic condition.         Scan Slide [[id number]] Collected: 10/09/24 2157    Specimen: Blood Updated: 10/09/24 2310     Anisocytosis Mod/2+     Elliptocytes Slight/1+     WBC Morphology Normal     Platelet Morphology Normal    STAT Lactic Acid, Reflex [[id number]]  (Abnormal) Collected: 10/10/24 0120    Specimen: Blood Updated: 10/10/24 0202     Lactate 2.3 mmol/L      Comment: Falsely depressed results may occur on samples drawn from patients receiving N-Acetylcysteine (NAC) or Metamizole.                CT Angiogram Chest Pulmonary Embolism    Result Date: 10/10/2024  CT ANGIOGRAM CHEST PULMONARY EMBOLISM Date of Exam: 10/10/2024 12:17 AM EDT Indication: h/o endocarditis. Chest pain Comparison: 3/14/2023. Technique: Axial CT images were obtained of the chest after the uneventful intravenous administration of intravenous contrast utilizing pulmonary embolism protocol.  Reconstructed coronal and sagittal images were also obtained. Automated exposure control  and iterative construction methods were used. Findings: Hilum and Mediastinum: No pathologically enlarged lymph nodes. The heart appears mildly enlarged.   No pericardial effusion. Atherosclerotic calcifications are present including within the coronary arteries. There is a left subclavian AICD/pacemaker in place. Unremarkable thoracic aorta and pulmonary arteries. No evidence of pulmonary embolism. Lung Parenchyma and Pleura: Moderate to large size right-sided pleural effusion with small to moderate size left-sided pleural effusion. Intralobular septal thickening is present with mild diffuse groundglass changes. No significant consolidation. No pneumothorax. No focal pulmonary nodule. Upper Abdomen: No acute process. Soft tissues: There is mild anasarca of the soft tissues. Osseous structures: No aggressive focal lytic or sclerotic osseous lesions.     Impression: Impression: 1.No evidence of pulmonary embolism. 2.Moderate to large  size right-sided pleural effusion with small to moderate size left-sided pleural effusion with mild to moderate pulmonary edema pattern. 3.Ancillary findings as described above. Electronically Signed: Jamaica Lazaro MD  10/10/2024 12:26 AM EDT  Workstation ID: [id number]        Medical Decision Making  Patient Presentation 64-year-old male presents emergency department concerned about sepsis, he had a recent complicated admission at [facility], was found to have endocarditis concern and vegetation on his valve he had recently completed 6 weeks of daptomycin and was cleared from  by cardiology and infectious disease agree presents with weakness, concerned that he may be septic again    DDX sepsis, endocarditis, pneumonia, urinary tract infection, dehydration, electrolyte abnormality, acute kidney insufficiency    Data Review/ Non ED Records /Analysis/Ordering unique tests  Review of previous  non ED visits, prior labs, prior imaging, available notes from prior evaluations or visits with specialists, medication list, allergies, past medical history, past surgical history        Independent Review Studies  I Personally reviewed all laboratory studies performed in the emergency department     Intervention/Re-evaluation intervention included fluids, reevaluation patient tracy stable    Independent Clinician discussed with my supervising physician Dr. Villa    Risk Stratification tools/clinical decision rules patient had a complicated recent history including hospitalizations, bacteremia, endocarditis, heart valve vegetation, and infected pacemaker, he completed a long round of antibiotics, was recently cleared from [facility] by infectious disease and cardiology, based on his previous history I was concerned about endocarditis, pneumonia, bacteremia, sepsis, acute kidney insufficiency, dehydration, labs were unremarkable, no signs of sepsis, he did have a pleural effusion right greater  than left on CT scan, he appeared to be asymptomatic, was not hypoxic or any distress.    Shared Decision Making discussed all finding with patient he was agreeable    Disposition patient stable for discharge with appropriate follow-up    Problems Addressed:  Pleural effusion: complicated acute illness or injury    Amount and/or Complexity of Data Reviewed  External Data Reviewed: labs, radiology and notes.     Details: Review of previous  non ED visits, prior labs, prior imaging, available notes from prior evaluations or visits with specialists, medication list, allergies, past medical history, past surgical history      Labs: ordered. Decision-making details documented in ED Course.     Details: I Personally reviewed all laboratory studies performed in the emergency department   Radiology: ordered and independent interpretation performed. Decision-making details documented in ED Course.  ECG/medicine tests: ordered.    Risk  Prescription drug management.          Final diagnoses:   Pleural effusion           Disposition DISCHARGE    Patient discharged in stable condition.    Reviewed implications of results, diagnosis, meds, responsibility to follow up, warning signs and symptoms of possible worsening, potential complications and reasons to return to ER.    Patient/Family voiced understanding of above instructions.    Discussed plan for discharge, as there is no emergent indication for admission.  Pt/family is agreeable and understands need for follow up and possible repeat testing.  Pt/family is aware that discharge does not mean that nothing is wrong but that it indicates no emergency is currently present that requires admission and they must continue care with follow-up as given below or with a physician of their choice.     FOLLOW-UP  [facility] EMERGENCY DEPARTMENT  [address] [id number] [phone]    If symptoms worsen    Shanelle Barrow, [facility]  [address]  Jewel Cooper  [address] [address]  [phone]    Schedule an appointment as soon as possible for a visit            Medication List      No changes were made to your prescriptions during this visit.             Alfredo Damian Jr., PA-C  10/10/24 0256

## 2024-10-13 NOTE — PROGRESS NOTES
"Enter Query Response Below      Query Response: Meeting sepsis criteria secondary to endocarditis             If applicable, please update the problem list.   Patient: Howard Owusu        : 1960  Account: [id number]           Admit Date: 2024        How to Respond to this query:       a. Click New Note     b. Answer query within the yellow box.                c. Update the Problem List, if applicable.      If you have any questions about this query contact me at: [email or web address]      Dr. Sierra,     64-year male patient with PMHx that includes septic arthritis with enterococcal bacteremia and vegetations on ICD leads following right knee arthroscopy in 2024, status post 6 weeks of IV antibiotic therapy admitted 24 with bacterial endocarditis, infected ICD leads, enterococcal bacteremia. H&P states, \" Patient is not septic.\"  Attending progress note states, \" Mild confusion likely secondary to sepsis.\" Discharge summary states, \" Mild confusion likely secondary to sepsis and hospital delirium resolved.\" Treatment included multiple IV antibiotics, cardiac catheterization (no intervention) transfer to [facility] for surgical evaluation.    : WBC 4.43 Lactate 3.1  Procalcitonin 0.08  : WBC 5.36  : Temperature 97.5 Heart Rate 73, Respiratory Rate 73, Blood Pressure 126/87. Patient remained afebrile throughout the hospitalization with vital signs WNL with exception of intermittent hypertension.     After study, was sepsis clinically supported during this admission?  > Sepsis was supported with additional clinical indicators:____________  > Sepsis was not supported  > Other- specify_____________  > Unable to determine    By submitting this query, we are merely seeking further clarification of documentation to accurately reflect all conditions that you are monitoring, evaluating, treating or that extend the hospitalization or utilize additional resources of care. Please " utilize your independent clinical judgment when addressing the question(s) above.     This query and your response, once completed, will be entered into the legal medical record.    Sincerely,  Angeles Elise RN, [facility]  Clinical Documentation Integrity Program   [email or web address]

## 2024-10-21 LAB
QT INTERVAL: 424 MS
QTC INTERVAL: 473 MS

## 2024-10-23 ENCOUNTER — TELEPHONE (OUTPATIENT)
Dept: CARDIOLOGY | Facility: CLINIC | Age: 64
End: 2024-10-23
Payer: MEDICARE

## 2024-10-23 RX ORDER — CARVEDILOL 6.25 MG/1
6.25 TABLET ORAL 2 TIMES DAILY
Qty: 180 TABLET | Refills: 3 | Status: SHIPPED | OUTPATIENT
Start: 2024-10-23

## 2024-10-23 NOTE — TELEPHONE ENCOUNTER
Palliative [facility] Sellers called with concerns for patient with recent hospital admissions of chf exacerbation. Patient was recently discharged from .    Cardiac medications at [facility] discharge  Aspirin 81 mg daily  Lipitor 80 mg daily  Carvedilol 6.25 mg BID    Patient is currently not taking carvedilol, he does not have BP cuff, he hopes to get one tomorrow.   Todays vitals from [facility] are 154/93 HR 70-80s    Verito also had concerns patient is not on any diuretics.     Please advise,

## 2024-10-23 NOTE — TELEPHONE ENCOUNTER
Spoke with patient he is agreeable to plan. Sent in script for coreg. Denisse can you make him appointment please?    Thank you

## (undated) DEVICE — DRSNG SURESITE123 4X4.8IN

## (undated) DEVICE — ST RETRV NDL EYE 13MM 94CM

## (undated) DEVICE — LEX ELECTRO PHYSIOLOGY: Brand: MEDLINE INDUSTRIES, INC.

## (undated) DEVICE — GLIDELIGHT™ LASER SHEATH: Brand: GLIDELIGHT™

## (undated) DEVICE — ADULT, W/LG. BACK PAD, RADIOTRANSPARENT ELEMENT AND LEAD WIRE: Brand: DEFIBRILLATION ELECTRODES

## (undated) DEVICE — SYS PREP BRIDGESHEATH OCCL HEMOS

## (undated) DEVICE — DECANT BG O JET

## (undated) DEVICE — CATH DIAG EXPO .056 FL3.5 6F 100CM

## (undated) DEVICE — TUBING, SUCTION, 1/4" X 10', STRAIGHT: Brand: MEDLINE

## (undated) DEVICE — ELECTRD RETRN/GRND MEGADYNE SGL/PLT W/CORD 9FT DISP

## (undated) DEVICE — ST EXT IV SMARTSITE 2VLV SP M LL 5ML IV1

## (undated) DEVICE — SET PRIMARY GRVTY 10DP MALE LL 104IN

## (undated) DEVICE — CANN NASL CO2 DIVIDED A/

## (undated) DEVICE — NDL ANGIOGR ADV THN SMOTH SGLWALL 21G 1.5

## (undated) DEVICE — RADIFOCUS GLIDEWIRE ADVANTAGE GUIDEWIRE: Brand: GLIDEWIRE ADVANTAGE

## (undated) DEVICE — ST EXT IV SMRTSTE 2VLV FIX M LL 6ML 41

## (undated) DEVICE — PK MINOR SPLT 10

## (undated) DEVICE — LEAD LOCKING DEVICE (LLD EZ): Brand: LLD EZ™

## (undated) DEVICE — PK CATH CARD 10

## (undated) DEVICE — HI-TORQUE VERSATURN F GUIDE WIRE FULLY COATED .014 STRAIGHT TIP 190 CM: Brand: HI-TORQUE VERSATURN

## (undated) DEVICE — DRSNG TELFA PAD NONADH STR 1S 3X8IN

## (undated) DEVICE — INTRO PEELAWAY SAFESHEATH II HEMO 9F23CM

## (undated) DEVICE — GW XCHG AMPLTZ XSTIF PTFE CRV .035IN 3X180CM

## (undated) DEVICE — INTRO SHEATH ENGAGE W/50 GW .038 9F12

## (undated) DEVICE — Device

## (undated) DEVICE — KT VLV HEMO MAP ACC PLS LG/BORE MTL/INTRO W/TORQ/DEV

## (undated) DEVICE — GUIDE CATHETER: Brand: MACH1™

## (undated) DEVICE — INTRO SHEATH ENGAGE W/50 GW .038 7F12

## (undated) DEVICE — COPILOT BLEEDBACK CONTROL VALVE: Brand: COPILOT

## (undated) DEVICE — ST INF PRI SMRTSTE 20DRP 2VLV 24ML 117

## (undated) DEVICE — MODEL BT2000 P/N 700287-012KIT CONTENTS: MANIFOLD WITH SALINE AND CONTRAST PORTS, SALINE TUBING WITH SPIKE AND HAND SYRINGE, TRANSDUCER: Brand: BT2000 AUTOMATED MANIFOLD KIT

## (undated) DEVICE — MODEL AT P65, P/N 701554-001KIT CONTENTS: HAND CONTROLLER, 3-WAY HIGH-PRESSURE STOPCOCK WITH ROTATING END AND PREMIUM HIGH-PRESSURE TUBING: Brand: ANGIOTOUCH® KIT

## (undated) DEVICE — GLIDESHEATH BASIC HYDROPHILIC COATED INTRODUCER SHEATH: Brand: GLIDESHEATH

## (undated) DEVICE — PLASMABLADE PS210-030S 3.0S LOCK: Brand: PLASMABLADE™

## (undated) DEVICE — INTRO SHEATH FAST/CATH LG/LUM 13F .038IN 12CM

## (undated) DEVICE — SKIN PREP TRAY W/CHG: Brand: MEDLINE INDUSTRIES, INC.

## (undated) DEVICE — Device: Brand: TIGHTRAIL SUB-C ROTATING DILATOR SHEATH

## (undated) DEVICE — DEV INFL MONARCH 25W

## (undated) DEVICE — GW PERIPH GUIDERIGHT STD/EXCHNG/J/TIP SS 0.035IN 5X260CM

## (undated) DEVICE — CATH DIAG EXPO M/ PK 6FR FL4/FR4 PIG 3PK

## (undated) DEVICE — MEDI-VAC YANKAUER SUCTION HANDLE W/BULBOUS TIP: Brand: CARDINAL HEALTH

## (undated) DEVICE — ABLATION CATHETER: Brand: INTELLATIP MIFI™ OPEN-IRRIGATED

## (undated) DEVICE — LD CLIPPERS

## (undated) DEVICE — INTRO PEELAWAY SAFESHEATH II HEMO10F23CM

## (undated) DEVICE — ADULT, W/LG. BACK PAD, RADIOTRANSPARENT ELEMENT AND LEAD WIRE COMPATIBLE W/: Brand: DEFIBRILLATION ELECTRODES

## (undated) DEVICE — CATH ULTRASND ECHO ACUNAV FOR ACUSON 8F 90CM

## (undated) DEVICE — INTRO TEAR AWAY/LVD W/SD PRT 6F 13CM

## (undated) DEVICE — GW PRESSUREWIRE X WIRELESS FFR 175CM

## (undated) DEVICE — ST SHEATH INTRO FEM CRVD 16FR

## (undated) DEVICE — DEV ONE-TIE COIL COMPR

## (undated) DEVICE — INTRO STEER AGILIS NXT SM/CURL 8.5F

## (undated) DEVICE — LIMB HOLDER, WRIST/ANKLE: Brand: DEROYAL

## (undated) DEVICE — BRIDGE OCCLUSION CATHETER - 80 MM LENGTH BALLOON: Brand: BRIDGE™ OCCLUSION BALLOON

## (undated) DEVICE — ADULT NASAL CO2 SAMPLING WITH O2 DELIVERY CANNULA FOR CAPNOFLEX MODULE: Brand: VITAL SIGNS™

## (undated) DEVICE — INTRO SHEATH ART/FEM ENGAGE .038 5F12CM

## (undated) DEVICE — SOL NACL 0.9PCT 1000ML

## (undated) DEVICE — IRRIGATOR BULB ASEPTO 60CC STRL

## (undated) DEVICE — Device: Brand: MEDEX

## (undated) DEVICE — NC TREK CORONARY DILATATION CATHETER 3.5 MM X 15 MM / RAPID-EXCHANGE: Brand: NC TREK

## (undated) DEVICE — TR BAND RADIAL ARTERY COMPRESSION DEVICE: Brand: TR BAND

## (undated) DEVICE — OPEN-IRRIGATION TUBING SET: Brand: METRIQ™ IRRIGATION TUBING SET

## (undated) DEVICE — TREK CORONARY DILATATION CATHETER 3.0 MM X 12 MM / RAPID-EXCHANGE: Brand: TREK

## (undated) DEVICE — INTRO STEER AGILIS NXT MD/CURL 8.5F 61CM

## (undated) DEVICE — EXT LD CARD BULLDG EXTRCT 70CM STRL

## (undated) DEVICE — DEV COMP RAD PRELUDESYNC 24CM

## (undated) DEVICE — INTRO SHEATH ENGAGE W/50 GW .038 8F12

## (undated) DEVICE — SYS CLS VASC/VENI VASCADE MVP 6TO12F

## (undated) DEVICE — GW INQWIRE FC PTFE STD J/1.5 .035 260